# Patient Record
Sex: FEMALE | Race: BLACK OR AFRICAN AMERICAN | Employment: UNEMPLOYED | URBAN - METROPOLITAN AREA
[De-identification: names, ages, dates, MRNs, and addresses within clinical notes are randomized per-mention and may not be internally consistent; named-entity substitution may affect disease eponyms.]

---

## 2017-04-05 ENCOUNTER — HOSPITAL ENCOUNTER (OUTPATIENT)
Dept: PREADMISSION TESTING | Age: 77
Discharge: HOME | End: 2017-04-05
Payer: COMMERCIAL

## 2017-04-05 VITALS
HEIGHT: 67 IN | OXYGEN SATURATION: 88 % | BODY MASS INDEX: 27.7 KG/M2 | SYSTOLIC BLOOD PRESSURE: 114 MMHG | DIASTOLIC BLOOD PRESSURE: 53 MMHG | HEART RATE: 81 BPM | RESPIRATION RATE: 16 BRPM | WEIGHT: 176.5 LBS

## 2017-04-05 LAB
ABSOLUTE EOS #: 0.2 K/UL (ref 0–0.4)
ABSOLUTE LYMPH #: 2.1 K/UL (ref 1–4.8)
ABSOLUTE MONO #: 0.5 K/UL (ref 0.2–0.8)
ANION GAP SERPL CALCULATED.3IONS-SCNC: 11 MMOL/L (ref 9–17)
BASOPHILS # BLD: 0 % (ref 0–2)
BASOPHILS ABSOLUTE: 0 K/UL (ref 0–0.2)
BUN BLDV-MCNC: 23 MG/DL (ref 8–23)
BUN/CREAT BLD: 28 (ref 9–20)
CALCIUM SERPL-MCNC: 9.3 MG/DL (ref 8.6–10.4)
CHLORIDE BLD-SCNC: 101 MMOL/L (ref 98–107)
CO2: 27 MMOL/L (ref 20–31)
CREAT SERPL-MCNC: 0.82 MG/DL (ref 0.5–0.9)
DIFFERENTIAL TYPE: ABNORMAL
EOSINOPHILS RELATIVE PERCENT: 3 % (ref 1–4)
GFR AFRICAN AMERICAN: >60 ML/MIN
GFR NON-AFRICAN AMERICAN: >60 ML/MIN
GFR SERPL CREATININE-BSD FRML MDRD: ABNORMAL ML/MIN/{1.73_M2}
GFR SERPL CREATININE-BSD FRML MDRD: ABNORMAL ML/MIN/{1.73_M2}
GLUCOSE BLD-MCNC: 77 MG/DL (ref 70–99)
HCT VFR BLD CALC: 39.9 % (ref 36–46)
HEMOGLOBIN: 13.3 G/DL (ref 12–16)
LYMPHOCYTES # BLD: 40 % (ref 24–44)
MCH RBC QN AUTO: 30.2 PG (ref 26–34)
MCHC RBC AUTO-ENTMCNC: 33.4 G/DL (ref 31–37)
MCV RBC AUTO: 90.6 FL (ref 80–100)
MONOCYTES # BLD: 9 % (ref 1–7)
PDW BLD-RTO: 14.3 % (ref 11.5–14.5)
PLATELET # BLD: 156 K/UL (ref 130–400)
PLATELET ESTIMATE: ABNORMAL
PMV BLD AUTO: 8.2 FL (ref 6–12)
POTASSIUM SERPL-SCNC: 4.7 MMOL/L (ref 3.7–5.3)
RBC # BLD: 4.41 M/UL (ref 4–5.2)
RBC # BLD: ABNORMAL 10*6/UL
SEG NEUTROPHILS: 48 % (ref 36–66)
SEGMENTED NEUTROPHILS ABSOLUTE COUNT: 2.6 K/UL (ref 1.8–7.7)
SODIUM BLD-SCNC: 139 MMOL/L (ref 135–144)
WBC # BLD: 5.3 K/UL (ref 3.5–11)
WBC # BLD: ABNORMAL 10*3/UL

## 2017-04-05 PROCEDURE — 85025 COMPLETE CBC W/AUTO DIFF WBC: CPT

## 2017-04-05 PROCEDURE — 80048 BASIC METABOLIC PNL TOTAL CA: CPT

## 2017-04-05 PROCEDURE — 36415 COLL VENOUS BLD VENIPUNCTURE: CPT

## 2017-04-05 PROCEDURE — 93005 ELECTROCARDIOGRAM TRACING: CPT

## 2017-04-06 LAB
EKG ATRIAL RATE: 71 BPM
EKG P AXIS: 26 DEGREES
EKG P-R INTERVAL: 192 MS
EKG Q-T INTERVAL: 410 MS
EKG QRS DURATION: 98 MS
EKG QTC CALCULATION (BAZETT): 445 MS
EKG R AXIS: 82 DEGREES
EKG T AXIS: 26 DEGREES
EKG VENTRICULAR RATE: 71 BPM

## 2017-04-17 ENCOUNTER — ANESTHESIA EVENT (OUTPATIENT)
Dept: OPERATING ROOM | Age: 77
End: 2017-04-17
Payer: COMMERCIAL

## 2017-04-18 ENCOUNTER — HOSPITAL ENCOUNTER (OUTPATIENT)
Age: 77
Setting detail: OUTPATIENT SURGERY
Discharge: HOME | End: 2017-04-18
Attending: PODIATRIST | Admitting: PODIATRIST
Payer: COMMERCIAL

## 2017-04-18 ENCOUNTER — ANESTHESIA (OUTPATIENT)
Dept: OPERATING ROOM | Age: 77
End: 2017-04-18
Payer: COMMERCIAL

## 2017-04-18 VITALS
TEMPERATURE: 97.7 F | HEART RATE: 70 BPM | RESPIRATION RATE: 17 BRPM | DIASTOLIC BLOOD PRESSURE: 53 MMHG | SYSTOLIC BLOOD PRESSURE: 134 MMHG | WEIGHT: 176.37 LBS | HEIGHT: 67 IN | BODY MASS INDEX: 27.68 KG/M2 | OXYGEN SATURATION: 96 %

## 2017-04-18 VITALS — OXYGEN SATURATION: 92 % | SYSTOLIC BLOOD PRESSURE: 103 MMHG | DIASTOLIC BLOOD PRESSURE: 57 MMHG

## 2017-04-18 PROCEDURE — 6360000002 HC RX W HCPCS: Performed by: NURSE ANESTHETIST, CERTIFIED REGISTERED

## 2017-04-18 PROCEDURE — 7100000001 HC PACU RECOVERY - ADDTL 15 MIN: Performed by: PODIATRIST

## 2017-04-18 PROCEDURE — 2580000003 HC RX 258: Performed by: ANESTHESIOLOGY

## 2017-04-18 PROCEDURE — 2500000003 HC RX 250 WO HCPCS: Performed by: NURSE ANESTHETIST, CERTIFIED REGISTERED

## 2017-04-18 PROCEDURE — 7100000000 HC PACU RECOVERY - FIRST 15 MIN: Performed by: PODIATRIST

## 2017-04-18 PROCEDURE — 2500000003 HC RX 250 WO HCPCS: Performed by: PODIATRIST

## 2017-04-18 PROCEDURE — 3600000012 HC SURGERY LEVEL 2 ADDTL 15MIN: Performed by: PODIATRIST

## 2017-04-18 PROCEDURE — 2580000003 HC RX 258: Performed by: NURSE ANESTHETIST, CERTIFIED REGISTERED

## 2017-04-18 PROCEDURE — 2720000010 HC SURG SUPPLY STERILE: Performed by: PODIATRIST

## 2017-04-18 PROCEDURE — 3700000000 HC ANESTHESIA ATTENDED CARE: Performed by: PODIATRIST

## 2017-04-18 PROCEDURE — 3600000002 HC SURGERY LEVEL 2 BASE: Performed by: PODIATRIST

## 2017-04-18 PROCEDURE — 3700000001 HC ADD 15 MINUTES (ANESTHESIA): Performed by: PODIATRIST

## 2017-04-18 RX ORDER — SODIUM CHLORIDE 0.9 % (FLUSH) 0.9 %
10 SYRINGE (ML) INJECTION EVERY 12 HOURS SCHEDULED
Status: DISCONTINUED | OUTPATIENT
Start: 2017-04-18 | End: 2017-04-18 | Stop reason: HOSPADM

## 2017-04-18 RX ORDER — PROPOFOL 10 MG/ML
INJECTION, EMULSION INTRAVENOUS CONTINUOUS PRN
Status: DISCONTINUED | OUTPATIENT
Start: 2017-04-18 | End: 2017-04-18 | Stop reason: SDUPTHER

## 2017-04-18 RX ORDER — FENTANYL CITRATE 50 UG/ML
INJECTION, SOLUTION INTRAMUSCULAR; INTRAVENOUS PRN
Status: DISCONTINUED | OUTPATIENT
Start: 2017-04-18 | End: 2017-04-18 | Stop reason: SDUPTHER

## 2017-04-18 RX ORDER — ONDANSETRON 2 MG/ML
4 INJECTION INTRAMUSCULAR; INTRAVENOUS
Status: DISCONTINUED | OUTPATIENT
Start: 2017-04-18 | End: 2017-04-18 | Stop reason: HOSPADM

## 2017-04-18 RX ORDER — LIDOCAINE HYDROCHLORIDE 10 MG/ML
INJECTION, SOLUTION EPIDURAL; INFILTRATION; INTRACAUDAL; PERINEURAL PRN
Status: DISCONTINUED | OUTPATIENT
Start: 2017-04-18 | End: 2017-04-18 | Stop reason: HOSPADM

## 2017-04-18 RX ORDER — CEFAZOLIN SODIUM 1 G/3ML
INJECTION, POWDER, FOR SOLUTION INTRAMUSCULAR; INTRAVENOUS PRN
Status: DISCONTINUED | OUTPATIENT
Start: 2017-04-18 | End: 2017-04-18 | Stop reason: SDUPTHER

## 2017-04-18 RX ORDER — SODIUM CHLORIDE, SODIUM LACTATE, POTASSIUM CHLORIDE, CALCIUM CHLORIDE 600; 310; 30; 20 MG/100ML; MG/100ML; MG/100ML; MG/100ML
INJECTION, SOLUTION INTRAVENOUS CONTINUOUS
Status: DISCONTINUED | OUTPATIENT
Start: 2017-04-19 | End: 2017-04-18 | Stop reason: HOSPADM

## 2017-04-18 RX ORDER — SODIUM CHLORIDE 9 MG/ML
INJECTION, SOLUTION INTRAVENOUS CONTINUOUS
Status: DISCONTINUED | OUTPATIENT
Start: 2017-04-19 | End: 2017-04-18 | Stop reason: ALTCHOICE

## 2017-04-18 RX ORDER — FENTANYL CITRATE 50 UG/ML
25 INJECTION, SOLUTION INTRAMUSCULAR; INTRAVENOUS EVERY 5 MIN PRN
Status: DISCONTINUED | OUTPATIENT
Start: 2017-04-18 | End: 2017-04-18 | Stop reason: HOSPADM

## 2017-04-18 RX ORDER — LABETALOL HYDROCHLORIDE 5 MG/ML
5 INJECTION, SOLUTION INTRAVENOUS EVERY 10 MIN PRN
Status: DISCONTINUED | OUTPATIENT
Start: 2017-04-18 | End: 2017-04-18 | Stop reason: HOSPADM

## 2017-04-18 RX ORDER — SODIUM CHLORIDE, SODIUM LACTATE, POTASSIUM CHLORIDE, CALCIUM CHLORIDE 600; 310; 30; 20 MG/100ML; MG/100ML; MG/100ML; MG/100ML
INJECTION, SOLUTION INTRAVENOUS CONTINUOUS PRN
Status: DISCONTINUED | OUTPATIENT
Start: 2017-04-18 | End: 2017-04-18 | Stop reason: SDUPTHER

## 2017-04-18 RX ORDER — HYDROMORPHONE HCL 110MG/55ML
0.5 PATIENT CONTROLLED ANALGESIA SYRINGE INTRAVENOUS EVERY 5 MIN PRN
Status: DISCONTINUED | OUTPATIENT
Start: 2017-04-18 | End: 2017-04-18 | Stop reason: HOSPADM

## 2017-04-18 RX ORDER — PROMETHAZINE HYDROCHLORIDE 25 MG/ML
6.25 INJECTION, SOLUTION INTRAMUSCULAR; INTRAVENOUS
Status: DISCONTINUED | OUTPATIENT
Start: 2017-04-18 | End: 2017-04-18 | Stop reason: HOSPADM

## 2017-04-18 RX ORDER — HYDRALAZINE HYDROCHLORIDE 20 MG/ML
5 INJECTION INTRAMUSCULAR; INTRAVENOUS EVERY 10 MIN PRN
Status: DISCONTINUED | OUTPATIENT
Start: 2017-04-18 | End: 2017-04-18 | Stop reason: HOSPADM

## 2017-04-18 RX ORDER — LIDOCAINE HYDROCHLORIDE 20 MG/ML
INJECTION, SOLUTION INFILTRATION; PERINEURAL PRN
Status: DISCONTINUED | OUTPATIENT
Start: 2017-04-18 | End: 2017-04-18 | Stop reason: SDUPTHER

## 2017-04-18 RX ORDER — SODIUM CHLORIDE 0.9 % (FLUSH) 0.9 %
10 SYRINGE (ML) INJECTION PRN
Status: DISCONTINUED | OUTPATIENT
Start: 2017-04-18 | End: 2017-04-18 | Stop reason: HOSPADM

## 2017-04-18 RX ORDER — LIDOCAINE HYDROCHLORIDE 10 MG/ML
1 INJECTION, SOLUTION EPIDURAL; INFILTRATION; INTRACAUDAL; PERINEURAL
Status: DISCONTINUED | OUTPATIENT
Start: 2017-04-19 | End: 2017-04-18 | Stop reason: HOSPADM

## 2017-04-18 RX ADMIN — LIDOCAINE HYDROCHLORIDE 40 MG: 20 INJECTION, SOLUTION INFILTRATION; PERINEURAL at 07:32

## 2017-04-18 RX ADMIN — FENTANYL CITRATE 25 MCG: 50 INJECTION, SOLUTION INTRAMUSCULAR; INTRAVENOUS at 07:32

## 2017-04-18 RX ADMIN — FENTANYL CITRATE 25 MCG: 50 INJECTION, SOLUTION INTRAMUSCULAR; INTRAVENOUS at 07:38

## 2017-04-18 RX ADMIN — FENTANYL CITRATE 25 MCG: 50 INJECTION, SOLUTION INTRAMUSCULAR; INTRAVENOUS at 07:51

## 2017-04-18 RX ADMIN — CEFAZOLIN SODIUM 2000 MG: 1 INJECTION, POWDER, FOR SOLUTION INTRAMUSCULAR; INTRAVENOUS at 07:34

## 2017-04-18 RX ADMIN — SODIUM CHLORIDE, POTASSIUM CHLORIDE, SODIUM LACTATE AND CALCIUM CHLORIDE: 600; 310; 30; 20 INJECTION, SOLUTION INTRAVENOUS at 07:27

## 2017-04-18 RX ADMIN — PROPOFOL 65 MCG/KG/MIN: 10 INJECTION, EMULSION INTRAVENOUS at 07:32

## 2017-04-18 RX ADMIN — FENTANYL CITRATE 25 MCG: 50 INJECTION, SOLUTION INTRAMUSCULAR; INTRAVENOUS at 07:34

## 2017-04-18 RX ADMIN — SODIUM CHLORIDE, POTASSIUM CHLORIDE, SODIUM LACTATE AND CALCIUM CHLORIDE: 600; 310; 30; 20 INJECTION, SOLUTION INTRAVENOUS at 07:25

## 2017-04-18 ASSESSMENT — PAIN - FUNCTIONAL ASSESSMENT: PAIN_FUNCTIONAL_ASSESSMENT: 0-10

## 2017-04-18 ASSESSMENT — PAIN DESCRIPTION - DESCRIPTORS: DESCRIPTORS: OTHER (COMMENT)

## 2017-04-18 ASSESSMENT — PAIN SCALES - GENERAL
PAINLEVEL_OUTOF10: 0
PAINLEVEL_OUTOF10: 0

## 2017-07-06 ENCOUNTER — HOSPITAL ENCOUNTER (INPATIENT)
Age: 77
LOS: 5 days | Discharge: HOME | DRG: 552 | End: 2017-07-11
Attending: EMERGENCY MEDICINE | Admitting: FAMILY MEDICINE
Payer: COMMERCIAL

## 2017-07-06 ENCOUNTER — APPOINTMENT (OUTPATIENT)
Dept: GENERAL RADIOLOGY | Age: 77
DRG: 552 | End: 2017-07-06
Payer: COMMERCIAL

## 2017-07-06 DIAGNOSIS — I50.9 ACUTE CONGESTIVE HEART FAILURE, UNSPECIFIED CONGESTIVE HEART FAILURE TYPE: Primary | ICD-10-CM

## 2017-07-06 DIAGNOSIS — R29.898 WEAKNESS OF RIGHT LOWER EXTREMITY: ICD-10-CM

## 2017-07-06 LAB
ABSOLUTE EOS #: 0.1 K/UL (ref 0–0.4)
ABSOLUTE LYMPH #: 2.5 K/UL (ref 1–4.8)
ABSOLUTE MONO #: 0.8 K/UL (ref 0.2–0.8)
ACETAMINOPHEN LEVEL: <10 UG/ML (ref 10–30)
ANION GAP SERPL CALCULATED.3IONS-SCNC: 12 MMOL/L (ref 9–17)
BASOPHILS # BLD: 0 %
BASOPHILS ABSOLUTE: 0 K/UL (ref 0–0.2)
BNP INTERPRETATION: ABNORMAL
BUN BLDV-MCNC: 16 MG/DL (ref 8–23)
BUN/CREAT BLD: 19 (ref 9–20)
CALCIUM SERPL-MCNC: 8.9 MG/DL (ref 8.6–10.4)
CHLORIDE BLD-SCNC: 102 MMOL/L (ref 98–107)
CO2: 25 MMOL/L (ref 20–31)
CREAT SERPL-MCNC: 0.83 MG/DL (ref 0.5–0.9)
DIFFERENTIAL TYPE: ABNORMAL
EOSINOPHILS RELATIVE PERCENT: 1 %
ETHANOL PERCENT: <0.01 %
ETHANOL: <10 MG/DL
GFR AFRICAN AMERICAN: >60 ML/MIN
GFR NON-AFRICAN AMERICAN: >60 ML/MIN
GFR SERPL CREATININE-BSD FRML MDRD: ABNORMAL ML/MIN/{1.73_M2}
GFR SERPL CREATININE-BSD FRML MDRD: ABNORMAL ML/MIN/{1.73_M2}
GLUCOSE BLD-MCNC: 100 MG/DL (ref 70–99)
HCT VFR BLD CALC: 38.3 % (ref 36–46)
HEMOGLOBIN: 12.9 G/DL (ref 12–16)
LYMPHOCYTES # BLD: 32 %
MCH RBC QN AUTO: 30.5 PG (ref 26–34)
MCHC RBC AUTO-ENTMCNC: 33.7 G/DL (ref 31–37)
MCV RBC AUTO: 90.3 FL (ref 80–100)
MONOCYTES # BLD: 10 %
PDW BLD-RTO: 14.1 % (ref 11.5–14.5)
PLATELET # BLD: 129 K/UL (ref 130–400)
PLATELET ESTIMATE: ABNORMAL
PMV BLD AUTO: 7.3 FL (ref 6–12)
POTASSIUM SERPL-SCNC: 4 MMOL/L (ref 3.7–5.3)
PRO-BNP: 1027 PG/ML
RBC # BLD: 4.24 M/UL (ref 4–5.2)
RBC # BLD: ABNORMAL 10*6/UL
SALICYLATE LEVEL: <1 MG/DL (ref 3–10)
SEG NEUTROPHILS: 57 %
SEGMENTED NEUTROPHILS ABSOLUTE COUNT: 4.4 K/UL (ref 1.8–7.7)
SODIUM BLD-SCNC: 139 MMOL/L (ref 135–144)
TOXIC TRICYCLIC SC,BLOOD: NEGATIVE
TROPONIN INTERP: NORMAL
TROPONIN T: <0.03 NG/ML
WBC # BLD: 7.9 K/UL (ref 3.5–11)
WBC # BLD: ABNORMAL 10*3/UL

## 2017-07-06 PROCEDURE — 71020 XR CHEST STANDARD TWO VW: CPT

## 2017-07-06 PROCEDURE — 87086 URINE CULTURE/COLONY COUNT: CPT

## 2017-07-06 PROCEDURE — 6370000000 HC RX 637 (ALT 250 FOR IP): Performed by: EMERGENCY MEDICINE

## 2017-07-06 PROCEDURE — G0480 DRUG TEST DEF 1-7 CLASSES: HCPCS

## 2017-07-06 PROCEDURE — 1200000000 HC SEMI PRIVATE

## 2017-07-06 PROCEDURE — G0378 HOSPITAL OBSERVATION PER HR: HCPCS

## 2017-07-06 PROCEDURE — 6360000002 HC RX W HCPCS: Performed by: EMERGENCY MEDICINE

## 2017-07-06 PROCEDURE — 85025 COMPLETE CBC W/AUTO DIFF WBC: CPT

## 2017-07-06 PROCEDURE — 80307 DRUG TEST PRSMV CHEM ANLYZR: CPT

## 2017-07-06 PROCEDURE — 93005 ELECTROCARDIOGRAM TRACING: CPT

## 2017-07-06 PROCEDURE — 83880 ASSAY OF NATRIURETIC PEPTIDE: CPT

## 2017-07-06 PROCEDURE — 80048 BASIC METABOLIC PNL TOTAL CA: CPT

## 2017-07-06 PROCEDURE — 99284 EMERGENCY DEPT VISIT MOD MDM: CPT

## 2017-07-06 PROCEDURE — 84484 ASSAY OF TROPONIN QUANT: CPT

## 2017-07-06 RX ORDER — OXYCODONE HYDROCHLORIDE 15 MG/1
15 TABLET ORAL ONCE
Status: COMPLETED | OUTPATIENT
Start: 2017-07-06 | End: 2017-07-06

## 2017-07-06 RX ORDER — GABAPENTIN 300 MG/1
300 CAPSULE ORAL ONCE
Status: COMPLETED | OUTPATIENT
Start: 2017-07-06 | End: 2017-07-06

## 2017-07-06 RX ORDER — CLOPIDOGREL BISULFATE 75 MG/1
75 TABLET ORAL ONCE
Status: COMPLETED | OUTPATIENT
Start: 2017-07-06 | End: 2017-07-06

## 2017-07-06 RX ORDER — FUROSEMIDE 10 MG/ML
40 INJECTION INTRAMUSCULAR; INTRAVENOUS ONCE
Status: COMPLETED | OUTPATIENT
Start: 2017-07-06 | End: 2017-07-06

## 2017-07-06 RX ORDER — DOCUSATE SODIUM 100 MG/1
100 CAPSULE, LIQUID FILLED ORAL ONCE
Status: COMPLETED | OUTPATIENT
Start: 2017-07-06 | End: 2017-07-06

## 2017-07-06 RX ORDER — ASPIRIN 81 MG/1
81 TABLET, CHEWABLE ORAL ONCE
Status: COMPLETED | OUTPATIENT
Start: 2017-07-06 | End: 2017-07-06

## 2017-07-06 RX ADMIN — OXYCODONE HYDROCHLORIDE 15 MG: 15 TABLET ORAL at 22:07

## 2017-07-06 RX ADMIN — GABAPENTIN 300 MG: 300 CAPSULE ORAL at 23:13

## 2017-07-06 RX ADMIN — CLOPIDOGREL BISULFATE 75 MG: 75 TABLET ORAL at 23:13

## 2017-07-06 RX ADMIN — FUROSEMIDE 40 MG: 10 INJECTION, SOLUTION INTRAMUSCULAR; INTRAVENOUS at 22:07

## 2017-07-06 RX ADMIN — ASPIRIN 81 MG 81 MG: 81 TABLET ORAL at 23:14

## 2017-07-06 RX ADMIN — DOCUSATE SODIUM 100 MG: 100 CAPSULE, LIQUID FILLED ORAL at 23:13

## 2017-07-06 ASSESSMENT — ENCOUNTER SYMPTOMS
SORE THROAT: 0
COUGH: 1
TROUBLE SWALLOWING: 0
VOMITING: 0
SHORTNESS OF BREATH: 1
CONSTIPATION: 0
BLOOD IN STOOL: 0
BACK PAIN: 0
PHOTOPHOBIA: 0
ABDOMINAL PAIN: 0
RHINORRHEA: 0
NAUSEA: 0
DIARRHEA: 0
SINUS PRESSURE: 0

## 2017-07-06 ASSESSMENT — PAIN SCALES - GENERAL: PAINLEVEL_OUTOF10: 8

## 2017-07-06 ASSESSMENT — PAIN DESCRIPTION - LOCATION: LOCATION: BACK

## 2017-07-06 ASSESSMENT — PAIN DESCRIPTION - ORIENTATION: ORIENTATION: LOWER

## 2017-07-06 ASSESSMENT — PAIN DESCRIPTION - PAIN TYPE: TYPE: ACUTE PAIN

## 2017-07-07 ENCOUNTER — APPOINTMENT (OUTPATIENT)
Dept: CT IMAGING | Age: 77
DRG: 552 | End: 2017-07-07
Payer: COMMERCIAL

## 2017-07-07 PROBLEM — R26.9 ABNORMAL GAIT: Status: ACTIVE | Noted: 2017-03-20

## 2017-07-07 PROBLEM — M51.36 DDD (DEGENERATIVE DISC DISEASE), LUMBAR: Status: ACTIVE | Noted: 2017-03-20

## 2017-07-07 PROBLEM — H04.129 DRY EYE SYNDROME: Status: ACTIVE | Noted: 2017-03-02

## 2017-07-07 LAB
ABSOLUTE EOS #: 0.1 K/UL (ref 0–0.4)
ABSOLUTE LYMPH #: 2.5 K/UL (ref 1–4.8)
ABSOLUTE MONO #: 0.7 K/UL (ref 0.2–0.8)
ANION GAP SERPL CALCULATED.3IONS-SCNC: 14 MMOL/L (ref 9–17)
BASOPHILS # BLD: 0 %
BASOPHILS ABSOLUTE: 0 K/UL (ref 0–0.2)
BILIRUBIN URINE: NEGATIVE
BUN BLDV-MCNC: 15 MG/DL (ref 8–23)
BUN/CREAT BLD: 18 (ref 9–20)
CALCIUM SERPL-MCNC: 9.1 MG/DL (ref 8.6–10.4)
CHLORIDE BLD-SCNC: 100 MMOL/L (ref 98–107)
CO2: 27 MMOL/L (ref 20–31)
COLOR: YELLOW
COMMENT UA: NORMAL
CREAT SERPL-MCNC: 0.85 MG/DL (ref 0.5–0.9)
DIFFERENTIAL TYPE: NORMAL
EKG ATRIAL RATE: 71 BPM
EKG P AXIS: 11 DEGREES
EKG P-R INTERVAL: 192 MS
EKG Q-T INTERVAL: 418 MS
EKG QRS DURATION: 106 MS
EKG QTC CALCULATION (BAZETT): 454 MS
EKG R AXIS: -44 DEGREES
EKG T AXIS: 4 DEGREES
EKG VENTRICULAR RATE: 71 BPM
EOSINOPHILS RELATIVE PERCENT: 2 %
GFR AFRICAN AMERICAN: >60 ML/MIN
GFR NON-AFRICAN AMERICAN: >60 ML/MIN
GFR SERPL CREATININE-BSD FRML MDRD: ABNORMAL ML/MIN/{1.73_M2}
GFR SERPL CREATININE-BSD FRML MDRD: ABNORMAL ML/MIN/{1.73_M2}
GLUCOSE BLD-MCNC: 122 MG/DL (ref 70–99)
GLUCOSE URINE: NEGATIVE
HCT VFR BLD CALC: 39.4 % (ref 36–46)
HEMOGLOBIN: 13.2 G/DL (ref 12–16)
KETONES, URINE: NEGATIVE
LEUKOCYTE ESTERASE, URINE: NEGATIVE
LYMPHOCYTES # BLD: 35 %
MCH RBC QN AUTO: 30.2 PG (ref 26–34)
MCHC RBC AUTO-ENTMCNC: 33.4 G/DL (ref 31–37)
MCV RBC AUTO: 90.4 FL (ref 80–100)
MONOCYTES # BLD: 9 %
NITRITE, URINE: NEGATIVE
PDW BLD-RTO: 14 % (ref 11.5–14.5)
PH UA: 5.5 (ref 5–8)
PLATELET # BLD: 131 K/UL (ref 130–400)
PLATELET ESTIMATE: NORMAL
PMV BLD AUTO: 7.2 FL (ref 6–12)
POTASSIUM SERPL-SCNC: 3.7 MMOL/L (ref 3.7–5.3)
PROTEIN UA: NEGATIVE
RBC # BLD: 4.36 M/UL (ref 4–5.2)
RBC # BLD: NORMAL 10*6/UL
SEG NEUTROPHILS: 54 %
SEGMENTED NEUTROPHILS ABSOLUTE COUNT: 3.9 K/UL (ref 1.8–7.7)
SODIUM BLD-SCNC: 141 MMOL/L (ref 135–144)
SPECIFIC GRAVITY UA: 1.01 (ref 1–1.03)
TROPONIN INTERP: NORMAL
TROPONIN T: <0.03 NG/ML
TURBIDITY: CLEAR
URINE HGB: NEGATIVE
UROBILINOGEN, URINE: NORMAL
WBC # BLD: 7.3 K/UL (ref 3.5–11)
WBC # BLD: NORMAL 10*3/UL

## 2017-07-07 PROCEDURE — G0378 HOSPITAL OBSERVATION PER HR: HCPCS

## 2017-07-07 PROCEDURE — 97161 PT EVAL LOW COMPLEX 20 MIN: CPT

## 2017-07-07 PROCEDURE — 97530 THERAPEUTIC ACTIVITIES: CPT

## 2017-07-07 PROCEDURE — 80048 BASIC METABOLIC PNL TOTAL CA: CPT

## 2017-07-07 PROCEDURE — 94760 N-INVAS EAR/PLS OXIMETRY 1: CPT

## 2017-07-07 PROCEDURE — 6360000002 HC RX W HCPCS: Performed by: FAMILY MEDICINE

## 2017-07-07 PROCEDURE — 2700000000 HC OXYGEN THERAPY PER DAY

## 2017-07-07 PROCEDURE — G8979 MOBILITY GOAL STATUS: HCPCS

## 2017-07-07 PROCEDURE — 72131 CT LUMBAR SPINE W/O DYE: CPT

## 2017-07-07 PROCEDURE — 97116 GAIT TRAINING THERAPY: CPT

## 2017-07-07 PROCEDURE — 6370000000 HC RX 637 (ALT 250 FOR IP): Performed by: FAMILY MEDICINE

## 2017-07-07 PROCEDURE — 81003 URINALYSIS AUTO W/O SCOPE: CPT

## 2017-07-07 PROCEDURE — 36415 COLL VENOUS BLD VENIPUNCTURE: CPT

## 2017-07-07 PROCEDURE — G8978 MOBILITY CURRENT STATUS: HCPCS

## 2017-07-07 PROCEDURE — 94640 AIRWAY INHALATION TREATMENT: CPT

## 2017-07-07 PROCEDURE — 87186 SC STD MICRODIL/AGAR DIL: CPT

## 2017-07-07 PROCEDURE — 87077 CULTURE AEROBIC IDENTIFY: CPT

## 2017-07-07 PROCEDURE — 2580000003 HC RX 258: Performed by: FAMILY MEDICINE

## 2017-07-07 PROCEDURE — 1200000000 HC SEMI PRIVATE

## 2017-07-07 PROCEDURE — 85025 COMPLETE CBC W/AUTO DIFF WBC: CPT

## 2017-07-07 PROCEDURE — 70450 CT HEAD/BRAIN W/O DYE: CPT

## 2017-07-07 PROCEDURE — 84484 ASSAY OF TROPONIN QUANT: CPT

## 2017-07-07 RX ORDER — ACETAMINOPHEN 325 MG/1
650 TABLET ORAL EVERY 4 HOURS PRN
Status: DISCONTINUED | OUTPATIENT
Start: 2017-07-07 | End: 2017-07-07 | Stop reason: SDUPTHER

## 2017-07-07 RX ORDER — FLUTICASONE PROPIONATE 50 MCG
2 SPRAY, SUSPENSION (ML) NASAL DAILY
Status: DISCONTINUED | OUTPATIENT
Start: 2017-07-07 | End: 2017-07-11 | Stop reason: HOSPADM

## 2017-07-07 RX ORDER — ONDANSETRON 2 MG/ML
4 INJECTION INTRAMUSCULAR; INTRAVENOUS EVERY 6 HOURS PRN
Status: DISCONTINUED | OUTPATIENT
Start: 2017-07-07 | End: 2017-07-11 | Stop reason: HOSPADM

## 2017-07-07 RX ORDER — CETIRIZINE HYDROCHLORIDE 10 MG/1
10 TABLET ORAL DAILY PRN
Status: DISCONTINUED | OUTPATIENT
Start: 2017-07-07 | End: 2017-07-11 | Stop reason: HOSPADM

## 2017-07-07 RX ORDER — SODIUM CHLORIDE 0.9 % (FLUSH) 0.9 %
10 SYRINGE (ML) INJECTION EVERY 12 HOURS SCHEDULED
Status: DISCONTINUED | OUTPATIENT
Start: 2017-07-07 | End: 2017-07-07 | Stop reason: SDUPTHER

## 2017-07-07 RX ORDER — IPRATROPIUM BROMIDE AND ALBUTEROL SULFATE 2.5; .5 MG/3ML; MG/3ML
1 SOLUTION RESPIRATORY (INHALATION) 4 TIMES DAILY PRN
Status: DISCONTINUED | OUTPATIENT
Start: 2017-07-07 | End: 2017-07-07

## 2017-07-07 RX ORDER — ALBUTEROL SULFATE 2.5 MG/3ML
2.5 SOLUTION RESPIRATORY (INHALATION)
Status: DISCONTINUED | OUTPATIENT
Start: 2017-07-07 | End: 2017-07-07

## 2017-07-07 RX ORDER — DOCUSATE SODIUM 100 MG/1
100 CAPSULE, LIQUID FILLED ORAL 2 TIMES DAILY
Status: DISCONTINUED | OUTPATIENT
Start: 2017-07-07 | End: 2017-07-11 | Stop reason: HOSPADM

## 2017-07-07 RX ORDER — TEMAZEPAM 15 MG/1
15 CAPSULE ORAL NIGHTLY PRN
Status: DISCONTINUED | OUTPATIENT
Start: 2017-07-07 | End: 2017-07-07 | Stop reason: CLARIF

## 2017-07-07 RX ORDER — OXYCODONE HYDROCHLORIDE 15 MG/1
15 TABLET ORAL EVERY 6 HOURS PRN
Status: DISCONTINUED | OUTPATIENT
Start: 2017-07-07 | End: 2017-07-07

## 2017-07-07 RX ORDER — SODIUM CHLORIDE 0.9 % (FLUSH) 0.9 %
10 SYRINGE (ML) INJECTION PRN
Status: DISCONTINUED | OUTPATIENT
Start: 2017-07-07 | End: 2017-07-11 | Stop reason: HOSPADM

## 2017-07-07 RX ORDER — LORAZEPAM 0.5 MG/1
0.5 TABLET ORAL NIGHTLY PRN
Status: DISCONTINUED | OUTPATIENT
Start: 2017-07-07 | End: 2017-07-11 | Stop reason: HOSPADM

## 2017-07-07 RX ORDER — ASPIRIN 81 MG/1
81 TABLET, CHEWABLE ORAL DAILY
Status: DISCONTINUED | OUTPATIENT
Start: 2017-07-07 | End: 2017-07-11 | Stop reason: HOSPADM

## 2017-07-07 RX ORDER — POLYETHYLENE GLYCOL 3350 17 G/17G
17 POWDER, FOR SOLUTION ORAL DAILY
Status: DISCONTINUED | OUTPATIENT
Start: 2017-07-07 | End: 2017-07-07 | Stop reason: CLARIF

## 2017-07-07 RX ORDER — SODIUM CHLORIDE 0.9 % (FLUSH) 0.9 %
10 SYRINGE (ML) INJECTION EVERY 12 HOURS SCHEDULED
Status: DISCONTINUED | OUTPATIENT
Start: 2017-07-07 | End: 2017-07-11 | Stop reason: HOSPADM

## 2017-07-07 RX ORDER — ACETAMINOPHEN 325 MG/1
650 TABLET ORAL EVERY 4 HOURS PRN
Status: DISCONTINUED | OUTPATIENT
Start: 2017-07-07 | End: 2017-07-11 | Stop reason: HOSPADM

## 2017-07-07 RX ORDER — OXYCODONE HYDROCHLORIDE 15 MG/1
15 TABLET ORAL EVERY 4 HOURS PRN
Status: DISCONTINUED | OUTPATIENT
Start: 2017-07-07 | End: 2017-07-11 | Stop reason: HOSPADM

## 2017-07-07 RX ORDER — HYDROCHLOROTHIAZIDE 12.5 MG/1
12.5 CAPSULE, GELATIN COATED ORAL DAILY
Status: DISCONTINUED | OUTPATIENT
Start: 2017-07-07 | End: 2017-07-11 | Stop reason: HOSPADM

## 2017-07-07 RX ORDER — CLOPIDOGREL BISULFATE 75 MG/1
75 TABLET ORAL DAILY
Status: DISCONTINUED | OUTPATIENT
Start: 2017-07-07 | End: 2017-07-11 | Stop reason: HOSPADM

## 2017-07-07 RX ORDER — ALBUTEROL SULFATE 2.5 MG/3ML
2.5 SOLUTION RESPIRATORY (INHALATION) EVERY 6 HOURS PRN
Status: DISCONTINUED | OUTPATIENT
Start: 2017-07-07 | End: 2017-07-11 | Stop reason: HOSPADM

## 2017-07-07 RX ORDER — SODIUM CHLORIDE 0.9 % (FLUSH) 0.9 %
10 SYRINGE (ML) INJECTION PRN
Status: DISCONTINUED | OUTPATIENT
Start: 2017-07-07 | End: 2017-07-07 | Stop reason: SDUPTHER

## 2017-07-07 RX ORDER — GABAPENTIN 300 MG/1
600 CAPSULE ORAL 3 TIMES DAILY
Status: DISCONTINUED | OUTPATIENT
Start: 2017-07-07 | End: 2017-07-11 | Stop reason: HOSPADM

## 2017-07-07 RX ORDER — GABAPENTIN 400 MG/1
800 CAPSULE ORAL 3 TIMES DAILY
Status: ON HOLD | COMMUNITY
End: 2017-07-10 | Stop reason: HOSPADM

## 2017-07-07 RX ORDER — ALBUTEROL SULFATE 90 UG/1
2 AEROSOL, METERED RESPIRATORY (INHALATION) EVERY 6 HOURS PRN
Status: DISCONTINUED | OUTPATIENT
Start: 2017-07-07 | End: 2017-07-11 | Stop reason: HOSPADM

## 2017-07-07 RX ORDER — IBANDRONATE SODIUM 150 MG/1
150 TABLET, FILM COATED ORAL
Status: DISCONTINUED | OUTPATIENT
Start: 2017-07-07 | End: 2017-07-07 | Stop reason: RX

## 2017-07-07 RX ORDER — POLYETHYLENE GLYCOL 3350 17 G/17G
17 POWDER, FOR SOLUTION ORAL DAILY
Status: DISCONTINUED | OUTPATIENT
Start: 2017-07-07 | End: 2017-07-11 | Stop reason: HOSPADM

## 2017-07-07 RX ADMIN — DOCUSATE SODIUM 100 MG: 100 CAPSULE, LIQUID FILLED ORAL at 22:12

## 2017-07-07 RX ADMIN — ASPIRIN 81 MG 81 MG: 81 TABLET ORAL at 08:21

## 2017-07-07 RX ADMIN — POLYETHYLENE GLYCOL 3350 17 G: 17 POWDER, FOR SOLUTION ORAL at 08:23

## 2017-07-07 RX ADMIN — CLOPIDOGREL BISULFATE 75 MG: 75 TABLET ORAL at 08:22

## 2017-07-07 RX ADMIN — OXYCODONE HYDROCHLORIDE 15 MG: 15 TABLET ORAL at 22:12

## 2017-07-07 RX ADMIN — MOMETASONE FUROATE AND FORMOTEROL FUMARATE DIHYDRATE 2 PUFF: 200; 5 AEROSOL RESPIRATORY (INHALATION) at 20:59

## 2017-07-07 RX ADMIN — GABAPENTIN 600 MG: 300 CAPSULE ORAL at 13:37

## 2017-07-07 RX ADMIN — ENOXAPARIN SODIUM 40 MG: 40 INJECTION SUBCUTANEOUS at 08:22

## 2017-07-07 RX ADMIN — TIOTROPIUM BROMIDE INHALATION SPRAY 2 PUFF: 3.12 SPRAY, METERED RESPIRATORY (INHALATION) at 13:53

## 2017-07-07 RX ADMIN — DOCUSATE SODIUM 100 MG: 100 CAPSULE, LIQUID FILLED ORAL at 08:22

## 2017-07-07 RX ADMIN — Medication 10 ML: at 08:23

## 2017-07-07 RX ADMIN — OXYCODONE HYDROCHLORIDE 15 MG: 15 TABLET ORAL at 08:22

## 2017-07-07 RX ADMIN — MAGNESIUM HYDROXIDE 30 ML: 400 SUSPENSION ORAL at 18:46

## 2017-07-07 RX ADMIN — HYDROCHLOROTHIAZIDE 12.5 MG: 12.5 CAPSULE ORAL at 08:21

## 2017-07-07 RX ADMIN — Medication 10 ML: at 22:13

## 2017-07-07 RX ADMIN — GABAPENTIN 600 MG: 300 CAPSULE ORAL at 08:22

## 2017-07-07 RX ADMIN — OXYCODONE HYDROCHLORIDE 15 MG: 15 TABLET ORAL at 17:53

## 2017-07-07 RX ADMIN — ALBUTEROL SULFATE 2.5 MG: 2.5 SOLUTION RESPIRATORY (INHALATION) at 04:46

## 2017-07-07 RX ADMIN — OXYCODONE HYDROCHLORIDE 15 MG: 15 TABLET ORAL at 13:33

## 2017-07-07 RX ADMIN — GABAPENTIN 600 MG: 300 CAPSULE ORAL at 22:11

## 2017-07-07 ASSESSMENT — PAIN DESCRIPTION - FREQUENCY
FREQUENCY: INTERMITTENT

## 2017-07-07 ASSESSMENT — PAIN DESCRIPTION - PAIN TYPE
TYPE: ACUTE PAIN;CHRONIC PAIN
TYPE: CHRONIC PAIN
TYPE: ACUTE PAIN
TYPE: ACUTE PAIN;CHRONIC PAIN

## 2017-07-07 ASSESSMENT — PAIN SCALES - GENERAL
PAINLEVEL_OUTOF10: 8
PAINLEVEL_OUTOF10: 7
PAINLEVEL_OUTOF10: 8
PAINLEVEL_OUTOF10: 10
PAINLEVEL_OUTOF10: 0
PAINLEVEL_OUTOF10: 8
PAINLEVEL_OUTOF10: 3
PAINLEVEL_OUTOF10: 0
PAINLEVEL_OUTOF10: 10
PAINLEVEL_OUTOF10: 7

## 2017-07-07 ASSESSMENT — PAIN DESCRIPTION - DESCRIPTORS
DESCRIPTORS: ACHING
DESCRIPTORS: ACHING
DESCRIPTORS: ACHING;DISCOMFORT;SORE

## 2017-07-07 ASSESSMENT — PAIN DESCRIPTION - ORIENTATION
ORIENTATION: RIGHT
ORIENTATION: LOWER

## 2017-07-07 ASSESSMENT — PAIN DESCRIPTION - ONSET: ONSET: GRADUAL

## 2017-07-07 ASSESSMENT — PAIN DESCRIPTION - LOCATION
LOCATION: BACK
LOCATION: GROIN
LOCATION: BACK
LOCATION: BACK

## 2017-07-07 ASSESSMENT — PAIN DESCRIPTION - PROGRESSION: CLINICAL_PROGRESSION: NOT CHANGED

## 2017-07-08 LAB
CULTURE: ABNORMAL
CULTURE: ABNORMAL
Lab: ABNORMAL
ORGANISM: ABNORMAL
SPECIMEN DESCRIPTION: ABNORMAL
SPECIMEN DESCRIPTION: ABNORMAL
STATUS: ABNORMAL

## 2017-07-08 PROCEDURE — 90670 PCV13 VACCINE IM: CPT | Performed by: FAMILY MEDICINE

## 2017-07-08 PROCEDURE — 97116 GAIT TRAINING THERAPY: CPT

## 2017-07-08 PROCEDURE — G0009 ADMIN PNEUMOCOCCAL VACCINE: HCPCS | Performed by: FAMILY MEDICINE

## 2017-07-08 PROCEDURE — 6370000000 HC RX 637 (ALT 250 FOR IP): Performed by: FAMILY MEDICINE

## 2017-07-08 PROCEDURE — G0378 HOSPITAL OBSERVATION PER HR: HCPCS

## 2017-07-08 PROCEDURE — 1200000000 HC SEMI PRIVATE

## 2017-07-08 PROCEDURE — 2700000000 HC OXYGEN THERAPY PER DAY

## 2017-07-08 PROCEDURE — 2580000003 HC RX 258: Performed by: FAMILY MEDICINE

## 2017-07-08 PROCEDURE — 97530 THERAPEUTIC ACTIVITIES: CPT

## 2017-07-08 PROCEDURE — 94640 AIRWAY INHALATION TREATMENT: CPT

## 2017-07-08 PROCEDURE — 93005 ELECTROCARDIOGRAM TRACING: CPT

## 2017-07-08 PROCEDURE — 6360000002 HC RX W HCPCS: Performed by: FAMILY MEDICINE

## 2017-07-08 RX ADMIN — GABAPENTIN 600 MG: 300 CAPSULE ORAL at 22:09

## 2017-07-08 RX ADMIN — GABAPENTIN 600 MG: 300 CAPSULE ORAL at 08:32

## 2017-07-08 RX ADMIN — MOMETASONE FUROATE AND FORMOTEROL FUMARATE DIHYDRATE 2 PUFF: 200; 5 AEROSOL RESPIRATORY (INHALATION) at 10:03

## 2017-07-08 RX ADMIN — GABAPENTIN 600 MG: 300 CAPSULE ORAL at 13:16

## 2017-07-08 RX ADMIN — HYDROCHLOROTHIAZIDE 12.5 MG: 12.5 CAPSULE ORAL at 08:32

## 2017-07-08 RX ADMIN — OXYCODONE HYDROCHLORIDE 15 MG: 15 TABLET ORAL at 17:57

## 2017-07-08 RX ADMIN — ASPIRIN 81 MG 81 MG: 81 TABLET ORAL at 08:32

## 2017-07-08 RX ADMIN — CLOPIDOGREL BISULFATE 75 MG: 75 TABLET ORAL at 08:32

## 2017-07-08 RX ADMIN — PNEUMOCOCCAL 13-VALENT CONJUGATE VACCINE 0.5 ML: 2.2; 2.2; 2.2; 2.2; 2.2; 4.4; 2.2; 2.2; 2.2; 2.2; 2.2; 2.2; 2.2 INJECTION, SUSPENSION INTRAMUSCULAR at 15:23

## 2017-07-08 RX ADMIN — DOCUSATE SODIUM 100 MG: 100 CAPSULE, LIQUID FILLED ORAL at 22:09

## 2017-07-08 RX ADMIN — POLYETHYLENE GLYCOL 3350 17 G: 17 POWDER, FOR SOLUTION ORAL at 08:32

## 2017-07-08 RX ADMIN — TIOTROPIUM BROMIDE INHALATION SPRAY 2 PUFF: 3.12 SPRAY, METERED RESPIRATORY (INHALATION) at 10:03

## 2017-07-08 RX ADMIN — MOMETASONE FUROATE AND FORMOTEROL FUMARATE DIHYDRATE 2 PUFF: 200; 5 AEROSOL RESPIRATORY (INHALATION) at 21:01

## 2017-07-08 RX ADMIN — DOCUSATE SODIUM 100 MG: 100 CAPSULE, LIQUID FILLED ORAL at 08:32

## 2017-07-08 RX ADMIN — MAGNESIUM HYDROXIDE 30 ML: 400 SUSPENSION ORAL at 08:31

## 2017-07-08 RX ADMIN — OXYCODONE HYDROCHLORIDE 15 MG: 15 TABLET ORAL at 08:31

## 2017-07-08 RX ADMIN — OXYCODONE HYDROCHLORIDE 15 MG: 15 TABLET ORAL at 13:15

## 2017-07-08 RX ADMIN — OXYCODONE HYDROCHLORIDE 15 MG: 15 TABLET ORAL at 22:09

## 2017-07-08 RX ADMIN — Medication 10 ML: at 13:16

## 2017-07-08 RX ADMIN — ENOXAPARIN SODIUM 40 MG: 40 INJECTION SUBCUTANEOUS at 08:31

## 2017-07-08 ASSESSMENT — PAIN SCALES - GENERAL
PAINLEVEL_OUTOF10: 4
PAINLEVEL_OUTOF10: 3
PAINLEVEL_OUTOF10: 2
PAINLEVEL_OUTOF10: 8
PAINLEVEL_OUTOF10: 8
PAINLEVEL_OUTOF10: 9
PAINLEVEL_OUTOF10: 3
PAINLEVEL_OUTOF10: 0
PAINLEVEL_OUTOF10: 5
PAINLEVEL_OUTOF10: 8
PAINLEVEL_OUTOF10: 8

## 2017-07-08 ASSESSMENT — PAIN DESCRIPTION - LOCATION
LOCATION: BACK
LOCATION: HIP
LOCATION: BACK
LOCATION: HIP
LOCATION: BACK

## 2017-07-08 ASSESSMENT — PAIN DESCRIPTION - ORIENTATION
ORIENTATION: LOWER
ORIENTATION: RIGHT
ORIENTATION: RIGHT
ORIENTATION: LOWER
ORIENTATION: LOWER

## 2017-07-08 ASSESSMENT — PAIN DESCRIPTION - PAIN TYPE
TYPE: ACUTE PAIN;CHRONIC PAIN
TYPE: ACUTE PAIN
TYPE: ACUTE PAIN;CHRONIC PAIN

## 2017-07-08 ASSESSMENT — PAIN DESCRIPTION - ONSET: ONSET: GRADUAL

## 2017-07-08 ASSESSMENT — PAIN DESCRIPTION - DESCRIPTORS
DESCRIPTORS: ACHING
DESCRIPTORS: ACHING

## 2017-07-08 ASSESSMENT — PAIN DESCRIPTION - FREQUENCY
FREQUENCY: INTERMITTENT

## 2017-07-09 PROCEDURE — G8988 SELF CARE GOAL STATUS: HCPCS

## 2017-07-09 PROCEDURE — G8987 SELF CARE CURRENT STATUS: HCPCS

## 2017-07-09 PROCEDURE — 97530 THERAPEUTIC ACTIVITIES: CPT

## 2017-07-09 PROCEDURE — 6370000000 HC RX 637 (ALT 250 FOR IP): Performed by: FAMILY MEDICINE

## 2017-07-09 PROCEDURE — 1200000000 HC SEMI PRIVATE

## 2017-07-09 PROCEDURE — 97535 SELF CARE MNGMENT TRAINING: CPT

## 2017-07-09 PROCEDURE — 6360000002 HC RX W HCPCS: Performed by: FAMILY MEDICINE

## 2017-07-09 PROCEDURE — 2580000003 HC RX 258: Performed by: FAMILY MEDICINE

## 2017-07-09 PROCEDURE — G0378 HOSPITAL OBSERVATION PER HR: HCPCS

## 2017-07-09 PROCEDURE — 94760 N-INVAS EAR/PLS OXIMETRY 1: CPT

## 2017-07-09 PROCEDURE — 97167 OT EVAL HIGH COMPLEX 60 MIN: CPT

## 2017-07-09 PROCEDURE — 2700000000 HC OXYGEN THERAPY PER DAY

## 2017-07-09 PROCEDURE — 94640 AIRWAY INHALATION TREATMENT: CPT

## 2017-07-09 RX ADMIN — OXYCODONE HYDROCHLORIDE 15 MG: 15 TABLET ORAL at 07:49

## 2017-07-09 RX ADMIN — OXYCODONE HYDROCHLORIDE 15 MG: 15 TABLET ORAL at 11:57

## 2017-07-09 RX ADMIN — ENOXAPARIN SODIUM 40 MG: 40 INJECTION SUBCUTANEOUS at 07:48

## 2017-07-09 RX ADMIN — GABAPENTIN 600 MG: 300 CAPSULE ORAL at 15:35

## 2017-07-09 RX ADMIN — DOCUSATE SODIUM 100 MG: 100 CAPSULE, LIQUID FILLED ORAL at 07:49

## 2017-07-09 RX ADMIN — ASPIRIN 81 MG 81 MG: 81 TABLET ORAL at 07:49

## 2017-07-09 RX ADMIN — HYDROCHLOROTHIAZIDE 12.5 MG: 12.5 CAPSULE ORAL at 07:49

## 2017-07-09 RX ADMIN — MAGNESIUM HYDROXIDE 30 ML: 400 SUSPENSION ORAL at 11:26

## 2017-07-09 RX ADMIN — DOCUSATE SODIUM 100 MG: 100 CAPSULE, LIQUID FILLED ORAL at 20:48

## 2017-07-09 RX ADMIN — Medication 10 ML: at 23:51

## 2017-07-09 RX ADMIN — TIOTROPIUM BROMIDE INHALATION SPRAY 2 PUFF: 3.12 SPRAY, METERED RESPIRATORY (INHALATION) at 09:49

## 2017-07-09 RX ADMIN — Medication 10 ML: at 07:50

## 2017-07-09 RX ADMIN — POLYETHYLENE GLYCOL 3350 17 G: 17 POWDER, FOR SOLUTION ORAL at 07:49

## 2017-07-09 RX ADMIN — CLOPIDOGREL BISULFATE 75 MG: 75 TABLET ORAL at 07:49

## 2017-07-09 RX ADMIN — GABAPENTIN 600 MG: 300 CAPSULE ORAL at 07:49

## 2017-07-09 RX ADMIN — OXYCODONE HYDROCHLORIDE 15 MG: 15 TABLET ORAL at 22:14

## 2017-07-09 RX ADMIN — OXYCODONE HYDROCHLORIDE 15 MG: 15 TABLET ORAL at 17:49

## 2017-07-09 RX ADMIN — MOMETASONE FUROATE AND FORMOTEROL FUMARATE DIHYDRATE 2 PUFF: 200; 5 AEROSOL RESPIRATORY (INHALATION) at 09:49

## 2017-07-09 RX ADMIN — HYDROMORPHONE HYDROCHLORIDE 0.5 MG: 1 INJECTION, SOLUTION INTRAMUSCULAR; INTRAVENOUS; SUBCUTANEOUS at 23:50

## 2017-07-09 RX ADMIN — MOMETASONE FUROATE AND FORMOTEROL FUMARATE DIHYDRATE 2 PUFF: 200; 5 AEROSOL RESPIRATORY (INHALATION) at 20:29

## 2017-07-09 ASSESSMENT — PAIN DESCRIPTION - LOCATION
LOCATION: GROIN;HIP
LOCATION: HIP;BACK
LOCATION: BACK;HIP
LOCATION: HIP;BACK

## 2017-07-09 ASSESSMENT — PAIN DESCRIPTION - PAIN TYPE
TYPE: ACUTE PAIN;CHRONIC PAIN
TYPE: CHRONIC PAIN

## 2017-07-09 ASSESSMENT — PAIN DESCRIPTION - ONSET: ONSET: GRADUAL

## 2017-07-09 ASSESSMENT — PAIN SCALES - GENERAL
PAINLEVEL_OUTOF10: 10
PAINLEVEL_OUTOF10: 6
PAINLEVEL_OUTOF10: 2
PAINLEVEL_OUTOF10: 8
PAINLEVEL_OUTOF10: 3
PAINLEVEL_OUTOF10: 0
PAINLEVEL_OUTOF10: 8
PAINLEVEL_OUTOF10: 10
PAINLEVEL_OUTOF10: 8

## 2017-07-09 ASSESSMENT — PAIN DESCRIPTION - PROGRESSION: CLINICAL_PROGRESSION: NOT CHANGED

## 2017-07-09 ASSESSMENT — PAIN DESCRIPTION - DESCRIPTORS
DESCRIPTORS: ACHING;SORE
DESCRIPTORS: ACHING;DISCOMFORT;RADIATING

## 2017-07-09 ASSESSMENT — PAIN DESCRIPTION - ORIENTATION
ORIENTATION: RIGHT
ORIENTATION: RIGHT;LOWER

## 2017-07-09 ASSESSMENT — PAIN DESCRIPTION - DIRECTION: RADIATING_TOWARDS: LEGS

## 2017-07-09 ASSESSMENT — PAIN DESCRIPTION - FREQUENCY: FREQUENCY: INTERMITTENT

## 2017-07-10 LAB
EKG ATRIAL RATE: 60 BPM
EKG P AXIS: 60 DEGREES
EKG P-R INTERVAL: 202 MS
EKG Q-T INTERVAL: 472 MS
EKG QRS DURATION: 86 MS
EKG QTC CALCULATION (BAZETT): 472 MS
EKG R AXIS: -36 DEGREES
EKG T AXIS: 15 DEGREES
EKG VENTRICULAR RATE: 60 BPM

## 2017-07-10 PROCEDURE — 2580000003 HC RX 258: Performed by: FAMILY MEDICINE

## 2017-07-10 PROCEDURE — 1200000000 HC SEMI PRIVATE

## 2017-07-10 PROCEDURE — G0378 HOSPITAL OBSERVATION PER HR: HCPCS

## 2017-07-10 PROCEDURE — 97110 THERAPEUTIC EXERCISES: CPT

## 2017-07-10 PROCEDURE — 97535 SELF CARE MNGMENT TRAINING: CPT | Performed by: NURSE PRACTITIONER

## 2017-07-10 PROCEDURE — 97530 THERAPEUTIC ACTIVITIES: CPT

## 2017-07-10 PROCEDURE — 6360000002 HC RX W HCPCS: Performed by: FAMILY MEDICINE

## 2017-07-10 PROCEDURE — 97530 THERAPEUTIC ACTIVITIES: CPT | Performed by: NURSE PRACTITIONER

## 2017-07-10 PROCEDURE — 6370000000 HC RX 637 (ALT 250 FOR IP): Performed by: FAMILY MEDICINE

## 2017-07-10 PROCEDURE — 94640 AIRWAY INHALATION TREATMENT: CPT

## 2017-07-10 PROCEDURE — 97116 GAIT TRAINING THERAPY: CPT

## 2017-07-10 RX ADMIN — OXYCODONE HYDROCHLORIDE 15 MG: 15 TABLET ORAL at 04:49

## 2017-07-10 RX ADMIN — DOCUSATE SODIUM 100 MG: 100 CAPSULE, LIQUID FILLED ORAL at 20:49

## 2017-07-10 RX ADMIN — GABAPENTIN 600 MG: 300 CAPSULE ORAL at 20:48

## 2017-07-10 RX ADMIN — GABAPENTIN 600 MG: 300 CAPSULE ORAL at 09:50

## 2017-07-10 RX ADMIN — TIOTROPIUM BROMIDE INHALATION SPRAY 2 PUFF: 3.12 SPRAY, METERED RESPIRATORY (INHALATION) at 20:51

## 2017-07-10 RX ADMIN — OXYCODONE HYDROCHLORIDE 15 MG: 15 TABLET ORAL at 20:49

## 2017-07-10 RX ADMIN — CLOPIDOGREL BISULFATE 75 MG: 75 TABLET ORAL at 09:50

## 2017-07-10 RX ADMIN — ENOXAPARIN SODIUM 40 MG: 40 INJECTION SUBCUTANEOUS at 09:50

## 2017-07-10 RX ADMIN — MOMETASONE FUROATE AND FORMOTEROL FUMARATE DIHYDRATE 2 PUFF: 200; 5 AEROSOL RESPIRATORY (INHALATION) at 20:42

## 2017-07-10 RX ADMIN — OXYCODONE HYDROCHLORIDE 15 MG: 15 TABLET ORAL at 09:50

## 2017-07-10 RX ADMIN — ASPIRIN 81 MG 81 MG: 81 TABLET ORAL at 09:50

## 2017-07-10 RX ADMIN — DOCUSATE SODIUM 100 MG: 100 CAPSULE, LIQUID FILLED ORAL at 09:50

## 2017-07-10 RX ADMIN — GABAPENTIN 600 MG: 300 CAPSULE ORAL at 14:43

## 2017-07-10 RX ADMIN — HYDROCHLOROTHIAZIDE 12.5 MG: 12.5 CAPSULE ORAL at 09:50

## 2017-07-10 RX ADMIN — Medication 10 ML: at 10:04

## 2017-07-10 RX ADMIN — POLYETHYLENE GLYCOL 3350 17 G: 17 POWDER, FOR SOLUTION ORAL at 17:54

## 2017-07-10 RX ADMIN — Medication 10 ML: at 20:49

## 2017-07-10 RX ADMIN — OXYCODONE HYDROCHLORIDE 15 MG: 15 TABLET ORAL at 14:45

## 2017-07-10 ASSESSMENT — PAIN DESCRIPTION - ORIENTATION
ORIENTATION: RIGHT;LOWER
ORIENTATION: RIGHT;POSTERIOR
ORIENTATION: RIGHT;POSTERIOR
ORIENTATION: RIGHT

## 2017-07-10 ASSESSMENT — PAIN DESCRIPTION - PAIN TYPE
TYPE: CHRONIC PAIN
TYPE: ACUTE PAIN;CHRONIC PAIN

## 2017-07-10 ASSESSMENT — PAIN DESCRIPTION - DIRECTION: RADIATING_TOWARDS: LEGS

## 2017-07-10 ASSESSMENT — PAIN SCALES - GENERAL
PAINLEVEL_OUTOF10: 5
PAINLEVEL_OUTOF10: 7
PAINLEVEL_OUTOF10: 7
PAINLEVEL_OUTOF10: 10
PAINLEVEL_OUTOF10: 3
PAINLEVEL_OUTOF10: 3
PAINLEVEL_OUTOF10: 4
PAINLEVEL_OUTOF10: 10
PAINLEVEL_OUTOF10: 9

## 2017-07-10 ASSESSMENT — PAIN DESCRIPTION - DESCRIPTORS
DESCRIPTORS: SHARP
DESCRIPTORS: SHARP
DESCRIPTORS: SHARP;ACHING

## 2017-07-10 ASSESSMENT — PAIN DESCRIPTION - LOCATION
LOCATION: HIP
LOCATION: HIP
LOCATION: BACK;HIP
LOCATION: HIP
LOCATION: BACK;HIP;GROIN;LEG
LOCATION: HIP

## 2017-07-10 ASSESSMENT — PAIN DESCRIPTION - PROGRESSION: CLINICAL_PROGRESSION: NOT CHANGED

## 2017-07-10 ASSESSMENT — PAIN DESCRIPTION - FREQUENCY
FREQUENCY: CONTINUOUS
FREQUENCY: CONTINUOUS

## 2017-07-10 ASSESSMENT — PAIN DESCRIPTION - ONSET: ONSET: ON-GOING

## 2017-07-11 VITALS
RESPIRATION RATE: 16 BRPM | DIASTOLIC BLOOD PRESSURE: 63 MMHG | HEART RATE: 77 BPM | WEIGHT: 180.4 LBS | TEMPERATURE: 97.9 F | BODY MASS INDEX: 28.31 KG/M2 | OXYGEN SATURATION: 95 % | SYSTOLIC BLOOD PRESSURE: 119 MMHG | HEIGHT: 67 IN

## 2017-07-11 PROCEDURE — 6370000000 HC RX 637 (ALT 250 FOR IP): Performed by: FAMILY MEDICINE

## 2017-07-11 PROCEDURE — 6360000002 HC RX W HCPCS: Performed by: FAMILY MEDICINE

## 2017-07-11 PROCEDURE — G0378 HOSPITAL OBSERVATION PER HR: HCPCS

## 2017-07-11 RX ADMIN — GABAPENTIN 600 MG: 300 CAPSULE ORAL at 09:46

## 2017-07-11 RX ADMIN — DOCUSATE SODIUM 100 MG: 100 CAPSULE, LIQUID FILLED ORAL at 09:46

## 2017-07-11 RX ADMIN — CLOPIDOGREL BISULFATE 75 MG: 75 TABLET ORAL at 09:46

## 2017-07-11 RX ADMIN — GABAPENTIN 600 MG: 300 CAPSULE ORAL at 13:48

## 2017-07-11 RX ADMIN — HYDROCHLOROTHIAZIDE 12.5 MG: 12.5 CAPSULE ORAL at 09:46

## 2017-07-11 RX ADMIN — ENOXAPARIN SODIUM 40 MG: 40 INJECTION SUBCUTANEOUS at 09:47

## 2017-07-11 RX ADMIN — POLYETHYLENE GLYCOL 3350 17 G: 17 POWDER, FOR SOLUTION ORAL at 09:47

## 2017-07-11 RX ADMIN — OXYCODONE HYDROCHLORIDE 15 MG: 15 TABLET ORAL at 09:46

## 2017-07-11 RX ADMIN — OXYCODONE HYDROCHLORIDE 15 MG: 15 TABLET ORAL at 05:34

## 2017-07-11 RX ADMIN — ASPIRIN 81 MG 81 MG: 81 TABLET ORAL at 09:46

## 2017-07-11 RX ADMIN — OXYCODONE HYDROCHLORIDE 15 MG: 15 TABLET ORAL at 13:50

## 2017-07-11 ASSESSMENT — PAIN SCALES - GENERAL
PAINLEVEL_OUTOF10: 10
PAINLEVEL_OUTOF10: 10
PAINLEVEL_OUTOF10: 5
PAINLEVEL_OUTOF10: 7

## 2017-07-11 ASSESSMENT — PAIN DESCRIPTION - ORIENTATION
ORIENTATION: RIGHT;LOWER
ORIENTATION: RIGHT;LOWER

## 2017-07-11 ASSESSMENT — PAIN DESCRIPTION - FREQUENCY
FREQUENCY: CONTINUOUS
FREQUENCY: CONTINUOUS

## 2017-07-11 ASSESSMENT — PAIN DESCRIPTION - LOCATION
LOCATION: BACK;HIP;GROIN;LEG
LOCATION: BACK;HIP;GROIN;LEG

## 2017-07-11 ASSESSMENT — PAIN DESCRIPTION - PAIN TYPE
TYPE: CHRONIC PAIN
TYPE: CHRONIC PAIN

## 2017-07-11 ASSESSMENT — PAIN DESCRIPTION - DESCRIPTORS
DESCRIPTORS: SHARP;ACHING
DESCRIPTORS: SHARP;ACHING

## 2017-08-01 PROBLEM — M54.17 LUMBOSACRAL RADICULOPATHY: Status: ACTIVE | Noted: 2017-08-01

## 2018-01-17 ENCOUNTER — OFFICE VISIT (OUTPATIENT)
Dept: PODIATRY | Age: 78
End: 2018-01-17
Payer: COMMERCIAL

## 2018-01-17 DIAGNOSIS — M79.604 BILATERAL LOWER EXTREMITY PAIN: Primary | ICD-10-CM

## 2018-01-17 DIAGNOSIS — B35.1 DERMATOPHYTOSIS OF NAIL: ICD-10-CM

## 2018-01-17 DIAGNOSIS — D23.71 BENIGN NEOPLASM OF SKIN OF LOWER LIMB, INCLUDING HIP, RIGHT: ICD-10-CM

## 2018-01-17 DIAGNOSIS — I73.9 PVD (PERIPHERAL VASCULAR DISEASE) (HCC): ICD-10-CM

## 2018-01-17 DIAGNOSIS — M79.605 BILATERAL LOWER EXTREMITY PAIN: Primary | ICD-10-CM

## 2018-01-17 PROCEDURE — 11721 DEBRIDE NAIL 6 OR MORE: CPT | Performed by: PODIATRIST

## 2018-01-17 PROCEDURE — 17110 DESTRUCTION B9 LES UP TO 14: CPT | Performed by: PODIATRIST

## 2018-01-17 NOTE — PROGRESS NOTES
Tramadol Nausea And Vomiting and Nausea Only     Current Outpatient Prescriptions on File Prior to Visit   Medication Sig Dispense Refill    Tiotropium Bromide Monohydrate 2.5 MCG/ACT AERS Inhale 2 puffs into the lungs daily 1 Inhaler 0    oxyCODONE (OXY-IR) 15 MG immediate release tablet Take 15 mg by mouth every 4 hours as needed for Pain      albuterol (PROVENTIL) (2.5 MG/3ML) 0.083% nebulizer solution Take 3 mLs by nebulization every 6 hours as needed for Wheezing. 120 each 3    aspirin 81 MG chewable tablet Take 1 tablet by mouth daily. 30 tablet     tiotropium (SPIRIVA) 18 MCG inhalation capsule Inhale 1 capsule into the lungs daily. 30 capsule 0    hydrochlorothiazide (MICROZIDE) 12.5 MG capsule Take 1 capsule by mouth daily. 30 capsule 0    budesonide-formoterol (SYMBICORT) 160-4.5 MCG/ACT AERO Inhale 2 puffs into the lungs 2 times daily. 1 Inhaler 3    ipratropium-albuterol (DUONEB) 0.5-2.5 (3) MG/3ML SOLN nebulizer solution Inhale 3 mLs into the lungs 4 times daily as needed for Shortness of Breath. 360 mL 0    albuterol (PROVENTIL HFA;VENTOLIN HFA) 108 (90 BASE) MCG/ACT inhaler Inhale 2 puffs into the lungs every 6 hours as needed for Wheezing.  clopidogrel (PLAVIX) 75 MG tablet Take 75 mg by mouth daily.  fluticasone (FLONASE) 50 MCG/ACT nasal spray 2 sprays by Nasal route daily.  cetirizine (ZYRTEC) 10 MG tablet Take 10 mg by mouth daily as needed for Allergies.  polyethylene glycol (GLYCOLAX) powder Take 17 g by mouth daily.  ibandronate (BONIVA) 150 MG tablet Take 150 mg by mouth every 30 days.  docusate sodium (COLACE) 100 MG capsule Take 100 mg by mouth 2 times daily.        Current Facility-Administered Medications on File Prior to Visit   Medication Dose Route Frequency Provider Last Rate Last Dose    0.9 % sodium chloride infusion   Intravenous Continuous Nicol Kaur MD   Stopped at 12/23/16 8832     Review of Systems  Objective:  General: AAO x 3

## 2018-02-12 RX ORDER — AMMONIUM LACTATE 12 G/100G
CREAM TOPICAL
Qty: 385 G | Refills: 3 | Status: SHIPPED | OUTPATIENT
Start: 2018-02-12 | End: 2018-06-04 | Stop reason: SDUPTHER

## 2018-03-12 ENCOUNTER — OFFICE VISIT (OUTPATIENT)
Dept: PODIATRY | Age: 78
End: 2018-03-12
Payer: COMMERCIAL

## 2018-03-12 VITALS — HEIGHT: 67 IN | BODY MASS INDEX: 27.62 KG/M2 | WEIGHT: 176 LBS

## 2018-03-12 DIAGNOSIS — M20.42 HAMMER TOES OF BOTH FEET: ICD-10-CM

## 2018-03-12 DIAGNOSIS — R26.2 DIFFICULTY WALKING: ICD-10-CM

## 2018-03-12 DIAGNOSIS — D23.72 BENIGN NEOPLASM OF SKIN OF FOOT, LEFT: ICD-10-CM

## 2018-03-12 DIAGNOSIS — M20.41 HAMMER TOES OF BOTH FEET: ICD-10-CM

## 2018-03-12 DIAGNOSIS — M79.604 BILATERAL LOWER EXTREMITY PAIN: Primary | ICD-10-CM

## 2018-03-12 DIAGNOSIS — I73.9 PERIPHERAL VASCULAR DISEASE (HCC): ICD-10-CM

## 2018-03-12 DIAGNOSIS — B35.1 DERMATOPHYTOSIS OF NAIL: ICD-10-CM

## 2018-03-12 DIAGNOSIS — M79.605 BILATERAL LOWER EXTREMITY PAIN: Primary | ICD-10-CM

## 2018-03-12 DIAGNOSIS — D23.71 BENIGN NEOPLASM OF SKIN OF LOWER LIMB, INCLUDING HIP, RIGHT: ICD-10-CM

## 2018-03-12 PROBLEM — N31.9 NEUROGENIC BLADDER: Status: ACTIVE | Noted: 2017-07-24

## 2018-03-12 PROBLEM — M62.81 GENERALIZED MUSCLE WEAKNESS: Status: ACTIVE | Noted: 2017-09-14

## 2018-03-12 PROBLEM — R55 SYNCOPE AND COLLAPSE: Status: ACTIVE | Noted: 2017-11-16

## 2018-03-12 PROCEDURE — 17110 DESTRUCTION B9 LES UP TO 14: CPT | Performed by: PODIATRIST

## 2018-03-12 PROCEDURE — 11721 DEBRIDE NAIL 6 OR MORE: CPT | Performed by: PODIATRIST

## 2018-03-12 NOTE — PROGRESS NOTES
absent, Bilateral    Neurological: Sensation diminshed to light touch to level of digits, Bilateral.  Protective sensation intact 6/10 sites via 5.07/10g [place]ev Monofilament, Bilateral.  negative Tinel's, Bilateral.  negative Valleix sign, Bilateral.      Integument: Warm, dry, supple, Bilateral.  Benign skin neoplasm, left 5th digit, left 2nd digit, right medial 1st MPJ. Open lesion absent, Bilateral.  Interdigital maceration absent to web spaces 4, Bilateral.  Nails 1-5 left and 1-5 right thickened > 3.0 mm, dystrophic and crumbly, discolored with subungual debris. Fissures absent, Bilateral.     Assessment:  65 y.o. female with:   1. Bilateral lower extremity pain  80189 - [place] DEBRIDEMENT OF NAILS, 6 OR MORE   2. Dermatophytosis of nail  51708 - [place] DEBRIDEMENT OF NAILS, 6 OR MORE   3. Peripheral vascular disease (HCC)  20368 - [place] DEBRIDEMENT OF NAILS, 6 OR MORE   4. Benign neoplasm of skin of lower limb, including hip, right  48640 - [place] DESTRUCTION BENIGN LESIONS UP TO 14         Plan:   Pt was evaluated and examined. Patient was given personalized discharge instructions. Nails 1-10 were debrided in length and thickness sharply with a nail nipper and  without incident. Pt will follow up in 2 months or sooner if any problems arise. Diagnosis was discussed with the pt and all of their questions were answered in detail. Proper foot hygiene and care was discussed with the pt. Patient to check feet daily and contact the office with any questions/problems/concerns. Other comorbidity noted and will be managed by PCP. Pain waiver discussed with patient and confirmed. 3/12/2018    The lesions were partially debrided and silver nitrate was applied with an apperature pad under occlusion. The patient will leave in place for 24-48 hours and than remove. The patient tolerated the procedure well and without complication.    Pt will follow up in 2 months         Electronically signed by Dina Nielsen DPM on 3/12/2018 at 1:44 PM  3/12/2018

## 2018-05-08 ENCOUNTER — OFFICE VISIT (OUTPATIENT)
Dept: PODIATRY | Age: 78
End: 2018-05-08
Payer: COMMERCIAL

## 2018-05-08 VITALS — HEIGHT: 66 IN | RESPIRATION RATE: 16 BRPM | BODY MASS INDEX: 28.28 KG/M2 | WEIGHT: 176 LBS | HEART RATE: 68 BPM

## 2018-05-08 DIAGNOSIS — M20.42 HAMMER TOES OF BOTH FEET: ICD-10-CM

## 2018-05-08 DIAGNOSIS — M20.41 HAMMER TOES OF BOTH FEET: ICD-10-CM

## 2018-05-08 DIAGNOSIS — D23.72 BENIGN NEOPLASM OF SKIN OF LOWER LIMB, INCLUDING HIP, LEFT: ICD-10-CM

## 2018-05-08 DIAGNOSIS — B35.1 DERMATOPHYTOSIS OF NAIL: ICD-10-CM

## 2018-05-08 DIAGNOSIS — R26.2 DIFFICULTY WALKING: ICD-10-CM

## 2018-05-08 DIAGNOSIS — D23.71 BENIGN NEOPLASM OF SKIN OF LOWER LIMB, INCLUDING HIP, RIGHT: ICD-10-CM

## 2018-05-08 DIAGNOSIS — M79.672 PAIN IN BOTH FEET: Primary | ICD-10-CM

## 2018-05-08 DIAGNOSIS — M79.671 PAIN IN BOTH FEET: Primary | ICD-10-CM

## 2018-05-08 DIAGNOSIS — I73.9 PERIPHERAL VASCULAR DISEASE (HCC): ICD-10-CM

## 2018-05-08 PROBLEM — Z99.81 SUPPLEMENTAL OXYGEN DEPENDENT: Status: ACTIVE | Noted: 2018-05-01

## 2018-05-08 PROBLEM — R04.0 EPISTAXIS: Status: ACTIVE | Noted: 2018-02-05

## 2018-05-08 PROBLEM — J20.8 ACUTE BRONCHITIS DUE TO OTHER SPECIFIED ORGANISMS: Status: ACTIVE | Noted: 2018-05-01

## 2018-05-08 PROBLEM — G56.03 BILATERAL CARPAL TUNNEL SYNDROME: Status: ACTIVE | Noted: 2018-02-12

## 2018-05-08 PROCEDURE — 17110 DESTRUCTION B9 LES UP TO 14: CPT | Performed by: PODIATRIST

## 2018-05-08 PROCEDURE — 11721 DEBRIDE NAIL 6 OR MORE: CPT | Performed by: PODIATRIST

## 2018-06-04 RX ORDER — AMMONIUM LACTATE 12 G/100G
CREAM TOPICAL
Qty: 385 G | Refills: 0 | Status: SHIPPED | OUTPATIENT
Start: 2018-06-04 | End: 2018-07-02 | Stop reason: SDUPTHER

## 2018-07-03 RX ORDER — AMMONIUM LACTATE 12 %
CREAM (GRAM) TOPICAL
Qty: 420 G | Refills: 0 | Status: SHIPPED | OUTPATIENT
Start: 2018-07-03 | End: 2018-07-30 | Stop reason: SDUPTHER

## 2018-07-12 ENCOUNTER — OFFICE VISIT (OUTPATIENT)
Dept: PODIATRY | Age: 78
End: 2018-07-12
Payer: COMMERCIAL

## 2018-07-12 VITALS — HEART RATE: 68 BPM | BODY MASS INDEX: 28.28 KG/M2 | WEIGHT: 176 LBS | RESPIRATION RATE: 16 BRPM | HEIGHT: 66 IN

## 2018-07-12 DIAGNOSIS — D23.72 BENIGN NEOPLASM OF SKIN OF LOWER LIMB, INCLUDING HIP, LEFT: ICD-10-CM

## 2018-07-12 DIAGNOSIS — B35.1 DERMATOPHYTOSIS OF NAIL: Primary | ICD-10-CM

## 2018-07-12 DIAGNOSIS — D23.71 BENIGN NEOPLASM OF SKIN OF LOWER LIMB, INCLUDING HIP, RIGHT: ICD-10-CM

## 2018-07-12 DIAGNOSIS — I73.9 PVD (PERIPHERAL VASCULAR DISEASE) (HCC): ICD-10-CM

## 2018-07-12 DIAGNOSIS — M79.672 PAIN IN BOTH FEET: ICD-10-CM

## 2018-07-12 DIAGNOSIS — M79.671 PAIN IN BOTH FEET: ICD-10-CM

## 2018-07-12 PROCEDURE — 17110 DESTRUCTION B9 LES UP TO 14: CPT | Performed by: PODIATRIST

## 2018-07-12 PROCEDURE — 11721 DEBRIDE NAIL 6 OR MORE: CPT | Performed by: PODIATRIST

## 2018-07-12 NOTE — PROGRESS NOTES
[facility] PHYSICIANS  [facility] [address]  [address] [facility]   Dept: [phone]  Dept Fax: [phone]    NAIL PAIN PROGRESS NOTE  Date of patient's visit: 7/12/2018  Patient's Name:  Benjamin Tubbs YOB: 1940            Patient Care Team:  Amina Rivera MD as PCP - Kirsite Espinal MD as Consulting Physician (Cardiology)  Renae Schofield DPM as Physician (Podiatry)      Chief Complaint   Patient presents with    Nail Problem    Foot Pain       Subjective: This Benjamin Tubbs comes to clinic for foot and nail care. Pt currently has complaint of thickened, painful, elongated nails that he/she cannot manage by themselves. Pt. Relates pain to nails with shoe gear. Pt's primary care physician is Amina Rivera MD.  Pt also relates to painful spots to the bottom of both of her feet that hurt when she is putting weight on it.    Past Medical History:   Diagnosis Date    Aortic valve disease     Pt. denies    Arthritis     COPD (chronic obstructive pulmonary disease) ([address]     Disease of blood and blood forming organ     Diverticulitis     GERD (gastroesophageal reflux disease)     History of blood transfusion     History of gastritis     Lumbar disc disease     Movement disorder     Nerve disease, peripheral ([address] 11/17/2015    Osteoporosis     Pneumonia 11/27/2015    Unspecified cerebral artery occlusion with cerebral infarction     TIA's       Allergies   Allergen Reactions    Anti-Inflammatory Enzyme [Nutritional Supplements]      Patient states allergy to anti inflammatories    Ceclor [Cefaclor]      Doesn't remember    Morphine Other (See Comments)     Urinary retention    Nsaids      Patient states allergy to anti-inflammatories    Penicillins     Propoxyphene     Skelaxin [Metaxalone]     Sulfa Antibiotics Hives    Tadalafil     Tolmetin      Patient states allergy to anti-inflammatories  Patient states allergy to anti-inflammatories  Patient states allergy to anti-inflammatories    Alendronate Nausea And Vomiting and Nausea Only    Asa [Aspirin] Other (See Comments)     Hx diverticulitis. Can't T  Take regular aspirin 325mg but can take baby aspirin 81mg    Erythromycin Nausea And Vomiting and Nausea Only    Temazepam Nausea And Vomiting and Nausea Only    Tetracyclines & Related Nausea And Vomiting    Tramadol Nausea And Vomiting and Nausea Only     Current Outpatient Prescriptions on File Prior to Visit   Medication Sig Dispense Refill    FRANCISCA-HYDROLAC 12 12 % cream APPLY TO AFFECTED AREA DAILY 420 g 0    Tiotropium Bromide Monohydrate 2.5 MCG/ACT AERS Inhale 2 puffs into the lungs daily 1 Inhaler 0    oxyCODONE (OXY-IR) 15 MG immediate release tablet Take 15 mg by mouth every 4 hours as needed for Pain      albuterol (PROVENTIL) (2.5 MG/3ML) 0.083% nebulizer solution Take 3 mLs by nebulization every 6 hours as needed for Wheezing. 120 each 3    aspirin 81 MG chewable tablet Take 1 tablet by mouth daily. 30 tablet     tiotropium (SPIRIVA) 18 MCG inhalation capsule Inhale 1 capsule into the lungs daily. 30 capsule 0    hydrochlorothiazide (MICROZIDE) 12.5 MG capsule Take 1 capsule by mouth daily. 30 capsule 0    budesonide-formoterol (SYMBICORT) 160-4.5 MCG/ACT AERO Inhale 2 puffs into the lungs 2 times daily. 1 Inhaler 3    ipratropium-albuterol (DUONEB) 0.5-2.5 (3) MG/3ML SOLN nebulizer solution Inhale 3 mLs into the lungs 4 times daily as needed for Shortness of Breath. 360 mL 0    albuterol (PROVENTIL HFA;VENTOLIN HFA) 108 (90 BASE) MCG/ACT inhaler Inhale 2 puffs into the lungs every 6 hours as needed for Wheezing.  clopidogrel (PLAVIX) 75 MG tablet Take 75 mg by mouth daily.  fluticasone (FLONASE) 50 MCG/ACT nasal spray 2 sprays by Nasal route daily.  cetirizine (ZYRTEC) 10 MG tablet Take 10 mg by mouth daily as needed for Allergies.       polyethylene glycol (GLYCOLAX) powder Take 17 g by mouth daily.  ibandronate (BONIVA) 150 MG tablet Take 150 mg by mouth every 30 days.  docusate sodium (COLACE) 100 MG capsule Take 100 mg by mouth 2 times daily. Current Facility-Administered Medications on File Prior to Visit   Medication Dose Route Frequency Provider Last Rate Last Dose    0.9 % sodium chloride infusion   Intravenous Continuous Martha Aranda MD   Stopped at 12/23/16 7848     Review of Systems. Review of Systems - History obtained from chart review and the patient  General ROS: negative for - chills, fatigue, fever, night sweats or weight gain  Constitutional: Negative for chills, diaphoresis, fatigue, fever and unexpected weight change. Musculoskeletal: Positive for arthralgias, gait problem and joint swelling. Neurological ROS: negative for - behavioral changes, confusion, headaches or seizures. Negative for weakness and numbness. Dermatological ROS: negative for - mole changes, rash  Cardiovascular: Negative for leg swelling. Gastrointestinal: Negative for constipation, diarrhea, nausea and vomiting. Objective:  General: AAO x 3 in NAD.     Derm  Toenail Description  Sites of Onychomycosis Involvement (Check affected area)  [x] [x] [x] [x] [x] [x] [x] [x] [x] [x]  5 4 3 2 1 1 2 3 4 5                          Right                                        Left    Thickness  [x] [x] [x] [x] [x] [x] [x] [x] [x] [x]  5 4 3 2 1 1 2 3 4 5                         Right                                        Left    Dystrophic Changes   [x] [x] [x] [x] [x] [x] [x] [x] [x] [x]  5 4 3 2 1 1 2 3 4 5                         Right                                        Left    Color  [x] [x] [x] [x] [x] [x] [x] [x] [x] [x]  5 4 3 2 1 1 2 3 4 5                          Right                                        Left    Incurvation/Ingrowin   [] [] [] [] [] [] [] [] [] []  5 4 3 2 1 1 2 3 4 5                         Right Left    Inflammation/Pain   [x] [x] [x] [x] [x] [x] [x] [x] [x] [x]  5 4 3  2 1 1 2 3 4 5                         Right                                        Left       Nails are painful 1-10 with direct palpation. Dermatologic Exam:  Skin lesion present to the right and left plantar foot with a central core and petchaie noted to the lesions periphery. Pain on palpation of the lesion      Musculoskeletal:     1st MPJ ROM decreased, Bilateral.  Muscle strength 5/5, Bilateral.  Pain present upon palpation of toenails 1-5, Bilateral. decreased medial longitudinal arch, Bilateral.  Ankle ROM decreased,Bilateral.    Dorsally contracted digits present digits 2, Bilateral.     Vascular: DP and PT pulses palpable 1/4, Bilateral.  CFT <5 seconds, Bilateral.  Hair growth absent to the level of the digits, Bilateral.  Edema present, Bilateral.  Varicosities absent, Bilateral. Erythema absent, Bilateral    Neurological: Sensation diminshed to light touch to level of digits, Bilateral.  Protective sensation intact 6/10 sites via 5.07/10g [place]-Kev Monofilament, Bilateral.  negative Tinel's, Bilateral.  negative Valleix sign, Bilateral.      Integument: Warm, dry, supple, Bilateral.  Open lesion absent, Bilateral.  Interdigital maceration absent to web spaces 4, Bilateral.  Nails 1-5 left and 1-5 right thickened > 3.0 mm, dystrophic and crumbly, discolored with subungual debris. Fissures absent, Bilateral.     Assessment:  65 y.o. female with:    Diagnosis Orders   1. Dermatophytosis of nail  71388 - [place] DEBRIDEMENT OF NAILS, 6 OR MORE   2. PVD (peripheral vascular disease) ([facility]  92163 - [place] DEBRIDEMENT OF NAILS, 6 OR MORE    36375 - [place] DESTRUCTION BENIGN LESIONS UP TO 14   3. Pain in both feet  63671 - [place] DEBRIDEMENT OF NAILS, 6 OR MORE    87325 - [place] DESTRUCTION BENIGN LESIONS UP TO 14   4. Benign neoplasm of skin of lower limb, including hip, right  01675 - [place] DESTRUCTION BENIGN LESIONS UP TO 14   5.  Benign

## 2018-07-30 RX ORDER — AMMONIUM LACTATE 12 %
CREAM (GRAM) TOPICAL
Qty: 420 G | Refills: 0 | Status: SHIPPED | OUTPATIENT
Start: 2018-07-30

## 2018-09-13 ENCOUNTER — OFFICE VISIT (OUTPATIENT)
Dept: PODIATRY | Age: 78
End: 2018-09-13
Payer: COMMERCIAL

## 2018-09-13 VITALS — HEIGHT: 66 IN | RESPIRATION RATE: 16 BRPM | BODY MASS INDEX: 28.77 KG/M2 | WEIGHT: 179 LBS

## 2018-09-13 DIAGNOSIS — I73.9 PVD (PERIPHERAL VASCULAR DISEASE) (HCC): ICD-10-CM

## 2018-09-13 DIAGNOSIS — B35.1 DERMATOPHYTOSIS OF NAIL: Primary | ICD-10-CM

## 2018-09-13 DIAGNOSIS — M79.672 PAIN IN BOTH FEET: ICD-10-CM

## 2018-09-13 DIAGNOSIS — D23.71 BENIGN NEOPLASM OF SKIN OF LOWER LIMB, INCLUDING HIP, RIGHT: ICD-10-CM

## 2018-09-13 DIAGNOSIS — D23.72 BENIGN NEOPLASM OF SKIN OF LOWER LIMB, INCLUDING HIP, LEFT: ICD-10-CM

## 2018-09-13 DIAGNOSIS — M79.671 PAIN IN BOTH FEET: ICD-10-CM

## 2018-09-13 PROCEDURE — 11721 DEBRIDE NAIL 6 OR MORE: CPT | Performed by: PODIATRIST

## 2018-09-13 PROCEDURE — 17110 DESTRUCTION B9 LES UP TO 14: CPT | Performed by: PODIATRIST

## 2018-09-13 NOTE — PROGRESS NOTES
[facility] PHYSICIANS  [facility] [address]  [address] [facility]   Dept: [phone]  Dept Fax: [phone]    NAIL PAIN PROGRESS NOTE  Date of patient's visit: 9/13/2018  Patient's Name:  Rhoda Batista YOB: 1940            Patient Care Team:  Sonido Dior MD as PCP - Kelsie Florence MD as Consulting Physician (Cardiology)  Álvaro Giles DPM as Physician (Podiatry)      No chief complaint on file. Subjective: This Rhoda Batista comes to clinic for foot and nail care. Pt currently has complaint of thickened, painful, elongated nails that he/she cannot manage by themselves. Pt. Relates pain to nails with shoe gear.   Pt's primary care physician is Sonido Dior MD.  Past Medical History:   Diagnosis Date    Aortic valve disease     Pt. denies    Arthritis     COPD (chronic obstructive pulmonary disease) ([address]     Disease of blood and blood forming organ     Diverticulitis     GERD (gastroesophageal reflux disease)     History of blood transfusion     History of gastritis     Lumbar disc disease     Movement disorder     Nerve disease, peripheral 11/17/2015    Osteoporosis     Pneumonia 11/27/2015    Unspecified cerebral artery occlusion with cerebral infarction     TIA's       Allergies   Allergen Reactions    Anti-Inflammatory Enzyme [Nutritional Supplements]      Patient states allergy to anti inflammatories    Ceclor [Cefaclor]      Doesn't remember    Morphine Other (See Comments)     Urinary retention    Nsaids      Patient states allergy to anti-inflammatories    Penicillins     Propoxyphene     Skelaxin [Metaxalone]     Sulfa Antibiotics Hives    Tadalafil     Tolmetin      Patient states allergy to anti-inflammatories  Patient states allergy to anti-inflammatories  Patient states allergy to anti-inflammatories    Alendronate Nausea And Vomiting and Nausea Only    Asa [Aspirin] Other (See Comments) times daily. Current Facility-Administered Medications on File Prior to Visit   Medication Dose Route Frequency Provider Last Rate Last Dose    0.9 % sodium chloride infusion   Intravenous Continuous Amor Patterson MD   Stopped at 12/23/16 9425     Review of Systems. Review of Systems - History obtained from chart review and the patient  General ROS: negative for - chills, fatigue, fever, night sweats or weight gain  Constitutional: Negative for chills, diaphoresis, fatigue, fever and unexpected weight change. Musculoskeletal: Positive for arthralgias, gait problem and joint swelling. Neurological ROS: negative for - behavioral changes, confusion, headaches or seizures. Negative for weakness and numbness. Dermatological ROS: negative for - mole changes, rash  Cardiovascular: Negative for leg swelling. Gastrointestinal: Negative for constipation, diarrhea, nausea and vomiting. Objective:  General: AAO x 3 in NAD.     Derm  Toenail Description  Sites of Onychomycosis Involvement (Check affected area)  [x] [x] [x] [x] [x] [x] [x] [x] [x] [x]  5 4 3 2 1 1 2 3 4 5                          Right                                        Left    Thickness  [x] [x] [x] [x] [x] [x] [x] [x] [x] [x]  5 4 3 2 1 1 2 3 4 5                         Right                                        Left    Dystrophic Changes   [x] [x] [x] [x] [x] [x] [x] [x] [x] [x]  5 4 3 2 1 1 2 3 4 5                         Right                                        Left    Color  [x] [x] [x] [x] [x] [x] [x] [x] [x] [x]  5 4 3 2 1 1 2 3 4 5                          Right                                        Left    Incurvation/Ingrowin   [] [] [] [] [] [] [] [] [] []  5 4 3 2 1 1 2 3 4 5                         Right                                        Left    Inflammation/Pain   [x] [x] [x] [x] [x] [x] [x] [x] [x] [x]  5 4 3  2 1 1 2 3 4 5                         Right                                        Left and than remove. The patient tolerated the procedure well and without complication. Nails 1-10 were debrided in length and thickness sharply with a nail nipper and  without incident. Pt will follow up in 9 weeks or sooner if any problems arise. Diagnosis was discussed with the pt and all of their questions were answered in detail. Proper foot hygiene and care was discussed with the pt. Patient to check feet daily and contact the office with any questions/problems/concerns. Other comorbidity noted and will be managed by PCP. Pain waiver discussed with patient and confirmed.    9/13/2018      Electronically signed by Gisell Lei DPM on 9/13/2018 at 2:12 PM  9/13/2018

## 2018-11-16 ENCOUNTER — HOSPITAL ENCOUNTER (INPATIENT)
Age: 78
LOS: 8 days | Discharge: NURSING, REHAB OR LONG-TERM CARE | DRG: 189 | End: 2018-11-24
Attending: EMERGENCY MEDICINE | Admitting: FAMILY MEDICINE
Payer: COMMERCIAL

## 2018-11-16 ENCOUNTER — APPOINTMENT (OUTPATIENT)
Dept: GENERAL RADIOLOGY | Age: 78
DRG: 189 | End: 2018-11-16
Payer: COMMERCIAL

## 2018-11-16 DIAGNOSIS — J81.0 ACUTE PULMONARY EDEMA (HCC): Primary | ICD-10-CM

## 2018-11-16 DIAGNOSIS — M51.36 DDD (DEGENERATIVE DISC DISEASE), LUMBAR: ICD-10-CM

## 2018-11-16 LAB
ABSOLUTE EOS #: 0.1 K/UL (ref 0–0.4)
ABSOLUTE IMMATURE GRANULOCYTE: ABNORMAL K/UL (ref 0–0.3)
ABSOLUTE LYMPH #: 1.9 K/UL (ref 1–4.8)
ABSOLUTE MONO #: 0.5 K/UL (ref 0.2–0.8)
ANION GAP SERPL CALCULATED.3IONS-SCNC: 12 MMOL/L (ref 9–17)
BASOPHILS # BLD: 1 % (ref 0–2)
BASOPHILS ABSOLUTE: 0 K/UL (ref 0–0.2)
BNP INTERPRETATION: NORMAL
BUN BLDV-MCNC: 20 MG/DL (ref 8–23)
BUN/CREAT BLD: 25 (ref 9–20)
CALCIUM SERPL-MCNC: 9.5 MG/DL (ref 8.6–10.4)
CHLORIDE BLD-SCNC: 103 MMOL/L (ref 98–107)
CO2: 24 MMOL/L (ref 20–31)
CREAT SERPL-MCNC: 0.79 MG/DL (ref 0.5–0.9)
DIFFERENTIAL TYPE: ABNORMAL
EKG ATRIAL RATE: 91 BPM
EKG P AXIS: 3 DEGREES
EKG P-R INTERVAL: 172 MS
EKG Q-T INTERVAL: 394 MS
EKG QRS DURATION: 98 MS
EKG QTC CALCULATION (BAZETT): 484 MS
EKG R AXIS: -42 DEGREES
EKG T AXIS: 24 DEGREES
EKG VENTRICULAR RATE: 91 BPM
EOSINOPHILS RELATIVE PERCENT: 2 % (ref 1–4)
GFR AFRICAN AMERICAN: >60 ML/MIN
GFR NON-AFRICAN AMERICAN: >60 ML/MIN
GFR SERPL CREATININE-BSD FRML MDRD: ABNORMAL ML/MIN/{1.73_M2}
GFR SERPL CREATININE-BSD FRML MDRD: ABNORMAL ML/MIN/{1.73_M2}
GLUCOSE BLD-MCNC: 97 MG/DL (ref 70–99)
HCT VFR BLD CALC: 38.2 % (ref 36–46)
HEMOGLOBIN: 12.6 G/DL (ref 12–16)
IMMATURE GRANULOCYTES: ABNORMAL %
LYMPHOCYTES # BLD: 31 % (ref 24–44)
MCH RBC QN AUTO: 30.7 PG (ref 26–34)
MCHC RBC AUTO-ENTMCNC: 33.1 G/DL (ref 31–37)
MCV RBC AUTO: 92.8 FL (ref 80–100)
MONOCYTES # BLD: 8 % (ref 1–7)
MYOGLOBIN: 63 NG/ML (ref 25–58)
NRBC AUTOMATED: ABNORMAL PER 100 WBC
PDW BLD-RTO: 13.5 % (ref 11.5–14.5)
PLATELET # BLD: 192 K/UL (ref 130–400)
PLATELET ESTIMATE: ABNORMAL
PMV BLD AUTO: ABNORMAL FL (ref 6–12)
POTASSIUM SERPL-SCNC: 3.9 MMOL/L (ref 3.7–5.3)
PRO-BNP: 217 PG/ML
RBC # BLD: 4.11 M/UL (ref 4–5.2)
RBC # BLD: ABNORMAL 10*6/UL
SEG NEUTROPHILS: 58 % (ref 36–66)
SEGMENTED NEUTROPHILS ABSOLUTE COUNT: 3.8 K/UL (ref 1.8–7.7)
SODIUM BLD-SCNC: 139 MMOL/L (ref 135–144)
TROPONIN INTERP: ABNORMAL
TROPONIN T: <0.03 NG/ML
WBC # BLD: 6.3 K/UL (ref 3.5–11)
WBC # BLD: ABNORMAL 10*3/UL

## 2018-11-16 PROCEDURE — 96375 TX/PRO/DX INJ NEW DRUG ADDON: CPT

## 2018-11-16 PROCEDURE — 96374 THER/PROPH/DIAG INJ IV PUSH: CPT

## 2018-11-16 PROCEDURE — 93005 ELECTROCARDIOGRAM TRACING: CPT

## 2018-11-16 PROCEDURE — 83880 ASSAY OF NATRIURETIC PEPTIDE: CPT

## 2018-11-16 PROCEDURE — 85025 COMPLETE CBC W/AUTO DIFF WBC: CPT

## 2018-11-16 PROCEDURE — 2060000000 HC ICU INTERMEDIATE R&B

## 2018-11-16 PROCEDURE — 6360000002 HC RX W HCPCS: Performed by: EMERGENCY MEDICINE

## 2018-11-16 PROCEDURE — 80048 BASIC METABOLIC PNL TOTAL CA: CPT

## 2018-11-16 PROCEDURE — 87086 URINE CULTURE/COLONY COUNT: CPT

## 2018-11-16 PROCEDURE — 94761 N-INVAS EAR/PLS OXIMETRY MLT: CPT

## 2018-11-16 PROCEDURE — 71045 X-RAY EXAM CHEST 1 VIEW: CPT

## 2018-11-16 PROCEDURE — 94640 AIRWAY INHALATION TREATMENT: CPT

## 2018-11-16 PROCEDURE — 83874 ASSAY OF MYOGLOBIN: CPT

## 2018-11-16 PROCEDURE — 81003 URINALYSIS AUTO W/O SCOPE: CPT

## 2018-11-16 PROCEDURE — 94664 DEMO&/EVAL PT USE INHALER: CPT

## 2018-11-16 PROCEDURE — 99285 EMERGENCY DEPT VISIT HI MDM: CPT

## 2018-11-16 PROCEDURE — 84484 ASSAY OF TROPONIN QUANT: CPT

## 2018-11-16 RX ORDER — ACETAMINOPHEN 325 MG/1
650 TABLET ORAL EVERY 4 HOURS PRN
Status: DISCONTINUED | OUTPATIENT
Start: 2018-11-16 | End: 2018-11-24 | Stop reason: HOSPADM

## 2018-11-16 RX ORDER — FUROSEMIDE 10 MG/ML
40 INJECTION INTRAMUSCULAR; INTRAVENOUS ONCE
Status: COMPLETED | OUTPATIENT
Start: 2018-11-16 | End: 2018-11-16

## 2018-11-16 RX ORDER — PREDNISONE 10 MG/1
10 TABLET ORAL DAILY
Status: DISCONTINUED | OUTPATIENT
Start: 2018-11-17 | End: 2018-11-17

## 2018-11-16 RX ORDER — OXYCODONE HYDROCHLORIDE AND ACETAMINOPHEN 5; 325 MG/1; MG/1
1 TABLET ORAL EVERY 4 HOURS PRN
Status: ON HOLD | COMMUNITY
End: 2018-11-24

## 2018-11-16 RX ORDER — DIPHENHYDRAMINE HCL 25 MG
12.5 TABLET ORAL NIGHTLY PRN
Status: ON HOLD | COMMUNITY
End: 2018-11-24 | Stop reason: HOSPADM

## 2018-11-16 RX ORDER — PYRIDOXINE HCL (VITAMIN B6) 100 MG
3 TABLET ORAL DAILY
COMMUNITY

## 2018-11-16 RX ORDER — IPRATROPIUM BROMIDE AND ALBUTEROL SULFATE 2.5; .5 MG/3ML; MG/3ML
1 SOLUTION RESPIRATORY (INHALATION)
Status: DISCONTINUED | OUTPATIENT
Start: 2018-11-16 | End: 2018-11-17

## 2018-11-16 RX ORDER — FLUTICASONE PROPIONATE 50 MCG
2 SPRAY, SUSPENSION (ML) NASAL DAILY
Status: DISCONTINUED | OUTPATIENT
Start: 2018-11-17 | End: 2018-11-24 | Stop reason: HOSPADM

## 2018-11-16 RX ORDER — ONDANSETRON 2 MG/ML
4 INJECTION INTRAMUSCULAR; INTRAVENOUS EVERY 6 HOURS PRN
Status: DISCONTINUED | OUTPATIENT
Start: 2018-11-16 | End: 2018-11-17 | Stop reason: SDUPTHER

## 2018-11-16 RX ORDER — BISACODYL 10 MG
10 SUPPOSITORY, RECTAL RECTAL DAILY PRN
Status: DISCONTINUED | OUTPATIENT
Start: 2018-11-16 | End: 2018-11-24 | Stop reason: HOSPADM

## 2018-11-16 RX ORDER — SODIUM CHLORIDE 0.9 % (FLUSH) 0.9 %
10 SYRINGE (ML) INJECTION EVERY 12 HOURS SCHEDULED
Status: DISCONTINUED | OUTPATIENT
Start: 2018-11-17 | End: 2018-11-24 | Stop reason: HOSPADM

## 2018-11-16 RX ORDER — SODIUM CHLORIDE 0.9 % (FLUSH) 0.9 %
10 SYRINGE (ML) INJECTION PRN
Status: DISCONTINUED | OUTPATIENT
Start: 2018-11-16 | End: 2018-11-24 | Stop reason: HOSPADM

## 2018-11-16 RX ORDER — DOCUSATE SODIUM 100 MG/1
100 CAPSULE, LIQUID FILLED ORAL 2 TIMES DAILY
Status: DISCONTINUED | OUTPATIENT
Start: 2018-11-17 | End: 2018-11-17 | Stop reason: SDUPTHER

## 2018-11-16 RX ORDER — CARVEDILOL 3.12 MG/1
3.12 TABLET ORAL 2 TIMES DAILY WITH MEALS
Status: DISCONTINUED | OUTPATIENT
Start: 2018-11-17 | End: 2018-11-17

## 2018-11-16 RX ORDER — GABAPENTIN 600 MG/1
600 TABLET ORAL 3 TIMES DAILY
Status: ON HOLD | COMMUNITY
End: 2019-10-15 | Stop reason: HOSPADM

## 2018-11-16 RX ORDER — METHYLPREDNISOLONE SODIUM SUCCINATE 125 MG/2ML
125 INJECTION, POWDER, LYOPHILIZED, FOR SOLUTION INTRAMUSCULAR; INTRAVENOUS ONCE
Status: COMPLETED | OUTPATIENT
Start: 2018-11-16 | End: 2018-11-16

## 2018-11-16 RX ORDER — FUROSEMIDE 10 MG/ML
40 INJECTION INTRAMUSCULAR; INTRAVENOUS DAILY
Status: DISCONTINUED | OUTPATIENT
Start: 2018-11-17 | End: 2018-11-17

## 2018-11-16 RX ORDER — LEVALBUTEROL INHALATION SOLUTION 1.25 MG/3ML
1 SOLUTION RESPIRATORY (INHALATION) EVERY 8 HOURS PRN
COMMUNITY

## 2018-11-16 RX ORDER — DOCUSATE SODIUM 100 MG/1
100 CAPSULE, LIQUID FILLED ORAL 2 TIMES DAILY
Status: DISCONTINUED | OUTPATIENT
Start: 2018-11-17 | End: 2018-11-24 | Stop reason: HOSPADM

## 2018-11-16 RX ORDER — NICOTINE 21 MG/24HR
1 PATCH, TRANSDERMAL 24 HOURS TRANSDERMAL DAILY PRN
Status: DISCONTINUED | OUTPATIENT
Start: 2018-11-16 | End: 2018-11-24 | Stop reason: HOSPADM

## 2018-11-16 RX ORDER — POLYETHYLENE GLYCOL 3350 17 G/17G
17 POWDER, FOR SOLUTION ORAL DAILY
Status: DISCONTINUED | OUTPATIENT
Start: 2018-11-17 | End: 2018-11-17

## 2018-11-16 RX ORDER — ALBUTEROL SULFATE 2.5 MG/3ML
5 SOLUTION RESPIRATORY (INHALATION)
Status: DISCONTINUED | OUTPATIENT
Start: 2018-11-16 | End: 2018-11-16

## 2018-11-16 RX ORDER — GABAPENTIN 300 MG/1
600 CAPSULE ORAL 2 TIMES DAILY
Status: DISCONTINUED | OUTPATIENT
Start: 2018-11-17 | End: 2018-11-17

## 2018-11-16 RX ORDER — PREDNISONE 10 MG/1
10 TABLET ORAL DAILY
Status: ON HOLD | COMMUNITY
End: 2018-11-17

## 2018-11-16 RX ORDER — ALBUTEROL SULFATE 2.5 MG/3ML
2.5 SOLUTION RESPIRATORY (INHALATION) EVERY 6 HOURS PRN
Status: DISCONTINUED | OUTPATIENT
Start: 2018-11-16 | End: 2018-11-24 | Stop reason: HOSPADM

## 2018-11-16 RX ORDER — LISINOPRIL 5 MG/1
5 TABLET ORAL DAILY
Status: DISCONTINUED | OUTPATIENT
Start: 2018-11-17 | End: 2018-11-17

## 2018-11-16 RX ORDER — IPRATROPIUM BROMIDE AND ALBUTEROL SULFATE 2.5; .5 MG/3ML; MG/3ML
1 SOLUTION RESPIRATORY (INHALATION) 4 TIMES DAILY PRN
Status: DISCONTINUED | OUTPATIENT
Start: 2018-11-16 | End: 2018-11-17

## 2018-11-16 RX ORDER — ALBUTEROL SULFATE 90 UG/1
2 AEROSOL, METERED RESPIRATORY (INHALATION)
Status: DISCONTINUED | OUTPATIENT
Start: 2018-11-16 | End: 2018-11-16

## 2018-11-16 RX ORDER — CLOPIDOGREL BISULFATE 75 MG/1
75 TABLET ORAL DAILY
Status: DISCONTINUED | OUTPATIENT
Start: 2018-11-17 | End: 2018-11-24 | Stop reason: HOSPADM

## 2018-11-16 RX ORDER — OXYCODONE AND ACETAMINOPHEN 10; 325 MG/1; MG/1
1 TABLET ORAL EVERY 4 HOURS PRN
Status: DISCONTINUED | OUTPATIENT
Start: 2018-11-16 | End: 2018-11-17

## 2018-11-16 RX ADMIN — ALBUTEROL SULFATE 5 MG: 5 SOLUTION RESPIRATORY (INHALATION) at 20:05

## 2018-11-16 RX ADMIN — FUROSEMIDE 40 MG: 10 INJECTION, SOLUTION INTRAMUSCULAR; INTRAVENOUS at 21:06

## 2018-11-16 RX ADMIN — METHYLPREDNISOLONE SODIUM SUCCINATE 125 MG: 125 INJECTION, POWDER, FOR SOLUTION INTRAMUSCULAR; INTRAVENOUS at 20:19

## 2018-11-16 ASSESSMENT — ENCOUNTER SYMPTOMS
SHORTNESS OF BREATH: 1
ABDOMINAL PAIN: 0
CONSTIPATION: 0
EYE DISCHARGE: 0
VOMITING: 0
EYE REDNESS: 0
FACIAL SWELLING: 0
DIARRHEA: 0
WHEEZING: 1
COLOR CHANGE: 0
COUGH: 1

## 2018-11-17 ENCOUNTER — APPOINTMENT (OUTPATIENT)
Dept: GENERAL RADIOLOGY | Age: 78
DRG: 189 | End: 2018-11-17
Payer: COMMERCIAL

## 2018-11-17 LAB
ANION GAP SERPL CALCULATED.3IONS-SCNC: 12 MMOL/L (ref 9–17)
BNP INTERPRETATION: NORMAL
BUN BLDV-MCNC: 20 MG/DL (ref 8–23)
BUN/CREAT BLD: 25 (ref 9–20)
CALCIUM SERPL-MCNC: 9.3 MG/DL (ref 8.6–10.4)
CHLORIDE BLD-SCNC: 100 MMOL/L (ref 98–107)
CHOLESTEROL/HDL RATIO: 2.4
CHOLESTEROL: 165 MG/DL
CO2: 26 MMOL/L (ref 20–31)
CREAT SERPL-MCNC: 0.81 MG/DL (ref 0.5–0.9)
CULTURE: NO GROWTH
GFR AFRICAN AMERICAN: >60 ML/MIN
GFR NON-AFRICAN AMERICAN: >60 ML/MIN
GFR SERPL CREATININE-BSD FRML MDRD: ABNORMAL ML/MIN/{1.73_M2}
GFR SERPL CREATININE-BSD FRML MDRD: ABNORMAL ML/MIN/{1.73_M2}
GLUCOSE BLD-MCNC: 150 MG/DL (ref 70–99)
HCT VFR BLD CALC: 36.3 % (ref 36–46)
HDLC SERPL-MCNC: 70 MG/DL
HEMOGLOBIN: 12.3 G/DL (ref 12–16)
LDL CHOLESTEROL: 89 MG/DL (ref 0–130)
LV EF: 68 %
LVEF MODALITY: NORMAL
Lab: NORMAL
MAGNESIUM: 1.9 MG/DL (ref 1.6–2.6)
MCH RBC QN AUTO: 32.1 PG (ref 26–34)
MCHC RBC AUTO-ENTMCNC: 34 G/DL (ref 31–37)
MCV RBC AUTO: 94.4 FL (ref 80–100)
MYOGLOBIN: 35 NG/ML (ref 25–58)
MYOGLOBIN: 37 NG/ML (ref 25–58)
MYOGLOBIN: 41 NG/ML (ref 25–58)
NRBC AUTOMATED: ABNORMAL PER 100 WBC
PDW BLD-RTO: 13.9 % (ref 11.5–14.5)
PLATELET # BLD: 167 K/UL (ref 130–400)
PMV BLD AUTO: ABNORMAL FL (ref 6–12)
POTASSIUM SERPL-SCNC: 4 MMOL/L (ref 3.7–5.3)
PRO-BNP: 254 PG/ML
RBC # BLD: 3.85 M/UL (ref 4–5.2)
SODIUM BLD-SCNC: 138 MMOL/L (ref 135–144)
SPECIMEN DESCRIPTION: NORMAL
STATUS: NORMAL
TRIGL SERPL-MCNC: 28 MG/DL
TROPONIN INTERP: NORMAL
TROPONIN T: <0.03 NG/ML
TSH SERPL DL<=0.05 MIU/L-ACNC: 0.46 MIU/L (ref 0.3–5)
VLDLC SERPL CALC-MCNC: NORMAL MG/DL (ref 1–30)
WBC # BLD: 4.4 K/UL (ref 3.5–11)

## 2018-11-17 PROCEDURE — 94664 DEMO&/EVAL PT USE INHALER: CPT

## 2018-11-17 PROCEDURE — 36415 COLL VENOUS BLD VENIPUNCTURE: CPT

## 2018-11-17 PROCEDURE — 97530 THERAPEUTIC ACTIVITIES: CPT

## 2018-11-17 PROCEDURE — G8988 SELF CARE GOAL STATUS: HCPCS

## 2018-11-17 PROCEDURE — 83874 ASSAY OF MYOGLOBIN: CPT

## 2018-11-17 PROCEDURE — 96365 THER/PROPH/DIAG IV INF INIT: CPT

## 2018-11-17 PROCEDURE — 80061 LIPID PANEL: CPT

## 2018-11-17 PROCEDURE — 72100 X-RAY EXAM L-S SPINE 2/3 VWS: CPT

## 2018-11-17 PROCEDURE — 80048 BASIC METABOLIC PNL TOTAL CA: CPT

## 2018-11-17 PROCEDURE — 71045 X-RAY EXAM CHEST 1 VIEW: CPT

## 2018-11-17 PROCEDURE — 97161 PT EVAL LOW COMPLEX 20 MIN: CPT

## 2018-11-17 PROCEDURE — 83735 ASSAY OF MAGNESIUM: CPT

## 2018-11-17 PROCEDURE — 96372 THER/PROPH/DIAG INJ SC/IM: CPT

## 2018-11-17 PROCEDURE — 96376 TX/PRO/DX INJ SAME DRUG ADON: CPT

## 2018-11-17 PROCEDURE — 6370000000 HC RX 637 (ALT 250 FOR IP): Performed by: FAMILY MEDICINE

## 2018-11-17 PROCEDURE — 2700000000 HC OXYGEN THERAPY PER DAY

## 2018-11-17 PROCEDURE — 97166 OT EVAL MOD COMPLEX 45 MIN: CPT

## 2018-11-17 PROCEDURE — 97535 SELF CARE MNGMENT TRAINING: CPT

## 2018-11-17 PROCEDURE — 73502 X-RAY EXAM HIP UNI 2-3 VIEWS: CPT

## 2018-11-17 PROCEDURE — 2060000000 HC ICU INTERMEDIATE R&B

## 2018-11-17 PROCEDURE — G8987 SELF CARE CURRENT STATUS: HCPCS

## 2018-11-17 PROCEDURE — 2580000003 HC RX 258: Performed by: FAMILY MEDICINE

## 2018-11-17 PROCEDURE — 94640 AIRWAY INHALATION TREATMENT: CPT

## 2018-11-17 PROCEDURE — 6360000002 HC RX W HCPCS: Performed by: FAMILY MEDICINE

## 2018-11-17 PROCEDURE — 83880 ASSAY OF NATRIURETIC PEPTIDE: CPT

## 2018-11-17 PROCEDURE — 85027 COMPLETE CBC AUTOMATED: CPT

## 2018-11-17 PROCEDURE — 84443 ASSAY THYROID STIM HORMONE: CPT

## 2018-11-17 PROCEDURE — 94760 N-INVAS EAR/PLS OXIMETRY 1: CPT

## 2018-11-17 PROCEDURE — 84484 ASSAY OF TROPONIN QUANT: CPT

## 2018-11-17 PROCEDURE — 93306 TTE W/DOPPLER COMPLETE: CPT

## 2018-11-17 RX ORDER — METHYLPREDNISOLONE SODIUM SUCCINATE 40 MG/ML
40 INJECTION, POWDER, LYOPHILIZED, FOR SOLUTION INTRAMUSCULAR; INTRAVENOUS EVERY 8 HOURS
Status: DISCONTINUED | OUTPATIENT
Start: 2018-11-17 | End: 2018-11-17

## 2018-11-17 RX ORDER — ASPIRIN 81 MG/1
81 TABLET ORAL DAILY
Status: ON HOLD | COMMUNITY
End: 2019-10-15 | Stop reason: HOSPADM

## 2018-11-17 RX ORDER — LEVOFLOXACIN 5 MG/ML
750 INJECTION, SOLUTION INTRAVENOUS EVERY 24 HOURS
Status: DISCONTINUED | OUTPATIENT
Start: 2018-11-17 | End: 2018-11-20

## 2018-11-17 RX ORDER — ASPIRIN 81 MG/1
81 TABLET, CHEWABLE ORAL DAILY
Status: DISCONTINUED | OUTPATIENT
Start: 2018-11-17 | End: 2018-11-24 | Stop reason: HOSPADM

## 2018-11-17 RX ORDER — ASCORBIC ACID 500 MG
500 TABLET ORAL DAILY
COMMUNITY

## 2018-11-17 RX ORDER — POLYETHYLENE GLYCOL 3350 17 G/17G
17 POWDER, FOR SOLUTION ORAL 2 TIMES DAILY
Status: DISCONTINUED | OUTPATIENT
Start: 2018-11-17 | End: 2018-11-24 | Stop reason: HOSPADM

## 2018-11-17 RX ORDER — ALBUTEROL SULFATE 2.5 MG/3ML
2.5 SOLUTION RESPIRATORY (INHALATION)
Status: DISCONTINUED | OUTPATIENT
Start: 2018-11-18 | End: 2018-11-24 | Stop reason: HOSPADM

## 2018-11-17 RX ORDER — OXYCODONE HCL 10 MG/1
20 TABLET, FILM COATED, EXTENDED RELEASE ORAL EVERY 12 HOURS SCHEDULED
Status: DISCONTINUED | OUTPATIENT
Start: 2018-11-17 | End: 2018-11-17

## 2018-11-17 RX ORDER — TROSPIUM CHLORIDE 20 MG/1
20 TABLET, FILM COATED ORAL 2 TIMES DAILY
Status: ON HOLD | COMMUNITY
End: 2018-11-24 | Stop reason: HOSPADM

## 2018-11-17 RX ORDER — FERROUS SULFATE 325(65) MG
325 TABLET ORAL 2 TIMES DAILY
Status: ON HOLD | COMMUNITY
End: 2019-10-15 | Stop reason: HOSPADM

## 2018-11-17 RX ORDER — METHYLPREDNISOLONE SODIUM SUCCINATE 125 MG/2ML
80 INJECTION, POWDER, LYOPHILIZED, FOR SOLUTION INTRAMUSCULAR; INTRAVENOUS EVERY 8 HOURS
Status: DISCONTINUED | OUTPATIENT
Start: 2018-11-17 | End: 2018-11-19

## 2018-11-17 RX ORDER — OXYCODONE HCL 10 MG/1
20 TABLET, FILM COATED, EXTENDED RELEASE ORAL 2 TIMES DAILY
Status: DISCONTINUED | OUTPATIENT
Start: 2018-11-17 | End: 2018-11-24 | Stop reason: HOSPADM

## 2018-11-17 RX ORDER — M-VIT,TX,IRON,MINS/CALC/FOLIC 27MG-0.4MG
1 TABLET ORAL DAILY
COMMUNITY

## 2018-11-17 RX ORDER — DIPHENHYDRAMINE HCL 25 MG
25 TABLET ORAL NIGHTLY PRN
Status: DISCONTINUED | OUTPATIENT
Start: 2018-11-17 | End: 2018-11-24 | Stop reason: HOSPADM

## 2018-11-17 RX ORDER — OXYCODONE AND ACETAMINOPHEN 10; 325 MG/1; MG/1
1 TABLET ORAL EVERY 6 HOURS PRN
Status: DISCONTINUED | OUTPATIENT
Start: 2018-11-17 | End: 2018-11-24 | Stop reason: HOSPADM

## 2018-11-17 RX ORDER — IPRATROPIUM BROMIDE AND ALBUTEROL SULFATE 2.5; .5 MG/3ML; MG/3ML
1 SOLUTION RESPIRATORY (INHALATION)
Status: DISCONTINUED | OUTPATIENT
Start: 2018-11-17 | End: 2018-11-17

## 2018-11-17 RX ORDER — OXYCODONE AND ACETAMINOPHEN 10; 325 MG/1; MG/1
1 TABLET ORAL EVERY 8 HOURS PRN
Status: DISCONTINUED | OUTPATIENT
Start: 2018-11-17 | End: 2018-11-17

## 2018-11-17 RX ORDER — ONDANSETRON 2 MG/ML
4 INJECTION INTRAMUSCULAR; INTRAVENOUS EVERY 6 HOURS PRN
Status: DISCONTINUED | OUTPATIENT
Start: 2018-11-17 | End: 2018-11-24 | Stop reason: HOSPADM

## 2018-11-17 RX ORDER — GABAPENTIN 300 MG/1
600 CAPSULE ORAL 3 TIMES DAILY
Status: DISCONTINUED | OUTPATIENT
Start: 2018-11-17 | End: 2018-11-17

## 2018-11-17 RX ORDER — MULTIVITAMIN WITH IRON
100 TABLET ORAL DAILY
COMMUNITY

## 2018-11-17 RX ORDER — LIDOCAINE 50 MG/G
1 PATCH TOPICAL DAILY
Status: DISCONTINUED | OUTPATIENT
Start: 2018-11-17 | End: 2018-11-24 | Stop reason: HOSPADM

## 2018-11-17 RX ORDER — ONDANSETRON 4 MG/1
4 TABLET, ORALLY DISINTEGRATING ORAL EVERY 6 HOURS PRN
Status: DISCONTINUED | OUTPATIENT
Start: 2018-11-17 | End: 2018-11-24 | Stop reason: HOSPADM

## 2018-11-17 RX ORDER — GABAPENTIN 300 MG/1
600 CAPSULE ORAL 3 TIMES DAILY
Status: DISCONTINUED | OUTPATIENT
Start: 2018-11-17 | End: 2018-11-24 | Stop reason: HOSPADM

## 2018-11-17 RX ADMIN — POLYETHYLENE GLYCOL (3350) 17 G: 17 POWDER, FOR SOLUTION ORAL at 09:14

## 2018-11-17 RX ADMIN — GABAPENTIN 600 MG: 300 CAPSULE ORAL at 01:21

## 2018-11-17 RX ADMIN — Medication 10 ML: at 09:17

## 2018-11-17 RX ADMIN — OXYCODONE HYDROCHLORIDE 20 MG: 10 TABLET, FILM COATED, EXTENDED RELEASE ORAL at 22:14

## 2018-11-17 RX ADMIN — OXYCODONE HYDROCHLORIDE 20 MG: 10 TABLET, FILM COATED, EXTENDED RELEASE ORAL at 01:21

## 2018-11-17 RX ADMIN — DOCUSATE SODIUM 100 MG: 100 CAPSULE, LIQUID FILLED ORAL at 01:22

## 2018-11-17 RX ADMIN — POLYETHYLENE GLYCOL (3350) 17 G: 17 POWDER, FOR SOLUTION ORAL at 01:22

## 2018-11-17 RX ADMIN — POLYETHYLENE GLYCOL (3350) 17 G: 17 POWDER, FOR SOLUTION ORAL at 20:31

## 2018-11-17 RX ADMIN — LEVOFLOXACIN 750 MG: 5 INJECTION, SOLUTION INTRAVENOUS at 14:01

## 2018-11-17 RX ADMIN — GABAPENTIN 600 MG: 300 CAPSULE ORAL at 20:31

## 2018-11-17 RX ADMIN — MOMETASONE FUROATE AND FORMOTEROL FUMARATE DIHYDRATE 2 PUFF: 200; 5 AEROSOL RESPIRATORY (INHALATION) at 09:14

## 2018-11-17 RX ADMIN — IPRATROPIUM BROMIDE AND ALBUTEROL SULFATE 1 AMPULE: .5; 3 SOLUTION RESPIRATORY (INHALATION) at 15:50

## 2018-11-17 RX ADMIN — TIOTROPIUM BROMIDE 18 MCG: 18 CAPSULE ORAL; RESPIRATORY (INHALATION) at 09:13

## 2018-11-17 RX ADMIN — MOMETASONE FUROATE AND FORMOTEROL FUMARATE DIHYDRATE 2 PUFF: 200; 5 AEROSOL RESPIRATORY (INHALATION) at 20:11

## 2018-11-17 RX ADMIN — METHYLPREDNISOLONE SODIUM SUCCINATE 80 MG: 125 INJECTION, POWDER, FOR SOLUTION INTRAMUSCULAR; INTRAVENOUS at 23:19

## 2018-11-17 RX ADMIN — FUROSEMIDE 40 MG: 10 INJECTION, SOLUTION INTRAMUSCULAR; INTRAVENOUS at 09:10

## 2018-11-17 RX ADMIN — OXYCODONE HYDROCHLORIDE AND ACETAMINOPHEN 1 TABLET: 10; 325 TABLET ORAL at 23:19

## 2018-11-17 RX ADMIN — OXYCODONE HYDROCHLORIDE AND ACETAMINOPHEN 1 TABLET: 10; 325 TABLET ORAL at 06:48

## 2018-11-17 RX ADMIN — IPRATROPIUM BROMIDE AND ALBUTEROL SULFATE 1 AMPULE: .5; 3 SOLUTION RESPIRATORY (INHALATION) at 20:11

## 2018-11-17 RX ADMIN — GABAPENTIN 600 MG: 300 CAPSULE ORAL at 16:59

## 2018-11-17 RX ADMIN — Medication 10 ML: at 20:30

## 2018-11-17 RX ADMIN — PREDNISONE 10 MG: 10 TABLET ORAL at 09:08

## 2018-11-17 RX ADMIN — DOCUSATE SODIUM 100 MG: 100 CAPSULE, LIQUID FILLED ORAL at 09:06

## 2018-11-17 RX ADMIN — DOCUSATE SODIUM 100 MG: 100 CAPSULE, LIQUID FILLED ORAL at 20:31

## 2018-11-17 RX ADMIN — ALBUTEROL SULFATE 2.5 MG: 2.5 SOLUTION RESPIRATORY (INHALATION) at 00:50

## 2018-11-17 RX ADMIN — OXYCODONE HYDROCHLORIDE 20 MG: 10 TABLET, FILM COATED, EXTENDED RELEASE ORAL at 14:00

## 2018-11-17 RX ADMIN — METHYLPREDNISOLONE SODIUM SUCCINATE 80 MG: 125 INJECTION, POWDER, FOR SOLUTION INTRAMUSCULAR; INTRAVENOUS at 14:01

## 2018-11-17 RX ADMIN — ALBUTEROL SULFATE 2.5 MG: 2.5 SOLUTION RESPIRATORY (INHALATION) at 09:13

## 2018-11-17 RX ADMIN — ASPIRIN 81 MG 81 MG: 81 TABLET ORAL at 01:21

## 2018-11-17 RX ADMIN — GABAPENTIN 600 MG: 300 CAPSULE ORAL at 09:09

## 2018-11-17 RX ADMIN — ENOXAPARIN SODIUM 40 MG: 40 INJECTION SUBCUTANEOUS at 09:10

## 2018-11-17 RX ADMIN — CLOPIDOGREL BISULFATE 75 MG: 75 TABLET ORAL at 20:31

## 2018-11-17 RX ADMIN — OXYCODONE HYDROCHLORIDE AND ACETAMINOPHEN 1 TABLET: 10; 325 TABLET ORAL at 12:54

## 2018-11-17 ASSESSMENT — PAIN DESCRIPTION - DESCRIPTORS
DESCRIPTORS: SHARP;SHOOTING;DISCOMFORT
DESCRIPTORS: SHARP;SHOOTING;DISCOMFORT

## 2018-11-17 ASSESSMENT — PAIN DESCRIPTION - PAIN TYPE
TYPE: CHRONIC PAIN
TYPE: CHRONIC PAIN;ACUTE PAIN
TYPE: CHRONIC PAIN

## 2018-11-17 ASSESSMENT — PAIN DESCRIPTION - FREQUENCY
FREQUENCY: CONTINUOUS
FREQUENCY: CONTINUOUS

## 2018-11-17 ASSESSMENT — PAIN SCALES - GENERAL
PAINLEVEL_OUTOF10: 10
PAINLEVEL_OUTOF10: 6
PAINLEVEL_OUTOF10: 9
PAINLEVEL_OUTOF10: 10
PAINLEVEL_OUTOF10: 9
PAINLEVEL_OUTOF10: 10
PAINLEVEL_OUTOF10: 10

## 2018-11-17 ASSESSMENT — PAIN DESCRIPTION - LOCATION
LOCATION: HIP;LEG
LOCATION: HIP;LEG
LOCATION: LEG
LOCATION: HIP;LEG
LOCATION: HIP;LEG

## 2018-11-17 ASSESSMENT — PAIN DESCRIPTION - PROGRESSION
CLINICAL_PROGRESSION: GRADUALLY IMPROVING
CLINICAL_PROGRESSION: NOT CHANGED
CLINICAL_PROGRESSION: GRADUALLY IMPROVING
CLINICAL_PROGRESSION: GRADUALLY IMPROVING

## 2018-11-17 ASSESSMENT — PAIN DESCRIPTION - ORIENTATION
ORIENTATION: RIGHT

## 2018-11-17 ASSESSMENT — PAIN DESCRIPTION - ONSET
ONSET: ON-GOING
ONSET: ON-GOING

## 2018-11-17 NOTE — PROGRESS NOTES
Occupational Therapy  Initial Assessment    DATE: 2018    NAME: Mika Weinstein  MRN: [id number]   : 1940    Chief Complaint   Patient presents with    Shortness of Breath     Past Medical History:   Diagnosis Date    Aortic valve disease     Pt. denies    Arthritis     COPD (chronic obstructive [facility] disease) [facility]     Disease of blood and blood forming organ     Diverticulitis     GERD (gastroesophageal reflux disease)     History of blood transfusion     History of gastritis     Lumbar disc disease     Movement disorder     Nerve disease, peripheral 2015    Osteoporosis     Pneumonia 2015    Unspecified cerebral artery occlusion with cerebral infarction     TIA's     Past Surgical History:   Procedure Laterality Date    ARTHROPLASTY Left 2017    LEFT FOOT ARTHROPLASTY  4TH DIGIT performed by Alicia Champion DPM at [place] [address] Left 2017    L 4th digit arthroplasty     HYSTERECTOMY      Partial    LUMBAR SPINE SURGERY  x 2    SPINAL FUSION      UPPER GASTROINTESTINAL ENDOSCOPY      VARICOSE VEIN SURGERY        18 1205   Restrictions/Precautions   Restrictions/Precautions Fall Risk   Required Braces or Orthoses? No   Vision   Vision [facility]   Hearing   Hearing [facility]   General   Patient assessed for rehabilitation services?  Yes   Family / Caregiver Present No   Referring Practitioner Adil   Diagnosis Acute pulmonary edema   Pain Assessment   Patient Currently in Pain Yes   Pain Assessment 0-10   Pain Level 10   Pain Type Chronic pain   Pain Location Leg  (sciatica)   Social/Functional History   Lives With Alone  (home health M-F X 4 hours, family A weekends inconsistent)   Type of [address] One level   Home Access Stairs to enter without rails   Entrance Stairs - Number of Steps 1   Bathroom Shower/Tub Tub/Shower unit   Bathroom Toilet Standard   Bathroom Accessibility Accessible   Home Equipment

## 2018-11-17 NOTE — ED PROVIDER NOTES
[address] [place] [address] ED  eMERGENCY dEPARTMENT eNCOUnter      Pt Name: Olena Joseph  MRN: [id number]  [place] 1940  Date of evaluation: 11/16/2018  Provider: Avery Mota MD    CHIEF COMPLAINT       Chief Complaint   Patient presents with    Shortness of Breath         HISTORY OF PRESENT ILLNESS  (Location/Symptom, Timing/Onset, Context/Setting, Quality, Duration, Modifying Factors, Severity.)   Olena Joseph is a 77 y.o. female who presents to the emergency department For difficulty breathing. She states she's been having trouble breathing for the last week and she's been coughing up yellow to brown colored phlegm. No hemoptysis or fever. She has a history of COPD. Symptoms are worse with exertion. Nursing Notes were reviewed. ALLERGIES     Anti-inflammatory enzyme [nutritional supplements]; Ceclor [cefaclor]; Morphine; Nsaids; Penicillins; Propoxyphene; Skelaxin [metaxalone]; Sulfa antibiotics; Tadalafil; Tolmetin; Alendronate; Asa [aspirin]; Erythromycin; Temazepam; Tetracyclines & related; and Tramadol    CURRENT MEDICATIONS       Previous Medications    ALBUTEROL (PROVENTIL HFA;VENTOLIN HFA) 108 (90 BASE) MCG/ACT INHALER    Inhale 2 puffs into the lungs every 6 hours as needed for Wheezing. ALBUTEROL (PROVENTIL) (2.5 MG/3ML) 0.083% NEBULIZER SOLUTION    Take 3 mLs by nebulization every 6 hours as needed for Wheezing. BUDESONIDE-FORMOTEROL (SYMBICORT) 160-4.5 MCG/ACT AERO    Inhale 2 puffs into the lungs 2 times daily. CALCIUM CARB-CHOLECALCIFEROL (CALCIUM 600 + D) 600-200 MG-UNIT TABS    Take by mouth    CETIRIZINE (ZYRTEC) 10 MG TABLET    Take 10 mg by mouth daily as needed for Allergies. CLOPIDOGREL (PLAVIX) 75 MG TABLET    Take 75 mg by mouth daily. DIPHENHYDRAMINE (BENADRYL) 25 MG TABLET    Take 25 mg by mouth every 6 hours as needed for Itching    DOCUSATE SODIUM (COLACE) 100 MG CAPSULE    Take 100 mg by mouth 2 times daily.     FLUTICASONE (FLONASE) 50 MCG/ACT during which I was engaged in work directly related to her care and did not include time spent treating other patients simultaneously. CONSULTS:  None    PROCEDURES:  None    FINAL IMPRESSION      1. Acute pulmonary edema [facility])          DISPOSITION/PLAN   DISPOSITION Decision To Admit 11/16/2018 08:47:53 PM      PATIENT REFERRED TO:   No follow-up provider specified.     DISCHARGE MEDICATIONS:     New Prescriptions    No medications on file         (Please note that portions of this note were completed with a voice recognition program.  Efforts were made to edit the dictations but occasionally words are mis-transcribed.)    Murphy Dempsey MD  Attending Emergency Physician             Murphy Dempsey MD  11/16/18 6935

## 2018-11-17 NOTE — H&P
0.083% nebulizer solution Take 3 mLs by nebulization every 6 hours as needed for Wheezing. 11/15/14  Yes Martinez Monroe MD   tiotropium (SPIRIVA) 18 MCG inhalation capsule Inhale 1 capsule into the lungs daily. 6/27/14  Yes Albert Orta MD   hydrochlorothiazide (MICROZIDE) 12.5 MG capsule Take 1 capsule by mouth daily. 6/28/14  Yes Albert Orta MD   budesonide-formoterol (SYMBICORT) 160-4.5 MCG/ACT AERO Inhale 2 puffs into the lungs 2 times daily. 6/27/14  Yes Marcello Griffin MD   ipratropium-albuterol (DUONEB) 0.5-2.5 (3) MG/3ML SOLN nebulizer solution Inhale 3 mLs into the lungs 4 times daily as needed for Shortness of Breath. 6/27/14  Yes Marcello Griffin MD   albuterol (PROVENTIL HFA;VENTOLIN HFA) 108 (90 BASE) MCG/ACT inhaler Inhale 2 puffs into the lungs every 6 hours as needed for Wheezing. Yes Historical Provider, MD   clopidogrel (PLAVIX) 75 MG tablet Take 75 mg by mouth daily. Yes Historical Provider, MD   fluticasone (FLONASE) 50 MCG/ACT nasal spray 2 sprays by Nasal route daily. Yes Historical Provider, MD   cetirizine (ZYRTEC) 10 MG tablet Take 10 mg by mouth daily as needed for Allergies. Yes Historical Provider, MD   polyethylene glycol (GLYCOLAX) powder Take 17 g by mouth daily. Yes Historical Provider, MD   ibandronate (BONIVA) 150 MG tablet Take 150 mg by mouth every 30 days. Yes Historical Provider, MD   docusate sodium (COLACE) 100 MG capsule Take 100 mg by mouth 2 times daily. Yes Historical Provider, MD   oxyCODONE (OXY-IR) 15 MG immediate release tablet Take 15 mg by mouth every 4 hours as needed for Pain    Historical Provider, MD       Allergies:  Anti-inflammatory enzyme [nutritional supplements]; Ceclor [cefaclor]; Morphine; Nsaids; Penicillins; Propoxyphene; Skelaxin [metaxalone]; Sulfa antibiotics; Tadalafil; Tolmetin; Alendronate; Asa [aspirin];  Erythromycin; Temazepam; Tetracyclines & related; and Tramadol    Social History:   TOBACCO:   reports Measurements & Calculations:      LVIDd:2.84 cm(3.7 - 5.6 cm)      Diastolic ITOSUM:40.8 ml   UEJDQ:5.46 cm(2.2 - 4.0 cm)      Systolic GHGLKQ:20.4 ml   QYQM:2.3 cm(0.6 - 1.1 cm)        Aortic Root:2.9 cm(2.0 - 3.7 cm)   LVPWd:1.14 cm(0.6 - 1.1 cm)      LA Dimension: 4.1 cm(1.9 - 4.0 cm)   Fractional Shortenin.94 %   Calculated LVEF (%): 55.9 %      LVOT:2 cm      Mitral:                                  Aortic      Valve Area (P1/2-Time): 3.73 cm^2        Peak Velocity: 1.59 m/s   Peak E-Wave: 1.04 m/s                    Peak Gradient: 10.11 mmHg   Peak A-Wave: 1.24 m/s   E/A Ratio: 0.84   Peak Gradient: 4.33 mmHg   Deceleration Time: 204 msec   P1/2t: 59 msec      Tricuspid:                               Pulmonic:      Estimated RVSP: 42 mmHg   Peak TR Velocity: 2.67 m/s   Peak TR Gradient: 28.5156 mmHg   Estimated RA Pressure: 5 mmHg                                            Estimated PASP: 33.52 mmHg     Septal Wall E' velocity:0.07 m/s  Lateral Wall E' velocity:0.09 m/s   XR CHEST PORTABLE [[id number]] Collected: 18 1106   Updated: 18 1115    Narrative:     EXAMINATION:  SINGLE XRAY VIEW OF THE CHEST    2018 11:04 am    COMPARISON:  Chest radiograph performed 2018. HISTORY:  ORDERING SYSTEM PROVIDED HISTORY: re-eval  TECHNOLOGIST PROVIDED HISTORY:  re-eval  Acuity: Acute  Type of Exam: Unknown    FINDINGS:  There is chronic pulmonary change.  Mild similar potential edema.  There is  bibasilar atelectasis. Azra Lipschutz is no pneumothorax.  The mediastinal structures  are stable.  The upper abdomen is unremarkable.  The extrathoracic soft  tissues are unremarkable. Impression:     Mild chronic pulmonary change with mild similar potential edema. Brain Natriuretic Peptide [[id number]] Collected: 18 0904   Updated: 18 0940     Pro- pg/mL    Comment:  Pro-BNP results cannot be compared to BNP results.        BNP Interpretation         Comment: Pro-BNP Reference Range:     addition  8. Will start Solumedrol 80 mg q8h now  9. Start Levaquin  10. Cont DUoNeb prn  11. COnt Lasix  12. Degroot  13. Daily Wts  14. Serial cardiac enz  15. Serial EKGs  16. Echo shows normal systolic function  17. HTN  18. CHF Stage B  19. AR  20. ACE/ BB for now  21. Chronic Pain  22. Severe lumbar pain  23. Rt hip pain  24. Check XRays  25. Increased Percocet 10 to q6h prn  26. Cont Oxycontin bid  27. Started Lidoderm ovre area of worse pain  28. PPI  29. IS  30. Lovenox  31. Cont clsoe f up  32. Answered all qts  33.  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    Constipation K59.00    Displacement of intervertebral disc without myelopathy [id number]    Dry eye syndrome H04.129    Eczema L30.9    Insomnia G47.00    DDD (degenerative disc disease), lumbar M51.36    Neuropathic pain M79.2    Osteoporosis M81.0    Nerve disease, peripheral G62.9    Posterior vitreous detachment H43.819    Failed back syndrome of lumbar spine M96.1    Pseudophakia Z96.1    Temporary cerebral vascular dysfunction G93.9    Bursitis, trochanteric M70.60    Vitamin D deficiency E55.9    Lumbosacral radiculopathy M54.17    Generalized muscle weakness M62.81    Neurogenic bladder N31.9    Syncope and collapse R55    Acute bronchitis due to other specified organisms J20.8    Bilateral carpal tunnel syndrome G56.03    Epistaxis R04.0    Supplemental oxygen dependent Z99.81    Acute pulmonary edema (HCC) J81.0

## 2018-11-17 NOTE — PROGRESS NOTES
I will see the patient tomorrow. But reviewing her medical records and her current admission information, this is not congestive heart failure. She has normal LV systolic function and only mild valvular heart disease. Her pro-BNP is normal.  This excludes heart failure. Based on the notes this is likely an exacerbation of her underlying chronic obstructive pulmonary disease.     Edmundo Vázquez MD, Mode Epperson

## 2018-11-17 NOTE — FLOWSHEET NOTE
11/17/18 1603   Provider Notification   Reason for Communication Review case   Provider Name Dr. Bushra Beltran   Provider Notification Physician   Method of Communication Call   Response See orders   Notification Time 46   MD called per patient request to see if she can resume her gabapentin TID which she takes at home, it is only ordered BID. MD states he will review her labs and determine if it can be increased to TID. See orders.

## 2018-11-17 NOTE — PROGRESS NOTES
Equipment: Rolling walker (transport chair)  Receives Help From: Home health (wekdays X 4 hrs, family on w/e inconsistent)  ADL Assistance: Needs assistance  Bath: Minimal assistance  Dressing: Minimal assistance  Homemaking Assistance: Needs assistance (family and HHA, meals on wheels)  Homemaking Responsibilities: No  Ambulation Assistance: Independent (rw, transport chair outside home)  Transfer Assistance: Independent  Active : No  Occupation: Retired  Cognition        Objective     Observation/Palpation  Posture: Poor (Trunk flexed due to LBP/sciatica)    AROM RLE (degrees)  RLE AROM: WNL  AROM LLE (degrees)  LLE AROM : WNL  AROM RUE (degrees)  RUE General AROM: See OT eval for B UE ROM  Strength RLE  Strength RLE: WFL  Comment: B ankles 5/5,hips 4/5,knees 4+/5  Strength LLE  Strength LLE: WFL  Strength RUE  Comment: See OT eval for B UE MMT  Tone RLE  RLE Tone: Normotonic  Tone LLE  LLE Tone: Normotonic  Motor Control  Gross Motor?: WNL  Sensation  Overall Sensation Status: WNL  Bed mobility  Rolling to Left: Stand by assistance  Supine to Sit: Stand by assistance (Very slow)  Scooting: Stand by assistance  Comment: Very slow/deliberate all movements  Transfers  Sit to Stand: Minimal Assistance  Stand to sit: Minimal Assistance  Stand Pivot Transfers: Minimal Assistance  Ambulation  Ambulation?: Yes  Ambulation 1  Surface: level tile  Device: Rolling Walker  Assistance: Moderate assistance  Distance: 5' x 1  Comments: To chair  Stairs/Curb  Stairs?: No     Balance  Posture: Poor  Sitting - Static: Good  Sitting - Dynamic: Good  Standing - Static: Fair;+  Standing - Dynamic: Fair        Assessment   Body structures, Functions, Activity limitations: Decreased endurance;Decreased balance  Prognosis: Good  Decision Making: Low Complexity  REQUIRES PT FOLLOW UP: Yes  Activity Tolerance  Activity Tolerance: Patient Tolerated treatment well;Patient limited by pain; Patient limited by endurance         Plan

## 2018-11-18 LAB
ANION GAP SERPL CALCULATED.3IONS-SCNC: 14 MMOL/L (ref 9–17)
BUN BLDV-MCNC: 18 MG/DL (ref 8–23)
BUN/CREAT BLD: 24 (ref 9–20)
CALCIUM SERPL-MCNC: 9 MG/DL (ref 8.6–10.4)
CHLORIDE BLD-SCNC: 102 MMOL/L (ref 98–107)
CO2: 25 MMOL/L (ref 20–31)
CREAT SERPL-MCNC: 0.75 MG/DL (ref 0.5–0.9)
GFR AFRICAN AMERICAN: >60 ML/MIN
GFR NON-AFRICAN AMERICAN: >60 ML/MIN
GFR SERPL CREATININE-BSD FRML MDRD: ABNORMAL ML/MIN/{1.73_M2}
GFR SERPL CREATININE-BSD FRML MDRD: ABNORMAL ML/MIN/{1.73_M2}
GLUCOSE BLD-MCNC: 196 MG/DL (ref 70–99)
HCT VFR BLD CALC: 35.2 % (ref 36–46)
HEMOGLOBIN: 12.2 G/DL (ref 12–16)
MCH RBC QN AUTO: 31.6 PG (ref 26–34)
MCHC RBC AUTO-ENTMCNC: 34.6 G/DL (ref 31–37)
MCV RBC AUTO: 91.4 FL (ref 80–100)
NRBC AUTOMATED: ABNORMAL PER 100 WBC
PDW BLD-RTO: 14.2 % (ref 11.5–14.5)
PLATELET # BLD: 168 K/UL (ref 130–400)
PMV BLD AUTO: 8.3 FL (ref 6–12)
POTASSIUM SERPL-SCNC: 4.1 MMOL/L (ref 3.7–5.3)
RBC # BLD: 3.85 M/UL (ref 4–5.2)
SODIUM BLD-SCNC: 141 MMOL/L (ref 135–144)
WBC # BLD: 7.2 K/UL (ref 3.5–11)

## 2018-11-18 PROCEDURE — 96372 THER/PROPH/DIAG INJ SC/IM: CPT

## 2018-11-18 PROCEDURE — 96366 THER/PROPH/DIAG IV INF ADDON: CPT

## 2018-11-18 PROCEDURE — 2700000000 HC OXYGEN THERAPY PER DAY

## 2018-11-18 PROCEDURE — 6360000002 HC RX W HCPCS: Performed by: FAMILY MEDICINE

## 2018-11-18 PROCEDURE — 36415 COLL VENOUS BLD VENIPUNCTURE: CPT

## 2018-11-18 PROCEDURE — 6370000000 HC RX 637 (ALT 250 FOR IP): Performed by: FAMILY MEDICINE

## 2018-11-18 PROCEDURE — 96376 TX/PRO/DX INJ SAME DRUG ADON: CPT

## 2018-11-18 PROCEDURE — 94640 AIRWAY INHALATION TREATMENT: CPT

## 2018-11-18 PROCEDURE — 2060000000 HC ICU INTERMEDIATE R&B

## 2018-11-18 PROCEDURE — 80048 BASIC METABOLIC PNL TOTAL CA: CPT

## 2018-11-18 PROCEDURE — 2580000003 HC RX 258: Performed by: FAMILY MEDICINE

## 2018-11-18 PROCEDURE — 85027 COMPLETE CBC AUTOMATED: CPT

## 2018-11-18 RX ADMIN — OXYCODONE HYDROCHLORIDE AND ACETAMINOPHEN 1 TABLET: 10; 325 TABLET ORAL at 05:37

## 2018-11-18 RX ADMIN — GABAPENTIN 600 MG: 300 CAPSULE ORAL at 14:06

## 2018-11-18 RX ADMIN — METHYLPREDNISOLONE SODIUM SUCCINATE 80 MG: 125 INJECTION, POWDER, FOR SOLUTION INTRAMUSCULAR; INTRAVENOUS at 21:35

## 2018-11-18 RX ADMIN — METHYLPREDNISOLONE SODIUM SUCCINATE 80 MG: 125 INJECTION, POWDER, FOR SOLUTION INTRAMUSCULAR; INTRAVENOUS at 14:06

## 2018-11-18 RX ADMIN — GABAPENTIN 600 MG: 300 CAPSULE ORAL at 08:40

## 2018-11-18 RX ADMIN — LEVOFLOXACIN 750 MG: 5 INJECTION, SOLUTION INTRAVENOUS at 14:06

## 2018-11-18 RX ADMIN — METHYLPREDNISOLONE SODIUM SUCCINATE 80 MG: 125 INJECTION, POWDER, FOR SOLUTION INTRAMUSCULAR; INTRAVENOUS at 05:36

## 2018-11-18 RX ADMIN — OXYCODONE HYDROCHLORIDE 20 MG: 10 TABLET, FILM COATED, EXTENDED RELEASE ORAL at 21:34

## 2018-11-18 RX ADMIN — ENOXAPARIN SODIUM 40 MG: 40 INJECTION SUBCUTANEOUS at 08:41

## 2018-11-18 RX ADMIN — POLYETHYLENE GLYCOL (3350) 17 G: 17 POWDER, FOR SOLUTION ORAL at 08:40

## 2018-11-18 RX ADMIN — DOCUSATE SODIUM 100 MG: 100 CAPSULE, LIQUID FILLED ORAL at 21:35

## 2018-11-18 RX ADMIN — OXYCODONE HYDROCHLORIDE AND ACETAMINOPHEN 1 TABLET: 10; 325 TABLET ORAL at 23:50

## 2018-11-18 RX ADMIN — OXYCODONE HYDROCHLORIDE AND ACETAMINOPHEN 1 TABLET: 10; 325 TABLET ORAL at 20:18

## 2018-11-18 RX ADMIN — Medication 10 ML: at 21:36

## 2018-11-18 RX ADMIN — GABAPENTIN 600 MG: 300 CAPSULE ORAL at 21:34

## 2018-11-18 RX ADMIN — ASPIRIN 81 MG 81 MG: 81 TABLET ORAL at 08:40

## 2018-11-18 RX ADMIN — MOMETASONE FUROATE AND FORMOTEROL FUMARATE DIHYDRATE 2 PUFF: 200; 5 AEROSOL RESPIRATORY (INHALATION) at 20:05

## 2018-11-18 RX ADMIN — OXYCODONE HYDROCHLORIDE AND ACETAMINOPHEN 1 TABLET: 10; 325 TABLET ORAL at 14:10

## 2018-11-18 RX ADMIN — CLOPIDOGREL BISULFATE 75 MG: 75 TABLET ORAL at 21:35

## 2018-11-18 RX ADMIN — Medication 10 ML: at 08:41

## 2018-11-18 RX ADMIN — TIOTROPIUM BROMIDE 18 MCG: 18 CAPSULE ORAL; RESPIRATORY (INHALATION) at 09:49

## 2018-11-18 RX ADMIN — OXYCODONE HYDROCHLORIDE 20 MG: 10 TABLET, FILM COATED, EXTENDED RELEASE ORAL at 10:00

## 2018-11-18 RX ADMIN — MOMETASONE FUROATE AND FORMOTEROL FUMARATE DIHYDRATE 2 PUFF: 200; 5 AEROSOL RESPIRATORY (INHALATION) at 09:55

## 2018-11-18 RX ADMIN — ALBUTEROL SULFATE 2.5 MG: 2.5 SOLUTION RESPIRATORY (INHALATION) at 20:05

## 2018-11-18 RX ADMIN — ALBUTEROL SULFATE 2.5 MG: 2.5 SOLUTION RESPIRATORY (INHALATION) at 14:44

## 2018-11-18 RX ADMIN — ALBUTEROL SULFATE 2.5 MG: 2.5 SOLUTION RESPIRATORY (INHALATION) at 09:48

## 2018-11-18 RX ADMIN — DOCUSATE SODIUM 100 MG: 100 CAPSULE, LIQUID FILLED ORAL at 08:40

## 2018-11-18 ASSESSMENT — PAIN DESCRIPTION - PROGRESSION

## 2018-11-18 ASSESSMENT — PAIN SCALES - GENERAL
PAINLEVEL_OUTOF10: 4
PAINLEVEL_OUTOF10: 10
PAINLEVEL_OUTOF10: 4
PAINLEVEL_OUTOF10: 8
PAINLEVEL_OUTOF10: 8
PAINLEVEL_OUTOF10: 7
PAINLEVEL_OUTOF10: 7
PAINLEVEL_OUTOF10: 8
PAINLEVEL_OUTOF10: 9
PAINLEVEL_OUTOF10: 8
PAINLEVEL_OUTOF10: 8

## 2018-11-18 ASSESSMENT — PAIN DESCRIPTION - LOCATION
LOCATION: HIP;LEG
LOCATION: HIP
LOCATION: HIP;LEG
LOCATION: HIP
LOCATION: HIP;LEG
LOCATION: HIP

## 2018-11-18 ASSESSMENT — PAIN DESCRIPTION - DESCRIPTORS
DESCRIPTORS: DISCOMFORT;TINGLING;NUMBNESS
DESCRIPTORS: DISCOMFORT;TINGLING;NUMBNESS
DESCRIPTORS: DISCOMFORT;NUMBNESS;TINGLING

## 2018-11-18 ASSESSMENT — PAIN DESCRIPTION - DIRECTION: RADIATING_TOWARDS: DOWN RIGHT LEG

## 2018-11-18 ASSESSMENT — PAIN DESCRIPTION - ORIENTATION
ORIENTATION: RIGHT

## 2018-11-18 ASSESSMENT — PAIN DESCRIPTION - PAIN TYPE
TYPE: CHRONIC PAIN

## 2018-11-18 ASSESSMENT — PAIN DESCRIPTION - FREQUENCY
FREQUENCY: CONTINUOUS

## 2018-11-18 ASSESSMENT — PAIN DESCRIPTION - ONSET
ONSET: ON-GOING

## 2018-11-18 NOTE — FLOWSHEET NOTE
Per Dr. Fitz Alfred request RN contacted radiology at [phone] and notified them of the errors noted in the results of the lumbar and R hip x-rays performed on 11/17/18. Radiology stated they will apply an addendum and get those errors corrected.

## 2018-11-18 NOTE — CONSULTS
Pulmonary Medicine and Critical Care Consult    Patient - Sandra Cisneros   MRN -  [id number]   Acct # - [de-identified]   - 1940      Date of Admission -  2018  7:56 PM  Date of evaluation -  2018  Room - -   Rashaun Orozco MD Primary Care Physician - Evone Duverney, MD     Reason for Consult      COPD exacerbation    Assessment   · Acute on chronic respiratory failure  · Acute Exacerbation of COPD  · Tracheo-bronchitis  · secondary pulmonary HTN    Recommendations   · O2 NC  · incentive spirometer every hour WA  · Albuterol nebs  · Solumedrol 80 IV Q 8 hrs  · Dulera 200 BID  · Levaquin  · Spiriva  · DVT PX    Problem List      Patient Active Problem List   Diagnosis    Hypoxia    Chronic obstructive pulmonary disease with acute exacerbation (HCC)    Hypokalemia    Chronic bilateral low back pain with bilateral sciatica    Altered mental status, unspecified    Pneumonia    Abnormal gait    Adiposity    Aortic valve insufficiency    Allergic rhinitis    Benign essential HTN    Centriacinar emphysema ([address]    Constipation    Displacement of intervertebral disc without myelopathy    Dry eye syndrome    Eczema    Insomnia    DDD (degenerative disc disease), lumbar    Neuropathic pain    Osteoporosis    Nerve disease, peripheral    Posterior vitreous detachment    Failed back syndrome of lumbar spine    Pseudophakia    Temporary cerebral vascular dysfunction    Bursitis, trochanteric    Vitamin D deficiency    Lumbosacral radiculopathy    Generalized muscle weakness    Neurogenic bladder    Syncope and collapse    Acute bronchitis due to other specified organisms    Bilateral carpal tunnel syndrome    Epistaxis    Supplemental oxygen dependent    Acute pulmonary edema ([address]       HPI     Sandra Cisneros is 77 y.o.,  female, PMH of chronic respiratory failure due to COPD  who presents to the emergency diphenhydrAMINE, oxyCODONE-acetaminophen, albuterol, sodium chloride flush, magnesium hydroxide, bisacodyl, nicotine, acetaminophen  IV Drips/Infusions    Home Medications  Prescriptions Prior to Admission: vitamin B-6 (PYRIDOXINE) 100 MG tablet, Take 100 mg by mouth daily  vitamin C (ASCORBIC ACID) 500 MG tablet, Take 500 mg by mouth daily  Coenzyme Q10 (COQ-10) 10 MG CAPS, Take 1 tablet by mouth daily  ferrous sulfate 325 (65 Fe) MG tablet, Take 325 mg by mouth 2 times daily  trospium (SANCTURA) 20 MG tablet, Take 20 mg by mouth 2 times daily  aspirin 81 MG EC tablet, Take 81 mg by mouth daily  Multiple Vitamins-Minerals (THERAPEUTIC MULTIVITAMIN-MINERALS) tablet, Take 1 tablet by mouth daily  sodium chloride (OCEAN, BABY AYR) 0.65 % nasal spray, 1 spray by Nasal route as needed for Congestion  gabapentin (NEURONTIN) 600 MG tablet, Take 600 mg by mouth 3 times daily. Martha Siebarber oxyCODONE-acetaminophen (PERCOCET) 5-325 MG per tablet, Take 1 tablet by mouth every 4 hours as needed for Pain. .  levalbuterol (XOPENEX) 1.25 MG/3ML nebulizer solution, Take 1 ampule by nebulization every 4 hours as needed for Wheezing  diphenhydrAMINE (BENADRYL) 25 MG tablet, Take 12.5 mg by mouth nightly as needed for Itching   Calcium Carb-Cholecalciferol (CALCIUM 600 + D) 600-200 MG-UNIT TABS, Take 3 tablets by mouth daily   OxyCODONE ER (XTAMPZA ER) 27 MG C12A, Take 27 mg by mouth every 12 hours. .  Mirabegron ER (MYRBETRIQ) 50 MG TB24, Take 50 mg by mouth daily  FRANCISCA-HYDROLAC 12 12 % cream, APPLY TO AFFECTED AREA DAILY  tiotropium (SPIRIVA) 18 MCG inhalation capsule, Inhale 1 capsule into the lungs daily. hydrochlorothiazide (MICROZIDE) 12.5 MG capsule, Take 1 capsule by mouth daily. budesonide-formoterol (SYMBICORT) 160-4.5 MCG/ACT AERO, Inhale 2 puffs into the lungs 2 times daily. albuterol (PROVENTIL HFA;VENTOLIN HFA) 108 (90 BASE) MCG/ACT inhaler, Inhale 2 puffs into the lungs every 6 hours as needed for Wheezing.   clopidogrel (PLAVIX) 75 MG tablet, Take 75 mg by mouth daily. fluticasone (FLONASE) 50 MCG/ACT nasal spray, 2 sprays by Nasal route daily as needed   cetirizine (ZYRTEC) 10 MG tablet, Take 10 mg by mouth daily as needed for Allergies. polyethylene glycol (GLYCOLAX) powder, Take 17 g by mouth daily. ibandronate (BONIVA) 150 MG tablet, Take 150 mg by mouth every 30 days. docusate sodium (COLACE) 100 MG capsule, Take 100 mg by mouth 2 times daily. [DISCONTINUED] Umeclidinium Bromide (INCRUSE ELLIPTA) 62.5 MCG/INH AEPB, Inhale into the lungs  [DISCONTINUED] predniSONE (DELTASONE) 10 MG tablet, Take 10 mg by mouth daily  [DISCONTINUED] Tiotropium Bromide Monohydrate 2.5 MCG/ACT AERS, Inhale 2 puffs into the lungs daily  [DISCONTINUED] oxyCODONE (OXY-IR) 15 MG immediate release tablet, Take 15 mg by mouth every 4 hours as needed for Pain  [DISCONTINUED] albuterol (PROVENTIL) (2.5 MG/3ML) 0.083% nebulizer solution, Take 3 mLs by nebulization every 6 hours as needed for Wheezing. [DISCONTINUED] ipratropium-albuterol (DUONEB) 0.5-2.5 (3) MG/3ML SOLN nebulizer solution, Inhale 3 mLs into the lungs 4 times daily as needed for Shortness of Breath. Allergies    Anti-inflammatory enzyme [nutritional supplements]; Ceclor [cefaclor]; Morphine; Nsaids; Penicillins; Propoxyphene; Skelaxin [metaxalone]; Sulfa antibiotics; Tadalafil; Tolmetin; Alendronate; Asa [aspirin]; Erythromycin; Temazepam; Tetracyclines & related; and Tramadol  Social History     Social History     Social History    Marital status:      Spouse name: N/A    Number of children: N/A    Years of education: N/A     Occupational History    Not on file. Social History Main Topics    Smoking status: Former Smoker     Quit date: 7/7/1994    Smokeless tobacco: Never Used    Alcohol use No    Drug use: No    Sexual activity: No     Other Topics Concern    Not on file     Social History Narrative    No narrative on file     Family History    History reviewed.  No pertinent family history. ROS - 11 systems   General Denies any fever= reported chills  HEENT Denies any diplopia, tinnitus or vertigo  Resp  As above  Cardiac Denies any chest pain, palpitations, claudication   GI Denies any melena, hematochezia, hematemesis or pyrosis   Denies any frequency, urgency, hesitancy or incontinence  Heme Denies bruising or bleeding easily  Endocrine Denies any history of  thyroid disease  Neuro Denies any focal motor or sensory deficits  Psychiatric Denies anxiety, depression, suicidal ideation  Skin Denies rashes, itching, open sores    Vitals     height is 5' 5\" (1.651 m) and weight is 156 lb 6.4 oz (70.9 kg). Her temporal temperature is 98.1 °F (36.7 °C). Her blood pressure is 108/60 and her pulse is 93. Her respiration is 16 and oxygen saturation is 92%. Body mass index is 26.03 kg/m². I/O      Intake/Output Summary (Last 24 hours) at 11/18/18 1356  Last data filed at 11/18/18 1007   Gross per 24 hour   Intake              430 ml   Output             2425 ml   Net            -1995 ml     I/O last 3 completed shifts: In: 1140 [P.O.:1140]  Out: 2425 [Urine:2425]   Patient Vitals for the past 96 hrs (Last 3 readings):   Weight   11/18/18 0536 156 lb 6.4 oz (70.9 kg)   11/16/18 2330 154 lb 9.6 oz (70.1 kg)   11/16/18 1958 161 lb (73 kg)     Exam   General Appearance  Awake, alert, oriented, in no acute distress  HEENT - Head is normocephalic, atraumatic. Pupil reactive to light  Neck - Supple, symmetrical, trachea midline and Soft, trachea midline and straight  Lungs - no wheezes, rales or rhonchi, aeration good  Cardiovascular - Heart sounds are normal.  Regular rhythm normal rate without murmur, gallop or rub. Abdomen - Soft, nontender, nondistended, no masses or organomegaly  Neurologic - CN II-XII are grossly intact.  There are no focal motor  deficits  Skin - No bruising or bleeding  Extremities - No cyanosis, clubbing 1+ edema    Labs  - Old records and notes have been reviewed in [facility]   CBC   Lab Results   Component Value Date    WBC 7.2 11/18/2018    RBC 3.85 11/18/2018    HGB 12.2 11/18/2018    HCT 35.2 11/18/2018     11/18/2018    MCV 91.4 11/18/2018    MCH 31.6 11/18/2018    MCHC 34.6 11/18/2018    RDW 14.2 11/18/2018    LYMPHOPCT 31 11/16/2018    MONOPCT 8 11/16/2018    BASOPCT 1 11/16/2018    MONOSABS 0.50 11/16/2018    LYMPHSABS 1.90 11/16/2018    EOSABS 0.10 11/16/2018    BASOSABS 0.00 11/16/2018    DIFFTYPE NOT REPORTED 11/16/2018     BMP Lab Results   Component Value Date     11/18/2018    K 4.1 11/18/2018     11/18/2018    CO2 25 11/18/2018    BUN 18 11/18/2018    CREATININE 0.75 11/18/2018    GLUCOSE 196 11/18/2018    CALCIUM 9.0 11/18/2018    MG 1.9 11/17/2018     LFTS  Lab Results   Component Value Date    ALKPHOS 85 08/20/2015    ALT 18 08/20/2015    AST 32 08/20/2015    PROT 8.3 08/20/2015    BILITOT 0.34 08/20/2015    BILIDIR 0.09 08/20/2015    IBILI 0.25 08/20/2015    LABALBU 3.9 08/20/2015       Lab Results   Component Value Date    APTT 26.6 11/26/2015     INR   Lab Results   Component Value Date    INR 1.0 11/26/2015    INR 1.0 11/15/2014    PROTIME 10.7 11/26/2015    PROTIME 10.6 11/15/2014     Results for [employer] (MRN [id number]) as of 11/18/2018 14:01   Ref. Range 11/17/2018 09:04   Pro-BNP Latest Ref Range: <300 pg/mL 254   Troponin T Latest Ref Range: <0.03 ng/mL <0.03   Chol/HDL Ratio Latest Ref Range: <5  2.4   Cholesterol Latest Ref Range: <200 mg/dL 165   HDL Cholesterol Latest Ref Range: >40 mg/dL 70   LDL Cholesterol Latest Ref Range: 0 - 130 mg/dL 89   Triglycerides Latest Ref Range: <150 mg/dL 28     Radiology    CXR  There is chronic pulmonary change.  Mild similar potential edema.  There is   bibasilar atelectasis.  There is no pneumothorax.  The mediastinal structures   are stable.  The upper abdomen is unremarkable.  The extrathoracic soft   tissues are unremarkable.            \"Thank you for asking us to see

## 2018-11-19 PROBLEM — H61.21 EXCESSIVE CERUMEN IN EAR CANAL, RIGHT: Status: ACTIVE | Noted: 2018-11-19

## 2018-11-19 LAB
ANION GAP SERPL CALCULATED.3IONS-SCNC: 10 MMOL/L (ref 9–17)
BNP INTERPRETATION: ABNORMAL
BUN BLDV-MCNC: 17 MG/DL (ref 8–23)
BUN/CREAT BLD: 24 (ref 9–20)
CALCIUM SERPL-MCNC: 8.9 MG/DL (ref 8.6–10.4)
CHLORIDE BLD-SCNC: 103 MMOL/L (ref 98–107)
CO2: 28 MMOL/L (ref 20–31)
CREAT SERPL-MCNC: 0.72 MG/DL (ref 0.5–0.9)
GFR AFRICAN AMERICAN: >60 ML/MIN
GFR NON-AFRICAN AMERICAN: >60 ML/MIN
GFR SERPL CREATININE-BSD FRML MDRD: ABNORMAL ML/MIN/{1.73_M2}
GFR SERPL CREATININE-BSD FRML MDRD: ABNORMAL ML/MIN/{1.73_M2}
GLUCOSE BLD-MCNC: 150 MG/DL (ref 70–99)
HCT VFR BLD CALC: 35.3 % (ref 36–46)
HEMOGLOBIN: 12.1 G/DL (ref 12–16)
MCH RBC QN AUTO: 31.5 PG (ref 26–34)
MCHC RBC AUTO-ENTMCNC: 34.4 G/DL (ref 31–37)
MCV RBC AUTO: 91.5 FL (ref 80–100)
NRBC AUTOMATED: ABNORMAL PER 100 WBC
PDW BLD-RTO: 14.4 % (ref 11.5–14.5)
PLATELET # BLD: 177 K/UL (ref 130–400)
PMV BLD AUTO: 8.2 FL (ref 6–12)
POTASSIUM SERPL-SCNC: 4.1 MMOL/L (ref 3.7–5.3)
PRO-BNP: 380 PG/ML
RBC # BLD: 3.86 M/UL (ref 4–5.2)
SODIUM BLD-SCNC: 141 MMOL/L (ref 135–144)
WBC # BLD: 8.4 K/UL (ref 3.5–11)

## 2018-11-19 PROCEDURE — 2580000003 HC RX 258: Performed by: FAMILY MEDICINE

## 2018-11-19 PROCEDURE — 80048 BASIC METABOLIC PNL TOTAL CA: CPT

## 2018-11-19 PROCEDURE — 97116 GAIT TRAINING THERAPY: CPT

## 2018-11-19 PROCEDURE — 96366 THER/PROPH/DIAG IV INF ADDON: CPT

## 2018-11-19 PROCEDURE — 36415 COLL VENOUS BLD VENIPUNCTURE: CPT

## 2018-11-19 PROCEDURE — 2700000000 HC OXYGEN THERAPY PER DAY

## 2018-11-19 PROCEDURE — 6370000000 HC RX 637 (ALT 250 FOR IP): Performed by: FAMILY MEDICINE

## 2018-11-19 PROCEDURE — 2060000000 HC ICU INTERMEDIATE R&B

## 2018-11-19 PROCEDURE — 97535 SELF CARE MNGMENT TRAINING: CPT

## 2018-11-19 PROCEDURE — 6360000002 HC RX W HCPCS: Performed by: FAMILY MEDICINE

## 2018-11-19 PROCEDURE — 83880 ASSAY OF NATRIURETIC PEPTIDE: CPT

## 2018-11-19 PROCEDURE — 96376 TX/PRO/DX INJ SAME DRUG ADON: CPT

## 2018-11-19 PROCEDURE — 94640 AIRWAY INHALATION TREATMENT: CPT

## 2018-11-19 PROCEDURE — 97530 THERAPEUTIC ACTIVITIES: CPT

## 2018-11-19 PROCEDURE — 6360000002 HC RX W HCPCS: Performed by: INTERNAL MEDICINE

## 2018-11-19 PROCEDURE — 97110 THERAPEUTIC EXERCISES: CPT

## 2018-11-19 PROCEDURE — 96372 THER/PROPH/DIAG INJ SC/IM: CPT

## 2018-11-19 PROCEDURE — 94760 N-INVAS EAR/PLS OXIMETRY 1: CPT

## 2018-11-19 PROCEDURE — 85027 COMPLETE CBC AUTOMATED: CPT

## 2018-11-19 RX ORDER — METHYLPREDNISOLONE SODIUM SUCCINATE 40 MG/ML
30 INJECTION, POWDER, LYOPHILIZED, FOR SOLUTION INTRAMUSCULAR; INTRAVENOUS EVERY 8 HOURS
Status: DISCONTINUED | OUTPATIENT
Start: 2018-11-19 | End: 2018-11-20

## 2018-11-19 RX ADMIN — DOCUSATE SODIUM 100 MG: 100 CAPSULE, LIQUID FILLED ORAL at 09:05

## 2018-11-19 RX ADMIN — Medication 5 DROP: at 20:24

## 2018-11-19 RX ADMIN — OXYCODONE HYDROCHLORIDE AND ACETAMINOPHEN 1 TABLET: 10; 325 TABLET ORAL at 18:16

## 2018-11-19 RX ADMIN — DOCUSATE SODIUM 100 MG: 100 CAPSULE, LIQUID FILLED ORAL at 20:27

## 2018-11-19 RX ADMIN — OXYCODONE HYDROCHLORIDE AND ACETAMINOPHEN 1 TABLET: 10; 325 TABLET ORAL at 05:54

## 2018-11-19 RX ADMIN — MOMETASONE FUROATE AND FORMOTEROL FUMARATE DIHYDRATE 2 PUFF: 200; 5 AEROSOL RESPIRATORY (INHALATION) at 20:16

## 2018-11-19 RX ADMIN — METHYLPREDNISOLONE SODIUM SUCCINATE 30 MG: 40 INJECTION, POWDER, FOR SOLUTION INTRAMUSCULAR; INTRAVENOUS at 21:44

## 2018-11-19 RX ADMIN — Medication 10 ML: at 09:06

## 2018-11-19 RX ADMIN — GABAPENTIN 600 MG: 300 CAPSULE ORAL at 14:25

## 2018-11-19 RX ADMIN — METHYLPREDNISOLONE SODIUM SUCCINATE 80 MG: 125 INJECTION, POWDER, FOR SOLUTION INTRAMUSCULAR; INTRAVENOUS at 05:54

## 2018-11-19 RX ADMIN — ENOXAPARIN SODIUM 40 MG: 40 INJECTION SUBCUTANEOUS at 09:06

## 2018-11-19 RX ADMIN — POLYETHYLENE GLYCOL (3350) 17 G: 17 POWDER, FOR SOLUTION ORAL at 09:06

## 2018-11-19 RX ADMIN — GABAPENTIN 600 MG: 300 CAPSULE ORAL at 09:05

## 2018-11-19 RX ADMIN — ALBUTEROL SULFATE 2.5 MG: 2.5 SOLUTION RESPIRATORY (INHALATION) at 07:09

## 2018-11-19 RX ADMIN — ALBUTEROL SULFATE 2.5 MG: 2.5 SOLUTION RESPIRATORY (INHALATION) at 15:09

## 2018-11-19 RX ADMIN — METHYLPREDNISOLONE SODIUM SUCCINATE 30 MG: 40 INJECTION, POWDER, FOR SOLUTION INTRAMUSCULAR; INTRAVENOUS at 14:25

## 2018-11-19 RX ADMIN — LEVOFLOXACIN 750 MG: 5 INJECTION, SOLUTION INTRAVENOUS at 14:25

## 2018-11-19 RX ADMIN — ALBUTEROL SULFATE 2.5 MG: 2.5 SOLUTION RESPIRATORY (INHALATION) at 20:16

## 2018-11-19 RX ADMIN — CLOPIDOGREL BISULFATE 75 MG: 75 TABLET ORAL at 20:27

## 2018-11-19 RX ADMIN — OXYCODONE HYDROCHLORIDE 20 MG: 10 TABLET, FILM COATED, EXTENDED RELEASE ORAL at 21:44

## 2018-11-19 RX ADMIN — Medication 10 ML: at 21:44

## 2018-11-19 RX ADMIN — OXYCODONE HYDROCHLORIDE 20 MG: 10 TABLET, FILM COATED, EXTENDED RELEASE ORAL at 10:02

## 2018-11-19 RX ADMIN — OXYCODONE HYDROCHLORIDE AND ACETAMINOPHEN 1 TABLET: 10; 325 TABLET ORAL at 12:01

## 2018-11-19 RX ADMIN — ALBUTEROL SULFATE 2.5 MG: 2.5 SOLUTION RESPIRATORY (INHALATION) at 11:53

## 2018-11-19 RX ADMIN — GABAPENTIN 600 MG: 300 CAPSULE ORAL at 21:45

## 2018-11-19 RX ADMIN — POLYETHYLENE GLYCOL (3350) 17 G: 17 POWDER, FOR SOLUTION ORAL at 20:33

## 2018-11-19 RX ADMIN — TIOTROPIUM BROMIDE 18 MCG: 18 CAPSULE ORAL; RESPIRATORY (INHALATION) at 07:09

## 2018-11-19 RX ADMIN — MOMETASONE FUROATE AND FORMOTEROL FUMARATE DIHYDRATE 2 PUFF: 200; 5 AEROSOL RESPIRATORY (INHALATION) at 07:11

## 2018-11-19 RX ADMIN — ASPIRIN 81 MG 81 MG: 81 TABLET ORAL at 20:39

## 2018-11-19 ASSESSMENT — PAIN DESCRIPTION - PROGRESSION

## 2018-11-19 ASSESSMENT — PAIN DESCRIPTION - FREQUENCY
FREQUENCY: CONTINUOUS
FREQUENCY: CONTINUOUS

## 2018-11-19 ASSESSMENT — PAIN DESCRIPTION - ORIENTATION: ORIENTATION: RIGHT

## 2018-11-19 ASSESSMENT — PAIN SCALES - GENERAL
PAINLEVEL_OUTOF10: 7
PAINLEVEL_OUTOF10: 8
PAINLEVEL_OUTOF10: 10
PAINLEVEL_OUTOF10: 0
PAINLEVEL_OUTOF10: 10
PAINLEVEL_OUTOF10: 7
PAINLEVEL_OUTOF10: 9
PAINLEVEL_OUTOF10: 5
PAINLEVEL_OUTOF10: 0
PAINLEVEL_OUTOF10: 4

## 2018-11-19 ASSESSMENT — ENCOUNTER SYMPTOMS
DIARRHEA: 0
SHORTNESS OF BREATH: 1
SHORTNESS OF BREATH: 0
COUGH: 1

## 2018-11-19 ASSESSMENT — PAIN DESCRIPTION - ONSET: ONSET: ON-GOING

## 2018-11-19 ASSESSMENT — PAIN DESCRIPTION - PAIN TYPE
TYPE: CHRONIC PAIN

## 2018-11-19 ASSESSMENT — PAIN DESCRIPTION - DESCRIPTORS
DESCRIPTORS: SHARP;STABBING
DESCRIPTORS: SHARP;STABBING

## 2018-11-19 ASSESSMENT — PAIN DESCRIPTION - LOCATION
LOCATION: BACK
LOCATION: BACK
LOCATION: HIP
LOCATION: GENERALIZED

## 2018-11-19 NOTE — CONSULTS
18  Cardiology Consultation Note   Reason for Consult: We have been asked to evaluate this patient for possible CHF. Name:   Darlene Mitchell :  1940   MRN:   [id number] Gender:   female   PCP:  Christie Garner MD Age: 77 y.o. PCP Fax:  [phone] Attending Physician: Jose C Marquez [facility]: [facility] Room Number: [id number]      History Obtained From:  patient, electronic medical record    Recommendations:   · Management per pulmonary service  · Continue current medications     Impression:   1. Chronic obstructive pulmonary disease exacerbation  2. Secondary pulmonary hypertension  3. Valvular heart disease: Mild aortic and  mitral regurgitation. I will continue current treatment. Clinical Summary:    77 y.o. [place] female with COPD who who presented with cough and dyspnea which has become progressively worse over the last several weeks. She also complains of mild orthopnea. She has phlegm which was white-yellow. She denies chest pain, paroxysmal nocturnal dyspnea, palpitations, dizziness or syncope. She is feeling better after steroids and current management. This patient does not have congestive heart failure. This was either part of the initial differential diagnosis and CXR findings. I did discuss this with her son, Lori Clemente.       History:   Code Status: Full Code     Allergies as of 2018 - Review Complete 2018   Allergen Reaction Noted    Anti-inflammatory enzyme [nutritional supplements]  2015    Ceclor [cefaclor]  2015    Morphine Other (See Comments) 2017    Nsaids  2015    Penicillins  2015    Propoxyphene  2015    Skelaxin [metaxalone]  2015    Sulfa antibiotics Hives 2017    Tadalafil  2017    Tolmetin  2015    Alendronate Nausea And Vomiting and Nausea Only 2014    Asa [aspirin] Other (See Comments) 2014    Erythromycin Nausea And Vomiting and TB24 Take 50 mg by mouth daily   Yes Historical Provider, MD   FRANCISCA-HYDROLAC 12 12 % cream APPLY TO AFFECTED AREA DAILY 7/30/18  Yes Antonietta Dow DPM   tiotropium (SPIRIVA) 18 MCG inhalation capsule Inhale 1 capsule into the lungs daily. 6/27/14  Yes Carmen Aldana MD   hydrochlorothiazide (MICROZIDE) 12.5 MG capsule Take 1 capsule by mouth daily. 6/28/14  Yes Carmen Aldana MD   budesonide-formoterol (SYMBICORT) 160-4.5 MCG/ACT AERO Inhale 2 puffs into the lungs 2 times daily. 6/27/14  Yes Ngoc Lopez MD   albuterol (PROVENTIL HFA;VENTOLIN HFA) 108 (90 BASE) MCG/ACT inhaler Inhale 2 puffs into the lungs every 6 hours as needed for Wheezing. Yes Historical Provider, MD   clopidogrel (PLAVIX) 75 MG tablet Take 75 mg by mouth daily. Yes Historical Provider, MD   fluticasone (FLONASE) 50 MCG/ACT nasal spray 2 sprays by Nasal route daily as needed    Yes Historical Provider, MD   cetirizine (ZYRTEC) 10 MG tablet Take 10 mg by mouth daily as needed for Allergies. Yes Historical Provider, MD   polyethylene glycol (GLYCOLAX) powder Take 17 g by mouth daily. Yes Historical Provider, MD   ibandronate (BONIVA) 150 MG tablet Take 150 mg by mouth every 30 days. Yes Historical Provider, MD   docusate sodium (COLACE) 100 MG capsule Take 100 mg by mouth 2 times daily. Yes Historical Provider, MD       She  has a past medical history of Aortic valve disease; Arthritis; COPD (chronic obstructive pulmonary disease) ([facility] [address] Disease of blood and blood forming organ; Diverticulitis; GERD (gastroesophageal reflux disease); History of blood transfusion; History of gastritis; Lumbar disc disease; Movement disorder; Nerve disease, peripheral; Osteoporosis; Pneumonia; and Unspecified cerebral artery occlusion with cerebral infarction. She  has a past surgical history that includes Spinal fusion (2012); Varicose vein surgery; Lumbar spine surgery (x 2); Upper gastrointestinal endoscopy;  Colonoscopy;

## 2018-11-19 NOTE — PROGRESS NOTES
Jerson Michel is a 77 y.o. female patient.     Current Facility-Administered Medications   Medication Dose Route Frequency Provider Last Rate Last Dose    methylPREDNISolone sodium (SOLU-MEDROL) injection 30 mg  30 mg Intravenous Q8H Raine Mccall MD   30 mg at 11/19/18 1425    ondansetron (ZOFRAN-ODT) disintegrating tablet 4 mg  4 mg Oral Q6H PRN Oleg Sosa MD        Or    ondansetron (ZOFRAN) injection 4 mg  4 mg Intravenous Q6H PRN Oleg Sosa MD        diphenhydrAMINE (BENADRYL) tablet 25 mg  25 mg Oral Nightly PRN Oleg Sosa MD        polyethylene glycol (GLYCOLAX) packet 17 g  17 g Oral BID Berta Last MD   17 g at 11/19/18 0906    aspirin chewable tablet 81 mg  81 mg Oral Daily Oleg Sosa MD   81 mg at 11/18/18 0840    oxyCODONE-acetaminophen (PERCOCET)  MG per tablet 1 tablet  1 tablet Oral Q6H PRN Oleg Sosa MD   1 tablet at 11/19/18 1201    lidocaine (LIDODERM) 5 % 1 patch  1 patch Transdermal Daily Berta Last MD   1 patch at 11/19/18 0906    oxyCODONE (OXYCONTIN) extended release tablet 20 mg  20 mg Oral BID Oleg Sosa MD   20 mg at 11/19/18 1002    levofloxacin (LEVAQUIN) 750 MG/150ML infusion 750 mg  750 mg Intravenous Q24H Berta Last MD   Stopped at 11/19/18 1600    gabapentin (NEURONTIN) capsule 600 mg  600 mg Oral TID Oleg Sosa MD   600 mg at 11/19/18 1425    albuterol (PROVENTIL) nebulizer solution 2.5 mg  2.5 mg Nebulization Q4H WA Oleg Sosa MD   2.5 mg at 11/19/18 1509    clopidogrel (PLAVIX) tablet 75 mg  75 mg Oral Daily Oleg Sosa MD   75 mg at 11/18/18 2135    fluticasone (FLONASE) 50 MCG/ACT nasal spray 2 spray  2 spray Nasal Daily Oleg Sosa MD        tiotropium (SPIRIVA) inhalation capsule 18 mcg  18 mcg Inhalation Daily Oleg Sosa MD   18 mcg at 11/19/18 0709    mometasone-formoterol (DULERA) 200-5 MCG/ACT inhaler 2 puff  2 puff Inhalation BID Berta Last MD   2 puff at 11/19/18 0711    albuterol rhythm  Left axis deviation  Moderate voltage criteria for LVH, may be normal variant  Abnormal ECG  When compared with ECG of 08-JUL-2017 07:50,  Vent. rate has increased BY  31 BPM       Assessment:    Condition: In serious condition. Improving. (Patient Active Problem List:     Hypoxia     Chronic obstructive pulmonary disease with acute exacerbation (HCC)     Hypokalemia     Chronic bilateral low back pain with bilateral sciatica     Altered mental status, unspecified     Pneumonia     Abnormal gait     Adiposity     Aortic valve insufficiency     Allergic rhinitis     Benign essential HTN     Centriacinar emphysema (HCC)     Constipation     Displacement of intervertebral disc without myelopathy     Dry eye syndrome     Eczema     Insomnia     DDD (degenerative disc disease), lumbar     Neuropathic pain     Osteoporosis     Nerve disease, peripheral     Posterior vitreous detachment     Failed back syndrome of lumbar spine     Pseudophakia     Temporary cerebral vascular dysfunction     Bursitis, trochanteric     Vitamin D deficiency     Lumbosacral radiculopathy     Generalized muscle weakness     Neurogenic bladder     Syncope and collapse     Acute bronchitis due to other specified organisms     Bilateral carpal tunnel syndrome     Epistaxis     Supplemental oxygen dependent     Excessive cerumen in ear canal, right    ). Plan:   (Pt looks much better  Breathing better  Energy improved  Ate better  C/o R ear fullness  Show Wax  Debrox bid  CHF well comp  AECOPD  Abx, steroids, aerosols prn  Plan DC soon  DC once OK w all).        Fariba Singh MD  11/19/2018

## 2018-11-19 NOTE — PROGRESS NOTES
Levaquin  · Decrease IV solu-medrol  · Albuterol Q 4 hours and prn  · Spiriva  · Dulera 200  · X-ray chest in am  · Labs: CBC and BMP in am  · BiPAP, when necessary  · 2 liters/min via nasal cannula  · PT OT  · DVT prophylaxis with low molecular weight heparin  · Will follow with you    Hank Steve MD, [facility]  Pulmonary Critical Care and Sleep Medicine,  [facility]  Cell: [phone]  Office: [phone]

## 2018-11-20 ENCOUNTER — APPOINTMENT (OUTPATIENT)
Dept: GENERAL RADIOLOGY | Age: 78
DRG: 189 | End: 2018-11-20
Payer: COMMERCIAL

## 2018-11-20 LAB
ABSOLUTE EOS #: 0 K/UL (ref 0–0.4)
ABSOLUTE IMMATURE GRANULOCYTE: ABNORMAL K/UL (ref 0–0.3)
ABSOLUTE LYMPH #: 1 K/UL (ref 1–4.8)
ABSOLUTE MONO #: 0.4 K/UL (ref 0.2–0.8)
ANION GAP SERPL CALCULATED.3IONS-SCNC: 9 MMOL/L (ref 9–17)
BASOPHILS # BLD: 0 % (ref 0–2)
BASOPHILS ABSOLUTE: 0 K/UL (ref 0–0.2)
BUN BLDV-MCNC: 21 MG/DL (ref 8–23)
BUN/CREAT BLD: 27 (ref 9–20)
CALCIUM SERPL-MCNC: 8.7 MG/DL (ref 8.6–10.4)
CHLORIDE BLD-SCNC: 103 MMOL/L (ref 98–107)
CO2: 27 MMOL/L (ref 20–31)
CREAT SERPL-MCNC: 0.78 MG/DL (ref 0.5–0.9)
DIFFERENTIAL TYPE: ABNORMAL
EOSINOPHILS RELATIVE PERCENT: 0 % (ref 1–4)
GFR AFRICAN AMERICAN: >60 ML/MIN
GFR NON-AFRICAN AMERICAN: >60 ML/MIN
GFR SERPL CREATININE-BSD FRML MDRD: ABNORMAL ML/MIN/{1.73_M2}
GFR SERPL CREATININE-BSD FRML MDRD: ABNORMAL ML/MIN/{1.73_M2}
GLUCOSE BLD-MCNC: 129 MG/DL (ref 70–99)
HCT VFR BLD CALC: 36.7 % (ref 36–46)
HEMOGLOBIN: 12.4 G/DL (ref 12–16)
IMMATURE GRANULOCYTES: ABNORMAL %
LYMPHOCYTES # BLD: 13 % (ref 24–44)
MCH RBC QN AUTO: 31.7 PG (ref 26–34)
MCHC RBC AUTO-ENTMCNC: 33.8 G/DL (ref 31–37)
MCV RBC AUTO: 93.6 FL (ref 80–100)
MONOCYTES # BLD: 6 % (ref 1–7)
NRBC AUTOMATED: ABNORMAL PER 100 WBC
PDW BLD-RTO: 14.2 % (ref 11.5–14.5)
PLATELET # BLD: 186 K/UL (ref 130–400)
PLATELET ESTIMATE: ABNORMAL
PMV BLD AUTO: ABNORMAL FL (ref 6–12)
POTASSIUM SERPL-SCNC: 4.5 MMOL/L (ref 3.7–5.3)
RBC # BLD: 3.92 M/UL (ref 4–5.2)
RBC # BLD: ABNORMAL 10*6/UL
SEG NEUTROPHILS: 81 % (ref 36–66)
SEGMENTED NEUTROPHILS ABSOLUTE COUNT: 6.3 K/UL (ref 1.8–7.7)
SODIUM BLD-SCNC: 139 MMOL/L (ref 135–144)
WBC # BLD: 7.7 K/UL (ref 3.5–11)
WBC # BLD: ABNORMAL 10*3/UL

## 2018-11-20 PROCEDURE — 36415 COLL VENOUS BLD VENIPUNCTURE: CPT

## 2018-11-20 PROCEDURE — 6360000002 HC RX W HCPCS: Performed by: FAMILY MEDICINE

## 2018-11-20 PROCEDURE — 6370000000 HC RX 637 (ALT 250 FOR IP): Performed by: INTERNAL MEDICINE

## 2018-11-20 PROCEDURE — 2580000003 HC RX 258: Performed by: FAMILY MEDICINE

## 2018-11-20 PROCEDURE — 2060000000 HC ICU INTERMEDIATE R&B

## 2018-11-20 PROCEDURE — 94760 N-INVAS EAR/PLS OXIMETRY 1: CPT

## 2018-11-20 PROCEDURE — 71045 X-RAY EXAM CHEST 1 VIEW: CPT

## 2018-11-20 PROCEDURE — 97116 GAIT TRAINING THERAPY: CPT

## 2018-11-20 PROCEDURE — 96376 TX/PRO/DX INJ SAME DRUG ADON: CPT

## 2018-11-20 PROCEDURE — 6370000000 HC RX 637 (ALT 250 FOR IP): Performed by: FAMILY MEDICINE

## 2018-11-20 PROCEDURE — 85025 COMPLETE CBC W/AUTO DIFF WBC: CPT

## 2018-11-20 PROCEDURE — 96372 THER/PROPH/DIAG INJ SC/IM: CPT

## 2018-11-20 PROCEDURE — 94640 AIRWAY INHALATION TREATMENT: CPT

## 2018-11-20 PROCEDURE — 2700000000 HC OXYGEN THERAPY PER DAY

## 2018-11-20 PROCEDURE — 97530 THERAPEUTIC ACTIVITIES: CPT

## 2018-11-20 PROCEDURE — 80048 BASIC METABOLIC PNL TOTAL CA: CPT

## 2018-11-20 PROCEDURE — 6360000002 HC RX W HCPCS: Performed by: INTERNAL MEDICINE

## 2018-11-20 RX ORDER — LEVOFLOXACIN 500 MG/1
750 TABLET, FILM COATED ORAL DAILY
Status: DISCONTINUED | OUTPATIENT
Start: 2018-11-20 | End: 2018-11-21 | Stop reason: CLARIF

## 2018-11-20 RX ORDER — PREDNISONE 20 MG/1
20 TABLET ORAL 2 TIMES DAILY
Status: DISCONTINUED | OUTPATIENT
Start: 2018-11-20 | End: 2018-11-24 | Stop reason: HOSPADM

## 2018-11-20 RX ADMIN — Medication 5 DROP: at 09:27

## 2018-11-20 RX ADMIN — ALBUTEROL SULFATE 2.5 MG: 2.5 SOLUTION RESPIRATORY (INHALATION) at 14:00

## 2018-11-20 RX ADMIN — GABAPENTIN 600 MG: 300 CAPSULE ORAL at 21:48

## 2018-11-20 RX ADMIN — PREDNISONE 20 MG: 20 TABLET ORAL at 21:48

## 2018-11-20 RX ADMIN — OXYCODONE HYDROCHLORIDE AND ACETAMINOPHEN 1 TABLET: 10; 325 TABLET ORAL at 16:17

## 2018-11-20 RX ADMIN — PREDNISONE 20 MG: 20 TABLET ORAL at 12:57

## 2018-11-20 RX ADMIN — LEVOFLOXACIN 750 MG: 500 TABLET, FILM COATED ORAL at 12:57

## 2018-11-20 RX ADMIN — ENOXAPARIN SODIUM 40 MG: 40 INJECTION SUBCUTANEOUS at 09:26

## 2018-11-20 RX ADMIN — GABAPENTIN 600 MG: 300 CAPSULE ORAL at 16:16

## 2018-11-20 RX ADMIN — MOMETASONE FUROATE AND FORMOTEROL FUMARATE DIHYDRATE 2 PUFF: 200; 5 AEROSOL RESPIRATORY (INHALATION) at 19:36

## 2018-11-20 RX ADMIN — OXYCODONE HYDROCHLORIDE 20 MG: 10 TABLET, FILM COATED, EXTENDED RELEASE ORAL at 09:36

## 2018-11-20 RX ADMIN — OXYCODONE HYDROCHLORIDE 20 MG: 10 TABLET, FILM COATED, EXTENDED RELEASE ORAL at 21:48

## 2018-11-20 RX ADMIN — ASPIRIN 81 MG 81 MG: 81 TABLET ORAL at 21:51

## 2018-11-20 RX ADMIN — ALBUTEROL SULFATE 2.5 MG: 2.5 SOLUTION RESPIRATORY (INHALATION) at 10:16

## 2018-11-20 RX ADMIN — Medication 10 ML: at 21:53

## 2018-11-20 RX ADMIN — ALBUTEROL SULFATE 2.5 MG: 2.5 SOLUTION RESPIRATORY (INHALATION) at 06:16

## 2018-11-20 RX ADMIN — DOCUSATE SODIUM 100 MG: 100 CAPSULE, LIQUID FILLED ORAL at 09:26

## 2018-11-20 RX ADMIN — ALBUTEROL SULFATE 2.5 MG: 2.5 SOLUTION RESPIRATORY (INHALATION) at 19:36

## 2018-11-20 RX ADMIN — METHYLPREDNISOLONE SODIUM SUCCINATE 30 MG: 40 INJECTION, POWDER, FOR SOLUTION INTRAMUSCULAR; INTRAVENOUS at 05:46

## 2018-11-20 RX ADMIN — OXYCODONE HYDROCHLORIDE AND ACETAMINOPHEN 1 TABLET: 10; 325 TABLET ORAL at 00:26

## 2018-11-20 RX ADMIN — GABAPENTIN 600 MG: 300 CAPSULE ORAL at 09:26

## 2018-11-20 RX ADMIN — MOMETASONE FUROATE AND FORMOTEROL FUMARATE DIHYDRATE 2 PUFF: 200; 5 AEROSOL RESPIRATORY (INHALATION) at 06:16

## 2018-11-20 RX ADMIN — POLYETHYLENE GLYCOL (3350) 17 G: 17 POWDER, FOR SOLUTION ORAL at 09:36

## 2018-11-20 RX ADMIN — CLOPIDOGREL BISULFATE 75 MG: 75 TABLET ORAL at 21:47

## 2018-11-20 RX ADMIN — DIPHENHYDRAMINE HCL 25 MG: 25 TABLET ORAL at 21:50

## 2018-11-20 RX ADMIN — Medication 10 ML: at 09:04

## 2018-11-20 RX ADMIN — TIOTROPIUM BROMIDE 18 MCG: 18 CAPSULE ORAL; RESPIRATORY (INHALATION) at 06:16

## 2018-11-20 ASSESSMENT — PAIN SCALES - GENERAL
PAINLEVEL_OUTOF10: 9
PAINLEVEL_OUTOF10: 7
PAINLEVEL_OUTOF10: 6
PAINLEVEL_OUTOF10: 7

## 2018-11-20 ASSESSMENT — PAIN DESCRIPTION - PAIN TYPE
TYPE: CHRONIC PAIN

## 2018-11-20 ASSESSMENT — PAIN DESCRIPTION - PROGRESSION
CLINICAL_PROGRESSION: GRADUALLY WORSENING
CLINICAL_PROGRESSION: GRADUALLY WORSENING
CLINICAL_PROGRESSION: GRADUALLY IMPROVING
CLINICAL_PROGRESSION: NOT CHANGED
CLINICAL_PROGRESSION: GRADUALLY IMPROVING

## 2018-11-20 ASSESSMENT — PAIN DESCRIPTION - DESCRIPTORS
DESCRIPTORS: SHARP;STABBING
DESCRIPTORS: DISCOMFORT
DESCRIPTORS: DISCOMFORT

## 2018-11-20 ASSESSMENT — ENCOUNTER SYMPTOMS
DIARRHEA: 0
COUGH: 1
SHORTNESS OF BREATH: 0

## 2018-11-20 ASSESSMENT — PAIN DESCRIPTION - FREQUENCY
FREQUENCY: CONTINUOUS

## 2018-11-20 ASSESSMENT — PAIN DESCRIPTION - ONSET
ONSET: ON-GOING
ONSET: PROGRESSIVE
ONSET: PROGRESSIVE

## 2018-11-20 ASSESSMENT — PAIN DESCRIPTION - LOCATION
LOCATION: HIP
LOCATION: BACK
LOCATION: BACK;HIP

## 2018-11-20 ASSESSMENT — PAIN DESCRIPTION - ORIENTATION
ORIENTATION: RIGHT
ORIENTATION: RIGHT;OTHER (COMMENT)
ORIENTATION: RIGHT

## 2018-11-20 NOTE — PLAN OF CARE
Problem: Falls - Risk of:  Goal: Will remain free from falls  Will remain free from falls   Outcome: Ongoing      Problem: Pain:  Goal: Pain level will decrease  Pain level will decrease   Outcome: Ongoing      Problem: Skin Integrity:  Goal: Absence of new skin breakdown  Absence of new skin breakdown   Outcome: Ongoing

## 2018-11-20 NOTE — PROGRESS NOTES
Physical Therapy  [facility]  Daily Treatment Note  NAME: Carolyn Angelo  : 1940  MRN: [id number]    Date of Service: 2018    Discharge Recommendations:  [address]        Patient Diagnosis(es): The encounter diagnosis was Acute pulmonary edema ([facility] [address] has a past medical history of Aortic valve disease; Arthritis; COPD (chronic obstructive pulmonary disease) ([facility] [address] Disease of blood and blood forming organ; Diverticulitis; GERD (gastroesophageal reflux disease); History of blood transfusion; History of gastritis; Lumbar disc disease; Movement disorder; Nerve disease, peripheral; Osteoporosis; Pneumonia; and Unspecified cerebral artery occlusion with cerebral infarction. has a past surgical history that includes Spinal fusion (); Varicose vein surgery; Lumbar spine surgery (x 2); Upper gastrointestinal endoscopy; Colonoscopy; Hysterectomy; Foot surgery (Left, 2017); and arthroplasty (Left, 2017).     Restrictions  Restrictions/Precautions  Restrictions/Precautions: Fall Risk, Up as Tolerated  Required Braces or Orthoses?: No  Position Activity Restriction  Other position/activity restrictions: HR monitor, 1500 ml fluid restriction, pedraza   Subjective   General  Chart Reviewed: Yes  Pain Screening  Patient Currently in Pain: Denies  Pain Assessment  Pain Assessment: 0-10  Pain Level: 6  Vital Signs  Patient Currently in Pain: Denies       Orientation     Cognition      Objective   Bed mobility  Scooting: Minimal assistance  Transfers  Sit to Stand: Minimal Assistance  Stand to sit: Minimal Assistance  Stand Pivot Transfers: Minimal Assistance  Ambulation  Ambulation?: Yes  Ambulation 1  Surface: level tile  Device: Rolling Walker  Assistance: Minimal assistance  Quality of Gait: extremely slow and requiring rest breaks, very poor posture, leaning onto walker to rest, pt very weak   Distance: 15 ft x 2  Comments: pt with very poor kyphotic posture, leaning heavily on walker requiring several rest breaks, pt doing worse than yesterday      Balance  Posture: Poor  Sitting - Static: Fair  Sitting - Dynamic: Fair  Standing - Static: Poor  Standing - Dynamic: Poor            Comment: assisted pt to bathroom for toiletin              Assessment   Body structures, Functions, Activity limitations: Decreased endurance;Decreased balance  Assessment: pt tolerated treatment fairly this date. Requires multiple rest breaks and cues to stay on task and for proper technique. Continued need for skilled therapy to improve strength and balance and to educate patient on proper posture and HEP  Activity Tolerance  Activity Tolerance: Patient limited by fatigue;Patient limited by endurance; Patient limited by cognitive status     G-Code     OutComes Score                                                    AM-PAC Score  AM-PAC Inpatient Mobility Raw Score : 14  AM-PAC Inpatient T-Scale Score : 38.1  Mobility Inpatient CMS 0-100% Score: 61.29  Mobility Inpatient CMS G-Code Modifier : CL          Goals  Short term goals  Time Frame for Short term goals: 12 treatments  Short term goal 1: Independent bed mobility/transfers  Short term goal 2: Independent ambulation w/ ' x 1  Short term goal 3: Good standing balance and postyre  Short term goal 4: Tolerate 30 min ther act  Patient Goals   Patient goals : Back to my apartment    Plan    Plan  Times per week: 1-2x/day,6-7 days/week  Current Treatment Recommendations: Functional Mobility Training, Balance Training, Strengthening, Transfer Training, Endurance Training, Gait Training, Stair training  Safety Devices  Type of devices:  All fall risk precautions in place, Left in chair, Nurse notified, Call light within reach  Restraints  Initially in place: No     Therapy Time   Individual Concurrent Group Co-treatment   Time In  1502         Time Out  1613         Minutes  70                 [address], [place]

## 2018-11-20 NOTE — PROGRESS NOTES
[facility]  [address] Lissa Aguilar, [address] [place] [address], [address] [place] [address]  Tel:  [phone]      Fax:  [phone]  [email or web address]    Cardiology Progress Note:    Patient seen at this time. Sitting up in bed, sleeping, awakens easily. NAD. Denies any dizziness. Denies CP or palpitations. States SOB is improving. Still having cough. Intermittent blood tinged sputum with increased coughing. Per nursing, VSS. No acute events overnight. Review of Systems: As above, otherwise, negative. ECG/Telemetry reviewed- Appears sinus. Recent Labs      11/18/18   0744  11/19/18   0520  11/20/18   0536   WBC  7.2  8.4  7.7   HGB  12.2  12.1  12.4   HCT  35.2*  35.3*  36.7   MCV  91.4  91.5  93.6   PLT  168  177  186      Recent Labs      11/18/18   0744  11/19/18   0520  11/20/18   0536   NA  141  141  139   K  4.1  4.1  4.5   CL  102  103  103   CO2  25  28  27   BUN  18  17  21   CREATININE  0.75  0.72  0.78     Recent Labs      11/17/18   0904   MG  1.9       Physical Exam:    /72   Pulse 93   Temp 97 °F (36.1 °C) (Temporal)   Resp 16   Ht 5' 5\" (1.651 m)   Wt 153 lb 1 oz (69.4 kg)   SpO2 96%   BMI 25.47 kg/m²      Alert, oriented . Appropriate. Head normocephalic without any acute identifiable abnormalities. PERRL. No scleral icterus. Neck supple. Atraumatic. Lung sounds clear, but diminished throughout. Regular rate. No acute distress with respirations. On nasal cannula. S1S2. Regular rate and rhythm. Distant sound. No murmur noted. Trace B ankle edema. PPP. Bowel sounds active. Abdomen soft, non-tender, non-distended. Skin intact. Skin warm and dry. Assessment/Plan     1. COPD: Per pulmonary service  2. Pulmonary Hypertension: As above  3.  Mild Mitral Regurgitation    Continue current medications  Okay from CV standpoint for patient to transfer to progressive care/step down unit  Okay to discharge from CV standpoint  Little else to add from CV standpoint  At

## 2018-11-20 NOTE — PROGRESS NOTES
hypertension    Recommendations:  · Continue Levaquin  · Discontinue IV solu-medrol  · Start prednisone taper  · Albuterol Q 4 hours and prn  · Spiriva  · Dulera 200  · BiPAP, when necessary  · 2 liters/min via nasal cannula  · PT OT  · DVT prophylaxis with low molecular weight heparin  · OK for discharge planning from pulmonary stand point with follow up with pulmonary in 2-3 weeks. Plan was discussed with patient and she understands it.     Sabina Cuevas MD, [facility]  Pulmonary Critical Care and Sleep Medicine,  [facility]  Cell: [phone]  Office: [phone]

## 2018-11-21 LAB
ANION GAP SERPL CALCULATED.3IONS-SCNC: 8 MMOL/L (ref 9–17)
BNP INTERPRETATION: NORMAL
BUN BLDV-MCNC: 20 MG/DL (ref 8–23)
BUN/CREAT BLD: 29 (ref 9–20)
CALCIUM SERPL-MCNC: 8.7 MG/DL (ref 8.6–10.4)
CHLORIDE BLD-SCNC: 104 MMOL/L (ref 98–107)
CO2: 28 MMOL/L (ref 20–31)
CREAT SERPL-MCNC: 0.69 MG/DL (ref 0.5–0.9)
GFR AFRICAN AMERICAN: >60 ML/MIN
GFR NON-AFRICAN AMERICAN: >60 ML/MIN
GFR SERPL CREATININE-BSD FRML MDRD: ABNORMAL ML/MIN/{1.73_M2}
GFR SERPL CREATININE-BSD FRML MDRD: ABNORMAL ML/MIN/{1.73_M2}
GLUCOSE BLD-MCNC: 138 MG/DL (ref 70–99)
HCT VFR BLD CALC: 37.9 % (ref 36–46)
HEMOGLOBIN: 12.5 G/DL (ref 12–16)
MCH RBC QN AUTO: 31 PG (ref 26–34)
MCHC RBC AUTO-ENTMCNC: 32.9 G/DL (ref 31–37)
MCV RBC AUTO: 94.2 FL (ref 80–100)
NRBC AUTOMATED: NORMAL PER 100 WBC
PDW BLD-RTO: 14.2 % (ref 11.5–14.5)
PLATELET # BLD: 206 K/UL (ref 130–400)
PMV BLD AUTO: NORMAL FL (ref 6–12)
POTASSIUM SERPL-SCNC: 4.6 MMOL/L (ref 3.7–5.3)
PRO-BNP: 269 PG/ML
RBC # BLD: 4.02 M/UL (ref 4–5.2)
SODIUM BLD-SCNC: 140 MMOL/L (ref 135–144)
WBC # BLD: 5.9 K/UL (ref 3.5–11)

## 2018-11-21 PROCEDURE — 6370000000 HC RX 637 (ALT 250 FOR IP): Performed by: FAMILY MEDICINE

## 2018-11-21 PROCEDURE — 2060000000 HC ICU INTERMEDIATE R&B

## 2018-11-21 PROCEDURE — 2580000003 HC RX 258: Performed by: FAMILY MEDICINE

## 2018-11-21 PROCEDURE — 36415 COLL VENOUS BLD VENIPUNCTURE: CPT

## 2018-11-21 PROCEDURE — 83880 ASSAY OF NATRIURETIC PEPTIDE: CPT

## 2018-11-21 PROCEDURE — 6360000002 HC RX W HCPCS: Performed by: FAMILY MEDICINE

## 2018-11-21 PROCEDURE — 97535 SELF CARE MNGMENT TRAINING: CPT

## 2018-11-21 PROCEDURE — 85027 COMPLETE CBC AUTOMATED: CPT

## 2018-11-21 PROCEDURE — 2700000000 HC OXYGEN THERAPY PER DAY

## 2018-11-21 PROCEDURE — 97116 GAIT TRAINING THERAPY: CPT

## 2018-11-21 PROCEDURE — 80048 BASIC METABOLIC PNL TOTAL CA: CPT

## 2018-11-21 PROCEDURE — 6370000000 HC RX 637 (ALT 250 FOR IP): Performed by: INTERNAL MEDICINE

## 2018-11-21 PROCEDURE — 96372 THER/PROPH/DIAG INJ SC/IM: CPT

## 2018-11-21 PROCEDURE — 94640 AIRWAY INHALATION TREATMENT: CPT

## 2018-11-21 PROCEDURE — 97530 THERAPEUTIC ACTIVITIES: CPT

## 2018-11-21 PROCEDURE — 94760 N-INVAS EAR/PLS OXIMETRY 1: CPT

## 2018-11-21 RX ORDER — LEVOFLOXACIN 750 MG/1
750 TABLET ORAL DAILY
Status: DISCONTINUED | OUTPATIENT
Start: 2018-11-21 | End: 2018-11-24 | Stop reason: HOSPADM

## 2018-11-21 RX ADMIN — Medication 10 ML: at 21:00

## 2018-11-21 RX ADMIN — ALBUTEROL SULFATE 2.5 MG: 2.5 SOLUTION RESPIRATORY (INHALATION) at 21:04

## 2018-11-21 RX ADMIN — LEVOFLOXACIN 750 MG: 750 TABLET, FILM COATED ORAL at 09:50

## 2018-11-21 RX ADMIN — ALBUTEROL SULFATE 2.5 MG: 2.5 SOLUTION RESPIRATORY (INHALATION) at 07:38

## 2018-11-21 RX ADMIN — PREDNISONE 20 MG: 20 TABLET ORAL at 09:26

## 2018-11-21 RX ADMIN — Medication 5 DROP: at 21:09

## 2018-11-21 RX ADMIN — GABAPENTIN 600 MG: 300 CAPSULE ORAL at 21:08

## 2018-11-21 RX ADMIN — POLYETHYLENE GLYCOL (3350) 17 G: 17 POWDER, FOR SOLUTION ORAL at 09:26

## 2018-11-21 RX ADMIN — ENOXAPARIN SODIUM 40 MG: 40 INJECTION SUBCUTANEOUS at 09:26

## 2018-11-21 RX ADMIN — ASPIRIN 81 MG 81 MG: 81 TABLET ORAL at 21:09

## 2018-11-21 RX ADMIN — Medication 10 ML: at 09:26

## 2018-11-21 RX ADMIN — GABAPENTIN 600 MG: 300 CAPSULE ORAL at 09:26

## 2018-11-21 RX ADMIN — OXYCODONE HYDROCHLORIDE AND ACETAMINOPHEN 1 TABLET: 10; 325 TABLET ORAL at 15:22

## 2018-11-21 RX ADMIN — CLOPIDOGREL BISULFATE 75 MG: 75 TABLET ORAL at 21:09

## 2018-11-21 RX ADMIN — Medication 5 DROP: at 09:26

## 2018-11-21 RX ADMIN — OXYCODONE HYDROCHLORIDE 20 MG: 10 TABLET, FILM COATED, EXTENDED RELEASE ORAL at 21:08

## 2018-11-21 RX ADMIN — GABAPENTIN 600 MG: 300 CAPSULE ORAL at 15:21

## 2018-11-21 RX ADMIN — DOCUSATE SODIUM 100 MG: 100 CAPSULE, LIQUID FILLED ORAL at 09:26

## 2018-11-21 RX ADMIN — OXYCODONE HYDROCHLORIDE AND ACETAMINOPHEN 1 TABLET: 10; 325 TABLET ORAL at 06:12

## 2018-11-21 RX ADMIN — PREDNISONE 20 MG: 20 TABLET ORAL at 21:09

## 2018-11-21 RX ADMIN — ALBUTEROL SULFATE 2.5 MG: 2.5 SOLUTION RESPIRATORY (INHALATION) at 11:30

## 2018-11-21 RX ADMIN — OXYCODONE HYDROCHLORIDE 20 MG: 10 TABLET, FILM COATED, EXTENDED RELEASE ORAL at 09:31

## 2018-11-21 RX ADMIN — MOMETASONE FUROATE AND FORMOTEROL FUMARATE DIHYDRATE 2 PUFF: 200; 5 AEROSOL RESPIRATORY (INHALATION) at 07:42

## 2018-11-21 RX ADMIN — MOMETASONE FUROATE AND FORMOTEROL FUMARATE DIHYDRATE 2 PUFF: 200; 5 AEROSOL RESPIRATORY (INHALATION) at 21:04

## 2018-11-21 RX ADMIN — ALBUTEROL SULFATE 2.5 MG: 2.5 SOLUTION RESPIRATORY (INHALATION) at 16:00

## 2018-11-21 ASSESSMENT — PAIN SCALES - GENERAL
PAINLEVEL_OUTOF10: 8
PAINLEVEL_OUTOF10: 9
PAINLEVEL_OUTOF10: 9
PAINLEVEL_OUTOF10: 0
PAINLEVEL_OUTOF10: 0
PAINLEVEL_OUTOF10: 4
PAINLEVEL_OUTOF10: 7
PAINLEVEL_OUTOF10: 6
PAINLEVEL_OUTOF10: 4

## 2018-11-21 ASSESSMENT — PAIN DESCRIPTION - FREQUENCY
FREQUENCY: CONTINUOUS

## 2018-11-21 ASSESSMENT — PAIN DESCRIPTION - PROGRESSION
CLINICAL_PROGRESSION: GRADUALLY WORSENING
CLINICAL_PROGRESSION: GRADUALLY IMPROVING
CLINICAL_PROGRESSION: GRADUALLY WORSENING
CLINICAL_PROGRESSION: GRADUALLY WORSENING
CLINICAL_PROGRESSION: GRADUALLY IMPROVING
CLINICAL_PROGRESSION: GRADUALLY IMPROVING

## 2018-11-21 ASSESSMENT — PAIN DESCRIPTION - DESCRIPTORS
DESCRIPTORS: DISCOMFORT

## 2018-11-21 ASSESSMENT — PAIN DESCRIPTION - LOCATION
LOCATION: BACK;HIP
LOCATION: BACK;HIP
LOCATION: BACK
LOCATION: BACK
LOCATION: BACK;HIP

## 2018-11-21 ASSESSMENT — PAIN DESCRIPTION - ONSET
ONSET: ON-GOING
ONSET: ON-GOING
ONSET: PROGRESSIVE
ONSET: PROGRESSIVE

## 2018-11-21 ASSESSMENT — PAIN DESCRIPTION - PAIN TYPE
TYPE: CHRONIC PAIN

## 2018-11-21 NOTE — PROGRESS NOTES
Normal Limits  Objective    ADL  UE Bathing: Minimal assistance  Additional Comments: with gown on backside. Balance  Sitting Balance: Supervision  Standing Balance: Contact guard assistance  Standing Balance  Sit to stand: Contact guard assistance  Stand to sit: Contact guard assistance  Comment: poor safety awareness with leaning on rw handles and kyphotic posture. Quick to move at times then fatigued. Functional Mobility  Functional - Mobility Device: Rolling Walker  Activity: Other  Assist Level: Minimal assistance  Functional Mobility Comments: moderate vcing for posture and safety awareness. Transfers  Stand Step Transfers: Contact guard assistance  Stand Pivot Transfers: Contact guard assistance  Sit to stand: Contact guard assistance  Stand to sit: Contact guard assistance   Cognition  Overall Cognitive Status: Exceptions  Arousal/Alertness: Appropriate responses to stimuli  Following Commands: Follows one step commands with increased time; Follows one step commands with repetition  Attention Span: Attends with cues to redirect; Difficulty attending to directions  Memory: Decreased short term memory  Safety Judgement: Decreased awareness of need for safety  Problem Solving: Decreased awareness of errors;Assistance required to identify errors made;Assistance required to correct errors made  Insights: Decreased awareness of deficits  Initiation: Requires cues for some  Sequencing: Requires cues for some     Perception  Overall Perceptual Status: [facility]     Positioning  Bed Positioning Type: Lower extremity  Bed Postion Comment: elevated onto chair with pillows. Assessment   Performance deficits / Impairments: Decreased functional mobility ; Decreased ADL status; Decreased endurance;Decreased balance  Assessment: Skilled OT is needed to increase I and safety with ADL and functional performance for pt to return home at [facility]   Prognosis: Good  Patient Education: posture and safety awareness  REQUIRES OT FOLLOW UP: Yes  Activity Tolerance  Activity Tolerance: Patient limited by fatigue;Patient limited by pain  Safety Devices  Type of devices: Call light within reach; Left in chair;Patient at risk for falls;Gait belt;Nurse notified          Plan   Plan  Times per week: 4-5x/week 1x/day as chon   Current Treatment Recommendations: Strengthening, Balance Training, Functional Mobility Training, Safety Education & Training, Home Management Training, Endurance Training, Neuromuscular Re-education, Patient/Caregiver Education & Training, Equipment Evaluation, Education, & procurement, Self-Care / ADL, Pain Management  AM-PAC Score        AM-PAC Inpatient Daily Activity Raw Score: 18  AM-PAC Inpatient ADL T-Scale Score : 38.66  ADL Inpatient CMS 0-100% Score: 46.65  ADL Inpatient CMS G-Code Modifier : CK    Goals  Short term goals  Time Frame for Short term goals: 10 sessions  Short term goal 1: Pt will demo mod I and safety with bed mobility, transfers, and functional mobility  Short term goal 2: Pt will demo mod I with UB dressing, toileting, and grooming  Short term goal 3: Pt will demo no more than min A with bathing and LB dressing  Short term goal 4: Pt will tolerate at least 15-20 mins of dynamic seated/standing activity with rest breaks PRN  Short term goal 5: Pt will demo G understanding of safety awareness, EC, AE, DME, and WS to increase safety  Patient Goals   Patient goals : Go back to my home! Therapy Time   Individual Concurrent Group Co-treatment   Time In 0736         Time Out 1435         Minutes 30             Upon arrival patient seated chairside. Patient completed sit>stand, flexed posture but straightens after vcing. Patient then continues to demonstrate flexed posture throughout treatment with moderate vcing to stand straight. Patient impulsive with quick movement and poor safety awareness. Patient then fatigues quickly and is resting forearms an rw.  Educated patient on not leaning on walker

## 2018-11-21 NOTE — PROGRESS NOTES
inhaler 2 puff  2 puff Inhalation BID Oleg Sosa MD   2 puff at 11/21/18 0742    albuterol (PROVENTIL) nebulizer solution 2.5 mg  2.5 mg Nebulization Q6H PRN Oleg Sosa MD   2.5 mg at 11/17/18 0913    sodium chloride flush 0.9 % injection 10 mL  10 mL Intravenous 2 times per day Oleg Sosa MD   10 mL at 11/21/18 0926    sodium chloride flush 0.9 % injection 10 mL  10 mL Intravenous PRN Oleg oSsa MD        magnesium hydroxide (MILK OF MAGNESIA) 400 MG/5ML suspension 30 mL  30 mL Oral Daily PRN Oleg Sosa MD        docusate sodium (COLACE) capsule 100 mg  100 mg Oral BID Oleg Sosa MD   100 mg at 11/21/18 0926    bisacodyl (DULCOLAX) suppository 10 mg  10 mg Rectal Daily PRN Oleg Sosa MD        nicotine (NICODERM CQ) 21 MG/24HR 1 patch  1 patch Transdermal Daily PRN Oleg Sosa MD        enoxaparin (LOVENOX) injection 40 mg  40 mg Subcutaneous Daily Oleg Sosa MD   40 mg at 11/21/18 1198    acetaminophen (TYLENOL) tablet 650 mg  650 mg Oral Q4H PRN David Pino MD         Facility-Administered Medications Ordered in Other Encounters   Medication Dose Route Frequency Provider Last Rate Last Dose    0.9 % sodium chloride infusion   Intravenous Continuous Asael Cadet MD   Stopped at 12/23/16 6498     Allergies   Allergen Reactions    Anti-Inflammatory Enzyme [Nutritional Supplements]      Patient states allergy to anti inflammatories    Ceclor [Cefaclor]      Doesn't remember    Morphine Other (See Comments)     Urinary retention    Nsaids      Patient states allergy to anti-inflammatories    Penicillins     Propoxyphene     Skelaxin [Metaxalone]     Sulfa Antibiotics Hives    Tadalafil     Tolmetin      Patient states allergy to anti-inflammatories  Patient states allergy to anti-inflammatories  Patient states allergy to anti-inflammatories    Alendronate Nausea And Vomiting and Nausea Only    Asa [Aspirin] Other (See Comments)     Hx diverticulitis.

## 2018-11-21 NOTE — PLAN OF CARE
Problem: Falls - Risk of:  Goal: Absence of physical injury  Absence of physical injury   Outcome: Ongoing  Fall risk precautions ongoing     Problem: Pain:  Goal: Control of acute pain  Control of acute pain   Outcome: Ongoing  Comfort care Q1, pain medication PRN     Problem: Skin Integrity:  Goal: Absence of new skin breakdown  Absence of new skin breakdown   Outcome: Ongoing  Assess skin Q4, turn pt Q2

## 2018-11-22 LAB
ANION GAP SERPL CALCULATED.3IONS-SCNC: 7 MMOL/L (ref 9–17)
BUN BLDV-MCNC: 23 MG/DL (ref 8–23)
BUN/CREAT BLD: 33 (ref 9–20)
CALCIUM SERPL-MCNC: 8.7 MG/DL (ref 8.6–10.4)
CHLORIDE BLD-SCNC: 105 MMOL/L (ref 98–107)
CO2: 28 MMOL/L (ref 20–31)
CREAT SERPL-MCNC: 0.7 MG/DL (ref 0.5–0.9)
GFR AFRICAN AMERICAN: >60 ML/MIN
GFR NON-AFRICAN AMERICAN: >60 ML/MIN
GFR SERPL CREATININE-BSD FRML MDRD: ABNORMAL ML/MIN/{1.73_M2}
GFR SERPL CREATININE-BSD FRML MDRD: ABNORMAL ML/MIN/{1.73_M2}
GLUCOSE BLD-MCNC: 107 MG/DL (ref 70–99)
HCT VFR BLD CALC: 36 % (ref 36–46)
HEMOGLOBIN: 12.4 G/DL (ref 12–16)
MCH RBC QN AUTO: 31.5 PG (ref 26–34)
MCHC RBC AUTO-ENTMCNC: 34.2 G/DL (ref 31–37)
MCV RBC AUTO: 92.1 FL (ref 80–100)
NRBC AUTOMATED: ABNORMAL PER 100 WBC
PDW BLD-RTO: 14.8 % (ref 11.5–14.5)
PLATELET # BLD: 194 K/UL (ref 130–400)
PMV BLD AUTO: 8.2 FL (ref 6–12)
POTASSIUM SERPL-SCNC: 4.7 MMOL/L (ref 3.7–5.3)
RBC # BLD: 3.92 M/UL (ref 4–5.2)
SODIUM BLD-SCNC: 140 MMOL/L (ref 135–144)
WBC # BLD: 5.3 K/UL (ref 3.5–11)

## 2018-11-22 PROCEDURE — 2580000003 HC RX 258: Performed by: FAMILY MEDICINE

## 2018-11-22 PROCEDURE — 96372 THER/PROPH/DIAG INJ SC/IM: CPT

## 2018-11-22 PROCEDURE — 6370000000 HC RX 637 (ALT 250 FOR IP): Performed by: FAMILY MEDICINE

## 2018-11-22 PROCEDURE — 6360000002 HC RX W HCPCS: Performed by: FAMILY MEDICINE

## 2018-11-22 PROCEDURE — 94760 N-INVAS EAR/PLS OXIMETRY 1: CPT

## 2018-11-22 PROCEDURE — 6370000000 HC RX 637 (ALT 250 FOR IP): Performed by: INTERNAL MEDICINE

## 2018-11-22 PROCEDURE — 2700000000 HC OXYGEN THERAPY PER DAY

## 2018-11-22 PROCEDURE — 80048 BASIC METABOLIC PNL TOTAL CA: CPT

## 2018-11-22 PROCEDURE — 85027 COMPLETE CBC AUTOMATED: CPT

## 2018-11-22 PROCEDURE — 2060000000 HC ICU INTERMEDIATE R&B

## 2018-11-22 PROCEDURE — 94640 AIRWAY INHALATION TREATMENT: CPT

## 2018-11-22 PROCEDURE — 36415 COLL VENOUS BLD VENIPUNCTURE: CPT

## 2018-11-22 RX ORDER — PANTOPRAZOLE SODIUM 40 MG/1
40 TABLET, DELAYED RELEASE ORAL
Status: DISCONTINUED | OUTPATIENT
Start: 2018-11-22 | End: 2018-11-24 | Stop reason: HOSPADM

## 2018-11-22 RX ADMIN — LEVOFLOXACIN 750 MG: 750 TABLET, FILM COATED ORAL at 09:03

## 2018-11-22 RX ADMIN — MOMETASONE FUROATE AND FORMOTEROL FUMARATE DIHYDRATE 2 PUFF: 200; 5 AEROSOL RESPIRATORY (INHALATION) at 19:26

## 2018-11-22 RX ADMIN — ALBUTEROL SULFATE 2.5 MG: 2.5 SOLUTION RESPIRATORY (INHALATION) at 07:42

## 2018-11-22 RX ADMIN — PREDNISONE 20 MG: 20 TABLET ORAL at 21:31

## 2018-11-22 RX ADMIN — DOCUSATE SODIUM 100 MG: 100 CAPSULE, LIQUID FILLED ORAL at 09:03

## 2018-11-22 RX ADMIN — Medication 10 ML: at 21:32

## 2018-11-22 RX ADMIN — PREDNISONE 20 MG: 20 TABLET ORAL at 09:03

## 2018-11-22 RX ADMIN — OXYCODONE HYDROCHLORIDE 20 MG: 10 TABLET, FILM COATED, EXTENDED RELEASE ORAL at 21:31

## 2018-11-22 RX ADMIN — ALBUTEROL SULFATE 2.5 MG: 2.5 SOLUTION RESPIRATORY (INHALATION) at 11:31

## 2018-11-22 RX ADMIN — GABAPENTIN 600 MG: 300 CAPSULE ORAL at 21:31

## 2018-11-22 RX ADMIN — Medication 10 ML: at 09:16

## 2018-11-22 RX ADMIN — GABAPENTIN 600 MG: 300 CAPSULE ORAL at 09:03

## 2018-11-22 RX ADMIN — CLOPIDOGREL BISULFATE 75 MG: 75 TABLET ORAL at 21:31

## 2018-11-22 RX ADMIN — ENOXAPARIN SODIUM 40 MG: 40 INJECTION SUBCUTANEOUS at 09:03

## 2018-11-22 RX ADMIN — PANTOPRAZOLE SODIUM 40 MG: 40 TABLET, DELAYED RELEASE ORAL at 14:32

## 2018-11-22 RX ADMIN — MOMETASONE FUROATE AND FORMOTEROL FUMARATE DIHYDRATE 2 PUFF: 200; 5 AEROSOL RESPIRATORY (INHALATION) at 07:42

## 2018-11-22 RX ADMIN — ALBUTEROL SULFATE 2.5 MG: 2.5 SOLUTION RESPIRATORY (INHALATION) at 19:23

## 2018-11-22 RX ADMIN — OXYCODONE HYDROCHLORIDE 20 MG: 10 TABLET, FILM COATED, EXTENDED RELEASE ORAL at 09:03

## 2018-11-22 RX ADMIN — Medication 5 DROP: at 09:04

## 2018-11-22 RX ADMIN — GABAPENTIN 600 MG: 300 CAPSULE ORAL at 14:30

## 2018-11-22 RX ADMIN — ASPIRIN 81 MG 81 MG: 81 TABLET ORAL at 21:31

## 2018-11-22 RX ADMIN — OXYCODONE HYDROCHLORIDE AND ACETAMINOPHEN 1 TABLET: 10; 325 TABLET ORAL at 14:31

## 2018-11-22 RX ADMIN — Medication 5 DROP: at 21:31

## 2018-11-22 RX ADMIN — ALBUTEROL SULFATE 2.5 MG: 2.5 SOLUTION RESPIRATORY (INHALATION) at 15:17

## 2018-11-22 RX ADMIN — OXYCODONE HYDROCHLORIDE AND ACETAMINOPHEN 1 TABLET: 10; 325 TABLET ORAL at 00:30

## 2018-11-22 ASSESSMENT — PAIN DESCRIPTION - PAIN TYPE
TYPE: CHRONIC PAIN

## 2018-11-22 ASSESSMENT — PAIN DESCRIPTION - PROGRESSION
CLINICAL_PROGRESSION: GRADUALLY WORSENING
CLINICAL_PROGRESSION: GRADUALLY IMPROVING
CLINICAL_PROGRESSION: GRADUALLY WORSENING
CLINICAL_PROGRESSION: GRADUALLY IMPROVING

## 2018-11-22 ASSESSMENT — PAIN SCALES - GENERAL
PAINLEVEL_OUTOF10: 9
PAINLEVEL_OUTOF10: 5
PAINLEVEL_OUTOF10: 4
PAINLEVEL_OUTOF10: 8
PAINLEVEL_OUTOF10: 9

## 2018-11-22 ASSESSMENT — ENCOUNTER SYMPTOMS
COUGH: 1
DIARRHEA: 0
SHORTNESS OF BREATH: 0

## 2018-11-22 ASSESSMENT — PAIN DESCRIPTION - ONSET
ONSET: PROGRESSIVE
ONSET: PROGRESSIVE

## 2018-11-22 ASSESSMENT — PAIN DESCRIPTION - FREQUENCY
FREQUENCY: CONTINUOUS

## 2018-11-22 ASSESSMENT — PAIN DESCRIPTION - DESCRIPTORS
DESCRIPTORS: DISCOMFORT

## 2018-11-22 ASSESSMENT — PAIN DESCRIPTION - LOCATION
LOCATION: GENERALIZED
LOCATION: BACK;HIP
LOCATION: GENERALIZED
LOCATION: BACK;HIP
LOCATION: BACK;HIP

## 2018-11-22 ASSESSMENT — PAIN DESCRIPTION - ORIENTATION: ORIENTATION: OTHER (COMMENT)

## 2018-11-22 NOTE — PROGRESS NOTES
Type and Reason for Visit: Initial, RD Nutrition Re-Screen (LOS > 5 Days)    Dx:  1. Acute on chronic respiratory failure. 2. AE COPD. 3. Tracheo-bronchitis. 4. Mild secondary pulmonary HTN. Diet Order:  Cardiac, 2 g sodium, 1,500 ml fluid restriction    Dietitian Assessment of Nutrition Re-Screen: Pt n/a @ time of visit. PO intake=%. Appears to be tolerating current diet. Recommendations:  1. Continue on cardiac, 2 g sodium, 1,500 ml fluid restricted diet. 2. Does not appear to need an ONS. Pt is @ low nutrition risk. F/U 11/26 or 11/27 (or earlier as requested).     Electronically signed by Serge Morocho, MANSI, QUINTIN on 11/22/18 at 4:58 PM    Contact Number: [phone]

## 2018-11-22 NOTE — PROGRESS NOTES
Sarika Ward is a 77 y.o. female patient.     Current Facility-Administered Medications   Medication Dose Route Frequency Provider Last Rate Last Dose    levofloxacin (LEVAQUIN) tablet 750 mg  750 mg Oral Daily Victorino Esquivel MD   750 mg at 11/22/18 0903    predniSONE (DELTASONE) tablet 20 mg  20 mg Oral BID Victorino Esquivel MD   20 mg at 11/22/18 0903    carbamide peroxide (DEBROX) 6.5 % otic solution 5 drop  5 drop Right Ear BID Oleg Sosa MD   5 drop at 11/22/18 0904    ondansetron (ZOFRAN-ODT) disintegrating tablet 4 mg  4 mg Oral Q6H PRN Oleg Sosa MD        Or    ondansetron (ZOFRAN) injection 4 mg  4 mg Intravenous Q6H PRN Oleg Sosa MD        diphenhydrAMINE (BENADRYL) tablet 25 mg  25 mg Oral Nightly PRN Oleg Sosa MD   25 mg at 11/20/18 2150    polyethylene glycol (GLYCOLAX) packet 17 g  17 g Oral BID Cameron Jacobson MD   17 g at 11/21/18 0926    aspirin chewable tablet 81 mg  81 mg Oral Daily Oleg Sosa MD   81 mg at 11/21/18 2109    oxyCODONE-acetaminophen (PERCOCET)  MG per tablet 1 tablet  1 tablet Oral Q6H PRN Oleg Sosa MD   1 tablet at 11/22/18 0030    lidocaine (LIDODERM) 5 % 1 patch  1 patch Transdermal Daily Cameron Jacobson MD   1 patch at 11/22/18 0903    oxyCODONE (OXYCONTIN) extended release tablet 20 mg  20 mg Oral BID Oleg Sosa MD   20 mg at 11/22/18 0903    gabapentin (NEURONTIN) capsule 600 mg  600 mg Oral TID Oleg Sosa MD   600 mg at 11/22/18 0903    albuterol (PROVENTIL) nebulizer solution 2.5 mg  2.5 mg Nebulization Q4H WA Oleg Sosa MD   2.5 mg at 11/22/18 1131    clopidogrel (PLAVIX) tablet 75 mg  75 mg Oral Daily Oleg Sosa MD   75 mg at 11/21/18 2109    fluticasone (FLONASE) 50 MCG/ACT nasal spray 2 spray  2 spray Nasal Daily Oleg Sosa MD        tiotropium (SPIRIVA) inhalation capsule 18 mcg  18 mcg Inhalation Daily Oleg Sosa MD   18 mcg at 11/20/18 0616    mometasone-formoterol (DULERA) 200-5 MCG/ACT inhaler 2 puff  2 puff Inhalation BID Oleg Sosa MD   2 puff at 11/22/18 0742    albuterol (PROVENTIL) nebulizer solution 2.5 mg  2.5 mg Nebulization Q6H PRN Oleg Sosa MD   2.5 mg at 11/17/18 0913    sodium chloride flush 0.9 % injection 10 mL  10 mL Intravenous 2 times per day Oleg Sosa MD   10 mL at 11/22/18 0916    sodium chloride flush 0.9 % injection 10 mL  10 mL Intravenous PRN Oleg Sosa MD        magnesium hydroxide (MILK OF MAGNESIA) 400 MG/5ML suspension 30 mL  30 mL Oral Daily PRN Oleg Sosa MD        docusate sodium (COLACE) capsule 100 mg  100 mg Oral BID Oleg Sosa MD   100 mg at 11/22/18 6156    bisacodyl (DULCOLAX) suppository 10 mg  10 mg Rectal Daily PRN Oleg Sosa MD        nicotine (NICODERM CQ) 21 MG/24HR 1 patch  1 patch Transdermal Daily PRN Oleg Sosa MD        enoxaparin (LOVENOX) injection 40 mg  40 mg Subcutaneous Daily Oleg Sosa MD   40 mg at 11/22/18 0903    acetaminophen (TYLENOL) tablet 650 mg  650 mg Oral Q4H PRN Tammy Pierre MD         Facility-Administered Medications Ordered in Other Encounters   Medication Dose Route Frequency Provider Last Rate Last Dose    0.9 % sodium chloride infusion   Intravenous Continuous Raymond Chambers MD   Stopped at 12/23/16 1558     Allergies   Allergen Reactions    Anti-Inflammatory Enzyme [Nutritional Supplements]      Patient states allergy to anti inflammatories    Ceclor [Cefaclor]      Doesn't remember    Morphine Other (See Comments)     Urinary retention    Nsaids      Patient states allergy to anti-inflammatories    Penicillins     Propoxyphene     Skelaxin [Metaxalone]     Sulfa Antibiotics Hives    Tadalafil     Tolmetin      Patient states allergy to anti-inflammatories  Patient states allergy to anti-inflammatories  Patient states allergy to anti-inflammatories    Alendronate Nausea And Vomiting and Nausea Only    Asa [Aspirin] Other (See Comments)     Hx diverticulitis. Extremities: There is no local swelling. Neurological: Patient is alert and oriented to person, place and time. Skin:  Warm and dry. Basic Metabolic Panel w/ Reflex to MG [[id number]] (Abnormal) Collected: 11/22/18 0438   Updated: 11/22/18 0520     Glucose 107 (H) mg/dL    BUN 23 mg/dL    CREATININE 0.70 mg/dL    Bun/Cre Ratio 33 (H)    Calcium 8.7 mg/dL    Sodium 140 mmol/L    Potassium 4.7 mmol/L    Chloride 105 mmol/L    CO2 28 mmol/L    Anion Gap 7 (L) mmol/L    GFR Non-African American >60 mL/min    GFR African American >60 mL/min    GFR Comment         Comment: Average GFR for 79or more years old:    65 mL/min/1.73sq m   Chronic Kidney Disease:    <60 mL/min/1.73sq m   Kidney failure:    <15 mL/min/1.73sq m               eGFR calculated using average adult body mass. Additional eGFR calculator    available at:         [email or web address]              GFR Staging NOT REPORTED   CBC [[id number]] (Abnormal) Collected: 11/22/18 0438   Updated: 11/22/18 0504     WBC 5.3 k/uL    RBC 3.92 (L) m/uL    Hemoglobin 12.4 g/dL    Hematocrit 36.0 %    MCV 92.1 fL    MCH 31.5 pg    MCHC 34.2 g/dL    RDW 14.8 (H) %    Platelets 277 k/uL    MPV 8.2 fL       Assessment:    Condition: In serious condition. Improving.    (Patient Active Problem List:     Hypoxia     Chronic obstructive pulmonary disease with acute exacerbation (HCC)     Hypokalemia     Chronic bilateral low back pain with bilateral sciatica     Altered mental status, unspecified     Pneumonia     Abnormal gait     Adiposity     Aortic valve insufficiency     Allergic rhinitis     Benign essential HTN     Centriacinar emphysema (HCC)     Constipation     Displacement of intervertebral disc without myelopathy     Dry eye syndrome     Eczema     Insomnia     DDD (degenerative disc disease), lumbar     Neuropathic pain     Osteoporosis     Nerve disease, peripheral     Posterior vitreous detachment     Failed back syndrome of

## 2018-11-22 NOTE — PROGRESS NOTES
[place]  Pulmonary Critical Care and Sleep Medicine,    [facility] [place]: [phone]

## 2018-11-23 LAB
ANION GAP SERPL CALCULATED.3IONS-SCNC: 17 MMOL/L (ref 9–17)
BUN BLDV-MCNC: 23 MG/DL (ref 8–23)
BUN/CREAT BLD: 34 (ref 9–20)
CALCIUM SERPL-MCNC: 8.8 MG/DL (ref 8.6–10.4)
CHLORIDE BLD-SCNC: 108 MMOL/L (ref 98–107)
CO2: 17 MMOL/L (ref 20–31)
CREAT SERPL-MCNC: 0.68 MG/DL (ref 0.5–0.9)
GFR AFRICAN AMERICAN: >60 ML/MIN
GFR NON-AFRICAN AMERICAN: >60 ML/MIN
GFR SERPL CREATININE-BSD FRML MDRD: ABNORMAL ML/MIN/{1.73_M2}
GFR SERPL CREATININE-BSD FRML MDRD: ABNORMAL ML/MIN/{1.73_M2}
GLUCOSE BLD-MCNC: 114 MG/DL (ref 70–99)
HCT VFR BLD CALC: 36.4 % (ref 36–46)
HEMOGLOBIN: 12.1 G/DL (ref 12–16)
MCH RBC QN AUTO: 31.4 PG (ref 26–34)
MCHC RBC AUTO-ENTMCNC: 33.3 G/DL (ref 31–37)
MCV RBC AUTO: 94.2 FL (ref 80–100)
NRBC AUTOMATED: ABNORMAL PER 100 WBC
PDW BLD-RTO: 13.2 % (ref 11.5–14.5)
PLATELET # BLD: 200 K/UL (ref 130–400)
PMV BLD AUTO: ABNORMAL FL (ref 6–12)
POTASSIUM SERPL-SCNC: 4.6 MMOL/L (ref 3.7–5.3)
RBC # BLD: 3.86 M/UL (ref 4–5.2)
SODIUM BLD-SCNC: 142 MMOL/L (ref 135–144)
WBC # BLD: 6.4 K/UL (ref 3.5–11)

## 2018-11-23 PROCEDURE — 36415 COLL VENOUS BLD VENIPUNCTURE: CPT

## 2018-11-23 PROCEDURE — 2700000000 HC OXYGEN THERAPY PER DAY

## 2018-11-23 PROCEDURE — 85027 COMPLETE CBC AUTOMATED: CPT

## 2018-11-23 PROCEDURE — 94640 AIRWAY INHALATION TREATMENT: CPT

## 2018-11-23 PROCEDURE — 96372 THER/PROPH/DIAG INJ SC/IM: CPT

## 2018-11-23 PROCEDURE — 6360000002 HC RX W HCPCS: Performed by: FAMILY MEDICINE

## 2018-11-23 PROCEDURE — 80048 BASIC METABOLIC PNL TOTAL CA: CPT

## 2018-11-23 PROCEDURE — 97116 GAIT TRAINING THERAPY: CPT

## 2018-11-23 PROCEDURE — 97530 THERAPEUTIC ACTIVITIES: CPT

## 2018-11-23 PROCEDURE — 6370000000 HC RX 637 (ALT 250 FOR IP): Performed by: FAMILY MEDICINE

## 2018-11-23 PROCEDURE — 1200000000 HC SEMI PRIVATE

## 2018-11-23 PROCEDURE — 97110 THERAPEUTIC EXERCISES: CPT

## 2018-11-23 PROCEDURE — 2060000000 HC ICU INTERMEDIATE R&B

## 2018-11-23 PROCEDURE — 6370000000 HC RX 637 (ALT 250 FOR IP): Performed by: INTERNAL MEDICINE

## 2018-11-23 PROCEDURE — 2580000003 HC RX 258: Performed by: FAMILY MEDICINE

## 2018-11-23 RX ORDER — FERROUS SULFATE 325(65) MG
325 TABLET ORAL 2 TIMES DAILY
Status: CANCELLED | OUTPATIENT
Start: 2018-11-24

## 2018-11-23 RX ORDER — ASPIRIN 81 MG/1
81 TABLET ORAL DAILY
Status: CANCELLED | OUTPATIENT
Start: 2018-11-24

## 2018-11-23 RX ADMIN — GABAPENTIN 600 MG: 300 CAPSULE ORAL at 21:26

## 2018-11-23 RX ADMIN — Medication 10 ML: at 21:26

## 2018-11-23 RX ADMIN — Medication 5 DROP: at 21:38

## 2018-11-23 RX ADMIN — OXYCODONE HYDROCHLORIDE AND ACETAMINOPHEN 1 TABLET: 10; 325 TABLET ORAL at 03:10

## 2018-11-23 RX ADMIN — OXYCODONE HYDROCHLORIDE AND ACETAMINOPHEN 1 TABLET: 10; 325 TABLET ORAL at 15:10

## 2018-11-23 RX ADMIN — OXYCODONE HYDROCHLORIDE 20 MG: 10 TABLET, FILM COATED, EXTENDED RELEASE ORAL at 10:00

## 2018-11-23 RX ADMIN — ALBUTEROL SULFATE 2.5 MG: 2.5 SOLUTION RESPIRATORY (INHALATION) at 12:02

## 2018-11-23 RX ADMIN — LEVOFLOXACIN 750 MG: 750 TABLET, FILM COATED ORAL at 10:00

## 2018-11-23 RX ADMIN — OXYCODONE HYDROCHLORIDE AND ACETAMINOPHEN 1 TABLET: 10; 325 TABLET ORAL at 21:26

## 2018-11-23 RX ADMIN — GABAPENTIN 600 MG: 300 CAPSULE ORAL at 10:00

## 2018-11-23 RX ADMIN — PREDNISONE 20 MG: 20 TABLET ORAL at 21:26

## 2018-11-23 RX ADMIN — Medication 10 ML: at 10:03

## 2018-11-23 RX ADMIN — OXYCODONE HYDROCHLORIDE 20 MG: 10 TABLET, FILM COATED, EXTENDED RELEASE ORAL at 22:55

## 2018-11-23 RX ADMIN — ENOXAPARIN SODIUM 40 MG: 40 INJECTION SUBCUTANEOUS at 10:01

## 2018-11-23 RX ADMIN — PREDNISONE 20 MG: 20 TABLET ORAL at 10:00

## 2018-11-23 RX ADMIN — GABAPENTIN 600 MG: 300 CAPSULE ORAL at 15:05

## 2018-11-23 RX ADMIN — ALBUTEROL SULFATE 2.5 MG: 2.5 SOLUTION RESPIRATORY (INHALATION) at 15:27

## 2018-11-23 RX ADMIN — ASPIRIN 81 MG 81 MG: 81 TABLET ORAL at 21:26

## 2018-11-23 RX ADMIN — CLOPIDOGREL BISULFATE 75 MG: 75 TABLET ORAL at 21:26

## 2018-11-23 ASSESSMENT — PAIN DESCRIPTION - PAIN TYPE
TYPE: CHRONIC PAIN
TYPE: CHRONIC PAIN

## 2018-11-23 ASSESSMENT — PAIN DESCRIPTION - LOCATION
LOCATION: BACK;HIP
LOCATION: BACK;HIP

## 2018-11-23 ASSESSMENT — PAIN SCALES - GENERAL
PAINLEVEL_OUTOF10: 0
PAINLEVEL_OUTOF10: 0
PAINLEVEL_OUTOF10: 9
PAINLEVEL_OUTOF10: 0
PAINLEVEL_OUTOF10: 9
PAINLEVEL_OUTOF10: 0
PAINLEVEL_OUTOF10: 7
PAINLEVEL_OUTOF10: 0
PAINLEVEL_OUTOF10: 9
PAINLEVEL_OUTOF10: 10
PAINLEVEL_OUTOF10: 2
PAINLEVEL_OUTOF10: 3
PAINLEVEL_OUTOF10: 7
PAINLEVEL_OUTOF10: 0
PAINLEVEL_OUTOF10: 9

## 2018-11-23 ASSESSMENT — ENCOUNTER SYMPTOMS
DIARRHEA: 0
COUGH: 1
SHORTNESS OF BREATH: 0

## 2018-11-23 NOTE — CARE COORDINATION
Social Claudene Aquas will also have a private room with a private bath. Discussed with dtr. She did not make decision. Will meet with pt to finalize dc plans.  Adria Flores

## 2018-11-23 NOTE — PROGRESS NOTES
1-2x/day,6-7 days/week  Current Treatment Recommendations: Functional Mobility Training, Balance Training, Strengthening, Transfer Training, Endurance Training, Gait Training, Stair training  Safety Devices  Type of devices:  All fall risk precautions in place, Left in chair, Nurse notified, Call light within reach  Restraints  Initially in place: No     Therapy Time   Individual Concurrent Group Co-treatment   Time In 1005         Time Out 1159         Minutes [facility] [address], PTA

## 2018-11-23 NOTE — CARE COORDINATION
Social Work-Met with pt. She states that she would like to discuss preference of SNF with dtr. Her dtr is working and she will not be able to discuss preference tonight.  Ivan Siemens

## 2018-11-24 VITALS
DIASTOLIC BLOOD PRESSURE: 73 MMHG | SYSTOLIC BLOOD PRESSURE: 135 MMHG | OXYGEN SATURATION: 95 % | RESPIRATION RATE: 16 BRPM | HEIGHT: 65 IN | TEMPERATURE: 97.9 F | BODY MASS INDEX: 26.08 KG/M2 | HEART RATE: 85 BPM | WEIGHT: 156.5 LBS

## 2018-11-24 LAB
ANION GAP SERPL CALCULATED.3IONS-SCNC: 10 MMOL/L (ref 9–17)
BUN BLDV-MCNC: 24 MG/DL (ref 8–23)
BUN/CREAT BLD: 32 (ref 9–20)
CALCIUM SERPL-MCNC: 8.4 MG/DL (ref 8.6–10.4)
CHLORIDE BLD-SCNC: 109 MMOL/L (ref 98–107)
CO2: 25 MMOL/L (ref 20–31)
CREAT SERPL-MCNC: 0.76 MG/DL (ref 0.5–0.9)
GFR AFRICAN AMERICAN: >60 ML/MIN
GFR NON-AFRICAN AMERICAN: >60 ML/MIN
GFR SERPL CREATININE-BSD FRML MDRD: ABNORMAL ML/MIN/{1.73_M2}
GFR SERPL CREATININE-BSD FRML MDRD: ABNORMAL ML/MIN/{1.73_M2}
GLUCOSE BLD-MCNC: 104 MG/DL (ref 70–99)
HCT VFR BLD CALC: 34.7 % (ref 36–46)
HEMOGLOBIN: 11.7 G/DL (ref 12–16)
MCH RBC QN AUTO: 31.5 PG (ref 26–34)
MCHC RBC AUTO-ENTMCNC: 33.8 G/DL (ref 31–37)
MCV RBC AUTO: 93 FL (ref 80–100)
NRBC AUTOMATED: ABNORMAL PER 100 WBC
PDW BLD-RTO: 14.2 % (ref 11.5–14.5)
PLATELET # BLD: 211 K/UL (ref 130–400)
PMV BLD AUTO: 8.1 FL (ref 6–12)
POTASSIUM SERPL-SCNC: 4.1 MMOL/L (ref 3.7–5.3)
RBC # BLD: 3.73 M/UL (ref 4–5.2)
SODIUM BLD-SCNC: 144 MMOL/L (ref 135–144)
WBC # BLD: 7.1 K/UL (ref 3.5–11)

## 2018-11-24 PROCEDURE — 85027 COMPLETE CBC AUTOMATED: CPT

## 2018-11-24 PROCEDURE — 96372 THER/PROPH/DIAG INJ SC/IM: CPT

## 2018-11-24 PROCEDURE — 80048 BASIC METABOLIC PNL TOTAL CA: CPT

## 2018-11-24 PROCEDURE — 6370000000 HC RX 637 (ALT 250 FOR IP): Performed by: FAMILY MEDICINE

## 2018-11-24 PROCEDURE — 6370000000 HC RX 637 (ALT 250 FOR IP): Performed by: INTERNAL MEDICINE

## 2018-11-24 PROCEDURE — 94640 AIRWAY INHALATION TREATMENT: CPT

## 2018-11-24 PROCEDURE — 2580000003 HC RX 258: Performed by: FAMILY MEDICINE

## 2018-11-24 PROCEDURE — 2700000000 HC OXYGEN THERAPY PER DAY

## 2018-11-24 PROCEDURE — 6360000002 HC RX W HCPCS: Performed by: FAMILY MEDICINE

## 2018-11-24 PROCEDURE — 36415 COLL VENOUS BLD VENIPUNCTURE: CPT

## 2018-11-24 RX ORDER — OXYCODONE HYDROCHLORIDE AND ACETAMINOPHEN 5; 325 MG/1; MG/1
1 TABLET ORAL EVERY 4 HOURS PRN
Qty: 20 TABLET | Refills: 0 | Status: SHIPPED | OUTPATIENT
Start: 2018-11-24 | End: 2018-11-29

## 2018-11-24 RX ADMIN — PANTOPRAZOLE SODIUM 40 MG: 40 TABLET, DELAYED RELEASE ORAL at 07:00

## 2018-11-24 RX ADMIN — ALBUTEROL SULFATE 2.5 MG: 2.5 SOLUTION RESPIRATORY (INHALATION) at 07:54

## 2018-11-24 RX ADMIN — GABAPENTIN 600 MG: 300 CAPSULE ORAL at 09:38

## 2018-11-24 RX ADMIN — OXYCODONE HYDROCHLORIDE 20 MG: 10 TABLET, FILM COATED, EXTENDED RELEASE ORAL at 09:37

## 2018-11-24 RX ADMIN — OXYCODONE HYDROCHLORIDE AND ACETAMINOPHEN 1 TABLET: 10; 325 TABLET ORAL at 04:01

## 2018-11-24 RX ADMIN — GABAPENTIN 600 MG: 300 CAPSULE ORAL at 14:39

## 2018-11-24 RX ADMIN — Medication 10 ML: at 09:38

## 2018-11-24 RX ADMIN — TIOTROPIUM BROMIDE 18 MCG: 18 CAPSULE ORAL; RESPIRATORY (INHALATION) at 07:54

## 2018-11-24 RX ADMIN — Medication 5 DROP: at 09:45

## 2018-11-24 RX ADMIN — ENOXAPARIN SODIUM 40 MG: 40 INJECTION SUBCUTANEOUS at 09:38

## 2018-11-24 RX ADMIN — ALBUTEROL SULFATE 2.5 MG: 2.5 SOLUTION RESPIRATORY (INHALATION) at 15:04

## 2018-11-24 RX ADMIN — PREDNISONE 20 MG: 20 TABLET ORAL at 09:38

## 2018-11-24 RX ADMIN — ALBUTEROL SULFATE 2.5 MG: 2.5 SOLUTION RESPIRATORY (INHALATION) at 11:25

## 2018-11-24 RX ADMIN — OXYCODONE HYDROCHLORIDE AND ACETAMINOPHEN 1 TABLET: 10; 325 TABLET ORAL at 14:39

## 2018-11-24 RX ADMIN — LEVOFLOXACIN 750 MG: 750 TABLET, FILM COATED ORAL at 09:37

## 2018-11-24 RX ADMIN — MOMETASONE FUROATE AND FORMOTEROL FUMARATE DIHYDRATE 2 PUFF: 200; 5 AEROSOL RESPIRATORY (INHALATION) at 07:55

## 2018-11-24 ASSESSMENT — PAIN SCALES - GENERAL
PAINLEVEL_OUTOF10: 9
PAINLEVEL_OUTOF10: 10

## 2018-11-24 ASSESSMENT — PAIN DESCRIPTION - LOCATION: LOCATION: BACK;HIP

## 2018-11-24 ASSESSMENT — PAIN DESCRIPTION - PAIN TYPE: TYPE: CHRONIC PAIN

## 2018-11-24 NOTE — PROGRESS NOTES
Pulmonary Critical Care Progress Note  Barak Bailey CNP / Dr. Sohail Fulton M.D. Patient seen for the follow up of Acute on chronic respiratory failure, acute exacerbation of COPD, tracheobronchitis, secondary pulmonary hypertension    Subjective:  She is feeling quite well, ambulating in her room and tolerating activity fairly well. Her shortness of breath and cough have improved significantly. She denies chest pain. Examination:  Vitals: /63   Pulse 99   Temp 98.2 °F (36.8 °C) (Oral)   Resp 16   Ht 5' 5\" (1.651 m)   Wt 156 lb 8 oz (71 kg)   SpO2 95%   BMI 26.04 kg/m²     General appearance: alert and cooperative with exam, sitting up in chair in no distress eating on  Neck: No JVD  Lungs: Air exchange, no rales, wheeze, rhonchi  Heart: regular rate and rhythm, S1, S2 normal, no gallop  Abdomen: Soft, non tender, + BS  Extremities: no cyanosis or clubbing. No significant edema    LABs:  CBC:   Recent Labs      11/23/18   0520  11/24/18   0627   WBC  6.4  7.1   HGB  12.1  11.7*   HCT  36.4  34.7*   PLT  200  211     BMP:   Recent Labs      11/23/18 0520  11/24/18   0627   NA  142  144   K  4.6  4.1   CO2  17*  25   BUN  23  24*   CREATININE  0.68  0.76   LABGLOM  >60  >60   GLUCOSE  114*  104*     Impression:  · Acute on chronic respiratory failure  · Acute exacerbation of COPD  · Tracheo-bronchitis  · Mild secondary pulmonary hypertension     Recommendations:  · 2 liters/min via nasal cannula  · BiPAP, when necessary  · Continue Levaquin  · Prednisone taper  · Albuterol Q 4 hours and prn  · Spiriva  · Dulera 200  · PT/OT  · DVT prophylaxis with low molecular weight heparin  · OK for discharge planning from pulmonary stand point with follow up with pulmonary in 2-3 weeks. Plan was discussed with patient and she understands it.  Transport set up for 6:30 PM tonight    Electronically signed by     JUNIOR Corrigan CNP on 11/24/2018 at 1:43 PM  Patient was seen under the supervision of

## 2018-12-16 PROBLEM — I27.21 SECONDARY PULMONARY ARTERIAL HYPERTENSION (HCC): Status: ACTIVE | Noted: 2018-12-16

## 2018-12-16 NOTE — DISCHARGE SUMMARY
100 mg by mouth daily             vitamin C (ASCORBIC ACID) 500 MG tablet  Take 500 mg by mouth daily                  Activity: activity as tolerated    Diet: cardiac diet    Time Spent on discharge is more than 30 minutes in the examination, evaluation, counseling and review of medications and discharge plan. Electronically signed by Fabien Condon MD on 12/16/2018 at 6:11 PM     Thank you Dr. Lilo Bartholomew MD for the opportunity to be involved in this patient's care.

## 2018-12-19 PROBLEM — I27.20 PULMONARY HTN (HCC): Status: ACTIVE | Noted: 2018-12-19

## 2018-12-19 PROBLEM — I38 VALVULAR HEART DISEASE: Status: ACTIVE | Noted: 2018-12-19

## 2018-12-19 PROBLEM — I51.7 LVH (LEFT VENTRICULAR HYPERTROPHY): Status: ACTIVE | Noted: 2018-12-19

## 2019-02-05 ENCOUNTER — OFFICE VISIT (OUTPATIENT)
Dept: PODIATRY | Age: 79
End: 2019-02-05
Payer: COMMERCIAL

## 2019-02-05 VITALS — WEIGHT: 171 LBS | HEIGHT: 66 IN | BODY MASS INDEX: 27.48 KG/M2

## 2019-02-05 DIAGNOSIS — M79.671 FOOT PAIN, BILATERAL: Primary | ICD-10-CM

## 2019-02-05 DIAGNOSIS — R26.2 TROUBLE WALKING: ICD-10-CM

## 2019-02-05 DIAGNOSIS — M79.672 FOOT PAIN, BILATERAL: Primary | ICD-10-CM

## 2019-02-05 DIAGNOSIS — I73.9 PERIPHERAL VASCULAR DISORDER (HCC): ICD-10-CM

## 2019-02-05 DIAGNOSIS — B35.1 DERMATOPHYTOSIS OF NAIL: ICD-10-CM

## 2019-02-05 PROCEDURE — 99213 OFFICE O/P EST LOW 20 MIN: CPT | Performed by: PODIATRIST

## 2019-02-05 PROCEDURE — 11721 DEBRIDE NAIL 6 OR MORE: CPT | Performed by: PODIATRIST

## 2019-04-16 ENCOUNTER — HOSPITAL ENCOUNTER (INPATIENT)
Age: 79
LOS: 3 days | Discharge: HOME HEALTH | DRG: 191 | End: 2019-04-19
Attending: EMERGENCY MEDICINE | Admitting: INTERNAL MEDICINE
Payer: COMMERCIAL

## 2019-04-16 ENCOUNTER — APPOINTMENT (OUTPATIENT)
Dept: GENERAL RADIOLOGY | Age: 79
DRG: 191 | End: 2019-04-16
Payer: COMMERCIAL

## 2019-04-16 DIAGNOSIS — R06.00 DYSPNEA AND RESPIRATORY ABNORMALITIES: Primary | ICD-10-CM

## 2019-04-16 DIAGNOSIS — J44.1 COPD EXACERBATION (HCC): ICD-10-CM

## 2019-04-16 DIAGNOSIS — J84.89 INTERSTITIAL PNEUMONITIS (HCC): ICD-10-CM

## 2019-04-16 DIAGNOSIS — R06.89 DYSPNEA AND RESPIRATORY ABNORMALITIES: Primary | ICD-10-CM

## 2019-04-16 LAB
% CKMB: 2.4 % (ref 0–3)
ABSOLUTE EOS #: 0.1 K/UL (ref 0–0.4)
ABSOLUTE IMMATURE GRANULOCYTE: ABNORMAL K/UL (ref 0–0.3)
ABSOLUTE LYMPH #: 1.9 K/UL (ref 1–4.8)
ABSOLUTE MONO #: 0.5 K/UL (ref 0.2–0.8)
ADENOVIRUS PCR: NOT DETECTED
ANION GAP SERPL CALCULATED.3IONS-SCNC: 11 MMOL/L (ref 9–17)
BASOPHILS # BLD: 0 % (ref 0–2)
BASOPHILS ABSOLUTE: 0 K/UL (ref 0–0.2)
BNP INTERPRETATION: NORMAL
BORDETELLA PERTUSSIS PCR: NOT DETECTED
BUN BLDV-MCNC: 15 MG/DL (ref 8–23)
BUN/CREAT BLD: 20 (ref 9–20)
CALCIUM SERPL-MCNC: 9.1 MG/DL (ref 8.6–10.4)
CHLAMYDIA PNEUMONIAE BY PCR: NOT DETECTED
CHLORIDE BLD-SCNC: 103 MMOL/L (ref 98–107)
CK MB: 4.3 NG/ML
CKMB INTERPRETATION: NORMAL
CO2: 25 MMOL/L (ref 20–31)
CORONAVIRUS 229E PCR: NOT DETECTED
CORONAVIRUS HKU1 PCR: NOT DETECTED
CORONAVIRUS NL63 PCR: NOT DETECTED
CORONAVIRUS OC43 PCR: NOT DETECTED
CREAT SERPL-MCNC: 0.75 MG/DL (ref 0.5–0.9)
DIFFERENTIAL TYPE: ABNORMAL
EOSINOPHILS RELATIVE PERCENT: 2 % (ref 1–4)
GFR AFRICAN AMERICAN: >60 ML/MIN
GFR NON-AFRICAN AMERICAN: >60 ML/MIN
GFR SERPL CREATININE-BSD FRML MDRD: ABNORMAL ML/MIN/{1.73_M2}
GFR SERPL CREATININE-BSD FRML MDRD: ABNORMAL ML/MIN/{1.73_M2}
GLUCOSE BLD-MCNC: 114 MG/DL (ref 70–99)
HCT VFR BLD CALC: 38.9 % (ref 36–46)
HEMOGLOBIN: 13.5 G/DL (ref 12–16)
HUMAN METAPNEUMOVIRUS PCR: NOT DETECTED
IMMATURE GRANULOCYTES: ABNORMAL %
INFLUENZA A BY PCR: NOT DETECTED
INFLUENZA A H1 (2009) PCR: NORMAL
INFLUENZA A H1 PCR: NORMAL
INFLUENZA A H3 PCR: NORMAL
INFLUENZA B BY PCR: NOT DETECTED
LYMPHOCYTES # BLD: 40 % (ref 24–44)
MAGNESIUM: 1.9 MG/DL (ref 1.6–2.6)
MCH RBC QN AUTO: 30.9 PG (ref 26–34)
MCHC RBC AUTO-ENTMCNC: 34.6 G/DL (ref 31–37)
MCV RBC AUTO: 89.4 FL (ref 80–100)
MONOCYTES # BLD: 10 % (ref 1–7)
MYCOPLASMA PNEUMONIAE PCR: NOT DETECTED
MYOGLOBIN: 45 NG/ML (ref 25–58)
MYOGLOBIN: 58 NG/ML (ref 25–58)
NRBC AUTOMATED: ABNORMAL PER 100 WBC
PARAINFLUENZA 1 PCR: NOT DETECTED
PARAINFLUENZA 2 PCR: NOT DETECTED
PARAINFLUENZA 3 PCR: NOT DETECTED
PARAINFLUENZA 4 PCR: NOT DETECTED
PDW BLD-RTO: 14.9 % (ref 11.5–14.5)
PLATELET # BLD: 221 K/UL (ref 130–400)
PLATELET ESTIMATE: ABNORMAL
PMV BLD AUTO: 8.4 FL (ref 6–12)
POTASSIUM SERPL-SCNC: 3.8 MMOL/L (ref 3.7–5.3)
PRO-BNP: 256 PG/ML
RBC # BLD: 4.35 M/UL (ref 4–5.2)
RBC # BLD: ABNORMAL 10*6/UL
RESP SYNCYTIAL VIRUS PCR: NOT DETECTED
RHINO/ENTEROVIRUS PCR: NOT DETECTED
SEG NEUTROPHILS: 48 % (ref 36–66)
SEGMENTED NEUTROPHILS ABSOLUTE COUNT: 2.3 K/UL (ref 1.8–7.7)
SODIUM BLD-SCNC: 139 MMOL/L (ref 135–144)
SPECIMEN DESCRIPTION: NORMAL
TOTAL CK: 182 U/L (ref 26–192)
TROPONIN INTERP: ABNORMAL
TROPONIN INTERP: ABNORMAL
TROPONIN T: ABNORMAL NG/ML
TROPONIN T: ABNORMAL NG/ML
TROPONIN, HIGH SENSITIVITY: 22 NG/L (ref 0–14)
TROPONIN, HIGH SENSITIVITY: 27 NG/L (ref 0–14)
WBC # BLD: 4.8 K/UL (ref 3.5–11)
WBC # BLD: ABNORMAL 10*3/UL

## 2019-04-16 PROCEDURE — 93005 ELECTROCARDIOGRAM TRACING: CPT

## 2019-04-16 PROCEDURE — 6360000002 HC RX W HCPCS: Performed by: NURSE PRACTITIONER

## 2019-04-16 PROCEDURE — 99285 EMERGENCY DEPT VISIT HI MDM: CPT

## 2019-04-16 PROCEDURE — 2580000003 HC RX 258: Performed by: NURSE PRACTITIONER

## 2019-04-16 PROCEDURE — 83874 ASSAY OF MYOGLOBIN: CPT

## 2019-04-16 PROCEDURE — 87798 DETECT AGENT NOS DNA AMP: CPT

## 2019-04-16 PROCEDURE — 94640 AIRWAY INHALATION TREATMENT: CPT

## 2019-04-16 PROCEDURE — 87486 CHLMYD PNEUM DNA AMP PROBE: CPT

## 2019-04-16 PROCEDURE — 6370000000 HC RX 637 (ALT 250 FOR IP): Performed by: NURSE PRACTITIONER

## 2019-04-16 PROCEDURE — 85025 COMPLETE CBC W/AUTO DIFF WBC: CPT

## 2019-04-16 PROCEDURE — 87581 M.PNEUMON DNA AMP PROBE: CPT

## 2019-04-16 PROCEDURE — 82553 CREATINE MB FRACTION: CPT

## 2019-04-16 PROCEDURE — 6360000002 HC RX W HCPCS: Performed by: INTERNAL MEDICINE

## 2019-04-16 PROCEDURE — 83880 ASSAY OF NATRIURETIC PEPTIDE: CPT

## 2019-04-16 PROCEDURE — 94761 N-INVAS EAR/PLS OXIMETRY MLT: CPT

## 2019-04-16 PROCEDURE — 97166 OT EVAL MOD COMPLEX 45 MIN: CPT

## 2019-04-16 PROCEDURE — 94664 DEMO&/EVAL PT USE INHALER: CPT

## 2019-04-16 PROCEDURE — 6370000000 HC RX 637 (ALT 250 FOR IP): Performed by: EMERGENCY MEDICINE

## 2019-04-16 PROCEDURE — 83735 ASSAY OF MAGNESIUM: CPT

## 2019-04-16 PROCEDURE — 1200000000 HC SEMI PRIVATE

## 2019-04-16 PROCEDURE — 82550 ASSAY OF CK (CPK): CPT

## 2019-04-16 PROCEDURE — 87070 CULTURE OTHR SPECIMN AEROBIC: CPT

## 2019-04-16 PROCEDURE — 6360000002 HC RX W HCPCS: Performed by: EMERGENCY MEDICINE

## 2019-04-16 PROCEDURE — 99223 1ST HOSP IP/OBS HIGH 75: CPT | Performed by: NURSE PRACTITIONER

## 2019-04-16 PROCEDURE — 87633 RESP VIRUS 12-25 TARGETS: CPT

## 2019-04-16 PROCEDURE — 2700000000 HC OXYGEN THERAPY PER DAY

## 2019-04-16 PROCEDURE — 2580000003 HC RX 258: Performed by: INTERNAL MEDICINE

## 2019-04-16 PROCEDURE — 84484 ASSAY OF TROPONIN QUANT: CPT

## 2019-04-16 PROCEDURE — 87205 SMEAR GRAM STAIN: CPT

## 2019-04-16 PROCEDURE — 71045 X-RAY EXAM CHEST 1 VIEW: CPT

## 2019-04-16 PROCEDURE — 96374 THER/PROPH/DIAG INJ IV PUSH: CPT

## 2019-04-16 PROCEDURE — 97530 THERAPEUTIC ACTIVITIES: CPT

## 2019-04-16 PROCEDURE — 97535 SELF CARE MNGMENT TRAINING: CPT

## 2019-04-16 PROCEDURE — 36415 COLL VENOUS BLD VENIPUNCTURE: CPT

## 2019-04-16 PROCEDURE — 80048 BASIC METABOLIC PNL TOTAL CA: CPT

## 2019-04-16 RX ORDER — ACETAMINOPHEN 325 MG/1
650 TABLET ORAL EVERY 4 HOURS PRN
Status: DISCONTINUED | OUTPATIENT
Start: 2019-04-16 | End: 2019-04-19 | Stop reason: HOSPADM

## 2019-04-16 RX ORDER — PREDNISONE 20 MG/1
40 TABLET ORAL DAILY
Status: DISCONTINUED | OUTPATIENT
Start: 2019-04-18 | End: 2019-04-19 | Stop reason: HOSPADM

## 2019-04-16 RX ORDER — LANOLIN ALCOHOL/MO/W.PET/CERES
100 CREAM (GRAM) TOPICAL DAILY
Status: DISCONTINUED | OUTPATIENT
Start: 2019-04-16 | End: 2019-04-19 | Stop reason: HOSPADM

## 2019-04-16 RX ORDER — ALBUTEROL SULFATE 2.5 MG/3ML
2.5 SOLUTION RESPIRATORY (INHALATION)
Status: DISCONTINUED | OUTPATIENT
Start: 2019-04-16 | End: 2019-04-19 | Stop reason: HOSPADM

## 2019-04-16 RX ORDER — ONDANSETRON 4 MG/1
4 TABLET, ORALLY DISINTEGRATING ORAL EVERY 6 HOURS PRN
Status: DISCONTINUED | OUTPATIENT
Start: 2019-04-16 | End: 2019-04-19 | Stop reason: HOSPADM

## 2019-04-16 RX ORDER — POLYETHYLENE GLYCOL 3350 17 G/17G
17 POWDER, FOR SOLUTION ORAL DAILY
Status: DISCONTINUED | OUTPATIENT
Start: 2019-04-16 | End: 2019-04-19 | Stop reason: HOSPADM

## 2019-04-16 RX ORDER — CLOPIDOGREL BISULFATE 75 MG/1
75 TABLET ORAL DAILY
Status: DISCONTINUED | OUTPATIENT
Start: 2019-04-16 | End: 2019-04-19 | Stop reason: HOSPADM

## 2019-04-16 RX ORDER — SODIUM CHLORIDE 9 MG/ML
INJECTION, SOLUTION INTRAVENOUS CONTINUOUS
Status: DISCONTINUED | OUTPATIENT
Start: 2019-04-16 | End: 2019-04-19 | Stop reason: HOSPADM

## 2019-04-16 RX ORDER — DOCUSATE SODIUM 100 MG/1
100 CAPSULE, LIQUID FILLED ORAL 2 TIMES DAILY
Status: DISCONTINUED | OUTPATIENT
Start: 2019-04-16 | End: 2019-04-19 | Stop reason: HOSPADM

## 2019-04-16 RX ORDER — GUAIFENESIN 600 MG/1
600 TABLET, EXTENDED RELEASE ORAL 2 TIMES DAILY
Status: DISCONTINUED | OUTPATIENT
Start: 2019-04-16 | End: 2019-04-19 | Stop reason: HOSPADM

## 2019-04-16 RX ORDER — GABAPENTIN 300 MG/1
600 CAPSULE ORAL 3 TIMES DAILY
Status: DISCONTINUED | OUTPATIENT
Start: 2019-04-16 | End: 2019-04-19 | Stop reason: HOSPADM

## 2019-04-16 RX ORDER — SODIUM CHLORIDE 0.9 % (FLUSH) 0.9 %
10 SYRINGE (ML) INJECTION PRN
Status: DISCONTINUED | OUTPATIENT
Start: 2019-04-16 | End: 2019-04-19 | Stop reason: HOSPADM

## 2019-04-16 RX ORDER — ONDANSETRON 2 MG/ML
4 INJECTION INTRAMUSCULAR; INTRAVENOUS EVERY 6 HOURS PRN
Status: DISCONTINUED | OUTPATIENT
Start: 2019-04-16 | End: 2019-04-16 | Stop reason: SDUPTHER

## 2019-04-16 RX ORDER — IBANDRONATE SODIUM 150 MG/1
150 TABLET, FILM COATED ORAL
Status: DISCONTINUED | OUTPATIENT
Start: 2019-04-16 | End: 2019-04-16 | Stop reason: RX

## 2019-04-16 RX ORDER — SODIUM CHLORIDE 0.9 % (FLUSH) 0.9 %
10 SYRINGE (ML) INJECTION EVERY 12 HOURS SCHEDULED
Status: DISCONTINUED | OUTPATIENT
Start: 2019-04-16 | End: 2019-04-19 | Stop reason: HOSPADM

## 2019-04-16 RX ORDER — METHYLPREDNISOLONE SODIUM SUCCINATE 40 MG/ML
40 INJECTION, POWDER, LYOPHILIZED, FOR SOLUTION INTRAMUSCULAR; INTRAVENOUS EVERY 6 HOURS
Status: COMPLETED | OUTPATIENT
Start: 2019-04-16 | End: 2019-04-18

## 2019-04-16 RX ORDER — FAMOTIDINE 20 MG/1
20 TABLET, FILM COATED ORAL 2 TIMES DAILY
Status: DISCONTINUED | OUTPATIENT
Start: 2019-04-16 | End: 2019-04-19 | Stop reason: HOSPADM

## 2019-04-16 RX ORDER — ONDANSETRON 2 MG/ML
4 INJECTION INTRAMUSCULAR; INTRAVENOUS EVERY 6 HOURS PRN
Status: DISCONTINUED | OUTPATIENT
Start: 2019-04-16 | End: 2019-04-19 | Stop reason: HOSPADM

## 2019-04-16 RX ORDER — IPRATROPIUM BROMIDE AND ALBUTEROL SULFATE 2.5; .5 MG/3ML; MG/3ML
1 SOLUTION RESPIRATORY (INHALATION)
Status: DISCONTINUED | OUTPATIENT
Start: 2019-04-16 | End: 2019-04-16

## 2019-04-16 RX ORDER — ASPIRIN 81 MG/1
81 TABLET ORAL DAILY
Status: DISCONTINUED | OUTPATIENT
Start: 2019-04-16 | End: 2019-04-19 | Stop reason: HOSPADM

## 2019-04-16 RX ORDER — CARVEDILOL 3.12 MG/1
3.12 TABLET ORAL 2 TIMES DAILY WITH MEALS
Status: DISCONTINUED | OUTPATIENT
Start: 2019-04-16 | End: 2019-04-19 | Stop reason: HOSPADM

## 2019-04-16 RX ORDER — NICOTINE 21 MG/24HR
1 PATCH, TRANSDERMAL 24 HOURS TRANSDERMAL DAILY PRN
Status: DISCONTINUED | OUTPATIENT
Start: 2019-04-16 | End: 2019-04-19 | Stop reason: HOSPADM

## 2019-04-16 RX ORDER — METHYLPREDNISOLONE SODIUM SUCCINATE 125 MG/2ML
125 INJECTION, POWDER, LYOPHILIZED, FOR SOLUTION INTRAMUSCULAR; INTRAVENOUS ONCE
Status: COMPLETED | OUTPATIENT
Start: 2019-04-16 | End: 2019-04-16

## 2019-04-16 RX ORDER — ASCORBIC ACID 500 MG
500 TABLET ORAL DAILY
Status: DISCONTINUED | OUTPATIENT
Start: 2019-04-16 | End: 2019-04-19 | Stop reason: HOSPADM

## 2019-04-16 RX ORDER — CALCIUM CARBONATE/VITAMIN D3 600 MG-10
3 TABLET ORAL DAILY
Status: DISCONTINUED | OUTPATIENT
Start: 2019-04-16 | End: 2019-04-19 | Stop reason: HOSPADM

## 2019-04-16 RX ORDER — LANOLIN ALCOHOL/MO/W.PET/CERES
325 CREAM (GRAM) TOPICAL 2 TIMES DAILY
Status: DISCONTINUED | OUTPATIENT
Start: 2019-04-16 | End: 2019-04-19 | Stop reason: HOSPADM

## 2019-04-16 RX ORDER — IPRATROPIUM BROMIDE AND ALBUTEROL SULFATE 2.5; .5 MG/3ML; MG/3ML
1 SOLUTION RESPIRATORY (INHALATION) ONCE
Status: COMPLETED | OUTPATIENT
Start: 2019-04-16 | End: 2019-04-16

## 2019-04-16 RX ADMIN — SODIUM CHLORIDE: 9 INJECTION, SOLUTION INTRAVENOUS at 03:47

## 2019-04-16 RX ADMIN — ALBUTEROL SULFATE 2.5 MG: 2.5 SOLUTION RESPIRATORY (INHALATION) at 07:08

## 2019-04-16 RX ADMIN — DOCUSATE SODIUM 100 MG: 100 CAPSULE, LIQUID FILLED ORAL at 20:25

## 2019-04-16 RX ADMIN — SODIUM CHLORIDE: 9 INJECTION, SOLUTION INTRAVENOUS at 15:17

## 2019-04-16 RX ADMIN — GUAIFENESIN 600 MG: 600 TABLET, EXTENDED RELEASE ORAL at 09:55

## 2019-04-16 RX ADMIN — CLOPIDOGREL 75 MG: 75 TABLET, FILM COATED ORAL at 09:54

## 2019-04-16 RX ADMIN — GABAPENTIN 600 MG: 300 CAPSULE ORAL at 09:54

## 2019-04-16 RX ADMIN — METHYLPREDNISOLONE SODIUM SUCCINATE 125 MG: 125 INJECTION, POWDER, FOR SOLUTION INTRAMUSCULAR; INTRAVENOUS at 02:40

## 2019-04-16 RX ADMIN — FAMOTIDINE 20 MG: 20 TABLET ORAL at 20:25

## 2019-04-16 RX ADMIN — DOCUSATE SODIUM 100 MG: 100 CAPSULE, LIQUID FILLED ORAL at 09:54

## 2019-04-16 RX ADMIN — GUAIFENESIN 600 MG: 600 TABLET, EXTENDED RELEASE ORAL at 20:26

## 2019-04-16 RX ADMIN — GABAPENTIN 600 MG: 300 CAPSULE ORAL at 20:25

## 2019-04-16 RX ADMIN — Medication 500 MG: at 09:54

## 2019-04-16 RX ADMIN — AZITHROMYCIN MONOHYDRATE 500 MG: 500 INJECTION, POWDER, LYOPHILIZED, FOR SOLUTION INTRAVENOUS at 20:25

## 2019-04-16 RX ADMIN — ALBUTEROL SULFATE 2.5 MG: 2.5 SOLUTION RESPIRATORY (INHALATION) at 15:25

## 2019-04-16 RX ADMIN — CARVEDILOL 3.12 MG: 3.12 TABLET, FILM COATED ORAL at 09:54

## 2019-04-16 RX ADMIN — FERROUS SULFATE TAB EC 325 MG (65 MG FE EQUIVALENT) 325 MG: 325 (65 FE) TABLET DELAYED RESPONSE at 09:55

## 2019-04-16 RX ADMIN — METHYLPREDNISOLONE SODIUM SUCCINATE 40 MG: 40 INJECTION, POWDER, FOR SOLUTION INTRAMUSCULAR; INTRAVENOUS at 15:01

## 2019-04-16 RX ADMIN — METHYLPREDNISOLONE SODIUM SUCCINATE 40 MG: 40 INJECTION, POWDER, FOR SOLUTION INTRAMUSCULAR; INTRAVENOUS at 20:26

## 2019-04-16 RX ADMIN — METHYLPREDNISOLONE SODIUM SUCCINATE 40 MG: 40 INJECTION, POWDER, FOR SOLUTION INTRAMUSCULAR; INTRAVENOUS at 09:55

## 2019-04-16 RX ADMIN — TIOTROPIUM BROMIDE 18 MCG: 18 CAPSULE ORAL; RESPIRATORY (INHALATION) at 11:49

## 2019-04-16 RX ADMIN — CARVEDILOL 3.12 MG: 3.12 TABLET, FILM COATED ORAL at 17:26

## 2019-04-16 RX ADMIN — Medication 10 ML: at 20:26

## 2019-04-16 RX ADMIN — ALBUTEROL SULFATE 2.5 MG: 2.5 SOLUTION RESPIRATORY (INHALATION) at 11:39

## 2019-04-16 RX ADMIN — GABAPENTIN 600 MG: 300 CAPSULE ORAL at 15:01

## 2019-04-16 RX ADMIN — MOMETASONE FUROATE AND FORMOTEROL FUMARATE DIHYDRATE 2 PUFF: 200; 5 AEROSOL RESPIRATORY (INHALATION) at 07:09

## 2019-04-16 RX ADMIN — ASPIRIN 81 MG: 81 TABLET, COATED ORAL at 09:55

## 2019-04-16 RX ADMIN — ENOXAPARIN SODIUM 40 MG: 40 INJECTION SUBCUTANEOUS at 09:55

## 2019-04-16 RX ADMIN — FAMOTIDINE 20 MG: 20 TABLET ORAL at 09:55

## 2019-04-16 RX ADMIN — Medication 3 TABLET: at 09:54

## 2019-04-16 RX ADMIN — IPRATROPIUM BROMIDE AND ALBUTEROL SULFATE 1 AMPULE: .5; 3 SOLUTION RESPIRATORY (INHALATION) at 02:09

## 2019-04-16 RX ADMIN — FERROUS SULFATE TAB EC 325 MG (65 MG FE EQUIVALENT) 325 MG: 325 (65 FE) TABLET DELAYED RESPONSE at 20:25

## 2019-04-16 RX ADMIN — POLYETHYLENE GLYCOL (3350) 17 G: 17 POWDER, FOR SOLUTION ORAL at 09:55

## 2019-04-16 ASSESSMENT — PAIN SCALES - GENERAL
PAINLEVEL_OUTOF10: 0
PAINLEVEL_OUTOF10: 7
PAINLEVEL_OUTOF10: 8
PAINLEVEL_OUTOF10: 8

## 2019-04-16 ASSESSMENT — PAIN DESCRIPTION - ORIENTATION
ORIENTATION: RIGHT
ORIENTATION: RIGHT

## 2019-04-16 ASSESSMENT — PAIN DESCRIPTION - PAIN TYPE
TYPE: CHRONIC PAIN

## 2019-04-16 ASSESSMENT — PAIN DESCRIPTION - DESCRIPTORS: DESCRIPTORS: ACHING

## 2019-04-16 ASSESSMENT — PAIN DESCRIPTION - LOCATION
LOCATION: BACK
LOCATION: BACK;HIP
LOCATION: BACK;HIP

## 2019-04-16 ASSESSMENT — PAIN DESCRIPTION - ONSET: ONSET: ON-GOING

## 2019-04-16 ASSESSMENT — PAIN DESCRIPTION - FREQUENCY: FREQUENCY: CONTINUOUS

## 2019-04-16 NOTE — PROGRESS NOTES
STAY WITH ME protocol in place  Bed alarm on  Signs in place  Fall risk sticker to wrist band  Non-skid socks on/at bedside  Adequate lighting provided  Room/floor free of clutter  Bed low and locked  Call light in reach  [address] [facility] in place for patient/family to view & ask questions.

## 2019-04-16 NOTE — ED PROVIDER NOTES
FE) MG TABLET    Take 325 mg by mouth 2 times daily    FLUTICASONE (FLONASE) 50 MCG/ACT NASAL SPRAY    2 sprays by Nasal route daily as needed     GABAPENTIN (NEURONTIN) 600 MG TABLET    Take 600 mg by mouth 3 times daily. Kayli Skinner FRANCISCA-HYDROLAC 12 12 % CREAM    APPLY TO AFFECTED AREA DAILY    IBANDRONATE (BONIVA) 150 MG TABLET    Take 150 mg by mouth every 30 days. LEVALBUTEROL (XOPENEX) 1.25 MG/3ML NEBULIZER SOLUTION    Take 1 ampule by nebulization every 4 hours as needed for Wheezing    MULTIPLE VITAMINS-MINERALS (THERAPEUTIC MULTIVITAMIN-MINERALS) TABLET    Take 1 tablet by mouth daily    OXYCODONE ER (XTAMPZA ER) 27 MG C12A    Take 27 mg by mouth every 12 hours. Kayli Skinner POLYETHYLENE GLYCOL (GLYCOLAX) POWDER    Take 17 g by mouth daily. SODIUM CHLORIDE (OCEAN, BABY AYR) 0.65 % NASAL SPRAY    1 spray by Nasal route as needed for Congestion    TIOTROPIUM (SPIRIVA) 18 MCG INHALATION CAPSULE    Inhale 1 capsule into the lungs daily.     VITAMIN B-6 (PYRIDOXINE) 100 MG TABLET    Take 100 mg by mouth daily    VITAMIN C (ASCORBIC ACID) 500 MG TABLET    Take 500 mg by mouth daily       PAST MEDICAL HISTORY         Diagnosis Date    Aortic valve disease     Pt. denies    Arthritis     COPD (chronic obstructive pulmonary disease) ([facility] [address]     Disease of blood and blood forming organ     Diverticulitis     GERD (gastroesophageal reflux disease)     History of blood transfusion     History of gastritis     Lumbar disc disease     Movement disorder     Nerve disease, peripheral 11/17/2015    Osteoporosis     Pneumonia 11/27/2015    Unspecified cerebral artery occlusion with cerebral infarction     TIA's       SURGICAL HISTORY           Procedure Laterality Date    ARTHROPLASTY Left 4/18/2017    LEFT FOOT ARTHROPLASTY  4TH DIGIT performed by Lili Whitfield DPM at [facility] 3 Left 04/18/2017    L 4th digit arthroplasty     HYSTERECTOMY      Partial    LUMBAR SPINE SURGERY  x 2    SPINAL FUSION  2012    UPPER GASTROINTESTINAL ENDOSCOPY      VARICOSE VEIN SURGERY           FAMILY HISTORY     History reviewed. No pertinent family history. No family status information on file. SOCIAL HISTORY      reports that she quit smoking about 24 years ago. She has never used smokeless tobacco. She reports that she does not drink alcohol or use drugs. REVIEW OF SYSTEMS    (2-9 systems for level 4, 10 or more for level 5)     Review of Systems   All other systems reviewed and are negative. Except as noted above the remainder of the review of systems was reviewed and negative. PHYSICAL EXAM    (up to 7 for level 4, 8 or more for level 5)     Vitals:    04/16/19 0226 04/16/19 0241 04/16/19 0257 04/16/19 0326   BP: (!) 150/85 (!) 152/82  (!) 145/66   Pulse: 93 91 91 85   Resp: 15 14 21 20   Temp:       TempSrc:       SpO2: 94% 94% 95% 97%   Weight:       Height:           Physical exam reflects a pleasant elderly female. She is afebrile. Vital signs stable to include pulse ox of 95% on 2 L via nasal cannula. She's not hypoxic. She is alert and conversive integument warm and dry. Oropharyngeal exam is without lesion. Mild JVD noted. Heart regular rate and rhythm normal S1 and S2. Systolic murmur noted. Lungs are diminished throughout scattered rales and scattered wheezes. Abdomen is soft throughout no focal pain. Extremities show 1+ edema. No calf swelling erythema or asymmetry. Integument without rash or lesion. No neurovascular deficits are noted      DIAGNOSTIC RESULTS     EKG: All EKG's are interpreted by the Emergency Department Physician who either signs or Co-signs this chart in the absence of a cardiologist.    EKG is limited by motion artifact demonstrates sinus rhythm rate in the 90s. Fascicular block noted. criteria for LVH noted. No gross ectopy.   No gross ischemic change    RADIOLOGY:   Non-plain film images such as CT, Ultrasound and MRI are read by the radiologist. Rajani Nolasco radiographic images are visualized and preliminarily interpreted by the emergency physician with the below findings:    XR CHEST PORTABLE   Status: Preliminary result   Order Providers     Maria De Jesus Tubbs MD          [place] Radiologists     Read Date Phone Pager   Diego Arzate Apr 16, 2019 [phone]    Radiation Dose Estimates     No radiation information found for this patient   Impression   1. Stable chronic bibasilar reticular opacities which may relate to   interstitial pneumonitis. 2. No acute pneumonia or pulmonary edema.             Interpretation per the Radiologist below, if available at the time of this note:    XR CHEST PORTABLE   Final Result   1. Stable chronic bibasilar reticular opacities which may relate to   interstitial pneumonitis. 2. No acute pneumonia or pulmonary edema. XR CHEST PORTABLE    (Results Pending)           LABS:  Labs Reviewed   CBC WITH AUTO DIFFERENTIAL - Abnormal; Notable for the following components:       Result Value    RDW 14.9 (*)     Monocytes 10 (*)     All other components within normal limits   BASIC METABOLIC PANEL - Abnormal; Notable for the following components:    Glucose 114 (*)     All other components within normal limits   GRAM STAIN   RESPIRATORY CULTURE   CK ISOENZYMES   TROP/MYOGLOBIN   MAGNESIUM   BRAIN NATRIURETIC PEPTIDE   Results for Michelle Salazar (MRN [id number]) as of 4/16/2019 02:36   Ref.  Range 4/16/2019 02:25   WBC Latest Ref Range: 3.5 - 11.0 k/uL 4.8   RBC Latest Ref Range: 4.0 - 5.2 m/uL 4.35   Hemoglobin Quant Latest Ref Range: 12.0 - 16.0 g/dL 13.5   Hematocrit Latest Ref Range: 36 - 46 % 38.9   MCV Latest Ref Range: 80 - 100 fL 89.4   MCH Latest Ref Range: 26 - 34 pg 30.9   MCHC Latest Ref Range: 31 - 37 g/dL 34.6   MPV Latest Ref Range: 6.0 - 12.0 fL 8.4   RDW Latest Ref Range: 11.5 - 14.5 % 14.9 (H)   Platelet Count Latest Ref Range: 130 - 400 k/uL 221   Platelet Estimate Unknown NOT REPORTED   Absolute Mono # Latest Ref Range: 0.2 - 0.8 k/uL 0.50   Eosinophils % Latest Ref Range: 1 - 4 % 2   Basophils # Latest Ref Range: 0.0 - 0.2 k/uL 0.00   Differential Type Unknown NOT REPORTED   Seg Neutrophils Latest Ref Range: 36 - 66 % 48   Segs Absolute Latest Ref Range: 1.8 - 7.7 k/uL 2.30   Lymphocytes Latest Ref Range: 24 - 44 % 40   Absolute Lymph # Latest Ref Range: 1.0 - 4.8 k/uL 1.90   Monocytes Latest Ref Range: 1 - 7 % 10 (H)   Absolute Eos # Latest Ref Range: 0.0 - 0.4 k/uL 0.10   Basophils Latest Ref Range: 0 - 2 % 0   Immature Granulocytes Latest Ref Range: 0 % NOT REPORTED   WBC Morphology Unknown NOT REPORTED   RBC Morphology Unknown NOT REPORTED     Results for Katalina Ball (MRN [id number]) as of 4/16/2019 04:02   Ref. Range 4/16/2019 02:54   Sodium Latest Ref Range: 135 - 144 mmol/L 139   Potassium Latest Ref Range: 3.7 - 5.3 mmol/L 3.8   Chloride Latest Ref Range: 98 - 107 mmol/L 103   CO2 Latest Ref Range: 20 - 31 mmol/L 25   BUN Latest Ref Range: 8 - 23 mg/dL 15   Creatinine Latest Ref Range: 0.50 - 0.90 mg/dL 0.75   Bun/Cre Ratio Latest Ref Range: 9 - 20  20   Anion Gap Latest Ref Range: 9 - 17 mmol/L 11   GFR Non- Latest Ref Range: >60 mL/min >60   GFR African American Latest Ref Range: >60 mL/min >60   Magnesium Latest Ref Range: 1.6 - 2.6 mg/dL 1.9   Glucose Latest Ref Range: 70 - 99 mg/dL 114 (H)   Calcium Latest Ref Range: 8.6 - 10.4 mg/dL 9.1   GFR Comment Unknown Pend   GFR Staging Unknown NOT REPORTED   Total CK Latest Ref Range: 26 - 192 U/L 182   Results for Katalina Ball (MRN [id number]) as of 4/16/2019 04:40   Ref. Range 4/16/2019 02:54   Total CK Latest Ref Range: 26 - 192 U/L 182   % CKMB Latest Ref Range: 0.0 - 3.0 % 2.4   CK-MB Latest Ref Range: <5.4 ng/mL 4.3   CKMB Interpretation Unknown NORMAL ISOENZYME . Lord Jomar Lord Jomar    Myoglobin Latest Ref Range: 25 - 58 ng/mL 58   Pro-BNP Latest Ref Range: <300 pg/mL 256   Troponin T Latest Ref Range: <0.03 ng/mL NOT REPORTED   Troponin Interp Unknown NOT REPORTED   Troponin, High Sensitivity Latest Ref Range: 0 - 14 ng/L 27 (H)   BNP Interpretation Unknown Pro-BNP Reference. .. Glucose Latest Ref Range: 70 - 99 mg/dL 114 (H)     All other labs were within normal range or not returned as of this dictation. EMERGENCY DEPARTMENT COURSE and DIFFERENTIAL DIAGNOSIS/MDM:   Vitals:    Vitals:    04/16/19 0226 04/16/19 0241 04/16/19 0257 04/16/19 0326   BP: (!) 150/85 (!) 152/82  (!) 145/66   Pulse: 93 91 91 85   Resp: 15 14 21 20   Temp:       TempSrc:       SpO2: 94% 94% 95% 97%   Weight:       Height:         Patient is evaluated on arrival.  She is placed on oxygen. She states she feels better on oxygen. She did receive nebulizer treatment . patient has a long history of multiple etiologies with regard to shortness of breath. Chest x-ray does not demonstrate pulmonary edema today. She does have evidence of acute on chronic interstitial pneumonitis. She does have flare of her underlying COPD. She did receive initial dosing of IV Solu-Medrol as well as nebulizer treatment per protocol. She felt better resting on oxygen. patient states she was not comfortable going home due to her worsening shortness of breath in the home setting. She cannot tolerate anti-inflammatories or aspirin products. EKG is without abnormality. With regard to her cardiac panel troponin is mildly elevated remainder of panel is normal.  Serial enzymes will be followed. Given her age and comorbidities care is discussed with internal medicine to facilitate admission for further symptomatic management and consultation with her pulmonologist    CONSULTS:  IP CONSULT TO INTERNAL MEDICINE  IP CONSULT TO PULMONOLOGY    PROCEDURES:  None    FINAL IMPRESSION      1. Dyspnea and respiratory abnormalities    2. Interstitial pneumonitis ([facility] [address]    3.  COPD exacerbation ([facility] [address]          DISPOSITION/PLAN   DISPOSITION Admitted 04/16/2019 03:21:40 AM      PATIENT REFERRED TO:   MD Andrew [id number]  [facility] [phone]            DISCHARGE MEDICATIONS:     New Prescriptions    No medications on file     Controlled Substances Monitoring:     RX Monitoring 4/16/2019   Attestation The Prescription Monitoring Report for this patient was reviewed today. Chronic Pain Routine Monitoring Obtaining appropriate analgesic effect of treatment.        (Please note that portions of this note were completed with a voice recognition program.  Efforts were made to edit the dictations but occasionally words are mis-transcribed.)    Beltran Laguerre MD  Attending Emergency Physician          Beltran Lagurere MD  04/16/19 4615

## 2019-04-16 NOTE — ED NOTES
Spoke to Dr Saman Beaulieu he stated Dr Princeton Mcburney would be making rounds this am and would see the patient.       Yuliya Michael, CHARO  04/16/19 [id number]

## 2019-04-16 NOTE — PLAN OF CARE
Problem: Falls - Risk of:  Goal: Will remain free from falls  Description  Will remain free from falls  Outcome: Ongoing  Goal: Absence of physical injury  Description  Absence of physical injury  Outcome: Ongoing     Problem: Risk for Impaired Skin Integrity  Goal: Tissue integrity - skin and mucous membranes  Description  Structural intactness and normal physiological function of skin and  mucous membranes. Outcome: Ongoing     Problem: ABCDS Injury Assessment  Goal: Absence of physical injury  Outcome: Ongoing     Problem: Pain:  Goal: Pain level will decrease  Description  Pain level will decrease  Outcome: Ongoing  Goal: Control of acute pain  Description  Control of acute pain  Outcome: Ongoing  Goal: Control of chronic pain  Description  Control of chronic pain  Outcome: Ongoing     Problem: Musculor/Skeletal Functional Status  Goal: Highest potential functional level  Outcome: Ongoing     Problem: Discharge Planning:  Goal: Discharged to appropriate level of care  Description  Discharged to appropriate level of care  Outcome: Ongoing     Problem:  Activity Intolerance:  Goal: Ability to tolerate increased activity will improve  Description  Ability to tolerate increased activity will improve  Outcome: Ongoing     Problem: Airway Clearance - Ineffective:  Goal: Ability to maintain a clear airway will improve  Description  Ability to maintain a clear airway will improve  Outcome: Ongoing     Problem: Breathing Pattern - Ineffective:  Goal: Ability to achieve and maintain a regular respiratory rate will improve  Description  Ability to achieve and maintain a regular respiratory rate will improve  Outcome: Ongoing     Problem: Gas Exchange - Impaired:  Goal: Levels of oxygenation will improve  Description  Levels of oxygenation will improve  Outcome: Ongoing

## 2019-04-16 NOTE — PROGRESS NOTES
Occupational Therapy   Occupational Therapy Initial Assessment  Date: 2019   Patient Name: Shelia Gallardo  MRN: [id number]     : 1940    Date of Service: 2019    Discharge Recommendations:  Home with Home health OT, Home with assist PRN, Continue to assess pending progress(continue to monitor progress-anticipate home with assist prn and HHOT pending progress)  OT Equipment Recommendations  Equipment Needed: Yes  Mobility Devices: ADL Assistive Devices  ADL Assistive Devices: Toilet Safety Frame;Transfer Tub Bench    Assessment   Performance deficits / Impairments: Decreased functional mobility ; Decreased ADL status; Decreased strength;Decreased endurance;Decreased high-level IADLs;Decreased balance  Decision Making: Medium Complexity  Patient Education: Pt. instructed in OT goals/poc, pacing, bed mobility, transfer tech, safe func mobility, use of RW, fall prevention, safety, and dc planning. REQUIRES OT FOLLOW UP: Yes  Activity Tolerance  Activity Tolerance: Patient Tolerated treatment well  Safety Devices  Safety Devices in place: Yes  Type of devices: All fall risk precautions in place;Nurse notified;Gait belt;Bed alarm in place;Call light within reach; Patient at risk for falls; Left in bed           Patient Diagnosis(es): The primary encounter diagnosis was Dyspnea and respiratory abnormalities. Diagnoses of Interstitial pneumonitis ([address] and COPD exacerbation ([address] were also pertinent to this visit. has a past medical history of Aortic valve disease, Arthritis, COPD (chronic obstructive pulmonary disease) ([address] Disease of blood and blood forming organ, Diverticulitis, GERD (gastroesophageal reflux disease), History of blood transfusion, History of gastritis, Lumbar disc disease, Movement disorder, Nerve disease, peripheral, Osteoporosis, Pneumonia, and Unspecified cerebral artery occlusion with cerebral infarction.    has a past surgical history that includes Spinal fusion (); Varicose vein surgery; Lumbar spine surgery (x 2); Upper gastrointestinal endoscopy; Colonoscopy; Hysterectomy; Foot surgery (Left, 04/18/2017); and arthroplasty (Left, 4/18/2017). Restrictions  Restrictions/Precautions  Restrictions/Precautions: Fall Risk  Position Activity Restriction  Other position/activity restrictions: up as tolerated, telemetry, IV, O2 per nc, bed alarm    Subjective   General  Chart Reviewed: Yes  Patient assessed for rehabilitation services?: Yes  Family / Caregiver Present: No  Diagnosis: COPD Exacerbation  Subjective  Subjective: \"I prefer Zoraida. \"  General Comment  Comments: Writer consulted CHARO Iniguez whom indicated pt. was medically stable for OT this date.      Social/Functional History  Social/Functional History  Lives With: Alone  Type of Home: Apartment  Home Layout: One level  Home Access: Level entry  Bathroom Shower/Tub: Tub/Shower unit  Bathroom Toilet: Standard(holds onto sink)  Bathroom Equipment: Shower chair, Grab bars in [address] [place]: Rolling walker, Wheelchair-manual, Oxygen, Sock aid(3L of O2 at home 24/7)  Arvilla Honey Help From: Other (comment)(aide from [address] 3 days/week for ~4hrs daily; aide assists with IADLs and showers )  ADL Assistance: Independent(takes own showers on days aide not present)  Homemaking Assistance: Independent(basic only)  Homemaking Responsibilities: Yes(basic only)  Ambulation Assistance: Independent(with RW)  Transfer Assistance: Independent  Active : No       Objective   Vision: Impaired  Vision Exceptions: Wears glasses for reading  Hearing: Within functional limits    Orientation  Overall Orientation Status: Within Functional Limits  Observation/Palpation  Posture: Poor  Observation: significant forward trunk flexion with standing posture(pt. reports present standing posture is normal stance for years)  Balance  Sitting Balance: Stand by assistance  Standing Balance: Minimal assistance  Standing Balance  Time: ~4 min   Activity: during functional mobility and ADLs  Sit to stand: Minimal assistance  Stand to sit: Minimal assistance  Comment: pt. ambulated ~20'  x 2 with min assist; verbal cues required re: looking up and re: safe tech/use of RW during mobility  Functional Mobility  Functional - Mobility Device: Rolling Walker  Activity: To/from bathroom  Assist Level: Minimal assistance  Toilet Transfers  Toilet - Technique: Ambulating  Equipment Used: Grab bars  Toilet Transfer: Minimal assistance  ADL  Grooming: Minimal assistance(standing sink side)  UE Bathing: Minimal assistance  LE Bathing: Moderate assistance  UE Dressing: Minimal assistance  LE Dressing: Maximum assistance(with footies and pajama pants)  Toileting: Minimal assistance  Additional Comments: AM-PAC 6 Clicks functional assessment (self care) completed, pt. scored 17.   Tone RUE  RUE Tone: Normotonic  Tone LUE  LUE Tone: Normotonic  Coordination  Movements Are Fluid And Coordinated: Yes(of BUE)     Bed mobility  Rolling to Left: Stand by assistance  Supine to Sit: Minimal assistance  Sit to Supine: Minimal assistance  Scooting: Minimal assistance  Comment: verbal cues re: proper tech and use of UE during bed mobility skills; pt. returned to bed following session per pt. request  Transfers  Stand Step Transfers: Minimal assistance  Sit to stand: Minimal assistance  Stand to sit: Minimal assistance  Transfer Comments: with RW; required vcs re: proper hand placement during transfer tech     Cognition  Overall Cognitive Status: WFL                 LUE AROM (degrees)  LUE AROM : WFL  RUE AROM (degrees)  RUE AROM : WFL  LUE Strength  Gross LUE Strength: Exceptions to WFL(grossly 3+/5)  RUE Strength  Gross RUE Strength: Exceptions to WFL(grossly 3+/5)                   Plan   Plan  Times per week: 4-6x/week  Times per day: Daily  Current Treatment Recommendations: Strengthening, Functional Mobility Training, Patient/Caregiver Education & Training, Equipment Evaluation, Education, & procurement, Endurance Training, Balance Training, Safety Education & Training, Self-Care / ADL    G-Code     OutComes Score                                                  AM-PAC Score             Goals  Short term goals  Time Frame for Short term goals: 8-10 sessions  Short term goal 1: Pt. will demo Mod-I with all aspects of basic self care using A.E. Short term goal 2: Pt. will demo Mod-I with functional transfers using RW and appropriate DME with good safety. Short term goal 3: Pt. will demo Mod-I with functional mobility for ADL completion/setup with good safety and pacing. Short term goal 4: Pt. will improve standing tolerance to 8-10 min for increased INDEP with functional mobility and ADL setup. Short term goal 5: Pt. will independently demo and verb good understanding of fall prevention techs, EC/WS techs and possible equip needs for home. Patient Goals   Patient goals :  \"To go home\"       Therapy Time   Individual Concurrent Group Co-treatment   Time In 1025         Time Out 1133         Minutes [address] [facility] Bright [place], OT

## 2019-04-16 NOTE — ED NOTES
Call out to Dr Gustavo Sawyer for consult, no answer per answering service will try again and have him call.      Joy Parmar RN  04/16/19 0776

## 2019-04-16 NOTE — H&P
[facility]    HISTORY AND PHYSICAL EXAMINATION            Date:   4/16/2019  Patient name:  Lance Cordova  Date of admission:  4/16/2019  1:45 AM  MRN:   [id number]  Account:  [de-identified]  YOB: 1940  PCP:    Alexander Cabello MD  Room:   2002/2002-02  Code Status:    Full Code    Chief Complaint:     Chief Complaint   Patient presents with    Shortness of Breath       History Obtained From:     patient    History of Present Illness: The patient is a 77 y.o. Non-/non  female who presents with Shortness of Breath   and she is admitted to the hospital for the management of COPD exacerbation. She was brought in by her son to the ED for worsening shortness of breath. She states that she is always a little short of breath and wears home O2 continuously at 3 L. She reports that she was going to see her PCP but that her symptoms got worse overnight so she came here. She denies any chest pain, N/V/D/ or dizziness. She states she had a more productive cough at home.  She denies fevers or chills    Past Medical History:     Past Medical History:   Diagnosis Date    Aortic valve disease     Pt. denies    Arthritis     COPD (chronic obstructive pulmonary disease) ([address]     Disease of blood and blood forming organ     Diverticulitis     GERD (gastroesophageal reflux disease)     History of blood transfusion     History of gastritis     Lumbar disc disease     Movement disorder     Nerve disease, peripheral 11/17/2015    Osteoporosis     Pneumonia 11/27/2015    Unspecified cerebral artery occlusion with cerebral infarction     TIA's        Past Surgical History:     Past Surgical History:   Procedure Laterality Date    ARTHROPLASTY Left 4/18/2017    LEFT FOOT ARTHROPLASTY  4TH DIGIT performed by Ana Maria Uriarte DPM at [place] [address] Left 04/18/2017    L 4th digit arthroplasty     HYSTERECTOMY      Partial    LUMBAR SPINE SURGERY  x 2    SPINAL FUSION  2012    UPPER GASTROINTESTINAL ENDOSCOPY      VARICOSE VEIN SURGERY          Medications Prior to Admission:     Prior to Admission medications    Medication Sig Start Date End Date Taking? Authorizing Provider   carvedilol (COREG) 3.125 MG tablet Take 3.125 mg by mouth 2 times daily (with meals)    Historical Provider, MD   vitamin B-6 (PYRIDOXINE) 100 MG tablet Take 100 mg by mouth daily    Historical Provider, MD   vitamin C (ASCORBIC ACID) 500 MG tablet Take 500 mg by mouth daily    Historical Provider, MD   Coenzyme Q10 (COQ-10) 10 MG CAPS Take 1 tablet by mouth daily    Historical Provider, MD   ferrous sulfate 325 (65 Fe) MG tablet Take 325 mg by mouth 2 times daily    Historical Provider, MD   aspirin 81 MG EC tablet Take 81 mg by mouth daily    Historical Provider, MD   Multiple Vitamins-Minerals (THERAPEUTIC MULTIVITAMIN-MINERALS) tablet Take 1 tablet by mouth daily    Historical Provider, MD   sodium chloride (OCEAN, BABY AYR) 0.65 % nasal spray 1 spray by Nasal route as needed for Congestion    Historical Provider, MD   gabapentin (NEURONTIN) 600 MG tablet Take 600 mg by mouth 3 times daily. Alfred Augustin Historical Provider, MD   levalbuterol (XOPENEX) 1.25 MG/3ML nebulizer solution Take 1 ampule by nebulization every 4 hours as needed for Wheezing    Historical Provider, MD   Calcium Carb-Cholecalciferol (CALCIUM 600 + D) 600-200 MG-UNIT TABS Take 3 tablets by mouth daily     Historical Provider, MD   OxyCODONE ER (XTAMPZA ER) 27 MG C12A Take 27 mg by mouth every 12 hours. .    Historical Provider, MD   FRANCISCA-HYDROLAC 12 12 % cream APPLY TO AFFECTED AREA DAILY 7/30/18   Juan Dow DPM   tiotropium (SPIRIVA) 18 MCG inhalation capsule Inhale 1 capsule into the lungs daily. 6/27/14   Brenton Ron MD   budesonide-formoterol (SYMBICORT) 160-4.5 MCG/ACT AERO Inhale 2 puffs into the lungs 2 times daily.  6/27/14   Brown Running MD Yunior   albuterol (PROVENTIL HFA;VENTOLIN HFA) 108 (90 BASE) MCG/ACT inhaler Inhale 2 puffs into the lungs every 6 hours as needed for Wheezing. Historical Provider, MD   clopidogrel (PLAVIX) 75 MG tablet Take 75 mg by mouth daily. Historical Provider, MD   fluticasone (FLONASE) 50 MCG/ACT nasal spray 2 sprays by Nasal route daily as needed     Historical Provider, MD   polyethylene glycol (GLYCOLAX) powder Take 17 g by mouth daily. Historical Provider, MD   ibandronate (BONIVA) 150 MG tablet Take 150 mg by mouth every 30 days. Historical Provider, MD   docusate sodium (COLACE) 100 MG capsule Take 100 mg by mouth 2 times daily. Historical Provider, MD        Allergies:     Anti-inflammatory enzyme [nutritional supplements]; Ceclor [cefaclor]; Morphine; Nsaids; Penicillins; Propoxyphene; Skelaxin [metaxalone]; Sulfa antibiotics; Tadalafil; Tolmetin; Alendronate; Asa [aspirin]; Erythromycin; Temazepam; Tetracyclines & related; and Tramadol    Social History:     Tobacco:    reports that she quit smoking about 24 years ago. She has never used smokeless tobacco.  Alcohol:      reports that she does not drink alcohol. Drug Use:  reports that she does not use drugs. Family History:     History reviewed. No pertinent family history. Review of Systems:     Positive and Negative as described in HPI. CONSTITUTIONAL:  negative for fevers, chills, sweats, fatigue, weight loss  HEENT:  negative for vision, hearing changes, runny nose, throat pain  RESPIRATORY:  Positive for shortness of breath, cough, congestion, wheezing. CARDIOVASCULAR:  negative for chest pain, palpitations.   GASTROINTESTINAL:  negative for nausea, vomiting, diarrhea, constipation, change in bowel habits, abdominal pain   GENITOURINARY:  negative for difficulty of urination, burning with urination, frequency   INTEGUMENT:  negative for rash, skin lesions, easy bruising   HEMATOLOGIC/LYMPHATIC:  negative for swelling/edema ALLERGIC/IMMUNOLOGIC:  negative for urticaria , itching  ENDOCRINE:  negative increase in drinking, increase in urination, hot or cold intolerance  MUSCULOSKELETAL:  negative joint pains, muscle aches, swelling of joints  NEUROLOGICAL:  negative for headaches, dizziness, lightheadedness, numbness, pain, tingling extremities  BEHAVIOR/PSYCH:  negative for depression, anxiety    Physical Exam:   BP (!) 151/84   Pulse 79   Temp 98.1 °F (36.7 °C) (Oral)   Resp 18   Ht 5' 4\" (1.626 m)   Wt 160 lb (72.6 kg)   SpO2 96%   BMI 27.46 kg/m²   Temp (24hrs), Av.9 °F (36.6 °C), Min:97.5 °F (36.4 °C), Max:98.2 °F (36.8 °C)    No results for input(s): POCGLU in the last 72 hours. No intake or output data in the 24 hours ending 19 1552    General Appearance:  alert, well appearing, and in no acute distress  Mental status: oriented to person, place, and time with normal affect  Head:  normocephalic, atraumatic. Eye: no icterus, redness, pupils equal and reactive, extraocular eye movements intact, conjunctiva clear  Ear: normal external ear, no discharge, hearing intact  Nose:  no drainage noted  Mouth: mucous membranes moist  Neck: supple, no carotid bruits, thyroid not palpable  Lungs: Bilateral equal air entry, diminished to auscultation, no wheezing, rales or rhonchi, dyspnea on exertion noted  Cardiovascular: normal rate, regular rhythm, no murmur, gallop, rub.   Abdomen: Soft, nontender, nondistended, normal bowel sounds, no hepatomegaly or splenomegaly  Neurologic: There are no new focal motor or sensory deficits, normal muscle tone and bulk, no abnormal sensation, normal speech, cranial nerves II through XII grossly intact  Skin: No gross lesions, rashes, bruising or bleeding on exposed skin area  Extremities:  peripheral pulses palpable, no pedal edema or calf pain with palpation  Psych: normal affect     Investigations:      Laboratory Testing:  Recent Results (from the past 24 hour(s))   EKG 12 Lead Collection Time: 04/16/19  1:54 AM   Result Value Ref Range    Ventricular Rate 95 BPM    Atrial Rate 95 BPM    P-R Interval 176 ms    QRS Duration 98 ms    Q-T Interval 368 ms    QTc Calculation (Bazett) 462 ms    P Axis 25 degrees    R Axis -53 degrees    T Axis 42 degrees   CBC Auto Differential    Collection Time: 04/16/19  2:25 AM   Result Value Ref Range    WBC 4.8 3.5 - 11.0 k/uL    RBC 4.35 4.0 - 5.2 m/uL    Hemoglobin 13.5 12.0 - 16.0 g/dL    Hematocrit 38.9 36 - 46 %    MCV 89.4 80 - 100 fL    MCH 30.9 26 - 34 pg    MCHC 34.6 31 - 37 g/dL    RDW 14.9 (H) 11.5 - 14.5 %    Platelets 969 703 - 115 k/uL    MPV 8.4 6.0 - 12.0 fL    NRBC Automated NOT REPORTED per 100 WBC    Differential Type NOT REPORTED     Seg Neutrophils 48 36 - 66 %    Lymphocytes 40 24 - 44 %    Monocytes 10 (H) 1 - 7 %    Eosinophils % 2 1 - 4 %    Basophils 0 0 - 2 %    Immature Granulocytes NOT REPORTED 0 %    Segs Absolute 2.30 1.8 - 7.7 k/uL    Absolute Lymph # 1.90 1.0 - 4.8 k/uL    Absolute Mono # 0.50 0.2 - 0.8 k/uL    Absolute Eos # 0.10 0.0 - 0.4 k/uL    Basophils # 0.00 0.0 - 0.2 k/uL    Absolute Immature Granulocyte NOT REPORTED 0.00 - 0.30 k/uL    WBC Morphology NOT REPORTED     RBC Morphology NOT REPORTED     Platelet Estimate NOT REPORTED    Basic Metabolic Panel    Collection Time: 04/16/19  2:54 AM   Result Value Ref Range    Glucose 114 (H) 70 - 99 mg/dL    BUN 15 8 - 23 mg/dL    CREATININE 0.75 0.50 - 0.90 mg/dL    Bun/Cre Ratio 20 9 - 20    Calcium 9.1 8.6 - 10.4 mg/dL    Sodium 139 135 - 144 mmol/L    Potassium 3.8 3.7 - 5.3 mmol/L    Chloride 103 98 - 107 mmol/L    CO2 25 20 - 31 mmol/L    Anion Gap 11 9 - 17 mmol/L    GFR Non-African American >60 >60 mL/min    GFR African American >60 >60 mL/min    GFR Comment          GFR Staging NOT REPORTED    Brain Natriuretic Peptide    Collection Time: 04/16/19  2:54 AM   Result Value Ref Range    Pro- <300 pg/mL    BNP Interpretation Pro-BNP Reference Range:    CK isoenzymes    Collection Time: 04/16/19  2:54 AM   Result Value Ref Range    Total  26 - 192 U/L    CK-MB 4.3 <5.4 ng/mL    % CKMB 2.4 0.0 - 3.0 %    CKMB Interpretation NORMAL ISOENZYME PATTERN    TROP/MYOGLOBIN    Collection Time: 04/16/19  2:54 AM   Result Value Ref Range    Troponin, High Sensitivity 27 (H) 0 - 14 ng/L    Troponin T NOT REPORTED <0.03 ng/mL    Troponin Interp NOT REPORTED     Myoglobin 58 25 - 58 ng/mL   Magnesium    Collection Time: 04/16/19  2:54 AM   Result Value Ref Range    Magnesium 1.9 1.6 - 2.6 mg/dL   TROP/MYOGLOBIN    Collection Time: 04/16/19  4:56 AM   Result Value Ref Range    Troponin, High Sensitivity 22 (H) 0 - 14 ng/L    Troponin T NOT REPORTED <0.03 ng/mL    Troponin Interp NOT REPORTED     Myoglobin 45 25 - 58 ng/mL       Imaging/Diagnostics:  Narrative EXAMINATION: SINGLE XRAY VIEW OF THE CHEST   4/16/2019 2:01 am     Impression   1. Stable chronic bibasilar reticular opacities which may relate to   interstitial pneumonitis. 2. No acute pneumonia or pulmonary edema. Assessment :      Primary Problem  Chronic obstructive pulmonary disease with acute exacerbation [facility])    Active Hospital Problems    Diagnosis Date Noted    Supplemental oxygen dependent [Z99.81] 05/01/2018    DDD (degenerative disc disease), lumbar [M51.36] 03/20/2017    Benign essential HTN [I10] 11/17/2015    Allergic rhinitis [J30.9] 11/17/2015    Osteoporosis [M81.0] 11/17/2015    Neuropathic pain [M79.2] 11/17/2015    Chronic obstructive pulmonary disease with acute exacerbation ([facility] [address] [J44.1] 06/25/2014       Plan:     Patient status Admit as inpatient in the  Med/Surge    1. Continue IV antibiotics  2. Continue selected home meds  3. start Mucinex twice a day  4. Continue IV Solu-Medrol 40 mg every 6 hours  5. Aerosol protocol  6. Continuous supplemental oxygen  7. Monitor labs, replace electrolytes as needed  8. Monitor and control pain  9.  Monitor and control blood pressure  10. General diet  11. PT/OT evaluation  12. DVT and GI prophylaxis  13. Pulmonary consult, await recommendations  14. Plan discussed with patient and RN      Consultations:   IP CONSULT TO INTERNAL MEDICINE  IP CONSULT TO PULMONOLOGY    Patient is admitted as inpatient status because of co-morbidities listed above, severity of signs and symptoms as outlined, requirement for current medical therapies and most importantly because of direct risk to patient if care not provided in a hospital setting.     JUNIOR HERNANDEZ - NP  4/16/2019  3:52 PM    Copy sent to Dr. Kathryn Joe MD

## 2019-04-16 NOTE — PROGRESS NOTES
Kay Oakley was ordered CoQ-10. As per the [address], herbals and certain dietary supplements will be discontinued. The herbal or dietary supplement may be continued after discharge from the hospital.     If you feel the patient needs to continue their home herbal therapy during the inpatient stay, the patient may bring an unopened bottle for verification and administration pursuant to our home medication use policy. Please contact the pharmacy with any questions or concerns. Thank you.   Pankaj Bradshaw   4/16/2019  6:09 AM

## 2019-04-16 NOTE — CONSULTS
 Valvular heart disease    Pulmonary HTN ([facility]    COPD exacerbation ([facility]       HPI     Jean Pierre Elliott is 77 y.o.,  female, PMH of chronic respiratory failure due to COPD   on home oxygen 3 L 24 hours daily who presents to the emergency department with progressive shortness of breath of 7  days  Duration. She jessica much worse last night and came to  ED . She had associated runny nose but denies fever or chills. She has She uses an inhaler and nebulizer home. She had a dry cough . No  chest pain, palpitations, abdominal pain, nausea or vomiting.        PMHx   Past Medical History      Diagnosis Date    Aortic valve disease     Pt. denies    Arthritis     COPD (chronic obstructive pulmonary disease) ([facility]     Disease of blood and blood forming organ     Diverticulitis     GERD (gastroesophageal reflux disease)     History of blood transfusion     History of gastritis     Lumbar disc disease     Movement disorder     Nerve disease, peripheral 11/17/2015    Osteoporosis     Pneumonia 11/27/2015    Unspecified cerebral artery occlusion with cerebral infarction     TIA's      Past Surgical History        Procedure Laterality Date    ARTHROPLASTY Left 4/18/2017    LEFT FOOT ARTHROPLASTY  4TH DIGIT performed by Beryl Grier DPM at [facility] Left 04/18/2017    L 4th digit arthroplasty     HYSTERECTOMY      Partial    LUMBAR SPINE SURGERY  x 2    SPINAL FUSION  2012    UPPER GASTROINTESTINAL ENDOSCOPY      VARICOSE VEIN SURGERY         Meds    Current Medications    aspirin  81 mg Oral Daily    mometasone-formoterol  2 puff Inhalation BID    calcium carb-cholecalciferol  3 tablet Oral Daily    carvedilol  3.125 mg Oral BID WC    clopidogrel  75 mg Oral Daily    docusate sodium  100 mg Oral BID    ferrous sulfate  325 mg Oral BID    gabapentin  600 mg Oral TID    polyethylene glycol  17 g Oral Daily    vitamin B-6  100 mg Oral Daily    vitamin C  500 mg Oral Daily    sodium chloride flush  10 mL Intravenous 2 times per day    famotidine  20 mg Oral BID    enoxaparin  40 mg Subcutaneous Daily    methylPREDNISolone  40 mg Intravenous Q6H    Followed by   Abbi Friday ON 4/18/2019] predniSONE  40 mg Oral Daily    guaiFENesin  600 mg Oral BID    tiotropium  18 mcg Inhalation Daily    oxyCODONE ER  27 mg Oral Q12H     sodium chloride, sodium chloride flush, magnesium hydroxide, nicotine, albuterol, acetaminophen, ondansetron **OR** ondansetron  IV Drips/Infusions   sodium chloride 75 mL/hr at 04/16/19 0347     Home Medications  Medications Prior to Admission: carvedilol (COREG) 3.125 MG tablet, Take 3.125 mg by mouth 2 times daily (with meals)  vitamin B-6 (PYRIDOXINE) 100 MG tablet, Take 100 mg by mouth daily  vitamin C (ASCORBIC ACID) 500 MG tablet, Take 500 mg by mouth daily  Coenzyme Q10 (COQ-10) 10 MG CAPS, Take 1 tablet by mouth daily  ferrous sulfate 325 (65 Fe) MG tablet, Take 325 mg by mouth 2 times daily  aspirin 81 MG EC tablet, Take 81 mg by mouth daily  Multiple Vitamins-Minerals (THERAPEUTIC MULTIVITAMIN-MINERALS) tablet, Take 1 tablet by mouth daily  sodium chloride (OCEAN, BABY AYR) 0.65 % nasal spray, 1 spray by Nasal route as needed for Congestion  gabapentin (NEURONTIN) 600 MG tablet, Take 600 mg by mouth 3 times daily. Gustavo Heredia levalbuterol (XOPENEX) 1.25 MG/3ML nebulizer solution, Take 1 ampule by nebulization every 4 hours as needed for Wheezing  Calcium Carb-Cholecalciferol (CALCIUM 600 + D) 600-200 MG-UNIT TABS, Take 3 tablets by mouth daily   OxyCODONE ER (XTAMPZA ER) 27 MG C12A, Take 27 mg by mouth every 12 hours. Gustavo Heredia FRANCISCA-HYDROLAC 12 12 % cream, APPLY TO AFFECTED AREA DAILY  tiotropium (SPIRIVA) 18 MCG inhalation capsule, Inhale 1 capsule into the lungs daily. budesonide-formoterol (SYMBICORT) 160-4.5 MCG/ACT AERO, Inhale 2 puffs into the lungs 2 times daily.   albuterol (PROVENTIL HFA;VENTOLIN HFA) 108 (90 BASE) MCG/ACT inhaler, Inhale 2 puffs into the lungs every 6 hours as needed for Wheezing. clopidogrel (PLAVIX) 75 MG tablet, Take 75 mg by mouth daily. fluticasone (FLONASE) 50 MCG/ACT nasal spray, 2 sprays by Nasal route daily as needed   polyethylene glycol (GLYCOLAX) powder, Take 17 g by mouth daily. ibandronate (BONIVA) 150 MG tablet, Take 150 mg by mouth every 30 days. docusate sodium (COLACE) 100 MG capsule, Take 100 mg by mouth 2 times daily. Allergies    Anti-inflammatory enzyme [nutritional supplements]; Ceclor [cefaclor]; Morphine; Nsaids; Penicillins; Propoxyphene; Skelaxin [metaxalone]; Sulfa antibiotics; Tadalafil; Tolmetin; Alendronate; Asa [aspirin];  Erythromycin; Temazepam; Tetracyclines & related; and Tramadol  Social History     Social History     Socioeconomic History    Marital status:      Spouse name: Not on file    Number of children: Not on file    Years of education: Not on file    Highest education level: Not on file   Occupational History    Not on file   Social Needs    Financial resource strain: Not on file    Food insecurity:     Worry: Not on file     Inability: Not on file    Transportation needs:     Medical: Not on file     Non-medical: Not on file   Tobacco Use    Smoking status: Former Smoker     Last attempt to quit: 1994     Years since quittin.7    Smokeless tobacco: Never Used   Substance and Sexual Activity    Alcohol use: No    Drug use: No    Sexual activity: Never   Lifestyle    Physical activity:     Days per week: Not on file     Minutes per session: Not on file    Stress: Not on file   Relationships    Social connections:     Talks on phone: Not on file     Gets together: Not on file     Attends Taoism service: Not on file     Active member of club or organization: Not on file     Attends meetings of clubs or organizations: Not on file     Relationship status: Not on file    Intimate partner violence:     Fear of current or ex partner: Not on file     Emotionally abused: Not on file     Physically abused: Not on file     Forced sexual activity: Not on file   Other Topics Concern    Not on file   Social History Narrative    Not on file     Family History    History reviewed. No pertinent family history. ROS - 11 systems   General Denies any fever or chills  HEENT Denies any diplopia, tinnitus or vertigo  Resp    As above  Cardiac Denies any chest pain, palpitations, claudication or edema  GI Denies any melena, hematochezia, hematemesis or pyrosis   Denies any frequency, urgency, hesitancy or incontinence  Heme Denies bruising or bleeding easily  Endocrine Denies any history of diabetes or thyroid disease  Neuro Denies any focal motor or sensory deficits  Psychiatric Denies anxiety, depression, suicidal ideation  Skin Denies rashes, itching, open sores    Vitals     height is 5' 4\" (1.626 m) and weight is 160 lb (72.6 kg). Her oral temperature is 97.5 °F (36.4 °C). Her blood pressure is 150/64 (abnormal) and her pulse is 85. Her respiration is 16 and oxygen saturation is 96%. Body mass index is 27.46 kg/m². I/O    No intake or output data in the 24 hours ending 04/16/19 1501  No intake/output data recorded. Patient Vitals for the past 96 hrs (Last 3 readings):   Weight   04/16/19 0156 160 lb (72.6 kg)     Exam   General Appearance  Awake, alert, oriented, in no acute distress  HEENT - Head is normocephalic, atraumatic. Pupil reactive to light  Neck - Supple, symmetrical, trachea midline and Soft, trachea midline and straight  Lungs -  Decreased breath sounds mild wheezing   Cardiovascular - Heart sounds are normal.  Regular rhythm normal rate without murmur, gallop or rub. Abdomen - Soft, nontender, nondistended, no masses or organomegaly  Neurologic - CN II-XII are grossly intact.  There are no focal motor deficits  Skin - No bruising or bleeding  Extremities - No cyanosis, clubbing or edema    Labs  - Old records and notes have been reviewed in [facility]   CBC     Lab Results   Component Value Date    WBC 4.8 04/16/2019    RBC 4.35 04/16/2019    HGB 13.5 04/16/2019    HCT 38.9 04/16/2019     04/16/2019    MCV 89.4 04/16/2019    MCH 30.9 04/16/2019    MCHC 34.6 04/16/2019    RDW 14.9 04/16/2019    LYMPHOPCT 40 04/16/2019    MONOPCT 10 04/16/2019    BASOPCT 0 04/16/2019    MONOSABS 0.50 04/16/2019    LYMPHSABS 1.90 04/16/2019    EOSABS 0.10 04/16/2019    BASOSABS 0.00 04/16/2019    DIFFTYPE NOT REPORTED 04/16/2019     BMP   Lab Results   Component Value Date     04/16/2019    K 3.8 04/16/2019     04/16/2019    CO2 25 04/16/2019    BUN 15 04/16/2019    CREATININE 0.75 04/16/2019    GLUCOSE 114 04/16/2019    CALCIUM 9.1 04/16/2019    MG 1.9 04/16/2019     LFTS  Lab Results   Component Value Date    ALKPHOS 85 08/20/2015    ALT 18 08/20/2015    AST 32 08/20/2015    PROT 8.3 08/20/2015    BILITOT 0.34 08/20/2015    BILIDIR 0.09 08/20/2015    IBILI 0.25 08/20/2015    LABALBU 3.9 08/20/2015       Lab Results   Component Value Date    APTT 26.6 11/26/2015     INR   Lab Results   Component Value Date    INR 1.0 11/26/2015    INR 1.0 11/15/2014    PROTIME 10.7 11/26/2015    PROTIME 10.6 11/15/2014     Results for Thermon Nails (MRN [id number]) as of 4/16/2019 15:04   Ref. Range 4/16/2019 02:54   Pro-BNP Latest Ref Range: <300 pg/mL 256   Results for Thermon Nails (MRN [id number]) as of 4/16/2019 15:04   Ref. Range 4/16/2019 02:54 4/16/2019 04:56   Troponin, High Sensitivity Latest Ref Range: 0 - 14 ng/L 27 (H) 22 (H)     Radiology    CXR 4/16/2019  1. Stable chronic bibasilar reticular opacities which may relate to   interstitial pneumonitis. 2. No acute pneumonia or pulmonary edema. Echocardiogram 11/18    Mild left ventricular hypertrophy  Global left ventricular systolic function is normal  Estimated ejection fraction is 65-70 % . Left atrium is mildly dilated. Right atrium is mildly dilated .   Aortic valve sclerosis without stenosis. Mild aortic insufficiency. Mitral annular calcification is seen. Mild mitral regurgitation. Mild tricuspid regurgitation. Mild pulmonary hypertension. No pericardial effusion seen. Normal aortic root dimension. 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 \"Thank you for asking us to see this patient\"    Case discussed with nurse and patient/family. Questions and concerns addressed.     Electronically signed by     Kimo Herrera MD on 4/16/2019 at 3:01 PM

## 2019-04-16 NOTE — PROGRESS NOTES
Pharmacy Note  You patient, Sarita Barahona, has an order for the bisphosphonate Ibandronate(Boniva)    Per [facility] approved policy, this maintenance medication is not continued while the patient is inpatient. Please resume this upon discharge if it remains appropriate.     Lydia Anglin, PharmD   4/16/2019  3:56 AM

## 2019-04-16 NOTE — PROGRESS NOTES
Pt admitted to room 2009-2 per cart in stable condition from ED  VSS  Oriented to room and surroundings  Bed in lowest position, wheels locked, 2/4 side rails up  Call light in reach, room free of clutter, adequate lighting provided  Pt is not a current smoker   Denies any further questions at this time  Instructed to call out with any questions/concerns/new onset of pain and/or n/v   White board updated  Continue to monitor with hourly rounding  STAY WITH ME protocol initiated   Bed alarm on  Signs in place  Fall risk sticker to wrist band  Non-skid socks on/at bedside  [address] in place for patient/family to view & ask questions.

## 2019-04-17 ENCOUNTER — APPOINTMENT (OUTPATIENT)
Dept: GENERAL RADIOLOGY | Age: 79
DRG: 191 | End: 2019-04-17
Payer: COMMERCIAL

## 2019-04-17 LAB
ANION GAP SERPL CALCULATED.3IONS-SCNC: 13 MMOL/L (ref 9–17)
BUN BLDV-MCNC: 12 MG/DL (ref 8–23)
BUN/CREAT BLD: 20 (ref 9–20)
CALCIUM SERPL-MCNC: 9.2 MG/DL (ref 8.6–10.4)
CHLORIDE BLD-SCNC: 107 MMOL/L (ref 98–107)
CO2: 23 MMOL/L (ref 20–31)
CREAT SERPL-MCNC: 0.6 MG/DL (ref 0.5–0.9)
GFR AFRICAN AMERICAN: >60 ML/MIN
GFR NON-AFRICAN AMERICAN: >60 ML/MIN
GFR SERPL CREATININE-BSD FRML MDRD: ABNORMAL ML/MIN/{1.73_M2}
GFR SERPL CREATININE-BSD FRML MDRD: ABNORMAL ML/MIN/{1.73_M2}
GLUCOSE BLD-MCNC: 123 MG/DL (ref 70–99)
HCT VFR BLD CALC: 42.8 % (ref 36–46)
HEMOGLOBIN: 14.4 G/DL (ref 12–16)
MCH RBC QN AUTO: 30.5 PG (ref 26–34)
MCHC RBC AUTO-ENTMCNC: 33.7 G/DL (ref 31–37)
MCV RBC AUTO: 90.5 FL (ref 80–100)
NRBC AUTOMATED: ABNORMAL PER 100 WBC
PDW BLD-RTO: 15 % (ref 11.5–14.5)
PLATELET # BLD: 180 K/UL (ref 130–400)
PMV BLD AUTO: 8.4 FL (ref 6–12)
POTASSIUM SERPL-SCNC: 3.9 MMOL/L (ref 3.7–5.3)
RBC # BLD: 4.73 M/UL (ref 4–5.2)
SODIUM BLD-SCNC: 143 MMOL/L (ref 135–144)
WBC # BLD: 6.2 K/UL (ref 3.5–11)

## 2019-04-17 PROCEDURE — 80048 BASIC METABOLIC PNL TOTAL CA: CPT

## 2019-04-17 PROCEDURE — 71045 X-RAY EXAM CHEST 1 VIEW: CPT

## 2019-04-17 PROCEDURE — 94761 N-INVAS EAR/PLS OXIMETRY MLT: CPT

## 2019-04-17 PROCEDURE — 6370000000 HC RX 637 (ALT 250 FOR IP): Performed by: NURSE PRACTITIONER

## 2019-04-17 PROCEDURE — 85027 COMPLETE CBC AUTOMATED: CPT

## 2019-04-17 PROCEDURE — 2580000003 HC RX 258: Performed by: INTERNAL MEDICINE

## 2019-04-17 PROCEDURE — 99233 SBSQ HOSP IP/OBS HIGH 50: CPT | Performed by: NURSE PRACTITIONER

## 2019-04-17 PROCEDURE — 97162 PT EVAL MOD COMPLEX 30 MIN: CPT

## 2019-04-17 PROCEDURE — 2700000000 HC OXYGEN THERAPY PER DAY

## 2019-04-17 PROCEDURE — 6360000002 HC RX W HCPCS: Performed by: NURSE PRACTITIONER

## 2019-04-17 PROCEDURE — 36415 COLL VENOUS BLD VENIPUNCTURE: CPT

## 2019-04-17 PROCEDURE — 97530 THERAPEUTIC ACTIVITIES: CPT

## 2019-04-17 PROCEDURE — 2580000003 HC RX 258: Performed by: NURSE PRACTITIONER

## 2019-04-17 PROCEDURE — 97116 GAIT TRAINING THERAPY: CPT

## 2019-04-17 PROCEDURE — 1200000000 HC SEMI PRIVATE

## 2019-04-17 PROCEDURE — 6360000002 HC RX W HCPCS: Performed by: INTERNAL MEDICINE

## 2019-04-17 PROCEDURE — 94640 AIRWAY INHALATION TREATMENT: CPT

## 2019-04-17 RX ORDER — OXYCODONE HYDROCHLORIDE AND ACETAMINOPHEN 5; 325 MG/1; MG/1
1 TABLET ORAL EVERY 8 HOURS PRN
Status: DISCONTINUED | OUTPATIENT
Start: 2019-04-17 | End: 2019-04-17

## 2019-04-17 RX ORDER — TROSPIUM CHLORIDE 20 MG/1
20 TABLET, FILM COATED ORAL 2 TIMES DAILY
Status: ON HOLD | COMMUNITY
End: 2019-10-15 | Stop reason: SDUPTHER

## 2019-04-17 RX ORDER — MULTIVIT-MIN/IRON/FOLIC ACID/K 18-600-40
1 CAPSULE ORAL DAILY
COMMUNITY

## 2019-04-17 RX ORDER — OXYCODONE AND ACETAMINOPHEN 10; 325 MG/1; MG/1
1 TABLET ORAL EVERY 6 HOURS PRN
Status: ON HOLD | COMMUNITY
End: 2019-10-10 | Stop reason: SDUPTHER

## 2019-04-17 RX ORDER — TROSPIUM CHLORIDE 20 MG/1
20 TABLET, FILM COATED ORAL
Status: DISCONTINUED | OUTPATIENT
Start: 2019-04-17 | End: 2019-04-19 | Stop reason: HOSPADM

## 2019-04-17 RX ORDER — OXYCODONE HYDROCHLORIDE AND ACETAMINOPHEN 5; 325 MG/1; MG/1
1 TABLET ORAL EVERY 4 HOURS PRN
Status: DISCONTINUED | OUTPATIENT
Start: 2019-04-17 | End: 2019-04-19 | Stop reason: HOSPADM

## 2019-04-17 RX ADMIN — CLOPIDOGREL 75 MG: 75 TABLET, FILM COATED ORAL at 21:36

## 2019-04-17 RX ADMIN — AZITHROMYCIN MONOHYDRATE 500 MG: 500 INJECTION, POWDER, LYOPHILIZED, FOR SOLUTION INTRAVENOUS at 15:00

## 2019-04-17 RX ADMIN — TROSPIUM CHLORIDE 20 MG: 20 TABLET, FILM COATED ORAL at 17:14

## 2019-04-17 RX ADMIN — Medication 500 MG: at 09:22

## 2019-04-17 RX ADMIN — ENOXAPARIN SODIUM 40 MG: 40 INJECTION SUBCUTANEOUS at 09:23

## 2019-04-17 RX ADMIN — ALBUTEROL SULFATE 2.5 MG: 2.5 SOLUTION RESPIRATORY (INHALATION) at 07:22

## 2019-04-17 RX ADMIN — GABAPENTIN 600 MG: 300 CAPSULE ORAL at 15:00

## 2019-04-17 RX ADMIN — ASPIRIN 81 MG: 81 TABLET, COATED ORAL at 21:36

## 2019-04-17 RX ADMIN — SODIUM CHLORIDE: 9 INJECTION, SOLUTION INTRAVENOUS at 22:41

## 2019-04-17 RX ADMIN — OXYCODONE AND ACETAMINOPHEN 1 TABLET: 5; 325 TABLET ORAL at 15:28

## 2019-04-17 RX ADMIN — FAMOTIDINE 20 MG: 20 TABLET ORAL at 09:22

## 2019-04-17 RX ADMIN — DOCUSATE SODIUM 100 MG: 100 CAPSULE, LIQUID FILLED ORAL at 09:21

## 2019-04-17 RX ADMIN — MOMETASONE FUROATE AND FORMOTEROL FUMARATE DIHYDRATE 2 PUFF: 200; 5 AEROSOL RESPIRATORY (INHALATION) at 07:23

## 2019-04-17 RX ADMIN — ALBUTEROL SULFATE 2.5 MG: 2.5 SOLUTION RESPIRATORY (INHALATION) at 10:56

## 2019-04-17 RX ADMIN — METHYLPREDNISOLONE SODIUM SUCCINATE 40 MG: 40 INJECTION, POWDER, FOR SOLUTION INTRAMUSCULAR; INTRAVENOUS at 03:51

## 2019-04-17 RX ADMIN — FERROUS SULFATE TAB EC 325 MG (65 MG FE EQUIVALENT) 325 MG: 325 (65 FE) TABLET DELAYED RESPONSE at 21:37

## 2019-04-17 RX ADMIN — SODIUM CHLORIDE: 9 INJECTION, SOLUTION INTRAVENOUS at 08:54

## 2019-04-17 RX ADMIN — OXYCODONE AND ACETAMINOPHEN 1 TABLET: 5; 325 TABLET ORAL at 22:40

## 2019-04-17 RX ADMIN — MOMETASONE FUROATE AND FORMOTEROL FUMARATE DIHYDRATE 2 PUFF: 200; 5 AEROSOL RESPIRATORY (INHALATION) at 20:38

## 2019-04-17 RX ADMIN — METHYLPREDNISOLONE SODIUM SUCCINATE 40 MG: 40 INJECTION, POWDER, FOR SOLUTION INTRAMUSCULAR; INTRAVENOUS at 15:00

## 2019-04-17 RX ADMIN — ALBUTEROL SULFATE 2.5 MG: 2.5 SOLUTION RESPIRATORY (INHALATION) at 15:44

## 2019-04-17 RX ADMIN — CARVEDILOL 3.12 MG: 3.12 TABLET, FILM COATED ORAL at 17:14

## 2019-04-17 RX ADMIN — ALBUTEROL SULFATE 2.5 MG: 2.5 SOLUTION RESPIRATORY (INHALATION) at 20:38

## 2019-04-17 RX ADMIN — FAMOTIDINE 20 MG: 20 TABLET ORAL at 21:37

## 2019-04-17 RX ADMIN — GUAIFENESIN 600 MG: 600 TABLET, EXTENDED RELEASE ORAL at 09:22

## 2019-04-17 RX ADMIN — GABAPENTIN 600 MG: 300 CAPSULE ORAL at 09:22

## 2019-04-17 RX ADMIN — PYRIDOXINE HCL TAB 50 MG 100 MG: 50 TAB at 09:22

## 2019-04-17 RX ADMIN — METHYLPREDNISOLONE SODIUM SUCCINATE 40 MG: 40 INJECTION, POWDER, FOR SOLUTION INTRAMUSCULAR; INTRAVENOUS at 09:04

## 2019-04-17 RX ADMIN — METHYLPREDNISOLONE SODIUM SUCCINATE 40 MG: 40 INJECTION, POWDER, FOR SOLUTION INTRAMUSCULAR; INTRAVENOUS at 21:37

## 2019-04-17 RX ADMIN — DOCUSATE SODIUM 100 MG: 100 CAPSULE, LIQUID FILLED ORAL at 21:36

## 2019-04-17 RX ADMIN — CARVEDILOL 3.12 MG: 3.12 TABLET, FILM COATED ORAL at 09:23

## 2019-04-17 RX ADMIN — GUAIFENESIN 600 MG: 600 TABLET, EXTENDED RELEASE ORAL at 21:37

## 2019-04-17 RX ADMIN — GABAPENTIN 600 MG: 300 CAPSULE ORAL at 21:37

## 2019-04-17 RX ADMIN — FERROUS SULFATE TAB EC 325 MG (65 MG FE EQUIVALENT) 325 MG: 325 (65 FE) TABLET DELAYED RESPONSE at 09:22

## 2019-04-17 ASSESSMENT — PAIN DESCRIPTION - PAIN TYPE
TYPE: CHRONIC PAIN

## 2019-04-17 ASSESSMENT — PAIN SCALES - GENERAL
PAINLEVEL_OUTOF10: 6
PAINLEVEL_OUTOF10: 8
PAINLEVEL_OUTOF10: 7
PAINLEVEL_OUTOF10: 10
PAINLEVEL_OUTOF10: 8
PAINLEVEL_OUTOF10: 9
PAINLEVEL_OUTOF10: 8

## 2019-04-17 ASSESSMENT — PAIN DESCRIPTION - ORIENTATION
ORIENTATION: RIGHT

## 2019-04-17 ASSESSMENT — PAIN DESCRIPTION - DESCRIPTORS
DESCRIPTORS: ACHING

## 2019-04-17 ASSESSMENT — PAIN DESCRIPTION - FREQUENCY
FREQUENCY: CONTINUOUS

## 2019-04-17 ASSESSMENT — PAIN DESCRIPTION - PROGRESSION
CLINICAL_PROGRESSION: NOT CHANGED
CLINICAL_PROGRESSION: NOT CHANGED

## 2019-04-17 ASSESSMENT — PAIN DESCRIPTION - LOCATION
LOCATION: BACK;HIP
LOCATION: BUTTOCKS;LEG

## 2019-04-17 ASSESSMENT — PAIN DESCRIPTION - ONSET
ONSET: ON-GOING

## 2019-04-17 NOTE — CARE COORDINATION
Social work: noted PT/OT recommendation of SNF. SW met with patient to discuss, adamant she is going home.

## 2019-04-17 NOTE — PROGRESS NOTES
retention    Nsaids      Patient states allergy to anti-inflammatories    Penicillins     Propoxyphene     Skelaxin [Metaxalone]     Sulfa Antibiotics Hives    Tadalafil     Tolmetin      Patient states allergy to anti-inflammatories  Patient states allergy to anti-inflammatories  Patient states allergy to anti-inflammatories    Alendronate Nausea And Vomiting and Nausea Only    Asa [Aspirin] Other (See Comments)     Hx diverticulitis.   Can't T  Take regular aspirin 325mg but can take baby aspirin 81mg    Erythromycin Nausea And Vomiting and Nausea Only    Temazepam Nausea And Vomiting and Nausea Only    Tetracyclines & Related Nausea And Vomiting    Tramadol Nausea And Vomiting and Nausea Only       Current Meds:   Scheduled Meds:    aspirin  81 mg Oral Daily    mometasone-formoterol  2 puff Inhalation BID    calcium carb-cholecalciferol  3 tablet Oral Daily    carvedilol  3.125 mg Oral BID WC    clopidogrel  75 mg Oral Daily    docusate sodium  100 mg Oral BID    ferrous sulfate  325 mg Oral BID    gabapentin  600 mg Oral TID    polyethylene glycol  17 g Oral Daily    vitamin B-6  100 mg Oral Daily    vitamin C  500 mg Oral Daily    sodium chloride flush  10 mL Intravenous 2 times per day    famotidine  20 mg Oral BID    enoxaparin  40 mg Subcutaneous Daily    methylPREDNISolone  40 mg Intravenous Q6H    Followed by   Sophie Shoemaker ON 4/18/2019] predniSONE  40 mg Oral Daily    guaiFENesin  600 mg Oral BID    oxyCODONE ER  27 mg Oral Q12H    azithromycin  500 mg Intravenous Q24H     Continuous Infusions:    sodium chloride 75 mL/hr at 04/16/19 1517     PRN Meds: sodium chloride, sodium chloride flush, magnesium hydroxide, nicotine, albuterol, acetaminophen, ondansetron **OR** ondansetron    Data:     Past Medical History:   has a past medical history of Aortic valve disease, Arthritis, COPD (chronic obstructive pulmonary disease) ([address] Disease of blood and blood forming organ, Diverticulitis, GERD (gastroesophageal reflux disease), History of blood transfusion, History of gastritis, Lumbar disc disease, Movement disorder, Nerve disease, peripheral, Osteoporosis, Pneumonia, and Unspecified cerebral artery occlusion with cerebral infarction. Social History:   reports that she quit smoking about 24 years ago. She has never used smokeless tobacco. She reports that she does not drink alcohol or use drugs. Family History: History reviewed. No pertinent family history. Vitals:  BP (!) 146/78   Pulse 68   Temp 97.3 °F (36.3 °C) (Oral)   Resp 16   Ht 5' 4\" (1.626 m)   Wt 174 lb 11.2 oz (79.2 kg)   SpO2 98%   BMI 29.99 kg/m²   Temp (24hrs), Av.8 °F (36.6 °C), Min:97.3 °F (36.3 °C), Max:98.1 °F (36.7 °C)    No results for input(s): POCGLU in the last 72 hours. I/O (24Hr): Intake/Output Summary (Last 24 hours) at 2019 0843  Last data filed at 2019 0557  Gross per 24 hour   Intake 1847 ml   Output --   Net 1847 ml       Labs:    Hematology:  Recent Labs     19  0225 19  0748   WBC 4.8 6.2   RBC 4.35 4.73   HGB 13.5 14.4   HCT 38.9 42.8   MCV 89.4 90.5   MCH 30.9 30.5   MCHC 34.6 33.7   RDW 14.9* 15.0*    180   MPV 8.4 8.4     Chemistry:  Recent Labs     19  0254 19  0456 19  0748     --  143   K 3.8  --  3.9     --  107   CO2 25  --  23   GLUCOSE 114*  --  123*   BUN 15  --  12   CREATININE 0.75  --  0.60   MG 1.9  --   --    ANIONGAP 11  --  13   LABGLOM >60  --  >60   GFRAA >60  --  >60   CALCIUM 9.1  --  9.2   PROBNP 256  --   --    TROPHS 27* 22*  --    CKTOTAL 182  --   --    CKMB 4.3  --   --    MYOGLOBIN 58 45  --      No results for input(s): PROT, LABALBU, LABA1C, M8GEOFY, N6PHQVR, FT4, TSH, AST, ALT, LDH, GGT, ALKPHOS, LABGGT, BILITOT, BILIDIR, AMMONIA, AMYLASE, LIPASE, LACTATE, CHOL, HDL, LDLCHOLESTEROL, CHOLHDLRATIO, TRIG, VLDL, WDF11OB, PHENYTOIN, PHENYF, URICACID, POCGLU in the last 72 hours.       Lab Results   Component Value Date/Time    SPECIAL NOT REPORTED 11/16/2018 09:07 PM     Lab Results   Component Value Date/Time    CULTURE NO GROWTH 11/16/2018 09:07 PM       Lab Results   Component Value Date    PHART 7.410 11/15/2014    GQH8QSR 42.0 11/15/2014    PO2ART 46.0 11/15/2014    LLV9EGE 26.1 11/15/2014    NBEA NOT REPORTED 11/15/2014    PBEA 1.8 11/15/2014    Y7YSWBAQ 78.6 11/15/2014    FIO2 ROOM AIR 11/15/2014       Radiology:  Narrative EXAMINATION: SINGLE XRAY VIEW OF THE CHEST   4/17/2019 5:40 am   FINDINGS:   Normal heart size. Lungs are hyperinflated, consistent with COPD. Bibasilar   reticular opacities are not significantly changed and may be related to   chronic lung disease. There is no focal consolidation. No evidence of   pleural effusion or pneumothorax. Impression   No acute cardiopulmonary process. Physical Examination:        General appearance:  alert, cooperative and no distress  Mental Status:  oriented to person, place and time and normal affect  Lungs:  diminished to auscultation bilaterally, normal effort  Heart:  regular rate and rhythm, no murmur  Abdomen:  soft, nontender, nondistended, normal bowel sounds, no masses, hepatomegaly, splenomegaly  Extremities:  no edema, redness, tenderness in the calves  Skin:  no gross lesions, rashes, induration    Assessment:        Primary Problem  Chronic obstructive pulmonary disease with acute exacerbation [facility])    Active Hospital Problems    Diagnosis Date Noted    Supplemental oxygen dependent [Z99.81] 05/01/2018    DDD (degenerative disc disease), lumbar [M51.36] 03/20/2017    Benign essential HTN [I10] 11/17/2015    Allergic rhinitis [J30.9] 11/17/2015    Osteoporosis [M81.0] 11/17/2015    Neuropathic pain [M79.2] 11/17/2015    Chronic obstructive pulmonary disease with acute exacerbation ([facility] [address] [J44.1] 06/25/2014       Plan:        1. Continue IV antibiotics, switch to oral tomorrow  2.  Monitor and control pain, home pain medications clarified and reordered  3. Continue mucinex  4. Continue aerosol protocol  5. Switch IV steroids to oral this afterboon  6. Continue PT/OT  7. Continue supplemental oxygen  8. DVT/GI prophylaxis   9. Discharge planning in the next 24 hours  10.  Plan discussed with patient and RN    Radha Vargas, JUNIOR - NP  4/17/2019  8:43 AM

## 2019-04-17 NOTE — PLAN OF CARE
Problem: Falls - Risk of:  Goal: Will remain free from falls  Description  Will remain free from falls  4/17/2019 1815 by Bart Braxton RN  Outcome: Ongoing  Note:   Patient is a fall risk during this admission. Fall risk assessment was performed. Patient is absent of falls. Bed is in the lowest position. Wheels on the bed are locked. Call light and bed side table are within reach. Clutter is removed. Patient was educated to call out when needing assistance or wanting to get out of bed. Patient offered toileting assistance during rounding. Hourly rounds have been performed. Fall precautions in place  4/17/2019 0422 by Kaylin Erickson RN  Outcome: Ongoing  Goal: Absence of physical injury  Description  Absence of physical injury  4/17/2019 0422 by Kaylin Erickson RN  Outcome: Ongoing     Problem: Risk for Impaired Skin Integrity  Goal: Tissue integrity - skin and mucous membranes  Description  Structural intactness and normal physiological function of skin and  mucous membranes. 4/17/2019 1815 by Bart Braxton RN  Outcome: Ongoing  Note:   Skin assessment performed. Patient turns self PRN. Pressure ulcer prevention being practiced. 4/17/2019 0422 by Kaylin Erickson RN  Outcome: Ongoing     Problem: ABCDS Injury Assessment  Goal: Absence of physical injury  4/17/2019 0422 by Kaylin Erickson RN  Outcome: Ongoing     Problem: Pain:  Goal: Pain level will decrease  Description  Pain level will decrease  4/17/2019 1815 by Bart Braxton RN  Outcome: Ongoing  Note:   Pain level assessment complete.    Patient educated on pain scale and control interventions  PRN pain medication given per patient request  Patient instructed to call out with new onset of pain or unrelieved pain , PRN Percocet added to routine pain meds with stated relief  4/17/2019 0422 by Kaylin Erickson RN  Outcome: Ongoing  Goal: Control of acute pain  Description  Control of acute pain  4/17/2019 0422 by Kaylin Erickson RN  Outcome: Ongoing  Goal: Control of chronic pain  Description  Control of chronic pain  4/17/2019 0422 by Brody Nunez RN  Outcome: Ongoing     Problem: Discharge Planning:  Goal: Discharged to appropriate level of care  Description  Discharged to appropriate level of care  4/17/2019 0422 by Brody Nuenz RN  Outcome: Ongoing     Problem:  Activity Intolerance:  Goal: Ability to tolerate increased activity will improve  Description  Ability to tolerate increased activity will improve  4/17/2019 1815 by Cary Cruz RN  Outcome: Ongoing  Note:   Pt able to walk to the bathroom using the walker, movements are all very slow but steady, PT encouraged pt to go to SNF but she is refusing  4/17/2019 0422 by Brody Nunez RN  Outcome: Ongoing     Problem: Airway Clearance - Ineffective:  Goal: Ability to maintain a clear airway will improve  Description  Ability to maintain a clear airway will improve  4/17/2019 1815 by Cary Cruz RN  Outcome: Ongoing  Note:   Airway is clear, using O2 cont as she does at home  4/17/2019 0422 by Brody Nunez RN  Outcome: Ongoing     Problem: Breathing Pattern - Ineffective:  Goal: Ability to achieve and maintain a regular respiratory rate will improve  Description  Ability to achieve and maintain a regular respiratory rate will improve  4/17/2019 1815 by Cary Cruz RN  Outcome: Ongoing  4/17/2019 0422 by Brody Nunez RN  Outcome: Ongoing     Problem: Gas Exchange - Impaired:  Goal: Levels of oxygenation will improve  Description  Levels of oxygenation will improve  4/17/2019 1815 by Cary Cruz RN  Outcome: Ongoing  Note:   Sats between 91-94% with the use of oxygen all day  4/17/2019 0422 by Brody Nunez RN  Outcome: Ongoing

## 2019-04-17 NOTE — PROGRESS NOTES
Physical Therapy    Facility/Department: [facility]  Initial Assessment    NAME: Lor Awad  : 1940  MRN: [id number]    Date of Service: 2019    Discharge Recommendations:  [address]   Pt presented to ED on 4/15/19 due to worsening shortness of breath.  Patient has known history of valvular heart disease, COPD and previous episodes of pulmonary edema.  She states that she is always a little short of breath.  She became progressively more short of breath through the evening.  She states she was trying to wait until tomorrow but her symptoms were too severe.     RN reports patient is medically stable for therapy treatment this date. Chart reviewed prior to treatment and patient is agreeable for therapy. Assessment   Body structures, Functions, Activity limitations: Decreased functional mobility ; Decreased strength;Decreased safe awareness;Decreased endurance;Decreased balance  Assessment: Pt requires continued skilled PT & is appropriate to D/C to [address] to increase independence with functional mobility, balance, safety & activity tolerance. Pt HIGH risk for falls and recommend SNF at D/C to return to baseline function and a safe D/C back home            Prognosis: Good  Decision Making: Medium Complexity  Exam: ROM, MMT, functional mobility, activity tolerance, Balance, & [employer] AM-PAC 6 Clicks Basic Mobility   Clinical Presentation: evolving  Patient Education: PT POC, functional mobility, safety awareness, prevention of sedentary complications, LE ex's & issued written pt education  REQUIRES PT FOLLOW UP: Yes  Activity Tolerance  Activity Tolerance: Patient limited by fatigue;Patient limited by endurance; Patient limited by pain       Patient Diagnosis(es): The primary encounter diagnosis was Dyspnea and respiratory abnormalities.  Diagnoses of Interstitial pneumonitis ([address] and COPD exacerbation ([address] were also pertinent to this visit. has a past medical history of Aortic valve disease, Arthritis, COPD (chronic obstructive pulmonary disease) ([address] Disease of blood and blood forming organ, Diverticulitis, GERD (gastroesophageal reflux disease), History of blood transfusion, History of gastritis, Lumbar disc disease, Movement disorder, Nerve disease, peripheral, Osteoporosis, Pneumonia, and Unspecified cerebral artery occlusion with cerebral infarction. has a past surgical history that includes Spinal fusion (2012); Varicose vein surgery; Lumbar spine surgery (x 2); Upper gastrointestinal endoscopy; Colonoscopy; Hysterectomy; Foot surgery (Left, 04/18/2017); and arthroplasty (Left, 4/18/2017).     Restrictions  Restrictions/Precautions  Restrictions/Precautions: Fall Risk  Required Braces or Orthoses?: No  Position Activity Restriction  Other position/activity restrictions: up as tolerated, telemetry, RUE IV, 2L O2 per NC, bed alarm  Vision/Hearing  Vision: Impaired  Vision Exceptions: Wears glasses for reading  Hearing: Within functional limits     Subjective  General  Chart Reviewed: Yes  Patient assessed for rehabilitation services?: Yes  Additional Pertinent Hx: Aortic valve disease, OA, COPD, GERD, TIA  General Comment  Comments: RNAvani PT  Subjective  Subjective: Pt agreeable to PT  Pain Screening  Patient Currently in Pain: Yes  Pain Assessment  Pain Assessment: 0-10  Pain Level: 8  Pain Type: Chronic pain  Pain Location: Buttocks;Leg  Pain Orientation: Right  Vital Signs  Patient Currently in Pain: Yes       Orientation  Orientation  Overall Orientation Status: Within Functional Limits  Social/Functional History  Social/Functional History  Lives With: Alone  Type of Home: Apartment  Home Layout: One level  Home Access: Level entry  Bathroom Shower/Tub: Tub/Shower unit  Bathroom Toilet: Standard(holds onto sink)  Bathroom Equipment: Shower chair, Grab bars in shower  Home Equipment: Rolling walker, assistance  Scooting: Minimal assistance  Comment: Cues/assist to reach to bedrail & use of upper body to assist  Transfers  Sit to Stand: Moderate Assistance  Stand to sit: Moderate Assistance  Bed to Chair: Moderate assistance  Stand Pivot Transfers: Minimal Assistance  Lateral Transfers: Minimal Assistance  Comment: Ed on proper hand placement of UB for safe sit/stand  Ambulation  Ambulation?: Yes  More Ambulation?: Yes  Ambulation 1  Surface: level tile  Device: Rolling Walker  Assistance: Moderate assistance  Quality of Gait: step to pattern, wide [place] & flexed trunk with heavy UE support arms of walker  Distance: 10ft  Pt amb to BR for toileting, Mod Assistance to sit to toilet. Pt stood with Mod Assistance,stood 3 minutes for pericare & to pull up brief, amb 2ft to sink for hand hygthen ambulated 20 ft with R/walker & sat to EOB   Pt rested 5 minutes  Ambulation 2  Surface - 2: level tile  Device 2: Rolling Walker  Assistance 2: Minimal assistance  Quality of Gait 2: step to pattern, wide [place] & flexed trunk with heavy UE support arms of walker  Distance: 20ft     Balance  Sitting - Static: Good  Sitting - Dynamic: Good  Standing - Static: Fair;+  Standing - Dynamic: Fair  Exercises  Comments: Ed on functional mobility, safety awareness, prevention of sedentary complications, LE ex's & issued written pt education    All lines intact, call light within reach, and patient positioned comfortably at end of treatment. All patient needs addressed prior to ending therapy session.         Plan   Plan  Times per week: 1-2x/D,5-6D/week  Current Treatment Recommendations: Strengthening, Balance Training, Functional Mobility Training, Transfer Training, Endurance Training, Gait Training, Home Exercise Program, Safety Education & Training, Patient/Caregiver Education & Training  Safety Devices  Type of devices: Bed alarm in place, Gait belt, Patient at risk for falls, Left in bed, Nurse notified    G-Code       OutComes Score  [place] Balance Score: 7                                               AM-PAC Score  AM-PAC Inpatient Mobility Raw Score : 13  AM-PAC Inpatient T-Scale Score : 36.74  Mobility Inpatient CMS 0-100% Score: 64.91  Mobility Inpatient CMS G-Code Modifier : CL          Goals  Short term goals  Time Frame for Short term goals: 12 visits  Short term goal 1: Inc bed-mobility & transfers to independent to enable pt to safely get in/OOB & return to PLOF & decrease risk for falls; Short term goal 2: Inc gait to amb 200ft or > indep w/ RW to enable pt to return to previous level of independence & tolerance of community ambulation; Short term goal 3: Pt able to tolerate 30-40 min of activity to include 15-20 reps of ex & functional mobility including 5 minutes of standing to facilitate activity tolerance to [facility]; Short term goal 4: Inc standing balance to good with device to facilitate pt independence for performance of ADL's & functional mobility, & reduce fall risk;   Short term goal 5: Ed pt on home ex's, safety & energy principles & issue written home program;       Therapy Time   Individual Concurrent Group Co-treatment   Time In 1133         Time Out 1218         Minutes [address] [facility], PT

## 2019-04-17 NOTE — PROGRESS NOTES
Pulmonary Critical Care Progress Note  Kenyatta Rodas CNP / Dr. Mercy Morales M.D. Patient seen for the follow up of Chronic obstructive pulmonary disease with acute exacerbation ([facility] [address]     Subjective:  She continues to complain of some shortness of breath, slightly improved compared to yesterday. She has cough with blood tinged sputum, unchanged. She denies chest pain. No acute events noted overnight. Examination:  Vitals: BP (!) 119/58   Pulse 72   Temp 98 °F (36.7 °C) (Axillary)   Resp 18   Ht 5' 4\" (1.626 m)   Wt 174 lb 11.2 oz (79.2 kg)   SpO2 93%   BMI 29.99 kg/m²     General appearance: alert and cooperative with exam, nursing at bedside  Neck: No JVD  Lungs: Moderate air exchange, fine crackles bilateral bases  Heart: regular rate and rhythm, S1, S2 normal, no gallop  Abdomen: Soft, non tender, + BS  Extremities: no cyanosis or clubbing.  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 Ana Cristina Gilbert and Sleep Medicine,    Patient seen and evaluated by me. She is sitting up in the bed. She denies any chest pain. She does have cough. She does not have good appetite. Lung exam reveals moderate exchange with occasional basal crackles. Plan is continue IV Zithromax/IV Solu-Medrol. Continue bronchodilators. Repeat x-ray chest and CBC in the morning. Slow clinical improvement. Above was reviewed and discussed with John Uriarte CNP. And I agree with assessment and plan of care.   Electronically signed by     Apryl Browne MD on 4/17/2019 at 3:45 PM  Pulmonary Critical Care and Sleep Medicine,  [facility]  Cell: [phone]  Office: [phone]

## 2019-04-17 NOTE — DISCHARGE INSTR - COC
Continuity of Care Form    Patient Name: Ericka Farris   :  1940  MRN:  [id number]    Admit date:  2019  Discharge date:  19    Code Status Order: Full Code   Advance Directives:   [facility] Documentation     Date/Time Healthcare Directive Type of Healthcare Directive Copy in [address] Agent's Name Healthcare Agent's Phone Number    [phone]  No, patient does not have an advance directive for healthcare treatment -- -- -- -- --          Admitting Physician:  Dustin Urban MD  PCP: Isrrael Jaimes MD    Discharging Nurse: [facility] Unit/Room#:   Discharging Unit Phone Number: [phone]    Emergency Contact:   Extended Emergency Contact Information  Primary Emergency Contact: Karla Quinonez   [facility] [address]  Phone: [phone]  Relation: Child  Secondary Emergency Contact: Dalia Quinonez   [facility] [address] [facility] Phone: [phone]  Relation: Grandchild    Past Surgical History:  Past Surgical History:   Procedure Laterality Date    ARTHROPLASTY Left 2017    LEFT FOOT ARTHROPLASTY  4TH DIGIT performed by [facility] Lu D[facility] at [facility] 3 Left 2017    L 4th digit arthroplasty     HYSTERECTOMY      Partial    LUMBAR SPINE SURGERY  x 2    SPINAL FUSION      UPPER GASTROINTESTINAL ENDOSCOPY      VARICOSE VEIN SURGERY         Immunization History:   Immunization History   Administered Date(s) Administered    Influenza, MDCK, Preservative free 2012, 2013, 10/17/2014, 2015, 10/13/2016    Pneumococcal 13-valent Conjugate ([id number]) 2017    Pneumococcal Polysaccharide ([id number]) 11/15/2010, 2015       Active Problems:  Patient Active Problem List   Diagnosis Code    Hypoxia R09.02    Chronic obstructive pulmonary disease with acute exacerbation ([facility] J44.1    Hypokalemia E87.6    Chronic bilateral low back pain with bilateral sciatica M54.42, M54.41, G89.29    Altered mental status, unspecified R41.82    Pneumonia J18.9    Abnormal gait R26.9    Adiposity E66.9    Aortic valve insufficiency I35.1    Allergic rhinitis J30.9    Benign essential HTN I10    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    Constipation K59.00    Displacement of intervertebral disc without myelopathy [id number]    Dry eye syndrome H04.129    Eczema L30.9    Insomnia G47.00    DDD (degenerative disc disease), lumbar M51.36    Neuropathic pain M79.2    Osteoporosis M81.0    Nerve disease, peripheral G62.9    Posterior vitreous detachment H43.819    Failed back syndrome of lumbar spine M96.1    Pseudophakia Z96.1    Temporary cerebral vascular dysfunction G93.9    Bursitis, trochanteric M70.60    Vitamin D deficiency E55.9    Lumbosacral radiculopathy M54.17    Generalized muscle weakness M62.81    Neurogenic bladder N31.9    Syncope and collapse R55    Acute bronchitis due to other specified organisms J20.8    Bilateral carpal tunnel syndrome G56.03    Epistaxis R04.0    Supplemental oxygen dependent Z99.81    Excessive cerumen in ear canal, right H61.21    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    LVH (left ventricular hypertrophy) I51.7    Valvular heart disease I38    Pulmonary HTN (HCC) I27.20    COPD exacerbation (HCC) J44.1       Isolation/Infection:   Isolation          No Isolation            Nurse Assessment:  Last Vital Signs: BP (!) 146/78   Pulse 68   Temp 97.3 °F (36.3 °C) (Oral)   Resp 16   Ht 5' 4\" (1.626 m)   Wt 174 lb 11.2 oz (79.2 kg)   SpO2 98%   BMI 29.99 kg/m²     Last documented pain score (0-10 scale): Pain Level: 6  Last Weight:   Wt Readings from Last 1 Encounters:   04/17/19 174 lb 11.2 oz (79.2 kg)     Mental Status:  oriented and alert    IV Access:  - None    Nursing Mobility/ADLs:  Walking   Assisted  Transfer  Assisted  Bathing  Assisted  Dressing Assisted  Toileting  Independent  Feeding  Independent  Med Admin  Independent  Med Delivery   whole    Wound Care Documentation and Therapy:        Elimination:  Continence:   · Bowel: Yes  · Bladder: Yes  Urinary Catheter: None   Colostomy/Ileostomy/Ileal Conduit: No       Date of Last BM: ***    Intake/Output Summary (Last 24 hours) at 4/17/2019 0840  Last data filed at 4/17/2019 0557  Gross per 24 hour   Intake 1847 ml   Output --   Net 1847 ml     I/O last 3 completed shifts: In: 1847 [P.O.:300;  I.V.:1547]  Out: -     Safety Concerns:     [facility] Safety Concerns:[id number]}    Impairments/Disabilities:      {[facility] Impairments/Disabilities:[id number]}    Nutrition Therapy:  Current Nutrition Therapy:   - Oral Diet:  General    Routes of Feeding: Oral  Liquids: No Restrictions  Daily Fluid Restriction: no  Last Modified Barium Swallow with Video (Video Swallowing Test): not done    Treatments at the Time of Hospital Discharge:   Respiratory Treatments: ***  Oxygen Therapy:  Is on home oxygen at 3 liters  Ventilator:    - No ventilator support    Rehab Therapies: Physical Therapy and Occupational Therapy  Weight Bearing Status/Restrictions: No weight bearing restirctions  Other Medical Equipment (for information only, NOT a DME order):  walker  Other Treatments: Skilled nursing assessment, med teaching and compliance, Resume HHA 3 days a week, SW for community services if needed and safety concerns     Patient's personal belongings (please select all that are sent with patient):  waleska    RN SIGNATURE:  Electronically signed by Dayron Linton RN on 4/19/19 at 8:42 AM    CASE MANAGEMENT/SOCIAL WORK SECTION    Inpatient Status Date: ***    Readmission Risk Assessment Score:  Readmission Risk              Risk of Unplanned Readmission:        18           Discharging to Facility/ Agency   · Name: [facility]  · Address:  · Phone: [phone]  · Fax: [phone]    [facility]    Phone: [phone]  Fax: [phone]    Dialysis Facility (if applicable)     / signature: Electronically signed by Moses Michel RN on 4/17/19 at 2:41 PM    PHYSICIAN SECTION    Prognosis: Fair    Condition at Discharge: Stable    Rehab Potential (if transferring to Rehab): Good    Recommended Labs or Other Treatments After Discharge: ***    Physician Certification: I certify the above information and transfer of Isaac Meek  is necessary for the continuing treatment of the diagnosis listed and that she requires Home Care for greater 30 days.      Update Admission H&P: No change in H&P    PHYSICIAN SIGNATURE:  Electronically signed by Bambi Alcocer DO on 4/17/19 at 9:00 AM

## 2019-04-17 NOTE — FLOWSHEET NOTE
Patient was awake and alert while reclining in bed. Patient engages in conversation and reports that she has a \"bad\" day. Writer provides listening presence and emotional support. Patient reports that her grand daughter has sickle cell and she is concerned about her. Writer offers a prayer for patient and grand daugher. Patient accepts and writer offers a prayer for healing and peace in the lives of both patient and grand daughter. Patient expresses gratitude for the prayer and the visit. Patient seemed to be coping better after the visit. Spiritual care will follow for possible further emotional or spiritual concerns. 04/17/19 5149   Encounter Summary   Services provided to: Patient   Referral/Consult From: [facility] Family members   Continue Visiting   (4/17/19)   Complexity of Encounter Moderate   Length of Encounter 30 minutes   Spiritual Assessment Completed Yes   Routine   Type Initial   Assessment Approachable; Anxious   Intervention Active listening;Explored feelings, thoughts, concerns;Explored coping resources;Nurtured hope;Prayer;Sustaining presence/ Ministry of presence   Outcome Expressed gratitude

## 2019-04-17 NOTE — CARE COORDINATION
Case Management Initial Discharge Plan  Sandi Morales,             Met with:patient to discuss discharge plans. Information verified: address, contacts, phone number, , insurance Yes  PCP: Jose Whitehead MD  Date of last visit: 19    Insurance Provider: Eulalio Velasquez Dual      Discharge Planning    Living Arrangements:  Alone   Support Systems:  Family Members, Children, Friends/Neighbors, Jose Antonio Chong has 1 stories  0 stairs to climb to get into front door, 0 stairs to climb to reach second floor  Location of bedroom/bathroom in home  - main floor    Patient able to perform ADL's:Assisted    Current Services (outpatient & in home) HHA 3 times a week  DME equipment: O2 3L 24/7, nebulizer, walker  DME provider: [facility]    Pharmacy: [facility] Medications:  No  Does patient want to participate in local refill/ meds to beds program?  No    Potential Assistance Needed:  SN, PT/OT    Patient agreeable to home care: Yes  Freedom of choice provided:  yes    Prior SNF/Rehab Placement and Facility: [facility]  Agreeable to SNF/Rehab: No  [place] of choice provided: n/a   Evaluation: n/a    Expected Discharge date:  19  Patient expects to be discharged to:  home  Follow Up Appointment: Best Day/ Time: Tuesday AM    Transportation provider: family  Transportation arrangements needed for discharge: No    Readmission Risk              Risk of Unplanned Readmission:        19             Does patient have a readmission risk score greater than 14?: Yes  If yes, follow-up appointment must be made within 7 days of discharge. Discharge Plan: Met with patient at bedside. Lives alone and uses [employer] service for transportation. Came to ER for SOB and has history of COPD. Has home O2 at 3L 24/7 and nebulizer from [facility] [place].   Follows with Dr. Kathleen Starr and has appointment 19 at 1:30pm.  Discussed SNF with pt per therapies

## 2019-04-17 NOTE — PLAN OF CARE
increased activity will improve  4/17/2019 0422 by Morro Guzman RN  Outcome: Ongoing  4/16/2019 1733 by Juanis Myers RN  Outcome: Ongoing     Problem: Airway Clearance - Ineffective:  Goal: Ability to maintain a clear airway will improve  Description  Ability to maintain a clear airway will improve  4/17/2019 0422 by Morro Guzman RN  Outcome: Ongoing  4/16/2019 1733 by Juanis Myers RN  Outcome: Ongoing     Problem: Breathing Pattern - Ineffective:  Goal: Ability to achieve and maintain a regular respiratory rate will improve  Description  Ability to achieve and maintain a regular respiratory rate will improve  4/17/2019 0422 by Morro Gzuman RN  Outcome: Ongoing  4/16/2019 1733 by Juanis Myers RN  Outcome: Ongoing     Problem: Gas Exchange - Impaired:  Goal: Levels of oxygenation will improve  Description  Levels of oxygenation will improve  4/17/2019 0422 by Morro Guzman RN  Outcome: Ongoing  4/16/2019 1733 by Juanis Myers RN  Outcome: Ongoing

## 2019-04-18 PROCEDURE — 6370000000 HC RX 637 (ALT 250 FOR IP): Performed by: NURSE PRACTITIONER

## 2019-04-18 PROCEDURE — 94640 AIRWAY INHALATION TREATMENT: CPT

## 2019-04-18 PROCEDURE — 97535 SELF CARE MNGMENT TRAINING: CPT

## 2019-04-18 PROCEDURE — 2580000003 HC RX 258: Performed by: NURSE PRACTITIONER

## 2019-04-18 PROCEDURE — 6360000002 HC RX W HCPCS: Performed by: NURSE PRACTITIONER

## 2019-04-18 PROCEDURE — 97530 THERAPEUTIC ACTIVITIES: CPT

## 2019-04-18 PROCEDURE — 1200000000 HC SEMI PRIVATE

## 2019-04-18 PROCEDURE — 99233 SBSQ HOSP IP/OBS HIGH 50: CPT | Performed by: NURSE PRACTITIONER

## 2019-04-18 RX ORDER — GUAIFENESIN 600 MG/1
600 TABLET, EXTENDED RELEASE ORAL 2 TIMES DAILY
Qty: 60 TABLET | Refills: 1 | Status: SHIPPED | OUTPATIENT
Start: 2019-04-18

## 2019-04-18 RX ORDER — HYDROCHLOROTHIAZIDE 12.5 MG/1
12.5 CAPSULE, GELATIN COATED ORAL DAILY
Status: ON HOLD | COMMUNITY
End: 2019-10-09

## 2019-04-18 RX ORDER — AZITHROMYCIN 500 MG/1
500 TABLET, FILM COATED ORAL DAILY
Qty: 10 TABLET | Refills: 0 | Status: SHIPPED | OUTPATIENT
Start: 2019-04-18 | End: 2019-04-28

## 2019-04-18 RX ORDER — AZITHROMYCIN 250 MG/1
500 TABLET, FILM COATED ORAL DAILY
Status: DISCONTINUED | OUTPATIENT
Start: 2019-04-18 | End: 2019-04-19 | Stop reason: HOSPADM

## 2019-04-18 RX ORDER — PREDNISONE 10 MG/1
TABLET ORAL
Qty: 30 TABLET | Refills: 0 | Status: SHIPPED | OUTPATIENT
Start: 2019-04-18 | End: 2019-04-30

## 2019-04-18 RX ADMIN — ALBUTEROL SULFATE 2.5 MG: 2.5 SOLUTION RESPIRATORY (INHALATION) at 14:29

## 2019-04-18 RX ADMIN — OXYCODONE AND ACETAMINOPHEN 1 TABLET: 5; 325 TABLET ORAL at 22:39

## 2019-04-18 RX ADMIN — GABAPENTIN 600 MG: 300 CAPSULE ORAL at 11:13

## 2019-04-18 RX ADMIN — ASPIRIN 81 MG: 81 TABLET, COATED ORAL at 22:40

## 2019-04-18 RX ADMIN — Medication 10 ML: at 11:17

## 2019-04-18 RX ADMIN — TROSPIUM CHLORIDE 20 MG: 20 TABLET, FILM COATED ORAL at 06:27

## 2019-04-18 RX ADMIN — AZITHROMYCIN 500 MG: 250 TABLET, FILM COATED ORAL at 11:21

## 2019-04-18 RX ADMIN — ALBUTEROL SULFATE 2.5 MG: 2.5 SOLUTION RESPIRATORY (INHALATION) at 18:15

## 2019-04-18 RX ADMIN — MOMETASONE FUROATE AND FORMOTEROL FUMARATE DIHYDRATE 2 PUFF: 200; 5 AEROSOL RESPIRATORY (INHALATION) at 18:16

## 2019-04-18 RX ADMIN — PREDNISONE 40 MG: 20 TABLET ORAL at 11:21

## 2019-04-18 RX ADMIN — GUAIFENESIN 600 MG: 600 TABLET, EXTENDED RELEASE ORAL at 11:13

## 2019-04-18 RX ADMIN — METHYLPREDNISOLONE SODIUM SUCCINATE 40 MG: 40 INJECTION, POWDER, FOR SOLUTION INTRAMUSCULAR; INTRAVENOUS at 02:07

## 2019-04-18 RX ADMIN — FERROUS SULFATE TAB EC 325 MG (65 MG FE EQUIVALENT) 325 MG: 325 (65 FE) TABLET DELAYED RESPONSE at 11:13

## 2019-04-18 RX ADMIN — Medication 3 TABLET: at 11:21

## 2019-04-18 RX ADMIN — PYRIDOXINE HCL TAB 50 MG 100 MG: 50 TAB at 11:13

## 2019-04-18 RX ADMIN — ENOXAPARIN SODIUM 40 MG: 40 INJECTION SUBCUTANEOUS at 11:14

## 2019-04-18 RX ADMIN — GUAIFENESIN 600 MG: 600 TABLET, EXTENDED RELEASE ORAL at 22:39

## 2019-04-18 RX ADMIN — OXYCODONE AND ACETAMINOPHEN 1 TABLET: 5; 325 TABLET ORAL at 14:24

## 2019-04-18 RX ADMIN — CARVEDILOL 3.12 MG: 3.12 TABLET, FILM COATED ORAL at 17:16

## 2019-04-18 RX ADMIN — ALBUTEROL SULFATE 2.5 MG: 2.5 SOLUTION RESPIRATORY (INHALATION) at 11:24

## 2019-04-18 RX ADMIN — Medication 500 MG: at 11:12

## 2019-04-18 RX ADMIN — CLOPIDOGREL 75 MG: 75 TABLET, FILM COATED ORAL at 22:43

## 2019-04-18 RX ADMIN — FAMOTIDINE 20 MG: 20 TABLET ORAL at 11:14

## 2019-04-18 RX ADMIN — GABAPENTIN 600 MG: 300 CAPSULE ORAL at 22:39

## 2019-04-18 RX ADMIN — Medication 10 ML: at 22:43

## 2019-04-18 RX ADMIN — CARVEDILOL 3.12 MG: 3.12 TABLET, FILM COATED ORAL at 11:21

## 2019-04-18 RX ADMIN — SODIUM CHLORIDE: 9 INJECTION, SOLUTION INTRAVENOUS at 22:50

## 2019-04-18 RX ADMIN — TROSPIUM CHLORIDE 20 MG: 20 TABLET, FILM COATED ORAL at 17:16

## 2019-04-18 RX ADMIN — ALBUTEROL SULFATE 2.5 MG: 2.5 SOLUTION RESPIRATORY (INHALATION) at 06:24

## 2019-04-18 RX ADMIN — MOMETASONE FUROATE AND FORMOTEROL FUMARATE DIHYDRATE 2 PUFF: 200; 5 AEROSOL RESPIRATORY (INHALATION) at 06:24

## 2019-04-18 RX ADMIN — FAMOTIDINE 20 MG: 20 TABLET ORAL at 22:40

## 2019-04-18 RX ADMIN — FERROUS SULFATE TAB EC 325 MG (65 MG FE EQUIVALENT) 325 MG: 325 (65 FE) TABLET DELAYED RESPONSE at 22:39

## 2019-04-18 ASSESSMENT — PAIN DESCRIPTION - LOCATION: LOCATION: BACK;HIP

## 2019-04-18 ASSESSMENT — PAIN DESCRIPTION - ONSET: ONSET: ON-GOING

## 2019-04-18 ASSESSMENT — PAIN DESCRIPTION - PAIN TYPE: TYPE: CHRONIC PAIN

## 2019-04-18 ASSESSMENT — PAIN SCALES - GENERAL
PAINLEVEL_OUTOF10: 8
PAINLEVEL_OUTOF10: 9

## 2019-04-18 ASSESSMENT — PAIN DESCRIPTION - DESCRIPTORS: DESCRIPTORS: ACHING

## 2019-04-18 ASSESSMENT — PAIN DESCRIPTION - FREQUENCY: FREQUENCY: CONTINUOUS

## 2019-04-18 ASSESSMENT — PAIN DESCRIPTION - PROGRESSION: CLINICAL_PROGRESSION: NOT CHANGED

## 2019-04-18 ASSESSMENT — PAIN - FUNCTIONAL ASSESSMENT: PAIN_FUNCTIONAL_ASSESSMENT: ACTIVITIES ARE NOT PREVENTED

## 2019-04-18 ASSESSMENT — PAIN DESCRIPTION - ORIENTATION: ORIENTATION: RIGHT

## 2019-04-18 NOTE — PLAN OF CARE
Problem:  Activity Intolerance:  Goal: Ability to tolerate increased activity will improve  Description  Ability to tolerate increased activity will improve  4/18/2019 1636 by Chelsea Motley RN  Outcome: Ongoing     Problem: Airway Clearance - Ineffective:  Goal: Ability to maintain a clear airway will improve  Description  Ability to maintain a clear airway will improve  4/18/2019 1636 by Chelsea Motley RN  Outcome: Ongoing     Problem: Breathing Pattern - Ineffective:  Goal: Ability to achieve and maintain a regular respiratory rate will improve  Description  Ability to achieve and maintain a regular respiratory rate will improve  4/18/2019 1636 by Chelsea Motley RN  Outcome: Ongoing

## 2019-04-18 NOTE — PROGRESS NOTES
[facility]    Progress Note    4/18/2019    9:36 AM    Name:   Netta Valle  MRN:     [id number]     Acct:      [de-identified]   Room:   2002/2002-02  IP Day:  2  Admit Date:  4/16/2019  1:45 AM    PCP:   Jennifer León MD  Code Status:  Full Code    Subjective:     C/C:   Chief Complaint   Patient presents with    Shortness of Breath     Interval History Status: improved. She states her shortness of breath continues to improve. She is eating and drinking ok. She is refusing SNF for rehab, would like to go home and have PT as an outpatient  Brief History:     The patient is a 77 y.o. Non-/non  female who presents with Shortness of Breath   and she is admitted to the hospital for the management of COPD exacerbation. She was brought in by her son to the ED for worsening shortness of breath. She states that she is always a little short of breath and wears home O2 continuously at 3 L. She reports that she was going to see her PCP but that her symptoms got worse overnight so she came here. She denies any chest pain, N/V/D/ or dizziness. She states she had a more productive cough at home. She denies fevers or chills    Review of Systems:     Constitutional:  negative for chills, fevers, sweats  Respiratory:  positive for cough, dyspnea on exertion, shortness of breath, negative for wheezing  Cardiovascular:  negative for chest pain, chest pressure/discomfort, lower extremity edema, palpitations  Gastrointestinal:  negative for abdominal pain, constipation, diarrhea, nausea, vomiting  Neurological:  negative for dizziness, headache    Medications: Allergies:     Allergies   Allergen Reactions    Anti-Inflammatory Enzyme [Nutritional Supplements]      Patient states allergy to anti inflammatories    Ceclor [Cefaclor]      Doesn't remember    Morphine Other (See Comments)     Urinary retention    Nsaids      Patient states allergy Results   Component Value Date/Time    CULTURE NO GROWTH 11/16/2018 09:07 PM       Lab Results   Component Value Date    PHART 7.410 11/15/2014    SBO0NGR 42.0 11/15/2014    PO2ART 46.0 11/15/2014    UQH3RKA 26.1 11/15/2014    NBEA NOT REPORTED 11/15/2014    PBEA 1.8 11/15/2014    J1EHONJK 78.6 11/15/2014    FIO2 ROOM AIR 11/15/2014       Radiology:  No new imaging    Physical Examination:        General appearance:  alert, cooperative and no distress  Mental Status:  oriented to person, place and time and normal affect  Lungs:  diminished to auscultation bilaterally, normal effort  Heart:  regular rate and rhythm, no murmur  Abdomen:  soft, nontender, nondistended, normal bowel sounds, no masses, hepatomegaly, splenomegaly  Extremities:  no edema, redness, tenderness in the calves  Skin:  no gross lesions, rashes, induration    Assessment:        Primary Problem  Chronic obstructive pulmonary disease with acute exacerbation [facility])    Active Hospital Problems    Diagnosis Date Noted    Supplemental oxygen dependent [Z99.81] 05/01/2018    DDD (degenerative disc disease), lumbar [M51.36] 03/20/2017    Benign essential HTN [I10] 11/17/2015    Allergic rhinitis [J30.9] 11/17/2015    Osteoporosis [M81.0] 11/17/2015    Neuropathic pain [M79.2] 11/17/2015    Chronic obstructive pulmonary disease with acute exacerbation ([facility] [J44.1] 06/25/2014       Plan:        1. Switch IV azithromycin to P.O.  2. Monitor and control pain  3. Monitor labs, replace electrolytes as needed  4. Continue mucinex  5. Continue aerosol protocol  6. IV steroids switched to oral  7. Continue PT/OT  8. Continue supplemental oxygen  9. DVT/GI prophylaxis   10. Durable medical equipment orders toilet safety frame and transfer tub bench due to generalized weakness causing decreased ADL status, endurance and functional mobility. 11. Discharge today when DME arrangements and home health care set up  12.  Plan discussed with patient and RN    CHRISTOPHER Kay Raymond NP  4/18/2019  9:36 AM

## 2019-04-18 NOTE — CARE COORDINATION
Made multiple visits to pt . Discussed order for bath bench and toilet safety frame. Pt states she already has a bath bench. Showed her pictures of toilet safety frame and she would like one. Order and demographics faxed to [facility] at [phone] and they will check pt's insurance to see if they can provide it. They will deliver to pt's home but will not install it. Explained this to pt. Mary at [facility] informed of d/c 4-19  Pt's dtr will have to bring her clothes and her portable O2 tank. Pt has already contacted [facility] for her HHA but that service will not resume until 4-22. Pt very particular about plans and information provided several times.

## 2019-04-18 NOTE — PROGRESS NOTES
Spoke with patient's daughter via phone in regards to discharge. Daughter states she is not able to pick patient up due to work. Daughter states if she knew about it yesterday she would have been prepared. Writer asked about granddaughter who is listed as ER contact. Terral Brunner (patient'dtr) states her daughter will not be in town until tomorrow. Writer offered life quad (d/t oxygen accessibility and help with transfer) daughter states someone will need to be there to help pt. Get set up with meds etc... Terral Brunner asked to speak with physician. Writer states she will call to inform.

## 2019-04-18 NOTE — PROGRESS NOTES
Medicine,    Patient seen and evaluated by me. She is resting in the bed, no acute events noted overnight. Lung exam reveals good air exchange with no wheeze. She has mild shortness of breath without cough. Plan is to switch her antibiotics to oral, switch steroids to oral, OK for discharge planning. Above was reviewed and discussed with Nat Menchaca CNP. And I agree with assessment and plan of care.   Electronically signed by     Destiny Chandler MD on 4/18/2019 at 12:29 PM  Pulmonary Critical Care and Sleep Medicine,  [facility]  Cell: [phone]  Office: [phone]

## 2019-04-18 NOTE — PROGRESS NOTES
Occupational Therapy  Facility/Department: [facility] MED SURG  Daily Treatment Note  NAME: Janki Mcknight  : 1940  MRN: [id number]    Date of Service: 2019    Discharge Recommendations:  Home with assist PRN, Home with nursing aide       Assessment   Performance deficits / Impairments: Decreased functional mobility ; Decreased ADL status; Decreased strength;Decreased endurance;Decreased high-level IADLs;Decreased balance  Prognosis: Fair  Patient Education: Issued pt handouts on EC/WS, home/community safety, fall prevention  REQUIRES OT FOLLOW UP: Yes  Activity Tolerance  Activity Tolerance: Patient Tolerated treatment well  Activity Tolerance: Pt moves very slowly and is very talkative, req'd EXTRA time for simple tasks  Safety Devices  Safety Devices in place: Yes  Type of devices: All fall risk precautions in place;Nurse notified;Gait belt;Bed alarm in place;Call light within reach; Patient at risk for falls; Left in bed         Patient Diagnosis(es): The primary encounter diagnosis was Dyspnea and respiratory abnormalities. Diagnoses of Interstitial pneumonitis ([address] and COPD exacerbation ([address] were also pertinent to this visit. has a past medical history of Aortic valve disease, Arthritis, COPD (chronic obstructive pulmonary disease) ([address] Disease of blood and blood forming organ, Diverticulitis, GERD (gastroesophageal reflux disease), History of blood transfusion, History of gastritis, Lumbar disc disease, Movement disorder, Nerve disease, peripheral, Osteoporosis, Pneumonia, and Unspecified cerebral artery occlusion with cerebral infarction. has a past surgical history that includes Spinal fusion (); Varicose vein surgery; Lumbar spine surgery (x 2); Upper gastrointestinal endoscopy; Colonoscopy; Hysterectomy; Foot surgery (Left, 2017); and arthroplasty (Left, 2017).     Restrictions  Restrictions/Precautions  Restrictions/Precautions: Fall Risk  Required Braces or Orthoses?: No  Position Activity Restriction  Other position/activity restrictions: up as tolerated, telemetry, RUE IV, 2L O2 per NC, bed alarm  Subjective   General  Chart Reviewed: Yes  Patient assessed for rehabilitation services?: Yes  Response to previous treatment: Patient with no complaints from previous session  Family / Caregiver Present: No  Diagnosis: Pt stated  Subjective  Subjective: Pt stated she is going home from hospital and not to rehab facility  General Comment  Comments: Writer consulted CHARO Mora whom indicated pt. was medically stable for OT this date. Orientation  Orientation  Overall Orientation Status: Within Functional Limits  Objective    ADL  Grooming: Supervision(standing at sink wash hands and brush teeth)  LE Dressing: Moderate assistance;Maximum assistance(underwear/socks)  Toileting: Stand by assistance        Balance  Sitting Balance: Supervision  Standing Balance: Stand by assistance  Standing Balance  Time: 10+ min   Activity: standing at sink  Sit to stand: Stand by assistance  Stand to sit: Stand by assistance  Comment: verbal cues for posture  Functional Mobility  Functional - Mobility Device: Rolling Walker  Activity: To/from bathroom  Assist Level: Stand by assistance  Functional Mobility Comments: verbal cues for posture and Max A for line mgmt  Toilet Transfers  Toilet - Technique: Ambulating  Equipment Used: Grab bars  Toilet Transfer: Stand by assistance  Bed mobility  Sit to Supine: Contact guard assistance;Minimal assistance  Transfers  Stand Step Transfers: Stand by assistance  Sit to stand: Stand by assistance  Stand to sit: Stand by assistance  Transfer Comments: verbal cues for posture, max a with line mgmt                       Cognition  Overall Cognitive Status: Exceptions  Arousal/Alertness: Appropriate responses to stimuli  Following Commands:  Follows all commands without difficulty  Attention Span: Appears intact  Memory: Appears intact  Safety Judgement: Decreased awareness of need for assistance;Decreased awareness of need for safety  Problem Solving: Decreased awareness of errors;Assistance required to identify errors made;Assistance required to correct errors made  Insights: Decreased awareness of deficits  Initiation: Requires cues for some  Sequencing: Requires cues for some                                         Plan   Plan  Times per week: 4-6x/week  Times per day: Daily  Current Treatment Recommendations: Strengthening, Functional Mobility Training, Patient/Caregiver Education & Training, Equipment Evaluation, Education, & procurement, Endurance Training, Balance Training, Safety Education & Training, Self-Care / ADL                                   AM-PAC Score        AM-PAC Inpatient Daily Activity Raw Score: 19  AM-PAC Inpatient ADL T-Scale Score : 40.22  ADL Inpatient CMS 0-100% Score: 42.8  ADL Inpatient CMS G-Code Modifier : CK    Goals  Short term goals  Time Frame for Short term goals: 8-10 sessions  Short term goal 1: Pt. will demo Mod-I with all aspects of basic self care using A.E. Short term goal 2: Pt. will demo Mod-I with functional transfers using RW and appropriate DME with good safety. Short term goal 3: Pt. will demo Mod-I with functional mobility for ADL completion/setup with good safety and pacing. Short term goal 4: Pt. will improve standing tolerance to 8-10 min for increased INDEP with functional mobility and ADL setup. Short term goal 5: Pt. will independently demo and verb good understanding of fall prevention techs, EC/WS techs and possible equip needs for home. Patient Goals   Patient goals :  \"To go home\"       Therapy Time   Individual Concurrent Group Co-treatment   Time In 1010         Time Out 1103         Minutes [address] [facility]

## 2019-04-18 NOTE — PLAN OF CARE
Discharged to appropriate level of care  Description  Discharged to appropriate level of care  Outcome: Ongoing     Problem:  Activity Intolerance:  Goal: Ability to tolerate increased activity will improve  Description  Ability to tolerate increased activity will improve  4/18/2019 0315 by Miguelina Schulz RN  Outcome: Ongoing  4/17/2019 1815 by Sabrina Kay RN  Outcome: Ongoing  Note:   Pt able to walk to the bathroom using the walker, movements are all very slow but steady, PT encouraged pt to go to SNF but she is refusing     Problem: Airway Clearance - Ineffective:  Goal: Ability to maintain a clear airway will improve  Description  Ability to maintain a clear airway will improve  4/18/2019 0315 by Miguelina Schulz RN  Outcome: Ongoing  4/17/2019 1815 by Sabrina Kay RN  Outcome: Ongoing  Note:   Airway is clear, using O2 cont as she does at home     Problem: Breathing Pattern - Ineffective:  Goal: Ability to achieve and maintain a regular respiratory rate will improve  Description  Ability to achieve and maintain a regular respiratory rate will improve  4/18/2019 0315 by Miguelina Schulz RN  Outcome: Ongoing  4/17/2019 1815 by Sabrina Kay RN  Outcome: Ongoing     Problem: Gas Exchange - Impaired:  Goal: Levels of oxygenation will improve  Description  Levels of oxygenation will improve  4/18/2019 0315 by Miguelina Schulz RN  Outcome: Ongoing  4/17/2019 1815 by Sabrina Kay RN  Outcome: Ongoing  Note:   Sats between 91-94% with the use of oxygen all day

## 2019-04-18 NOTE — DISCHARGE SUMMARY
[facility]    Discharge Summary     Patient ID: Ramo Mccauley  :  1940   MRN: [id number]     ACCOUNT:  [de-identified]   Patient's PCP: Tory Lugo MD  Admit Date: 2019   Discharge Date: 2019     Length of Stay: 3  Code Status:  Full Code  Admitting Physician: Nagi Gibbons MD  Discharge Physician: JUNIOR Oro - SILVIANO     Active Discharge Diagnoses:     Hospital Problem Lists:  Principal Problem:    Chronic obstructive pulmonary disease with acute exacerbation ([facility] [address]  Active Problems:    Allergic rhinitis    Benign essential HTN    DDD (degenerative disc disease), lumbar    Neuropathic pain    Osteoporosis    Supplemental oxygen dependent  Resolved Problems:    * No resolved hospital problems. *      Admission Condition:  fair     Discharged Condition: stable    Hospital Stay:     Hospital Course:  Ramo Mccauley is a 77 y.o. female who was admitted for the management of  Chronic obstructive pulmonary disease with acute exacerbation ([facility] [address] , presented to ER with Shortness of Breath  She was brought in by her son to the ED for worsening shortness of breath.  She states that she is always a little short of breath and wears home O2 continuously at 3 L.  She reports that she was going to see her PCP but that her symptoms got worse overnight so she came here. Brad Das denies any chest pain, N/V/D/ or dizziness. She states she had a more productive cough at home. She denies fevers or chills    She was treated with IV antibiotics, steroids and aerosol treatments  SNF was recommended, patient refused. Discharge delayed due family issues.      Significant therapeutic interventions: see above    Significant Diagnostic Studies:   Labs / Micro:  CBC:   Lab Results   Component Value Date    WBC 6.2 2019    RBC 4.73 2019    HGB 14.4 2019    HCT 42.8 2019    MCV 90.5 2019    MCH 30.5 2019    MCHC 33.7 04/17/2019    RDW 15.0 04/17/2019     04/17/2019     BMP:    Lab Results   Component Value Date    GLUCOSE 123 04/17/2019     04/17/2019    K 3.9 04/17/2019     04/17/2019    CO2 23 04/17/2019    ANIONGAP 13 04/17/2019    BUN 12 04/17/2019    CREATININE 0.60 04/17/2019    BUNCRER 20 04/17/2019    CALCIUM 9.2 04/17/2019    LABGLOM >60 04/17/2019    GFRAA >60 04/17/2019    GFR      04/17/2019    GFR NOT REPORTED 04/17/2019     Radiology:  Xr Chest Portable    Result Date: 4/18/2019  EXAMINATION: SINGLE XRAY VIEW OF THE CHEST 4/17/2019 5:40 am COMPARISON: Chest radiograph 04/16/2019 HISTORY: ORDERING SYSTEM PROVIDED HISTORY: AECOPD TECHNOLOGIST PROVIDED HISTORY: AECOPD FINDINGS: Normal heart size. Lungs are hyperinflated, consistent with COPD. Bibasilar reticular opacities are not significantly changed and may be related to chronic lung disease. There is no focal consolidation. No evidence of pleural effusion or pneumothorax. No acute cardiopulmonary process. Xr Chest Portable    Result Date: 4/16/2019  EXAMINATION: SINGLE XRAY VIEW OF THE CHEST 4/16/2019 2:01 am COMPARISON: Chest radiograph 11/20/2018. HISTORY: ORDERING SYSTEM PROVIDED HISTORY: Chest Pain TECHNOLOGIST PROVIDED HISTORY: Chest Pain Ordering Physician Provided Reason for Exam: sob Acuity: Acute Type of Exam: Initial Relevant Medical/Surgical History: copd FINDINGS: Single view provided. Moderate rotation. The mediastinal, cardiac, and hilar silhouettes are stable with enlarged pulmonary arteries. Evidence of remote healed granulomatous disease. Pulmonary hyperinflation. Stable basilar predominant fine reticular opacities which are seen on the prior exams. No acute consolidation. 1. Stable chronic bibasilar reticular opacities which may relate to interstitial pneumonitis. 2. No acute pneumonia or pulmonary edema.          Consultations:    Consults:     Final Specialist Recommendations/Findings:   IP CONSULT TO INTERNAL MEDICINE  IP CONSULT TO PULMONOLOGY      The patient was seen and examined on day of discharge and this discharge summary is in conjunction with any daily progress note from day of discharge. Discharge plan:     Disposition: Home with [facility]    Physician Follow Up:   [facility]  [facility]. [address] [facility] [address]    [address] [address]  [phone]    Skilled home care    Ofelia Patrick MD  . [address] [address]  [phone]    Go on 5/2/2019  Appointment 5-2-19 at 1:30pm    Kathryn Joe MD  [facility] [address]  [facility] [address] [place] [address]  [phone]    In 2 weeks      [facility] and [address] [facility] [address] [address] [phone]    toilet safety frame       Requiring Further Evaluation/Follow Up POST HOSPITALIZATION/Incidental Findings: follow up as an outpatient for physical therapy     Diet: cardiac diet    Activity: As tolerated    Instructions to Patient: take medications as prescribed,take antibiotics until completion    Discharge Medications:      Medication List      START taking these medications    azithromycin 500 MG tablet  Commonly known as:  ZITHROMAX  Take 1 tablet by mouth daily for 10 days     guaiFENesin 600 MG extended release tablet  Commonly known as:  MUCINEX  Take 1 tablet by mouth 2 times daily     predniSONE 10 MG tablet  Commonly known as:  DELTASONE  Take 4 tablets by mouth daily for 3 days, THEN 3 tablets daily for 3 days, THEN 2 tablets daily for 3 days, THEN 1 tablet daily for 3 days. Start taking on:  4/18/2019        CHANGE how you take these medications    trospium 20 MG tablet  Commonly known as:  SANCTURA  What changed:  Another medication with the same name was removed. Continue taking this medication, and follow the directions you see here.         CONTINUE taking these medications    albuterol sulfate  (90 Base) MCG/ACT inhaler aspirin 81 MG EC tablet     budesonide-formoterol 160-4.5 MCG/ACT Aero  Commonly known as:  SYMBICORT  Inhale 2 puffs into the lungs 2 times daily. CALCIUM 600 + D 600-200 MG-UNIT Tabs  Generic drug:  Calcium Carb-Cholecalciferol     clopidogrel 75 MG tablet  Commonly known as:  PLAVIX     CoQ-10 10 MG Caps     docusate sodium 100 MG capsule  Commonly known as:  COLACE     ferrous sulfate 325 (65 Fe) MG tablet     fluticasone 50 MCG/ACT nasal spray  Commonly known as:  FLONASE     gabapentin 600 MG tablet  Commonly known as:  NEURONTIN     FRANCISCA-HYDROLAC 12 12 % cream  Generic drug:  ammonium lactate  APPLY TO AFFECTED AREA DAILY     hydrochlorothiazide 12.5 MG capsule  Commonly known as:  MICROZIDE     oxyCODONE-acetaminophen 5-325 MG per tablet  Commonly known as:  PERCOCET     polyethylene glycol powder  Commonly known as:  GLYCOLAX     sodium chloride 0.65 % nasal spray  Commonly known as:  OCEAN, BABY AYR     therapeutic multivitamin-minerals tablet     tiotropium 18 MCG inhalation capsule  Commonly known as:  SPIRIVA  Inhale 1 capsule into the lungs daily. vitamin B-6 100 MG tablet  Commonly known as:  PYRIDOXINE     vitamin C 500 MG tablet  Commonly known as:  ASCORBIC ACID     vitamin D 2000 units Caps capsule     XOPENEX 1.25 MG/3ML nebulizer solution  Generic drug:  levalbuterol     XTAMPZA ER 27 MG C12a  Generic drug:  oxyCODONE ER           Where to Get Your Medications      These medications were sent to [facility] [address], [address] [facility] [phone] - [phone]  [address] [place] [address], [address] [place] [address]    Phone:  [phone]   · azithromycin 500 MG tablet  · guaiFENesin 600 MG extended release tablet  · predniSONE 10 MG tablet         Time Spent on discharge is  39 mins in patient examination, evaluation, counseling as well as medication reconciliation, prescriptions for required medications, discharge plan and follow up.     Electronically

## 2019-04-18 NOTE — PROGRESS NOTES
Transitions of Care Pharmacy Service   Medication Review    The patient's list of current home medications has been reviewed. Source(s) of information: Patient unable to confirm medications, [employer] ([phone]), [facility][phone]), [employer]    Based on information provided by the above source(s), I have updated the patient's home med list as described below. I changed or updated the following medications on the patient's home medication list:  Discontinued Carvedilol 3.125mg - old prescription (12/2018)  Ibandronate 150mg - old prescription (2016)     Added Hydrochlorothiazide 12.5mg - daily   Vitamin D 2000 unit - daily     Adjusted   Levalbuterol 1.25mg/3ml - changed from every 4 hours to every 8 hours per [facility] 5/325mg - changed from q8h to q6h prn pain  Glycolax Powder - changed from daily to bid   Trospium Chloride 20mg - added strength  CoQ 10 (10mg) - changed from daily to three times daily      Other Notes Unable to confirm OTC and prescription medications with patient, she did not want to discuss her prescriptions when asked. Medications verified with [employer] (maintenance) and [employer] (controls)            Please feel free to call me with any questions about this encounter. Thank you. This note will be reviewed and co-signed by the Transitions of Care Pharmacist. The pharmacist will review inpatient orders and contact the physician about any discrepancies. Antonia Mckoy, pharmacy technician  Transitions [facility] Service  Phone:  [phone]  Fax: [phone]      Electronically signed by Antonia Mckoy on 4/18/2019 at 9:58 AM       Prior to Admission medications    Medication Sig Start Date End Date Taking?  Authorizing Provider   hydrochlorothiazide (MICROZIDE) 12.5 MG capsule Take 12.5 mg by mouth daily       oxyCODONE-acetaminophen (PERCOCET) 5-325 MG per tablet Take 1 tablet by mouth every 6 hours as needed for Pain.        trospium (SANCTURA) 20 MG tablet Take 20 mg by mouth 2 times daily       Cholecalciferol (VITAMIN D) 2000 units CAPS capsule Take 1 capsule by mouth daily               vitamin B-6 (PYRIDOXINE) 100 MG tablet Take 100 mg by mouth daily       vitamin C (ASCORBIC ACID) 500 MG tablet Take 500 mg by mouth daily       Coenzyme Q10 (COQ-10) 10 MG CAPS Take 1 tablet by mouth 3 times daily        ferrous sulfate 325 (65 Fe) MG tablet Take 325 mg by mouth 2 times daily       aspirin 81 MG EC tablet Take 81 mg by mouth daily       Multiple Vitamins-Minerals (THERAPEUTIC MULTIVITAMIN-MINERALS) tablet Take 1 tablet by mouth daily       sodium chloride (OCEAN, BABY AYR) 0.65 % nasal spray 1 spray by Nasal route as needed for Congestion       gabapentin (NEURONTIN) 600 MG tablet Take 600 mg by mouth 3 times daily. Allayne Ania levalbuterol (XOPENEX) 1.25 MG/3ML nebulizer solution Take 1 ampule by nebulization every 8 hours as needed for Wheezing        Calcium Carb-Cholecalciferol (CALCIUM 600 + D) 600-200 MG-UNIT TABS Take 3 tablets by mouth daily        OxyCODONE ER (XTAMPZA ER) 27 MG C12A Take 27 mg by mouth every 12 hours. Allayne Ania FRANCISCA-HYDROLAC 12 12 % cream APPLY TO AFFECTED AREA DAILY       tiotropium (SPIRIVA) 18 MCG inhalation capsule Inhale 1 capsule into the lungs daily. budesonide-formoterol (SYMBICORT) 160-4.5 MCG/ACT AERO Inhale 2 puffs into the lungs 2 times daily. albuterol (PROVENTIL HFA;VENTOLIN HFA) 108 (90 BASE) MCG/ACT inhaler Inhale 2 puffs into the lungs every 6 hours as needed for Wheezing. clopidogrel (PLAVIX) 75 MG tablet Take 75 mg by mouth daily. fluticasone (FLONASE) 50 MCG/ACT nasal spray 2 sprays by Nasal route daily as needed        polyethylene glycol (GLYCOLAX) powder Take 17 g by mouth 2 times daily        docusate sodium (COLACE) 100 MG capsule Take 100 mg by mouth 2 times daily.

## 2019-04-19 VITALS
TEMPERATURE: 97.5 F | SYSTOLIC BLOOD PRESSURE: 124 MMHG | DIASTOLIC BLOOD PRESSURE: 62 MMHG | HEIGHT: 64 IN | BODY MASS INDEX: 26.17 KG/M2 | RESPIRATION RATE: 18 BRPM | OXYGEN SATURATION: 92 % | HEART RATE: 73 BPM | WEIGHT: 153.3 LBS

## 2019-04-19 LAB
BILIRUBIN URINE: NEGATIVE
COLOR: YELLOW
COMMENT UA: NORMAL
EKG ATRIAL RATE: 95 BPM
EKG P AXIS: 25 DEGREES
EKG P-R INTERVAL: 176 MS
EKG Q-T INTERVAL: 368 MS
EKG QRS DURATION: 98 MS
EKG QTC CALCULATION (BAZETT): 462 MS
EKG R AXIS: -53 DEGREES
EKG T AXIS: 42 DEGREES
EKG VENTRICULAR RATE: 95 BPM
GLUCOSE URINE: NEGATIVE
KETONES, URINE: NEGATIVE
LEUKOCYTE ESTERASE, URINE: NEGATIVE
NITRITE, URINE: NEGATIVE
PH UA: 5.5 (ref 5–8)
PROTEIN UA: NEGATIVE
SPECIFIC GRAVITY UA: 1.01 (ref 1–1.03)
TURBIDITY: CLEAR
URINE HGB: NEGATIVE
UROBILINOGEN, URINE: NORMAL

## 2019-04-19 PROCEDURE — 6370000000 HC RX 637 (ALT 250 FOR IP): Performed by: NURSE PRACTITIONER

## 2019-04-19 PROCEDURE — 81003 URINALYSIS AUTO W/O SCOPE: CPT

## 2019-04-19 PROCEDURE — 94640 AIRWAY INHALATION TREATMENT: CPT

## 2019-04-19 PROCEDURE — 99239 HOSP IP/OBS DSCHRG MGMT >30: CPT | Performed by: NURSE PRACTITIONER

## 2019-04-19 PROCEDURE — 6360000002 HC RX W HCPCS: Performed by: NURSE PRACTITIONER

## 2019-04-19 PROCEDURE — 2500000003 HC RX 250 WO HCPCS: Performed by: NURSE PRACTITIONER

## 2019-04-19 PROCEDURE — 2580000003 HC RX 258: Performed by: NURSE PRACTITIONER

## 2019-04-19 RX ORDER — METOPROLOL TARTRATE 5 MG/5ML
5 INJECTION INTRAVENOUS EVERY 4 HOURS PRN
Status: DISCONTINUED | OUTPATIENT
Start: 2019-04-19 | End: 2019-04-19 | Stop reason: HOSPADM

## 2019-04-19 RX ADMIN — FERROUS SULFATE TAB EC 325 MG (65 MG FE EQUIVALENT) 325 MG: 325 (65 FE) TABLET DELAYED RESPONSE at 09:16

## 2019-04-19 RX ADMIN — GABAPENTIN 600 MG: 300 CAPSULE ORAL at 15:17

## 2019-04-19 RX ADMIN — TROSPIUM CHLORIDE 20 MG: 20 TABLET, FILM COATED ORAL at 06:07

## 2019-04-19 RX ADMIN — ENOXAPARIN SODIUM 40 MG: 40 INJECTION SUBCUTANEOUS at 09:37

## 2019-04-19 RX ADMIN — PYRIDOXINE HCL TAB 50 MG 100 MG: 50 TAB at 09:16

## 2019-04-19 RX ADMIN — METOPROLOL TARTRATE 5 MG: 5 INJECTION INTRAVENOUS at 03:36

## 2019-04-19 RX ADMIN — PREDNISONE 40 MG: 20 TABLET ORAL at 09:15

## 2019-04-19 RX ADMIN — Medication 3 TABLET: at 09:16

## 2019-04-19 RX ADMIN — AZITHROMYCIN 500 MG: 250 TABLET, FILM COATED ORAL at 09:16

## 2019-04-19 RX ADMIN — ALBUTEROL SULFATE 2.5 MG: 2.5 SOLUTION RESPIRATORY (INHALATION) at 06:11

## 2019-04-19 RX ADMIN — ALBUTEROL SULFATE 2.5 MG: 2.5 SOLUTION RESPIRATORY (INHALATION) at 09:55

## 2019-04-19 RX ADMIN — Medication 10 ML: at 09:20

## 2019-04-19 RX ADMIN — OXYCODONE AND ACETAMINOPHEN 1 TABLET: 5; 325 TABLET ORAL at 09:16

## 2019-04-19 RX ADMIN — Medication 500 MG: at 09:16

## 2019-04-19 RX ADMIN — OXYCODONE AND ACETAMINOPHEN 1 TABLET: 5; 325 TABLET ORAL at 15:14

## 2019-04-19 RX ADMIN — FAMOTIDINE 20 MG: 20 TABLET ORAL at 09:16

## 2019-04-19 RX ADMIN — GABAPENTIN 600 MG: 300 CAPSULE ORAL at 09:16

## 2019-04-19 RX ADMIN — OXYCODONE AND ACETAMINOPHEN 1 TABLET: 5; 325 TABLET ORAL at 03:44

## 2019-04-19 RX ADMIN — MOMETASONE FUROATE AND FORMOTEROL FUMARATE DIHYDRATE 2 PUFF: 200; 5 AEROSOL RESPIRATORY (INHALATION) at 06:11

## 2019-04-19 RX ADMIN — GUAIFENESIN 600 MG: 600 TABLET, EXTENDED RELEASE ORAL at 09:16

## 2019-04-19 RX ADMIN — CARVEDILOL 3.12 MG: 3.12 TABLET, FILM COATED ORAL at 09:37

## 2019-04-19 ASSESSMENT — PAIN DESCRIPTION - DESCRIPTORS: DESCRIPTORS: ACHING

## 2019-04-19 ASSESSMENT — PAIN DESCRIPTION - LOCATION: LOCATION: BACK

## 2019-04-19 ASSESSMENT — PAIN DESCRIPTION - PROGRESSION: CLINICAL_PROGRESSION: GRADUALLY IMPROVING

## 2019-04-19 ASSESSMENT — PAIN SCALES - GENERAL
PAINLEVEL_OUTOF10: 9
PAINLEVEL_OUTOF10: 7
PAINLEVEL_OUTOF10: 8
PAINLEVEL_OUTOF10: 9

## 2019-04-19 ASSESSMENT — PAIN DESCRIPTION - FREQUENCY: FREQUENCY: CONTINUOUS

## 2019-04-19 ASSESSMENT — PAIN DESCRIPTION - ORIENTATION: ORIENTATION: RIGHT

## 2019-04-19 ASSESSMENT — PAIN DESCRIPTION - PAIN TYPE: TYPE: CHRONIC PAIN

## 2019-04-19 ASSESSMENT — PAIN - FUNCTIONAL ASSESSMENT: PAIN_FUNCTIONAL_ASSESSMENT: ACTIVITIES ARE NOT PREVENTED

## 2019-04-19 ASSESSMENT — PAIN DESCRIPTION - ONSET: ONSET: ON-GOING

## 2019-04-19 NOTE — PLAN OF CARE
Problem: Falls - Risk of:  Goal: Will remain free from falls  Description  Will remain free from falls  4/19/2019 1338 by Afia Krause RN  Outcome: Ongoing     Problem: Pain:  Goal: Control of acute pain  Description  Control of acute pain  Outcome: Ongoing     Problem:  Activity Intolerance:  Goal: Ability to tolerate increased activity will improve  Description  Ability to tolerate increased activity will improve  Outcome: Ongoing

## 2019-04-19 NOTE — PLAN OF CARE
Problem: Falls - Risk of:  Goal: Will remain free from falls  Description  Will remain free from falls  Outcome: Ongoing  Note:   Siderails up x 2  Hourly rounding  Call light in reach  Instructed to call for assist before attempting out of bed. Remains free from falls and accidental injury at this time   Floor free from obstacles  Bed is locked and in lowest position  Adequate lighting provided  Bed alarm on, [place] star and Stay with Me signs posted         Problem: Risk for Impaired Skin Integrity  Goal: Tissue integrity - skin and mucous membranes  Description  Structural intactness and normal physiological function of skin and  mucous membranes. Outcome: Ongoing     Problem: Pain:  Goal: Pain level will decrease  Description  Pain level will decrease  Outcome: Ongoing  Note:   Pain level assessment complete.    Patient educated on pain scale and control interventions  PRN pain medication given per patient request  Patient instructed to call out with new onset of pain or unrelieved pain       Problem: Musculor/Skeletal Functional Status  Goal: Highest potential functional level  Outcome: Ongoing     Problem: Breathing Pattern - Ineffective:  Goal: Ability to achieve and maintain a regular respiratory rate will improve  Description  Ability to achieve and maintain a regular respiratory rate will improve  4/19/2019 0046 by Shasha Moreland RN  Outcome: Ongoing

## 2019-04-19 NOTE — PROGRESS NOTES
Patient called daughter Katey Mccullough for discharge transportation. No answer.  VM left to call back

## 2019-04-19 NOTE — PROGRESS NOTES
Writer asked if daughter returned call. patient states daughterElli will be in when she's done \"feeding the baby\". writer asked patient if [employer] transportation could be provided for her, if daughter is having trouble with time management due to other obligations? Ravinder Mayorga And writer could provided scrub pants from housekeeping? . Patient states, no daughter is picking her up.  Writer will continue to eval. Sangita Dillon NP notified and Jose Viveros RN lead nurse

## 2019-04-19 NOTE — PROGRESS NOTES
Patient discharged home with belongings and home medications. Patient accompanied by staff member and friend outside to [facility] and [facility].

## 2019-04-19 NOTE — PROGRESS NOTES
CLINICAL PHARMACY NOTE: MEDS TO [address] [facility] Patient?: No  Total # of Prescriptions Filled: 3   The following medications were delivered to the patient:  · AZITHROMYCIN  · MUCINEX  · PREDNISONE  Total # of Interventions Completed: 1  Time Spent (min): 30    Additional Documentation:    NO ISSUES FILLING MEDICATIONS.  PT HAD ZERO CO-PAY

## 2019-06-07 ENCOUNTER — APPOINTMENT (OUTPATIENT)
Dept: CT IMAGING | Age: 79
End: 2019-06-07
Payer: COMMERCIAL

## 2019-06-07 ENCOUNTER — HOSPITAL ENCOUNTER (EMERGENCY)
Age: 79
Discharge: HOME | End: 2019-06-07
Attending: EMERGENCY MEDICINE
Payer: COMMERCIAL

## 2019-06-07 VITALS
OXYGEN SATURATION: 90 % | DIASTOLIC BLOOD PRESSURE: 65 MMHG | TEMPERATURE: 98.4 F | HEART RATE: 96 BPM | RESPIRATION RATE: 20 BRPM | SYSTOLIC BLOOD PRESSURE: 117 MMHG

## 2019-06-07 DIAGNOSIS — S09.90XA CLOSED HEAD INJURY, INITIAL ENCOUNTER: Primary | ICD-10-CM

## 2019-06-07 PROCEDURE — 99283 EMERGENCY DEPT VISIT LOW MDM: CPT

## 2019-06-07 PROCEDURE — 6370000000 HC RX 637 (ALT 250 FOR IP): Performed by: PHYSICIAN ASSISTANT

## 2019-06-07 PROCEDURE — 70450 CT HEAD/BRAIN W/O DYE: CPT

## 2019-06-07 RX ORDER — GABAPENTIN 300 MG/1
600 CAPSULE ORAL ONCE
Status: COMPLETED | OUTPATIENT
Start: 2019-06-07 | End: 2019-06-07

## 2019-06-07 RX ADMIN — GABAPENTIN 600 MG: 300 CAPSULE ORAL at 16:45

## 2019-06-07 NOTE — ED PROVIDER NOTES
[address] [place] [address] ED  [facility]      Pt Name: Rhonda Haji  MRN: [id number]  [place] 1940  Date ofevaluation: 6/7/2019  Provider: Lindsay Larios Dr       Chief Complaint   Patient presents with    Head Injury     STRUCK HEADON UTILITY CUPBOARD DOOR         HISTORY OF PRESENT ILLNESS  (Location/Symptom, Timing/Onset, Context/Setting, Quality, Duration, Modifying Factors, Severity.)   Rhonda Haji is a 77 y.o. female who presents to the emergency department with head injury. Home Wednesday night patient started up and hit her head on a piece of furniture. No LOC. No nausea vomiting. Patient is on Plavix. Patient was to make sure that her head is okay  No definite alleviating or aggravating factors. Nursing Notes were reviewed. ALLERGIES     Anti-inflammatory enzyme [nutritional supplements]; Ceclor [cefaclor]; Morphine; Nsaids; Penicillins; Propoxyphene; Skelaxin [metaxalone]; Sulfa antibiotics; Tadalafil; Tolmetin; Alendronate; Asa [aspirin]; Erythromycin; Temazepam; Tetracyclines & related; and Tramadol    CURRENT MEDICATIONS       Discharge Medication List as of 6/7/2019  5:32 PM      CONTINUE these medications which have NOT CHANGED    Details   oxyCODONE-acetaminophen (PERCOCET) 5-325 MG per tablet Take 1 tablet by mouth every 6 hours as needed for Pain.  Historical Med      hydrochlorothiazide (MICROZIDE) 12.5 MG capsule Take 12.5 mg by mouth dailyHistorical Med      guaiFENesin (MUCINEX) 600 MG extended release tablet Take 1 tablet by mouth 2 times daily, Disp-60 tablet, R-1Normal      trospium (SANCTURA) 20 MG tablet Take 20 mg by mouth 2 times dailyHistorical Med      Cholecalciferol (VITAMIN D) 2000 units CAPS capsule Take 1 capsule by mouth dailyHistorical Med      vitamin B-6 (PYRIDOXINE) 100 MG tablet Take 100 mg by mouth dailyHistorical Med      vitamin C (ASCORBIC ACID) 500 MG tablet Take 500 mg by mouth dailyHistorical Med noted.   Psychiatric: She has a normal mood and affect. Her behavior is normal.               DIAGNOSTIC RESULTS     EKG: All EKG's are interpreted by the Emergency Department Physician who either signs or Co-signs this chart in the absence of a cardiologist.        RADIOLOGY:   Non-plain film images such as CT, Ultrasound and MRI are read by the radiologist. Plain radiographic images arevisualized and preliminarily interpreted by the emergency physician with the below findings:        Interpretation per the Radiologist below, if available at thetime of this note:          ED BEDSIDE ULTRASOUND:   Performed by ED Physician - none    LABS:  Labs Reviewed - No data to display    All other labs were within normal range or not returned as of this dictation. EMERGENCY DEPARTMENT COURSE and DIFFERENTIAL DIAGNOSIS/MDM:   Vitals:    Vitals:    06/07/19 1534   BP: 117/65   Pulse: 96   Resp: 20   Temp: 98.4 °F (36.9 °C)   TempSrc: Oral   SpO2: 90%      CT of brain is negative. Patient requested that she receive her Neurontin that she is due for while in the ER. This was given. Patient discharged home. CONSULTS:  None    PROCEDURES:  Procedures        FINAL IMPRESSION      1.  Closed head injury, initial encounter          DISPOSITION/PLAN   DISPOSITION Decision To Discharge 06/07/2019 05:32:10 PM      PATIENTREFERRED TO:   Jennifer León MD  [facility] [address]  [facility] [place] [phone]    In 3 days        DISCHARGE MEDICATIONS:     Discharge Medication List as of 6/7/2019  5:32 PM              (Please note that portions of this note were completed with a voice recognition program.  Efforts were made to edit thedictations but occasionally words are mis-transcribed.)    SOWMYA Cardozo PA-C  06/07/19 [id number]

## 2019-06-07 NOTE — ED PROVIDER NOTES
eMERGENCY dEPARTMENT eNCOUnter   Attending Attestation     Pt Name: Kayla Bailon  MRN: [id number]  [place] 1940  Date of evaluation: 6/7/19   Kayla Bailon is a 77 y.o. female with CC: Head Injury ([address])    MDM:   The patient is a 76-year-old female who presented to the emergency department status post hitting her head on a couple her chest on Plavix. No focal neuro deficits on exam.  CT head no acute findings. Patient discharged home with outpatient follow-up and given parameters for return to the emergency department. CRITICAL CARE:       EKG: All EKG's are interpreted by the Emergency Department Physician who either signs or Co-signs this chart in the absence of a cardiologist.      RADIOLOGY:All plain film, CT, MRI, and formal ultrasound images (except ED bedside ultrasound) are read by the radiologist, see reports below, unless otherwise noted in MDM or here. CT Head WO Contrast   Final Result   No acute intracranial abnormality. LABS: All lab results were reviewed by myself, and all abnormals are listed below. Labs Reviewed - No data to display        I personally evaluated and examined the patient in conjunction with the APC and agree with the assessment, treatment plan, and disposition of the patient as recorded by the APC.    Parisa Onofre MD  Attending Emergency Physician          Parisa Onofre MD  17/62/49 [id number]

## 2019-08-23 ENCOUNTER — INITIAL CONSULT (OUTPATIENT)
Dept: PAIN MANAGEMENT | Age: 79
End: 2019-08-23
Payer: COMMERCIAL

## 2019-08-23 VITALS
HEIGHT: 66 IN | HEART RATE: 93 BPM | DIASTOLIC BLOOD PRESSURE: 62 MMHG | BODY MASS INDEX: 26.84 KG/M2 | WEIGHT: 167 LBS | SYSTOLIC BLOOD PRESSURE: 118 MMHG

## 2019-08-23 DIAGNOSIS — Z79.891 ENCOUNTER FOR LONG-TERM OPIATE ANALGESIC USE: ICD-10-CM

## 2019-08-23 DIAGNOSIS — M70.61 TROCHANTERIC BURSITIS OF RIGHT HIP: ICD-10-CM

## 2019-08-23 DIAGNOSIS — M47.817 LUMBOSACRAL SPONDYLOSIS WITHOUT MYELOPATHY: ICD-10-CM

## 2019-08-23 DIAGNOSIS — M96.1 POSTLAMINECTOMY SYNDROME, LUMBAR REGION: Primary | ICD-10-CM

## 2019-08-23 PROCEDURE — 99204 OFFICE O/P NEW MOD 45 MIN: CPT | Performed by: PAIN MEDICINE

## 2019-08-23 ASSESSMENT — ENCOUNTER SYMPTOMS
BACK PAIN: 1
BOWEL INCONTINENCE: 0
DOUBLE VISION: 0
ABDOMINAL PAIN: 0
SHORTNESS OF BREATH: 1

## 2019-08-23 NOTE — PROGRESS NOTES
HPI:     Back Pain   This is a chronic problem. The current episode started more than 1 year ago. The problem occurs constantly. The problem is unchanged. The pain is present in the lumbar spine. The quality of the pain is described as stabbing and burning. The pain is at a severity of 8/10. The pain is severe. The pain is the same all the time. The symptoms are aggravated by standing. Associated symptoms include bladder incontinence, headaches, leg pain, numbness, tingling, weakness and weight loss. Pertinent negatives include no abdominal pain, bowel incontinence, chest pain or fever. Pain in the low back and the right hip. Has had several back surgeries and continues to have lingering low back pain. X-ray lumbar spine with previous multilevel fusion as well as advanced degenerative changes. She is been opioid dependent and was seeing pain management at [facility] but has recently been discharged for noncompliance and missed appointments. She comes in today for second opinion. States that she has had bursa injections in the past with some benefit. Patient denies any new neurological symptoms. No bowel or bladder incontinence, no weakness, and no falling. Review of OARRS does not show any aberrant prescription behavior.     Past Medical History:   Diagnosis Date    Aortic valve disease     Pt. denies    Arthritis     Chronic back pain     on 6/7/19 pt states she is in pain mgmnt    COPD (chronic obstructive pulmonary disease) ([facility] [address]     Disease of blood and blood forming organ     Diverticulitis     GERD (gastroesophageal reflux disease)     History of blood transfusion     History of gastritis     Lumbar disc disease     Movement disorder     Nerve disease, peripheral 11/17/2015    Osteoporosis     Pneumonia 11/27/2015    Unspecified cerebral artery occlusion with cerebral infarction     TIA's       Past Surgical History:   Procedure Laterality Date    ARTHROPLASTY Left 4/18/2017    LEFT FOOT ARTHROPLASTY  4TH DIGIT performed by Anitha Gonzales DPM at [facility] 3 Left 04/18/2017    L 4th digit arthroplasty     HYSTERECTOMY      Partial    LUMBAR SPINE SURGERY  x 2    SPINAL FUSION  2012    UPPER GASTROINTESTINAL ENDOSCOPY      VARICOSE VEIN SURGERY         Allergies   Allergen Reactions    Anti-Inflammatory Enzyme [Nutritional Supplements]      Patient states allergy to anti inflammatories    Ceclor [Cefaclor]      Doesn't remember    Morphine Other (See Comments)     Urinary retention    Nsaids      Patient states allergy to anti-inflammatories    Penicillins     Propoxyphene     Skelaxin [Metaxalone]     Sulfa Antibiotics Hives    Tadalafil     Tolmetin      Patient states allergy to anti-inflammatories  Patient states allergy to anti-inflammatories  Patient states allergy to anti-inflammatories    Alendronate Nausea And Vomiting and Nausea Only    Asa [Aspirin] Other (See Comments)     Hx diverticulitis.   Can't T  Take regular aspirin 325mg but can take baby aspirin 81mg    Erythromycin Nausea And Vomiting and Nausea Only    Temazepam Nausea And Vomiting and Nausea Only    Tetracyclines & Related Nausea And Vomiting    Tramadol Nausea And Vomiting and Nausea Only         Current Outpatient Medications:     oxyCODONE-acetaminophen (PERCOCET) 5-325 MG per tablet, Take 1 tablet by mouth every 6 hours as needed for Pain. , Disp: , Rfl:     trospium (SANCTURA) 20 MG tablet, Take 20 mg by mouth 2 times daily, Disp: , Rfl:     Cholecalciferol (VITAMIN D) 2000 units CAPS capsule, Take 1 capsule by mouth daily, Disp: , Rfl:     vitamin B-6 (PYRIDOXINE) 100 MG tablet, Take 100 mg by mouth daily, Disp: , Rfl:     vitamin C (ASCORBIC ACID) 500 MG tablet, Take 500 mg by mouth daily, Disp: , Rfl:     Coenzyme Q10 (COQ-10) 10 MG CAPS, Take 1 tablet by mouth 3 times daily , Disp: , Rfl:     ferrous sulfate 325 (65 Fe) MG tablet, Take 325 mg by mouth 2

## 2019-08-27 ENCOUNTER — TELEPHONE (OUTPATIENT)
Dept: PAIN MANAGEMENT | Age: 79
End: 2019-08-27

## 2019-09-17 ENCOUNTER — HOSPITAL ENCOUNTER (OUTPATIENT)
Dept: VASCULAR LAB | Age: 79
Discharge: HOME | End: 2019-09-17
Payer: COMMERCIAL

## 2019-09-17 ENCOUNTER — OFFICE VISIT (OUTPATIENT)
Dept: PODIATRY | Age: 79
End: 2019-09-17
Payer: COMMERCIAL

## 2019-09-17 VITALS — WEIGHT: 138 LBS | BODY MASS INDEX: 22.99 KG/M2 | HEIGHT: 65 IN | RESPIRATION RATE: 16 BRPM

## 2019-09-17 DIAGNOSIS — I87.2 EDEMA OF LEFT LOWER LEG DUE TO PERIPHERAL VENOUS INSUFFICIENCY: ICD-10-CM

## 2019-09-17 DIAGNOSIS — I73.9 PERIPHERAL VASCULAR DISORDER (HCC): ICD-10-CM

## 2019-09-17 DIAGNOSIS — R26.2 TROUBLE WALKING: ICD-10-CM

## 2019-09-17 DIAGNOSIS — B35.1 DERMATOPHYTOSIS OF NAIL: ICD-10-CM

## 2019-09-17 DIAGNOSIS — M79.672 FOOT PAIN, BILATERAL: Primary | ICD-10-CM

## 2019-09-17 DIAGNOSIS — M79.671 FOOT PAIN, BILATERAL: Primary | ICD-10-CM

## 2019-09-17 DIAGNOSIS — D23.72 BENIGN NEOPLASM OF SKIN OF LOWER LIMB, INCLUDING HIP, LEFT: ICD-10-CM

## 2019-09-17 DIAGNOSIS — R60.0 EDEMA OF LEFT LOWER LEG DUE TO PERIPHERAL VENOUS INSUFFICIENCY: ICD-10-CM

## 2019-09-17 PROCEDURE — 11721 DEBRIDE NAIL 6 OR MORE: CPT | Performed by: PODIATRIST

## 2019-09-17 PROCEDURE — 99213 OFFICE O/P EST LOW 20 MIN: CPT | Performed by: PODIATRIST

## 2019-09-17 PROCEDURE — 17110 DESTRUCTION B9 LES UP TO 14: CPT | Performed by: PODIATRIST

## 2019-09-17 PROCEDURE — 93971 EXTREMITY STUDY: CPT

## 2019-09-17 NOTE — PROGRESS NOTES
[facility]  [facility]  [address] [place] [address]  Dept: [phone]  Dept Fax: [phone]     PAIN PROGRESS NOTE  Date of patient's visit: 9/17/2019  Patient's Name:  Glorious Done YOB: 1940            Patient Care Team:  Karie Recinos MD as PCP - General (Family Medicine)  Yahaira Kirby MD as Consulting Physician (Cardiology)  Jennifer Stanton DPM as Physician (Podiatry)      Chief Complaint   Patient presents with    Foot Pain    Nail Problem    Benign Neoplasm       Subjective: This Glorious Done comes to clinic for foot and nail care. Pt currently has complaint of thickened, painful, elongated nails that he/she cannot manage by themselves. Pt. Relates pain to nails with shoe gear. Pt's primary care physician is Karie Recinos MD last seen 7/23/2019. Pt has a new complaint of swelling to her left and right calf muscles and it has been tender. She has not been moving around a lot lately and is sitting in her chair.   Past Medical History:   Diagnosis Date    Aortic valve disease     Pt. denies    Arthritis     Chronic back pain     on 6/7/19 pt states she is in pain mgmnt    COPD (chronic obstructive pulmonary disease) ([address]     Disease of blood and blood forming organ     Diverticulitis     GERD (gastroesophageal reflux disease)     History of blood transfusion     History of gastritis     Lumbar disc disease     Movement disorder     Nerve disease, peripheral 11/17/2015    Osteoporosis     Pneumonia 11/27/2015    Unspecified cerebral artery occlusion with cerebral infarction     TIA's       Allergies   Allergen Reactions    Anti-Inflammatory Enzyme [Nutritional Supplements]      Patient states allergy to anti inflammatories    Ceclor [Cefaclor]      Doesn't remember    Morphine Other (See Comments)     Urinary retention    Nsaids      Patient states allergy to anti-inflammatories    Penicillins     Wheezing       Calcium Carb-Cholecalciferol (CALCIUM 600 + D) 600-200 MG-UNIT TABS Take 3 tablets by mouth daily       OxyCODONE ER (XTAMPZA ER) 27 MG C12A Take 27 mg by mouth every 12 hours. .      FRANCISCA-HYDROLAC 12 12 % cream APPLY TO AFFECTED AREA DAILY 420 g 0    tiotropium (SPIRIVA) 18 MCG inhalation capsule Inhale 1 capsule into the lungs daily. 30 capsule 0    budesonide-formoterol (SYMBICORT) 160-4.5 MCG/ACT AERO Inhale 2 puffs into the lungs 2 times daily. 1 Inhaler 3    albuterol (PROVENTIL HFA;VENTOLIN HFA) 108 (90 BASE) MCG/ACT inhaler Inhale 2 puffs into the lungs every 6 hours as needed for Wheezing.  clopidogrel (PLAVIX) 75 MG tablet Take 75 mg by mouth daily.  polyethylene glycol (GLYCOLAX) powder Take 17 g by mouth 2 times daily       docusate sodium (COLACE) 100 MG capsule Take 100 mg by mouth 2 times daily. No current facility-administered medications on file prior to visit. Review of Systems. Review of Systems:   History obtained from chart review and the patient  General ROS: negative for - chills, fatigue, fever, night sweats or weight gain  Constitutional: Negative for chills, diaphoresis, fatigue, fever and unexpected weight change. Musculoskeletal: Positive for arthralgias, gait problem and joint swelling. Neurological ROS: negative for - behavioral changes, confusion, headaches or seizures. Negative for weakness and numbness. Dermatological ROS: negative for - mole changes, rash  Cardiovascular: Negative for leg swelling. Gastrointestinal: Negative for constipation, diarrhea, nausea and vomiting. Objective:  General: AAO x 3 in NAD.     Derm  Toenail Description  Sites of Onychomycosis Involvement (Check affected area)  [x] [x] [x] [x] [x] [x] [x] [x] [x] [x]  5 4 3 2 1 1 2 3 4 5                          Right                                        Left    Thickness  [x] [x] [x] [x] [x] [x] [x] [x] [x] [x]  5 4 3 2 1 1 2 3 4 5 Right                                        Left    Dystrophic Changes   [x] [x] [x] [x] [x] [x] [x] [x] [x] [x]  5 4 3 2 1 1 2 3 4 5                         Right                                        Left    Color  [x] [x] [x] [x] [x] [x] [x] [x] [x] [x]  5 4 3 2 1 1 2 3 4 5                          Right                                        Left    Incurvation/Ingrowin   [] [] [] [] [] [] [] [] [] []  5 4 3 2 1 1 2 3 4 5                         Right                                        Left    Inflammation/Pain   [x] [x] [x] [x] [x] [x] [x] [x] [x] [x]  5 4 3  2 1 1 2 3 4 5                         Right                                        Left       Nails are painful 1-10 with direct palpation. Dermatologic:    Skin lesion present to the right and left plantar foot with a central core and petchaie noted to the lesions periphery. Pain on palpation of the lesion     Musculoskeletal:     1st MPJ ROM decreased, Bilateral.  Muscle strength 5/5, Bilateral.  Pain present upon palpation of toenails 1-5, Bilateral. decreased medial longitudinal arch, Bilateral.  Ankle ROM decreased,Bilateral.    Dorsally contracted digits present digits 2, Bilateral.     Vascular:    Very tender calf muscles with compressino and 2+ pititng edema     DP and PT pulses palpable 1/4, Bilateral.  CFT <5 seconds, Bilateral.  Hair growth absent to the level of the digits, Bilateral.  Edema present, Bilateral.  Varicosities absent, Bilateral. Erythema absent, Bilateral    Integument: Warm, dry, supple, Bilateral.  Open lesion absent, Bilateral.  Interdigital maceration absent to web spaces 4, Bilateral.  Nails 1-5 left and 1-5 right thickened > 3.0 mm, dystrophic and crumbly, discolored with subungual debris. Fissures absent, Bilateral.   Neurological:  Sensation present to light touch to level of digits, Bilateral.      Assessment:  [de-identified] y.o. female with:    Diagnosis Orders   1.  Foot pain, bilateral  [address] - [place] [facility] OF

## 2019-10-07 ENCOUNTER — HOSPITAL ENCOUNTER (INPATIENT)
Age: 79
LOS: 7 days | Discharge: HOME HEALTH | DRG: 884 | End: 2019-10-15
Attending: EMERGENCY MEDICINE | Admitting: INTERNAL MEDICINE
Payer: COMMERCIAL

## 2019-10-07 ENCOUNTER — APPOINTMENT (OUTPATIENT)
Dept: GENERAL RADIOLOGY | Age: 79
DRG: 884 | End: 2019-10-07
Payer: COMMERCIAL

## 2019-10-07 ENCOUNTER — APPOINTMENT (OUTPATIENT)
Dept: CT IMAGING | Age: 79
DRG: 884 | End: 2019-10-07
Payer: COMMERCIAL

## 2019-10-07 LAB
ABSOLUTE EOS #: 0.09 K/UL (ref 0–0.44)
ABSOLUTE IMMATURE GRANULOCYTE: 0.02 K/UL (ref 0–0.3)
ABSOLUTE LYMPH #: 2.19 K/UL (ref 1.1–3.7)
ABSOLUTE MONO #: 0.59 K/UL (ref 0.1–1.2)
ANION GAP SERPL CALCULATED.3IONS-SCNC: 10 MMOL/L (ref 9–17)
BASOPHILS # BLD: 1 % (ref 0–2)
BASOPHILS ABSOLUTE: 0.03 K/UL (ref 0–0.2)
BNP INTERPRETATION: NORMAL
BUN BLDV-MCNC: 14 MG/DL (ref 8–23)
BUN/CREAT BLD: 21 (ref 9–20)
CALCIUM SERPL-MCNC: 9.9 MG/DL (ref 8.6–10.4)
CHLORIDE BLD-SCNC: 100 MMOL/L (ref 98–107)
CO2: 28 MMOL/L (ref 20–31)
CREAT SERPL-MCNC: 0.68 MG/DL (ref 0.5–0.9)
D-DIMER QUANTITATIVE: 18.97 MG/L FEU
DIFFERENTIAL TYPE: ABNORMAL
EOSINOPHILS RELATIVE PERCENT: 2 % (ref 1–4)
GFR AFRICAN AMERICAN: >60 ML/MIN
GFR NON-AFRICAN AMERICAN: >60 ML/MIN
GFR SERPL CREATININE-BSD FRML MDRD: ABNORMAL ML/MIN/{1.73_M2}
GFR SERPL CREATININE-BSD FRML MDRD: ABNORMAL ML/MIN/{1.73_M2}
GLUCOSE BLD-MCNC: 123 MG/DL (ref 70–99)
HCT VFR BLD CALC: 43.2 % (ref 36.3–47.1)
HEMOGLOBIN: 14 G/DL (ref 11.9–15.1)
IMMATURE GRANULOCYTES: 0 %
LYMPHOCYTES # BLD: 37 % (ref 24–43)
MAGNESIUM: 1.7 MG/DL (ref 1.6–2.6)
MCH RBC QN AUTO: 29.5 PG (ref 25.2–33.5)
MCHC RBC AUTO-ENTMCNC: 32.4 G/DL (ref 28.4–34.8)
MCV RBC AUTO: 91.1 FL (ref 82.6–102.9)
MONOCYTES # BLD: 10 % (ref 3–12)
MYOGLOBIN: 97 NG/ML (ref 25–58)
NRBC AUTOMATED: 0 PER 100 WBC
PDW BLD-RTO: 13.5 % (ref 11.8–14.4)
PLATELET # BLD: 134 K/UL (ref 138–453)
PLATELET ESTIMATE: ABNORMAL
PMV BLD AUTO: 11.3 FL (ref 8.1–13.5)
POTASSIUM SERPL-SCNC: 3.5 MMOL/L (ref 3.7–5.3)
PRO-BNP: 196 PG/ML
RBC # BLD: 4.74 M/UL (ref 3.95–5.11)
RBC # BLD: ABNORMAL 10*6/UL
SEG NEUTROPHILS: 51 % (ref 36–65)
SEGMENTED NEUTROPHILS ABSOLUTE COUNT: 3.02 K/UL (ref 1.5–8.1)
SODIUM BLD-SCNC: 138 MMOL/L (ref 135–144)
TROPONIN INTERP: ABNORMAL
TROPONIN T: ABNORMAL NG/ML
TROPONIN, HIGH SENSITIVITY: 47 NG/L (ref 0–14)
WBC # BLD: 5.9 K/UL (ref 3.5–11.3)
WBC # BLD: ABNORMAL 10*3/UL

## 2019-10-07 PROCEDURE — 2580000003 HC RX 258: Performed by: NURSE PRACTITIONER

## 2019-10-07 PROCEDURE — 83735 ASSAY OF MAGNESIUM: CPT

## 2019-10-07 PROCEDURE — 93005 ELECTROCARDIOGRAM TRACING: CPT | Performed by: NURSE PRACTITIONER

## 2019-10-07 PROCEDURE — 83874 ASSAY OF MYOGLOBIN: CPT

## 2019-10-07 PROCEDURE — 6360000004 HC RX CONTRAST MEDICATION: Performed by: NURSE PRACTITIONER

## 2019-10-07 PROCEDURE — 51701 INSERT BLADDER CATHETER: CPT

## 2019-10-07 PROCEDURE — 85379 FIBRIN DEGRADATION QUANT: CPT

## 2019-10-07 PROCEDURE — 71045 X-RAY EXAM CHEST 1 VIEW: CPT

## 2019-10-07 PROCEDURE — 71260 CT THORAX DX C+: CPT

## 2019-10-07 PROCEDURE — 99285 EMERGENCY DEPT VISIT HI MDM: CPT

## 2019-10-07 PROCEDURE — 85025 COMPLETE CBC W/AUTO DIFF WBC: CPT

## 2019-10-07 PROCEDURE — 84484 ASSAY OF TROPONIN QUANT: CPT

## 2019-10-07 PROCEDURE — 83880 ASSAY OF NATRIURETIC PEPTIDE: CPT

## 2019-10-07 PROCEDURE — 80048 BASIC METABOLIC PNL TOTAL CA: CPT

## 2019-10-07 RX ORDER — ASPIRIN 81 MG/1
324 TABLET, CHEWABLE ORAL ONCE
Status: COMPLETED | OUTPATIENT
Start: 2019-10-07 | End: 2019-10-08

## 2019-10-07 RX ORDER — MORPHINE SULFATE 2 MG/ML
2 INJECTION, SOLUTION INTRAMUSCULAR; INTRAVENOUS ONCE
Status: COMPLETED | OUTPATIENT
Start: 2019-10-07 | End: 2019-10-08

## 2019-10-07 RX ORDER — SODIUM CHLORIDE 9 MG/ML
INJECTION, SOLUTION INTRAVENOUS CONTINUOUS
Status: DISCONTINUED | OUTPATIENT
Start: 2019-10-07 | End: 2019-10-08

## 2019-10-07 RX ORDER — 0.9 % SODIUM CHLORIDE 0.9 %
80 INTRAVENOUS SOLUTION INTRAVENOUS ONCE
Status: COMPLETED | OUTPATIENT
Start: 2019-10-08 | End: 2019-10-08

## 2019-10-07 RX ORDER — SODIUM CHLORIDE 0.9 % (FLUSH) 0.9 %
10 SYRINGE (ML) INJECTION PRN
Status: DISCONTINUED | OUTPATIENT
Start: 2019-10-07 | End: 2019-10-15 | Stop reason: HOSPADM

## 2019-10-07 RX ADMIN — SODIUM CHLORIDE: 9 INJECTION, SOLUTION INTRAVENOUS at 23:30

## 2019-10-07 ASSESSMENT — ENCOUNTER SYMPTOMS
VOMITING: 0
WHEEZING: 0
NAUSEA: 0
SHORTNESS OF BREATH: 0
COUGH: 0
ABDOMINAL PAIN: 0

## 2019-10-08 ENCOUNTER — APPOINTMENT (OUTPATIENT)
Dept: CT IMAGING | Age: 79
DRG: 884 | End: 2019-10-08
Payer: COMMERCIAL

## 2019-10-08 PROBLEM — R55 NEAR SYNCOPE: Status: ACTIVE | Noted: 2019-10-08

## 2019-10-08 PROBLEM — J96.11 CHRONIC RESPIRATORY FAILURE WITH HYPOXIA (HCC): Status: ACTIVE | Noted: 2019-10-08

## 2019-10-08 PROBLEM — R07.9 CHEST PAIN AT REST: Status: ACTIVE | Noted: 2019-10-08
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
## 2019-10-08 PROBLEM — N39.0 UTI (URINARY TRACT INFECTION): Status: ACTIVE | Noted: 2019-10-08

## 2019-10-08 PROBLEM — R30.0 DYSURIA: Status: ACTIVE | Noted: 2019-10-08

## 2019-10-08 LAB
-: ABNORMAL
ADENOVIRUS PCR: NOT DETECTED
ALBUMIN SERPL-MCNC: 3.7 G/DL (ref 3.5–5.2)
ALBUMIN/GLOBULIN RATIO: NORMAL (ref 1–2.5)
ALP BLD-CCNC: 47 U/L (ref 35–104)
ALT SERPL-CCNC: 11 U/L (ref 5–33)
AMORPHOUS: ABNORMAL
AST SERPL-CCNC: 28 U/L
BACTERIA: ABNORMAL
BILIRUB SERPL-MCNC: 0.48 MG/DL (ref 0.3–1.2)
BILIRUBIN DIRECT: 0.12 MG/DL
BILIRUBIN URINE: NEGATIVE
BILIRUBIN, INDIRECT: 0.36 MG/DL (ref 0–1)
BORDETELLA PERTUSSIS PCR: NOT DETECTED
CASTS UA: ABNORMAL /LPF
CHLAMYDIA PNEUMONIAE BY PCR: NOT DETECTED
COLOR: YELLOW
COMMENT UA: ABNORMAL
CORONAVIRUS 229E PCR: NOT DETECTED
CORONAVIRUS HKU1 PCR: NOT DETECTED
CORONAVIRUS NL63 PCR: NOT DETECTED
CORONAVIRUS OC43 PCR: NOT DETECTED
CRYSTALS, UA: ABNORMAL /HPF
EKG ATRIAL RATE: 78 BPM
EKG P AXIS: 45 DEGREES
EKG P-R INTERVAL: 188 MS
EKG Q-T INTERVAL: 398 MS
EKG QRS DURATION: 102 MS
EKG QTC CALCULATION (BAZETT): 453 MS
EKG R AXIS: -37 DEGREES
EKG T AXIS: 35 DEGREES
EKG VENTRICULAR RATE: 78 BPM
EPITHELIAL CELLS UA: ABNORMAL /HPF (ref 0–5)
GLOBULIN: NORMAL G/DL (ref 1.5–3.8)
GLUCOSE URINE: NEGATIVE
HUMAN METAPNEUMOVIRUS PCR: NOT DETECTED
INFLUENZA A BY PCR: NOT DETECTED
INFLUENZA A H1 (2009) PCR: NORMAL
INFLUENZA A H1 PCR: NORMAL
INFLUENZA A H3 PCR: NORMAL
INFLUENZA B BY PCR: NOT DETECTED
KETONES, URINE: NEGATIVE
LEUKOCYTE ESTERASE, URINE: ABNORMAL
LV EF: 60 %
LVEF MODALITY: NORMAL
MUCUS: ABNORMAL
MYCOPLASMA PNEUMONIAE PCR: NOT DETECTED
MYOGLOBIN: 38 NG/ML (ref 25–58)
MYOGLOBIN: 48 NG/ML (ref 25–58)
MYOGLOBIN: 66 NG/ML (ref 25–58)
MYOGLOBIN: 78 NG/ML (ref 25–58)
NITRITE, URINE: NEGATIVE
OTHER OBSERVATIONS UA: ABNORMAL
PARAINFLUENZA 1 PCR: NOT DETECTED
PARAINFLUENZA 2 PCR: NOT DETECTED
PARAINFLUENZA 3 PCR: NOT DETECTED
PARAINFLUENZA 4 PCR: NOT DETECTED
PH UA: 5.5 (ref 5–8)
PROTEIN UA: NEGATIVE
RBC UA: ABNORMAL /HPF (ref 0–2)
RENAL EPITHELIAL, UA: ABNORMAL /HPF
RESP SYNCYTIAL VIRUS PCR: NOT DETECTED
RHINO/ENTEROVIRUS PCR: NOT DETECTED
SEDIMENTATION RATE, ERYTHROCYTE: 8 MM (ref 0–20)
SPECIFIC GRAVITY UA: 1.02 (ref 1–1.03)
SPECIMEN DESCRIPTION: NORMAL
TOTAL PROTEIN: 7 G/DL (ref 6.4–8.3)
TRICHOMONAS: ABNORMAL
TROPONIN INTERP: ABNORMAL
TROPONIN T: ABNORMAL NG/ML
TROPONIN, HIGH SENSITIVITY: 35 NG/L (ref 0–14)
TROPONIN, HIGH SENSITIVITY: 41 NG/L (ref 0–14)
TROPONIN, HIGH SENSITIVITY: 42 NG/L (ref 0–14)
TROPONIN, HIGH SENSITIVITY: 45 NG/L (ref 0–14)
TSH SERPL DL<=0.05 MIU/L-ACNC: 1.25 MIU/L (ref 0.3–5)
TURBIDITY: ABNORMAL
URINE HGB: NEGATIVE
UROBILINOGEN, URINE: NORMAL
WBC UA: ABNORMAL /HPF (ref 0–5)
YEAST: ABNORMAL

## 2019-10-08 PROCEDURE — 96374 THER/PROPH/DIAG INJ IV PUSH: CPT

## 2019-10-08 PROCEDURE — 6370000000 HC RX 637 (ALT 250 FOR IP): Performed by: NURSE PRACTITIONER

## 2019-10-08 PROCEDURE — 2700000000 HC OXYGEN THERAPY PER DAY

## 2019-10-08 PROCEDURE — 6370000000 HC RX 637 (ALT 250 FOR IP): Performed by: FAMILY MEDICINE

## 2019-10-08 PROCEDURE — 87581 M.PNEUMON DNA AMP PROBE: CPT

## 2019-10-08 PROCEDURE — 93010 ELECTROCARDIOGRAM REPORT: CPT | Performed by: INTERNAL MEDICINE

## 2019-10-08 PROCEDURE — 87798 DETECT AGENT NOS DNA AMP: CPT

## 2019-10-08 PROCEDURE — 72125 CT NECK SPINE W/O DYE: CPT

## 2019-10-08 PROCEDURE — 87633 RESP VIRUS 12-25 TARGETS: CPT

## 2019-10-08 PROCEDURE — 80076 HEPATIC FUNCTION PANEL: CPT

## 2019-10-08 PROCEDURE — 6360000004 HC RX CONTRAST MEDICATION: Performed by: NURSE PRACTITIONER

## 2019-10-08 PROCEDURE — 81001 URINALYSIS AUTO W/SCOPE: CPT

## 2019-10-08 PROCEDURE — 96365 THER/PROPH/DIAG IV INF INIT: CPT

## 2019-10-08 PROCEDURE — 83874 ASSAY OF MYOGLOBIN: CPT

## 2019-10-08 PROCEDURE — 87086 URINE CULTURE/COLONY COUNT: CPT

## 2019-10-08 PROCEDURE — APPSS45 APP SPLIT SHARED TIME 31-45 MINUTES: Performed by: NURSE PRACTITIONER

## 2019-10-08 PROCEDURE — 84484 ASSAY OF TROPONIN QUANT: CPT

## 2019-10-08 PROCEDURE — 70450 CT HEAD/BRAIN W/O DYE: CPT

## 2019-10-08 PROCEDURE — 6360000002 HC RX W HCPCS: Performed by: NURSE PRACTITIONER

## 2019-10-08 PROCEDURE — 36415 COLL VENOUS BLD VENIPUNCTURE: CPT

## 2019-10-08 PROCEDURE — 94760 N-INVAS EAR/PLS OXIMETRY 1: CPT

## 2019-10-08 PROCEDURE — 96361 HYDRATE IV INFUSION ADD-ON: CPT

## 2019-10-08 PROCEDURE — 6360000002 HC RX W HCPCS: Performed by: EMERGENCY MEDICINE

## 2019-10-08 PROCEDURE — 96366 THER/PROPH/DIAG IV INF ADDON: CPT

## 2019-10-08 PROCEDURE — 84443 ASSAY THYROID STIM HORMONE: CPT

## 2019-10-08 PROCEDURE — 93306 TTE W/DOPPLER COMPLETE: CPT

## 2019-10-08 PROCEDURE — 99222 1ST HOSP IP/OBS MODERATE 55: CPT | Performed by: FAMILY MEDICINE

## 2019-10-08 PROCEDURE — 94640 AIRWAY INHALATION TREATMENT: CPT

## 2019-10-08 PROCEDURE — 85651 RBC SED RATE NONAUTOMATED: CPT

## 2019-10-08 PROCEDURE — 96375 TX/PRO/DX INJ NEW DRUG ADDON: CPT

## 2019-10-08 PROCEDURE — 2060000000 HC ICU INTERMEDIATE R&B

## 2019-10-08 PROCEDURE — 87486 CHLMYD PNEUM DNA AMP PROBE: CPT

## 2019-10-08 PROCEDURE — 2580000003 HC RX 258: Performed by: NURSE PRACTITIONER

## 2019-10-08 PROCEDURE — 2580000003 HC RX 258: Performed by: FAMILY MEDICINE

## 2019-10-08 RX ORDER — OXYCODONE HYDROCHLORIDE AND ACETAMINOPHEN 5; 325 MG/1; MG/1
1 TABLET ORAL EVERY 6 HOURS PRN
Status: DISCONTINUED | OUTPATIENT
Start: 2019-10-08 | End: 2019-10-15 | Stop reason: HOSPADM

## 2019-10-08 RX ORDER — GUAIFENESIN 600 MG/1
600 TABLET, EXTENDED RELEASE ORAL 2 TIMES DAILY
Status: DISCONTINUED | OUTPATIENT
Start: 2019-10-08 | End: 2019-10-15 | Stop reason: HOSPADM

## 2019-10-08 RX ORDER — NICOTINE 21 MG/24HR
1 PATCH, TRANSDERMAL 24 HOURS TRANSDERMAL DAILY PRN
Status: DISCONTINUED | OUTPATIENT
Start: 2019-10-08 | End: 2019-10-08

## 2019-10-08 RX ORDER — CALCIUM CARBONATE/VITAMIN D3 600 MG-10
3 TABLET ORAL DAILY
Status: DISCONTINUED | OUTPATIENT
Start: 2019-10-08 | End: 2019-10-15 | Stop reason: HOSPADM

## 2019-10-08 RX ORDER — ACETAMINOPHEN 325 MG/1
650 TABLET ORAL EVERY 4 HOURS PRN
Status: DISCONTINUED | OUTPATIENT
Start: 2019-10-08 | End: 2019-10-15 | Stop reason: HOSPADM

## 2019-10-08 RX ORDER — GABAPENTIN 300 MG/1
600 CAPSULE ORAL 3 TIMES DAILY
Status: DISCONTINUED | OUTPATIENT
Start: 2019-10-08 | End: 2019-10-15

## 2019-10-08 RX ORDER — POTASSIUM CHLORIDE 7.45 MG/ML
10 INJECTION INTRAVENOUS PRN
Status: DISCONTINUED | OUTPATIENT
Start: 2019-10-08 | End: 2019-10-15 | Stop reason: HOSPADM

## 2019-10-08 RX ORDER — HYDROCHLOROTHIAZIDE 12.5 MG/1
12.5 CAPSULE, GELATIN COATED ORAL DAILY
Status: DISCONTINUED | OUTPATIENT
Start: 2019-10-08 | End: 2019-10-14

## 2019-10-08 RX ORDER — SODIUM CHLORIDE 0.9 % (FLUSH) 0.9 %
10 SYRINGE (ML) INJECTION EVERY 12 HOURS SCHEDULED
Status: DISCONTINUED | OUTPATIENT
Start: 2019-10-08 | End: 2019-10-15 | Stop reason: HOSPADM

## 2019-10-08 RX ORDER — ONDANSETRON 2 MG/ML
4 INJECTION INTRAMUSCULAR; INTRAVENOUS EVERY 6 HOURS PRN
Status: DISCONTINUED | OUTPATIENT
Start: 2019-10-08 | End: 2019-10-15 | Stop reason: HOSPADM

## 2019-10-08 RX ORDER — CLOPIDOGREL BISULFATE 75 MG/1
75 TABLET ORAL DAILY
Status: DISCONTINUED | OUTPATIENT
Start: 2019-10-08 | End: 2019-10-15 | Stop reason: HOSPADM

## 2019-10-08 RX ORDER — SODIUM CHLORIDE 0.9 % (FLUSH) 0.9 %
10 SYRINGE (ML) INJECTION PRN
Status: DISCONTINUED | OUTPATIENT
Start: 2019-10-08 | End: 2019-10-08 | Stop reason: SDUPTHER

## 2019-10-08 RX ORDER — M-VIT,TX,IRON,MINS/CALC/FOLIC 27MG-0.4MG
1 TABLET ORAL DAILY
Status: DISCONTINUED | OUTPATIENT
Start: 2019-10-08 | End: 2019-10-15 | Stop reason: HOSPADM

## 2019-10-08 RX ORDER — LANOLIN ALCOHOL/MO/W.PET/CERES
325 CREAM (GRAM) TOPICAL 2 TIMES DAILY
Status: DISCONTINUED | OUTPATIENT
Start: 2019-10-08 | End: 2019-10-15

## 2019-10-08 RX ORDER — CIPROFLOXACIN 2 MG/ML
400 INJECTION, SOLUTION INTRAVENOUS ONCE
Status: COMPLETED | OUTPATIENT
Start: 2019-10-08 | End: 2019-10-08

## 2019-10-08 RX ORDER — ONDANSETRON 4 MG/1
4 TABLET, ORALLY DISINTEGRATING ORAL EVERY 6 HOURS PRN
Status: DISCONTINUED | OUTPATIENT
Start: 2019-10-08 | End: 2019-10-15 | Stop reason: HOSPADM

## 2019-10-08 RX ORDER — DOCUSATE SODIUM 100 MG/1
100 CAPSULE, LIQUID FILLED ORAL 2 TIMES DAILY
Status: DISCONTINUED | OUTPATIENT
Start: 2019-10-08 | End: 2019-10-15 | Stop reason: HOSPADM

## 2019-10-08 RX ORDER — ASCORBIC ACID 500 MG
500 TABLET ORAL DAILY
Status: DISCONTINUED | OUTPATIENT
Start: 2019-10-08 | End: 2019-10-15 | Stop reason: HOSPADM

## 2019-10-08 RX ORDER — LANOLIN ALCOHOL/MO/W.PET/CERES
100 CREAM (GRAM) TOPICAL DAILY
Status: DISCONTINUED | OUTPATIENT
Start: 2019-10-08 | End: 2019-10-15 | Stop reason: HOSPADM

## 2019-10-08 RX ORDER — ALBUTEROL SULFATE 2.5 MG/3ML
2.5 SOLUTION RESPIRATORY (INHALATION) EVERY 8 HOURS PRN
Status: DISCONTINUED | OUTPATIENT
Start: 2019-10-08 | End: 2019-10-12

## 2019-10-08 RX ORDER — CIPROFLOXACIN 2 MG/ML
400 INJECTION, SOLUTION INTRAVENOUS EVERY 12 HOURS
Status: DISCONTINUED | OUTPATIENT
Start: 2019-10-08 | End: 2019-10-10

## 2019-10-08 RX ORDER — SODIUM CHLORIDE 9 MG/ML
INJECTION, SOLUTION INTRAVENOUS CONTINUOUS
Status: DISCONTINUED | OUTPATIENT
Start: 2019-10-08 | End: 2019-10-10

## 2019-10-08 RX ORDER — ONDANSETRON 2 MG/ML
4 INJECTION INTRAMUSCULAR; INTRAVENOUS EVERY 6 HOURS PRN
Status: DISCONTINUED | OUTPATIENT
Start: 2019-10-08 | End: 2019-10-08

## 2019-10-08 RX ORDER — TROSPIUM CHLORIDE 20 MG/1
20 TABLET, FILM COATED ORAL
Status: DISCONTINUED | OUTPATIENT
Start: 2019-10-08 | End: 2019-10-15 | Stop reason: HOSPADM

## 2019-10-08 RX ORDER — POTASSIUM CHLORIDE 20 MEQ/1
40 TABLET, EXTENDED RELEASE ORAL PRN
Status: DISCONTINUED | OUTPATIENT
Start: 2019-10-08 | End: 2019-10-15 | Stop reason: HOSPADM

## 2019-10-08 RX ADMIN — POTASSIUM CHLORIDE 40 MEQ: 20 TABLET, EXTENDED RELEASE ORAL at 10:35

## 2019-10-08 RX ADMIN — FERROUS SULFATE TAB EC 325 MG (65 MG FE EQUIVALENT) 325 MG: 325 (65 FE) TABLET DELAYED RESPONSE at 20:15

## 2019-10-08 RX ADMIN — GABAPENTIN 600 MG: 300 CAPSULE ORAL at 13:48

## 2019-10-08 RX ADMIN — DOCUSATE SODIUM 100 MG: 100 CAPSULE, LIQUID FILLED ORAL at 20:15

## 2019-10-08 RX ADMIN — OXYCODONE AND ACETAMINOPHEN 1 TABLET: 5; 325 TABLET ORAL at 14:50

## 2019-10-08 RX ADMIN — GABAPENTIN 600 MG: 300 CAPSULE ORAL at 20:15

## 2019-10-08 RX ADMIN — GABAPENTIN 600 MG: 300 CAPSULE ORAL at 09:56

## 2019-10-08 RX ADMIN — ALBUTEROL SULFATE 2.5 MG: 2.5 SOLUTION RESPIRATORY (INHALATION) at 16:09

## 2019-10-08 RX ADMIN — GUAIFENESIN 600 MG: 600 TABLET, EXTENDED RELEASE ORAL at 09:57

## 2019-10-08 RX ADMIN — TROSPIUM CHLORIDE 20 MG: 20 TABLET, FILM COATED ORAL at 09:56

## 2019-10-08 RX ADMIN — MOMETASONE FUROATE AND FORMOTEROL FUMARATE DIHYDRATE 2 PUFF: 200; 5 AEROSOL RESPIRATORY (INHALATION) at 10:02

## 2019-10-08 RX ADMIN — VITAMIN D, TAB 1000IU (100/BT) 2000 UNITS: 25 TAB at 09:56

## 2019-10-08 RX ADMIN — Medication 3 TABLET: at 09:56

## 2019-10-08 RX ADMIN — DOCUSATE SODIUM 100 MG: 100 CAPSULE, LIQUID FILLED ORAL at 09:56

## 2019-10-08 RX ADMIN — IOPAMIDOL 75 ML: 755 INJECTION, SOLUTION INTRAVENOUS at 00:23

## 2019-10-08 RX ADMIN — GUAIFENESIN 600 MG: 600 TABLET, EXTENDED RELEASE ORAL at 20:15

## 2019-10-08 RX ADMIN — CIPROFLOXACIN 400 MG: 2 INJECTION, SOLUTION INTRAVENOUS at 06:13

## 2019-10-08 RX ADMIN — ASPIRIN 81 MG 324 MG: 81 TABLET ORAL at 00:06

## 2019-10-08 RX ADMIN — PYRIDOXINE HCL TAB 50 MG 100 MG: 50 TAB at 09:56

## 2019-10-08 RX ADMIN — Medication 10 ML: at 00:26

## 2019-10-08 RX ADMIN — SODIUM CHLORIDE 80 ML: 9 INJECTION, SOLUTION INTRAVENOUS at 00:24

## 2019-10-08 RX ADMIN — MOMETASONE FUROATE AND FORMOTEROL FUMARATE DIHYDRATE 2 PUFF: 200; 5 AEROSOL RESPIRATORY (INHALATION) at 20:35

## 2019-10-08 RX ADMIN — CIPROFLOXACIN 400 MG: 2 INJECTION, SOLUTION INTRAVENOUS at 18:19

## 2019-10-08 RX ADMIN — OXYCODONE HYDROCHLORIDE AND ACETAMINOPHEN 500 MG: 500 TABLET ORAL at 09:57

## 2019-10-08 RX ADMIN — OXYCODONE AND ACETAMINOPHEN 1 TABLET: 5; 325 TABLET ORAL at 21:13

## 2019-10-08 RX ADMIN — FERROUS SULFATE TAB EC 325 MG (65 MG FE EQUIVALENT) 325 MG: 325 (65 FE) TABLET DELAYED RESPONSE at 09:57

## 2019-10-08 RX ADMIN — SODIUM CHLORIDE: 9 INJECTION, SOLUTION INTRAVENOUS at 09:58

## 2019-10-08 RX ADMIN — TROSPIUM CHLORIDE 20 MG: 20 TABLET, FILM COATED ORAL at 18:18

## 2019-10-08 RX ADMIN — TIOTROPIUM BROMIDE INHALATION SPRAY 2 PUFF: 3.12 SPRAY, METERED RESPIRATORY (INHALATION) at 10:02

## 2019-10-08 RX ADMIN — MULTIPLE VITAMINS W/ MINERALS TAB 1 TABLET: TAB at 09:57

## 2019-10-08 RX ADMIN — SODIUM CHLORIDE: 9 INJECTION, SOLUTION INTRAVENOUS at 22:21

## 2019-10-08 RX ADMIN — ALBUTEROL SULFATE 2.5 MG: 2.5 SOLUTION RESPIRATORY (INHALATION) at 20:34

## 2019-10-08 RX ADMIN — ENOXAPARIN SODIUM 40 MG: 40 INJECTION SUBCUTANEOUS at 09:56

## 2019-10-08 RX ADMIN — Medication 2 MG: at 05:42

## 2019-10-08 RX ADMIN — CLOPIDOGREL BISULFATE 75 MG: 75 TABLET ORAL at 09:57

## 2019-10-08 ASSESSMENT — PAIN SCALES - GENERAL
PAINLEVEL_OUTOF10: 7
PAINLEVEL_OUTOF10: 5
PAINLEVEL_OUTOF10: 7
PAINLEVEL_OUTOF10: 8
PAINLEVEL_OUTOF10: 7
PAINLEVEL_OUTOF10: 0
PAINLEVEL_OUTOF10: 7
PAINLEVEL_OUTOF10: 7

## 2019-10-08 ASSESSMENT — PAIN DESCRIPTION - LOCATION
LOCATION: BACK;SHOULDER
LOCATION: BACK

## 2019-10-08 ASSESSMENT — PAIN DESCRIPTION - PAIN TYPE
TYPE: CHRONIC PAIN

## 2019-10-08 ASSESSMENT — ENCOUNTER SYMPTOMS
DIARRHEA: 0
SHORTNESS OF BREATH: 1
SINUS PRESSURE: 0
PHOTOPHOBIA: 0
VOMITING: 0
WHEEZING: 0
NAUSEA: 0
ABDOMINAL DISTENTION: 0
SINUS PAIN: 0
ABDOMINAL PAIN: 0
COLOR CHANGE: 0
CONSTIPATION: 0

## 2019-10-08 NOTE — H&P
weakness and chest pain is preventing her from completing even the most basic of daily tasks. Past Medical History:     Past Medical History:   Diagnosis Date    Aortic valve disease     Pt. denies    Arthritis     Chronic back pain     on 6/7/19 pt states she is in pain mgmnt    COPD (chronic obstructive pulmonary disease) ([facility] [address]     Disease of blood and blood forming organ     Diverticulitis     GERD (gastroesophageal reflux disease)     History of blood transfusion     History of gastritis     Lumbar disc disease     Movement disorder     Nerve disease, peripheral 11/17/2015    Osteoporosis     Pneumonia 11/27/2015    Unspecified cerebral artery occlusion with cerebral infarction     TIA's        Past Surgical History:     Past Surgical History:   Procedure Laterality Date    ARTHROPLASTY Left 4/18/2017    LEFT FOOT ARTHROPLASTY  4TH DIGIT performed by Dedrick Obrien DPM at [place] [address] Left 04/18/2017    L 4th digit arthroplasty     HYSTERECTOMY      Partial    LUMBAR SPINE SURGERY  x 2    SPINAL FUSION  2012    UPPER GASTROINTESTINAL ENDOSCOPY      VARICOSE VEIN SURGERY          Medications Prior to Admission:     Prior to Admission medications    Medication Sig Start Date End Date Taking? Authorizing Provider   hydrochlorothiazide (MICROZIDE) 12.5 MG capsule Take 12.5 mg by mouth daily    Historical Provider, MD   guaiFENesin (MUCINEX) 600 MG extended release tablet Take 1 tablet by mouth 2 times daily 4/18/19   JUNIOR Carcamo NP   oxyCODONE-acetaminophen (PERCOCET) 5-325 MG per tablet Take 1 tablet by mouth every 6 hours as needed for Pain.      Historical Provider, MD   trospium (SANCTURA) 20 MG tablet Take 20 mg by mouth 2 times daily    Historical Provider, MD   Cholecalciferol (VITAMIN D) 2000 units CAPS capsule Take 1 capsule by mouth daily    Historical Provider, MD   vitamin B-6 (PYRIDOXINE) 100 MG tablet Take 100 mg by mouth daily Historical Provider, MD   vitamin C (ASCORBIC ACID) 500 MG tablet Take 500 mg by mouth daily    Historical Provider, MD   Coenzyme Q10 (COQ-10) 10 MG CAPS Take 1 tablet by mouth 3 times daily     Historical Provider, MD   ferrous sulfate 325 (65 Fe) MG tablet Take 325 mg by mouth 2 times daily    Historical Provider, MD   aspirin 81 MG EC tablet Take 81 mg by mouth daily    Historical Provider, MD   Multiple Vitamins-Minerals (THERAPEUTIC MULTIVITAMIN-MINERALS) tablet Take 1 tablet by mouth daily    Historical Provider, MD   sodium chloride (OCEAN, BABY AYR) 0.65 % nasal spray 1 spray by Nasal route as needed for Congestion    Historical Provider, MD   gabapentin (NEURONTIN) 600 MG tablet Take 600 mg by mouth 3 times daily. Jacob Hdez Historical Provider, MD   levalbuterol (XOPENEX) 1.25 MG/3ML nebulizer solution Take 1 ampule by nebulization every 8 hours as needed for Wheezing     Historical Provider, MD   Calcium Carb-Cholecalciferol (CALCIUM 600 + D) 600-200 MG-UNIT TABS Take 3 tablets by mouth daily     Historical Provider, MD   OxyCODONE ER (XTAMPZA ER) 27 MG C12A Take 27 mg by mouth every 12 hours. .    Historical Provider, MD   FRANCISCA-HYDROLAC 12 12 % cream APPLY TO AFFECTED AREA DAILY 7/30/18   Michelle Dow DPM   tiotropium (SPIRIVA) 18 MCG inhalation capsule Inhale 1 capsule into the lungs daily. 6/27/14   Bharat Garcia MD   budesonide-formoterol (SYMBICORT) 160-4.5 MCG/ACT AERO Inhale 2 puffs into the lungs 2 times daily. 6/27/14   [place] Samuels MD   albuterol (PROVENTIL HFA;VENTOLIN HFA) 108 (90 BASE) MCG/ACT inhaler Inhale 2 puffs into the lungs every 6 hours as needed for Wheezing. Historical Provider, MD   clopidogrel (PLAVIX) 75 MG tablet Take 75 mg by mouth daily. Historical Provider, MD   polyethylene glycol (GLYCOLAX) powder Take 17 g by mouth 2 times daily     Historical Provider, MD   docusate sodium (COLACE) 100 MG capsule Take 100 mg by mouth 2 times daily.     Historical Provider, Max:97.9 °F (36.6 °C)    No results for input(s): POCGLU in the last 72 hours. No intake or output data in the 24 hours ending 10/08/19 1124    Physical Exam   Constitutional: She is oriented to person, place, and time. She appears well-developed and well-nourished. No distress. HENT:   Head: Normocephalic and atraumatic. Eyes: Pupils are equal, round, and reactive to light. Right eye exhibits no discharge. Left eye exhibits no discharge. Neck: Normal range of motion. Neck supple. Cardiovascular: Normal rate, regular rhythm, S1 normal, S2 normal, intact distal pulses and normal pulses. Murmur heard. Systolic murmur is present with a grade of 4/6. Pulmonary/Chest: No accessory muscle usage. No tachypnea. No respiratory distress. She has decreased breath sounds in the right lower field and the left lower field. She has no wheezes. She has no rhonchi. She has no rales. Abdominal: Soft. Bowel sounds are normal. She exhibits no distension and no mass. There is no tenderness. Musculoskeletal: Normal range of motion. Neurological: She is alert and oriented to person, place, and time. Skin: Skin is warm and dry. Capillary refill takes less than 2 seconds. She is not diaphoretic. No erythema. No pallor. Psychiatric: She has a normal mood and affect.  Her behavior is normal. Judgment and thought content normal.       Investigations:      Laboratory Testing:  Recent Results (from the past 24 hour(s))   EKG 12 Lead    Collection Time: 10/07/19 10:30 PM   Result Value Ref Range    Ventricular Rate 78 BPM    Atrial Rate 78 BPM    P-R Interval 188 ms    QRS Duration 102 ms    Q-T Interval 398 ms    QTc Calculation (Bazett) 453 ms    P Axis 45 degrees    R Axis -37 degrees    T Axis 35 degrees   CBC with DIFF    Collection Time: 10/07/19 11:01 PM   Result Value Ref Range    WBC 5.9 3.5 - 11.3 k/uL    RBC 4.74 3.95 - 5.11 m/uL    Hemoglobin 14.0 11.9 - 15.1 g/dL    Hematocrit 43.2 36.3 - 47.1 %    MCV 91.1

## 2019-10-08 NOTE — ED PROVIDER NOTES
[facility] ED  EMERGENCY DEPARTMENT ENCOUNTER      Pt Name: Lg Mullen  MRN: [id number]  [place] 1940  Date of evaluation: 10/7/2019  Provider: JUNIOR Scanlon [id number]       Chief Complaint   Patient presents with    Chest Pain    Leg Pain     bilat         HISTORY OFPRESENT ILLNESS  (Location/Symptom, Timing/Onset, Context/Setting, Quality, Duration, Modifying Factors, Severity.)   Lg Mullen is a [de-identified] y.o. female who presents to the emergency department for evaluation of chest pain, dysuria and bilateral leg pain. Patient states she developed pain left side of her chest earlier today. Pain is intermittent and is a 5 out of 10. She denies injury. She denies cough or shortness of breath. She does have a history of COPD and is on home O2. She denies abdominal pain, nausea or vomiting. She complains of bilateral leg pain with the left leg hurting her more than the right leg. Her leg pain is 9 out of 10. This is a chronic issue for her. She had an outpatient ultrasound of her left lower extremity on September 17, 2019 which was negative for DVT. She denies injury. He also complains of foul-smelling urine and dysuria for the past couple days. She is concerned she may have urinary tract infection. The patient lives at home alone but was brought to emergency department by her granddaughter who left after the patient was brought back into the emergency room. Patient also complains of a painful bump in the back of her head after she fell 5 days ago at the back of her head. Nursing Notes were reviewed.     PASTMEDICAL HISTORY     Past Medical History:   Diagnosis Date    Aortic valve disease     Pt. denies    Arthritis     Chronic back pain     on 6/7/19 pt states she is in pain mgmnt    COPD (chronic obstructive pulmonary disease) ([facility] [address]     Disease of blood and blood forming organ     Diverticulitis     GERD (gastroesophageal reflux disease)     History of blood transfusion     History of gastritis     Lumbar disc disease     Movement disorder     Nerve disease, peripheral 11/17/2015    Osteoporosis     Pneumonia 11/27/2015    Unspecified cerebral artery occlusion with cerebral infarction     TIA's         SURGICAL HISTORY       Past Surgical History:   Procedure Laterality Date    ARTHROPLASTY Left 4/18/2017    LEFT FOOT ARTHROPLASTY  4TH DIGIT performed by Jo Ann Bowman DPM at [place] [id number] Left 04/18/2017    L 4th digit arthroplasty     HYSTERECTOMY      Partial    LUMBAR SPINE SURGERY  x 2    SPINAL FUSION  2012    UPPER GASTROINTESTINAL ENDOSCOPY      VARICOSE VEIN SURGERY           CURRENT MEDICATIONS     Previous Medications    ALBUTEROL (PROVENTIL HFA;VENTOLIN HFA) 108 (90 BASE) MCG/ACT INHALER    Inhale 2 puffs into the lungs every 6 hours as needed for Wheezing. ASPIRIN 81 MG EC TABLET    Take 81 mg by mouth daily    BUDESONIDE-FORMOTEROL (SYMBICORT) 160-4.5 MCG/ACT AERO    Inhale 2 puffs into the lungs 2 times daily. CALCIUM CARB-CHOLECALCIFEROL (CALCIUM 600 + D) 600-200 MG-UNIT TABS    Take 3 tablets by mouth daily     CHOLECALCIFEROL (VITAMIN D) 2000 UNITS CAPS CAPSULE    Take 1 capsule by mouth daily    CLOPIDOGREL (PLAVIX) 75 MG TABLET    Take 75 mg by mouth daily. COENZYME Q10 (COQ-10) 10 MG CAPS    Take 1 tablet by mouth 3 times daily     DOCUSATE SODIUM (COLACE) 100 MG CAPSULE    Take 100 mg by mouth 2 times daily. FERROUS SULFATE 325 (65 FE) MG TABLET    Take 325 mg by mouth 2 times daily    GABAPENTIN (NEURONTIN) 600 MG TABLET    Take 600 mg by mouth 3 times daily. Jaime Temperance     FRANCISCA-HYDROLAC 12 12 % CREAM    APPLY TO AFFECTED AREA DAILY    GUAIFENESIN (MUCINEX) 600 MG EXTENDED RELEASE TABLET    Take 1 tablet by mouth 2 times daily    HYDROCHLOROTHIAZIDE (MICROZIDE) 12.5 MG CAPSULE    Take 12.5 mg by mouth daily    LEVALBUTEROL (XOPENEX) 1.25 MG/3ML NEBULIZER SOLUTION    Take 1 ampule by nebulization every 8 hours as needed for Wheezing     MULTIPLE VITAMINS-MINERALS (THERAPEUTIC MULTIVITAMIN-MINERALS) TABLET    Take 1 tablet by mouth daily    OXYCODONE ER (XTAMPZA ER) 27 MG C12A    Take 27 mg by mouth every 12 hours. .    OXYCODONE-ACETAMINOPHEN (PERCOCET) 5-325 MG PER TABLET    Take 1 tablet by mouth every 6 hours as needed for Pain. POLYETHYLENE GLYCOL (GLYCOLAX) POWDER    Take 17 g by mouth 2 times daily     SODIUM CHLORIDE (OCEAN, BABY AYR) 0.65 % NASAL SPRAY    1 spray by Nasal route as needed for Congestion    TIOTROPIUM (SPIRIVA) 18 MCG INHALATION CAPSULE    Inhale 1 capsule into the lungs daily. TROSPIUM (SANCTURA) 20 MG TABLET    Take 20 mg by mouth 2 times daily    VITAMIN B-6 (PYRIDOXINE) 100 MG TABLET    Take 100 mg by mouth daily    VITAMIN C (ASCORBIC ACID) 500 MG TABLET    Take 500 mg by mouth daily       ALLERGIES     Anti-inflammatory enzyme [nutritional supplements]; Ceclor [cefaclor]; Morphine; Nsaids; Penicillins; Propoxyphene; Skelaxin [metaxalone]; Sulfa antibiotics; Tadalafil; Tolmetin; Alendronate; Asa [aspirin]; Erythromycin; Temazepam; Tetracyclines & related; and Tramadol    FAMILY HISTORY     History reviewed. No pertinent family history.        SOCIAL HISTORY       Social History     Socioeconomic History    Marital status:      Spouse name: None    Number of children: None    Years of education: None    Highest education level: None   Occupational History    None   Social Needs    Financial resource strain: None    Food insecurity:     Worry: None     Inability: None    Transportation needs:     Medical: None     Non-medical: None   Tobacco Use    Smoking status: Former Smoker     Last attempt to quit: 1994     Years since quittin.2    Smokeless tobacco: Never Used   Substance and Sexual Activity    Alcohol use: No    Drug use: No    Sexual activity: Never   Lifestyle    Physical activity:     Days per week: None     Minutes per session: None    Stress: None   Relationships    Social connections:     Talks on phone: None     Gets together: None     Attends [employer] service: None     Active member of club or organization: None     Attends meetings of clubs or organizations: None     Relationship status: None    Intimate partner violence:     Fear of current or ex partner: None     Emotionally abused: None     Physically abused: None     Forced sexual activity: None   Other Topics Concern    None   Social History Narrative    None         REVIEW OF SYSTEMS    (2-9 systems for level 4, 10 or more for level 5)     Review of Systems   Constitutional: Positive for activity change. Respiratory: Negative for cough, shortness of breath and wheezing. Cardiovascular: Positive for chest pain and leg swelling. Gastrointestinal: Negative for abdominal pain, nausea and vomiting. Genitourinary: Positive for dysuria. Musculoskeletal: Positive for arthralgias and gait problem. Neurological: Negative for dizziness and headaches. All other systems reviewed and are negative. Except as noted above the remainder of the review of systems was reviewed and negative. PHYSICAL EXAM    (up to 7 for level 4, 8 or more for level 5)     ED Triage Vitals [10/07/19 2241]   BP Temp Temp src Pulse Resp SpO2 Height Weight   104/85 -- -- 78 19 96 % -- --       Physical Exam   Constitutional: She is oriented to person, place, and time. She appears well-developed and well-nourished. HENT:   Head: Normocephalic and atraumatic. Head is without abrasion, without contusion and without laceration. Right Ear: Hearing and external ear normal.   Left Ear: Hearing and external ear normal.   Nose: Nose normal.   Mouth/Throat: Uvula is midline, oropharynx is clear and moist and mucous membranes are normal.   Eyes: Pupils are equal, round, and reactive to light.  Conjunctivae, EOM and lids are normal.   Neck: Normal range of motion and full passive range of motion without pain. Neck supple. Cardiovascular: Normal rate, regular rhythm, S1 normal, S2 normal, normal heart sounds, intact distal pulses and normal pulses. Pulmonary/Chest: Effort normal and breath sounds normal. She exhibits no tenderness. Abdominal: Soft. Normal appearance and bowel sounds are normal. She exhibits no distension. There is no tenderness. Musculoskeletal: Normal range of motion. Right lower leg: She exhibits tenderness and edema. Left lower leg: She exhibits tenderness and edema. Legs:  1+ edema noted to both lower extremities. There is no asymmetry. No erythema or ecchymosis. There is tenderness palpation to the left and right calf as well as the left upper leg. Neurological: She is alert and oriented to person, place, and time. She has normal strength. No cranial nerve deficit or sensory deficit. GCS eye subscore is 4. GCS verbal subscore is 5. GCS motor subscore is 6. Skin: Skin is warm, dry and intact. Capillary refill takes less than 2 seconds. No ecchymosis noted. No erythema. Psychiatric: She has a normal mood and affect.  Her behavior is normal. Judgment and thought content normal.         DIAGNOSTIC RESULTS     EKG:All EKG's are interpreted by the Emergency Department Physician who either signs or Co-signs this chart in the absence of a cardiologist.    EKG interpreted by attending physician    RADIOLOGY:   Non-plain film images such as CT, Ultrasound and MRI are read by theradiologist. Lobo Fine radiographic images are visualized and preliminarily interpreted by the emergency physician with the below findings:    Xr Chest Portable    Result Date: 10/8/2019  EXAMINATION: ONE XRAY VIEW OF THE CHEST 10/7/2019 11:32 pm COMPARISON: 04/17/2019 HISTORY: ORDERING SYSTEM PROVIDED HISTORY: SOB TECHNOLOGIST PROVIDED HISTORY: SOB Reason for Exam: chest pain   lower left Relevant Medical/Surgical History: COPD   GERD FINDINGS: The cardiac silhouette is within

## 2019-10-08 NOTE — ED NOTES
Pt daughter called and informed staff about knot on back of pt head from a fall. Small contusion and swelling noted to back of head of pt. Pt states she rolled out of bed last Wednesday. Pt denies loss of consciousness from fall. Pt denies nausea, vomiting. Pt denies any dizziness.       Jenna Fisher, RN  10/08/19 1300 [facility] Parag Means RN  10/08/19 0110

## 2019-10-08 NOTE — ED NOTES
Pt arrived to ED with family with c/o bilateral lower leg swelling and pain, chest pain. Pt family concerned because of pt bilateral leg pain and states pt legs are more swollen than normal. Pt denies chest pain at this time. Pt talking in full sentences without complication. Pt wears home O2 at all time. Pt denies nausea, vomiting. Respirations non labored. Skin warm and dry. Skin color appropriate to race. Pt A&Ox4.       Felicitas Centeno RN  10/08/19 2519

## 2019-10-09 PROBLEM — R54 AGE-RELATED PHYSICAL DEBILITY: Status: ACTIVE | Noted: 2019-10-09

## 2019-10-09 LAB
-: NORMAL
AMORPHOUS: NORMAL
ANION GAP SERPL CALCULATED.3IONS-SCNC: 9 MMOL/L (ref 9–17)
BACTERIA: NORMAL
BILIRUBIN URINE: NEGATIVE
BUN BLDV-MCNC: 10 MG/DL (ref 8–23)
BUN/CREAT BLD: 17 (ref 9–20)
CALCIUM SERPL-MCNC: 9 MG/DL (ref 8.6–10.4)
CASTS UA: NORMAL /LPF
CHLORIDE BLD-SCNC: 109 MMOL/L (ref 98–107)
CO2: 24 MMOL/L (ref 20–31)
COLOR: YELLOW
COMMENT UA: ABNORMAL
CREAT SERPL-MCNC: 0.6 MG/DL (ref 0.5–0.9)
CRYSTALS, UA: NORMAL /HPF
CULTURE: NORMAL
EKG ATRIAL RATE: 89 BPM
EKG P AXIS: 22 DEGREES
EKG P-R INTERVAL: 180 MS
EKG Q-T INTERVAL: 402 MS
EKG QRS DURATION: 100 MS
EKG QTC CALCULATION (BAZETT): 489 MS
EKG R AXIS: -46 DEGREES
EKG T AXIS: 21 DEGREES
EKG VENTRICULAR RATE: 89 BPM
EPITHELIAL CELLS UA: NORMAL /HPF (ref 0–5)
GFR AFRICAN AMERICAN: >60 ML/MIN
GFR NON-AFRICAN AMERICAN: >60 ML/MIN
GFR SERPL CREATININE-BSD FRML MDRD: ABNORMAL ML/MIN/{1.73_M2}
GFR SERPL CREATININE-BSD FRML MDRD: ABNORMAL ML/MIN/{1.73_M2}
GLUCOSE BLD-MCNC: 92 MG/DL (ref 70–99)
GLUCOSE URINE: NEGATIVE
HCT VFR BLD CALC: 34.7 % (ref 36.3–47.1)
HEMOGLOBIN: 11.2 G/DL (ref 11.9–15.1)
INR BLD: 1.1
KETONES, URINE: NEGATIVE
LEUKOCYTE ESTERASE, URINE: ABNORMAL
Lab: NORMAL
MCH RBC QN AUTO: 29.7 PG (ref 25.2–33.5)
MCHC RBC AUTO-ENTMCNC: 32.3 G/DL (ref 28.4–34.8)
MCV RBC AUTO: 92 FL (ref 82.6–102.9)
MUCUS: NORMAL
NITRITE, URINE: NEGATIVE
NRBC AUTOMATED: 0 PER 100 WBC
OTHER OBSERVATIONS UA: NORMAL
PDW BLD-RTO: 13.5 % (ref 11.8–14.4)
PH UA: 6 (ref 5–8)
PLATELET # BLD: 125 K/UL (ref 138–453)
PMV BLD AUTO: 10.9 FL (ref 8.1–13.5)
POTASSIUM SERPL-SCNC: 3.7 MMOL/L (ref 3.7–5.3)
PROTEIN UA: NEGATIVE
PROTHROMBIN TIME: 11 SEC (ref 9.7–11.6)
RBC # BLD: 3.77 M/UL (ref 3.95–5.11)
RBC UA: NORMAL /HPF (ref 0–2)
RENAL EPITHELIAL, UA: NORMAL /HPF
SODIUM BLD-SCNC: 142 MMOL/L (ref 135–144)
SPECIFIC GRAVITY UA: 1.01 (ref 1–1.03)
SPECIMEN DESCRIPTION: NORMAL
TRICHOMONAS: NORMAL
TURBIDITY: CLEAR
URINE HGB: NEGATIVE
UROBILINOGEN, URINE: NORMAL
WBC # BLD: 3.5 K/UL (ref 3.5–11.3)
WBC UA: NORMAL /HPF (ref 0–5)
YEAST: NORMAL

## 2019-10-09 PROCEDURE — 97166 OT EVAL MOD COMPLEX 45 MIN: CPT

## 2019-10-09 PROCEDURE — 96366 THER/PROPH/DIAG IV INF ADDON: CPT

## 2019-10-09 PROCEDURE — 6370000000 HC RX 637 (ALT 250 FOR IP): Performed by: NURSE PRACTITIONER

## 2019-10-09 PROCEDURE — 96361 HYDRATE IV INFUSION ADD-ON: CPT

## 2019-10-09 PROCEDURE — 99232 SBSQ HOSP IP/OBS MODERATE 35: CPT | Performed by: INTERNAL MEDICINE

## 2019-10-09 PROCEDURE — 2700000000 HC OXYGEN THERAPY PER DAY

## 2019-10-09 PROCEDURE — 94640 AIRWAY INHALATION TREATMENT: CPT

## 2019-10-09 PROCEDURE — 85027 COMPLETE CBC AUTOMATED: CPT

## 2019-10-09 PROCEDURE — 6370000000 HC RX 637 (ALT 250 FOR IP): Performed by: FAMILY MEDICINE

## 2019-10-09 PROCEDURE — 81001 URINALYSIS AUTO W/SCOPE: CPT

## 2019-10-09 PROCEDURE — 80048 BASIC METABOLIC PNL TOTAL CA: CPT

## 2019-10-09 PROCEDURE — 97535 SELF CARE MNGMENT TRAINING: CPT

## 2019-10-09 PROCEDURE — 97162 PT EVAL MOD COMPLEX 30 MIN: CPT

## 2019-10-09 PROCEDURE — 2060000000 HC ICU INTERMEDIATE R&B

## 2019-10-09 PROCEDURE — 2580000003 HC RX 258: Performed by: NURSE PRACTITIONER

## 2019-10-09 PROCEDURE — 97530 THERAPEUTIC ACTIVITIES: CPT

## 2019-10-09 PROCEDURE — 94760 N-INVAS EAR/PLS OXIMETRY 1: CPT

## 2019-10-09 PROCEDURE — 36415 COLL VENOUS BLD VENIPUNCTURE: CPT

## 2019-10-09 PROCEDURE — 85610 PROTHROMBIN TIME: CPT

## 2019-10-09 PROCEDURE — APPSS45 APP SPLIT SHARED TIME 31-45 MINUTES: Performed by: NURSE PRACTITIONER

## 2019-10-09 PROCEDURE — 6360000002 HC RX W HCPCS: Performed by: NURSE PRACTITIONER

## 2019-10-09 PROCEDURE — 97116 GAIT TRAINING THERAPY: CPT

## 2019-10-09 PROCEDURE — 87086 URINE CULTURE/COLONY COUNT: CPT

## 2019-10-09 RX ORDER — TETRAHYDROZOLINE HCL 0.05 %
1 DROPS OPHTHALMIC (EYE) EVERY 4 HOURS PRN
Status: DISCONTINUED | OUTPATIENT
Start: 2019-10-09 | End: 2019-10-15 | Stop reason: HOSPADM

## 2019-10-09 RX ORDER — AMMONIUM LACTATE 12 G/100G
LOTION TOPICAL PRN
Status: DISCONTINUED | OUTPATIENT
Start: 2019-10-09 | End: 2019-10-15 | Stop reason: HOSPADM

## 2019-10-09 RX ADMIN — OXYCODONE AND ACETAMINOPHEN 1 TABLET: 5; 325 TABLET ORAL at 09:27

## 2019-10-09 RX ADMIN — GABAPENTIN 600 MG: 300 CAPSULE ORAL at 13:13

## 2019-10-09 RX ADMIN — OXYCODONE HYDROCHLORIDE AND ACETAMINOPHEN 500 MG: 500 TABLET ORAL at 09:27

## 2019-10-09 RX ADMIN — DOCUSATE SODIUM 100 MG: 100 CAPSULE, LIQUID FILLED ORAL at 09:26

## 2019-10-09 RX ADMIN — TIOTROPIUM BROMIDE INHALATION SPRAY 2 PUFF: 3.12 SPRAY, METERED RESPIRATORY (INHALATION) at 08:41

## 2019-10-09 RX ADMIN — Medication 3 TABLET: at 09:24

## 2019-10-09 RX ADMIN — TROSPIUM CHLORIDE 20 MG: 20 TABLET, FILM COATED ORAL at 09:54

## 2019-10-09 RX ADMIN — VITAMIN D, TAB 1000IU (100/BT) 2000 UNITS: 25 TAB at 09:28

## 2019-10-09 RX ADMIN — DOCUSATE SODIUM 100 MG: 100 CAPSULE, LIQUID FILLED ORAL at 21:33

## 2019-10-09 RX ADMIN — CLOPIDOGREL BISULFATE 75 MG: 75 TABLET ORAL at 09:24

## 2019-10-09 RX ADMIN — GUAIFENESIN 600 MG: 600 TABLET, EXTENDED RELEASE ORAL at 21:34

## 2019-10-09 RX ADMIN — SODIUM CHLORIDE: 9 INJECTION, SOLUTION INTRAVENOUS at 21:48

## 2019-10-09 RX ADMIN — MOMETASONE FUROATE AND FORMOTEROL FUMARATE DIHYDRATE 2 PUFF: 200; 5 AEROSOL RESPIRATORY (INHALATION) at 08:39

## 2019-10-09 RX ADMIN — MULTIPLE VITAMINS W/ MINERALS TAB 1 TABLET: TAB at 09:27

## 2019-10-09 RX ADMIN — GABAPENTIN 600 MG: 300 CAPSULE ORAL at 09:24

## 2019-10-09 RX ADMIN — GABAPENTIN 600 MG: 300 CAPSULE ORAL at 21:33

## 2019-10-09 RX ADMIN — ALBUTEROL SULFATE 2.5 MG: 2.5 SOLUTION RESPIRATORY (INHALATION) at 08:37

## 2019-10-09 RX ADMIN — FERROUS SULFATE TAB EC 325 MG (65 MG FE EQUIVALENT) 325 MG: 325 (65 FE) TABLET DELAYED RESPONSE at 09:24

## 2019-10-09 RX ADMIN — Medication 1 DROP: at 13:14

## 2019-10-09 RX ADMIN — CIPROFLOXACIN 400 MG: 2 INJECTION, SOLUTION INTRAVENOUS at 16:56

## 2019-10-09 RX ADMIN — PYRIDOXINE HCL TAB 50 MG 100 MG: 50 TAB at 09:28

## 2019-10-09 RX ADMIN — SODIUM CHLORIDE: 9 INJECTION, SOLUTION INTRAVENOUS at 09:58

## 2019-10-09 RX ADMIN — FERROUS SULFATE TAB EC 325 MG (65 MG FE EQUIVALENT) 325 MG: 325 (65 FE) TABLET DELAYED RESPONSE at 21:34

## 2019-10-09 RX ADMIN — GUAIFENESIN 600 MG: 600 TABLET, EXTENDED RELEASE ORAL at 09:27

## 2019-10-09 RX ADMIN — TROSPIUM CHLORIDE 20 MG: 20 TABLET, FILM COATED ORAL at 21:44

## 2019-10-09 RX ADMIN — SODIUM CHLORIDE, PRESERVATIVE FREE 10 ML: 5 INJECTION INTRAVENOUS at 09:28

## 2019-10-09 ASSESSMENT — PAIN DESCRIPTION - PAIN TYPE
TYPE: CHRONIC PAIN

## 2019-10-09 ASSESSMENT — PAIN DESCRIPTION - FREQUENCY
FREQUENCY: CONTINUOUS
FREQUENCY: CONTINUOUS

## 2019-10-09 ASSESSMENT — PAIN DESCRIPTION - LOCATION
LOCATION: BACK
LOCATION: BACK;BREAST
LOCATION: BACK

## 2019-10-09 ASSESSMENT — PAIN - FUNCTIONAL ASSESSMENT
PAIN_FUNCTIONAL_ASSESSMENT: PREVENTS OR INTERFERES SOME ACTIVE ACTIVITIES AND ADLS
PAIN_FUNCTIONAL_ASSESSMENT: PREVENTS OR INTERFERES SOME ACTIVE ACTIVITIES AND ADLS

## 2019-10-09 ASSESSMENT — PAIN DESCRIPTION - DESCRIPTORS
DESCRIPTORS: ACHING;BURNING;HEAVINESS
DESCRIPTORS: ACHING;BURNING;CONSTANT

## 2019-10-09 ASSESSMENT — PAIN SCALES - GENERAL
PAINLEVEL_OUTOF10: 5
PAINLEVEL_OUTOF10: 0
PAINLEVEL_OUTOF10: 7

## 2019-10-09 ASSESSMENT — PAIN DESCRIPTION - ONSET
ONSET: ON-GOING
ONSET: ON-GOING

## 2019-10-09 ASSESSMENT — PAIN DESCRIPTION - ORIENTATION
ORIENTATION: LOWER
ORIENTATION: LOWER;LEFT

## 2019-10-09 ASSESSMENT — PAIN DESCRIPTION - PROGRESSION
CLINICAL_PROGRESSION: NOT CHANGED

## 2019-10-09 NOTE — CONSULTS
nausea, vomiting, diarrhea, or constipation. Denies epistaxis, hemoptysis, hematemesis, hematuria, hematochezia, or melena. History: Allergies as of 10/07/2019 - Review Complete 10/07/2019   Allergen Reaction Noted    Anti-inflammatory enzyme [nutritional supplements]  11/27/2015    Ceclor [cefaclor]  11/17/2015    Morphine Other (See Comments) 07/07/2017    Nsaids  11/27/2015    Penicillins  11/17/2015    Propoxyphene  11/17/2015    Skelaxin [metaxalone]  11/27/2015    Sulfa antibiotics Hives 07/07/2017    Tadalafil  04/13/2017    Tolmetin  11/27/2015    Alendronate Nausea And Vomiting and Nausea Only 06/25/2014    Asa [aspirin] Other (See Comments) 06/25/2014    Erythromycin Nausea And Vomiting and Nausea Only 06/25/2014    Temazepam Nausea And Vomiting and Nausea Only 06/25/2014    Tetracyclines & related Nausea And Vomiting 06/25/2014    Tramadol Nausea And Vomiting and Nausea Only 06/25/2014       Prior to Admission medications    Medication Sig Start Date End Date Taking? Authorizing Provider   hydrochlorothiazide (MICROZIDE) 12.5 MG capsule Take 12.5 mg by mouth daily    Historical Provider, MD   guaiFENesin (MUCINEX) 600 MG extended release tablet Take 1 tablet by mouth 2 times daily 4/18/19   JUNIOR Carcamo NP   oxyCODONE-acetaminophen (PERCOCET) 5-325 MG per tablet Take 1 tablet by mouth every 6 hours as needed for Pain.      Historical Provider, MD   trospium (SANCTURA) 20 MG tablet Take 20 mg by mouth 2 times daily    Historical Provider, MD   Cholecalciferol (VITAMIN D) 2000 units CAPS capsule Take 1 capsule by mouth daily    Historical Provider, MD   vitamin B-6 (PYRIDOXINE) 100 MG tablet Take 100 mg by mouth daily    Historical Provider, MD   vitamin C (ASCORBIC ACID) 500 MG tablet Take 500 mg by mouth daily    Historical Provider, MD   Coenzyme Q10 (COQ-10) 10 MG CAPS Take 1 tablet by mouth 3 times daily     Historical Provider, MD   ferrous sulfate 325 (65 Fe) MG She has never used smokeless tobacco. She reports that she does not drink alcohol or use drugs. She She indicated that her mother is . She indicated that her father is . Her family history includes Heart Disease in her father and mother. Review of Systems:       As above in HPI, otherwise negative, and/or noncontributory. Physical Examination:     BP (!) 111/58   Pulse 69   Temp 98.1 °F (36.7 °C) (Oral)   Resp 18   Ht 5' 4.96\" (1.65 m)   Wt 138 lb 0.1 oz (62.6 kg)   SpO2 97%   BMI 22.99 kg/m²     General: Awake, alert. Oriented. Cooperative. Well developed and well nourished. Head: Normocephalic. Atraumatic. Eyes: PERRL. No erythema. No scleral icterus. Neck: Supple. Atraumatic. Trachea midline. Respiratory: Lung sounds diminished. Regular rate. No distress noted with respirations. Cardiovascular: S1S2. Regular rate and rhythm. + Systolic murmur noted. No edema. Carotids 2+ without bruit bilaterally. PPP. Gastrointestinal: Bowel sounds active. Non-tender, non-distended. Soft. Musculoskeletal: Strength equal bilaterally. Normal tone. Normal ROM throughout. Dermatological: Skin intact. No rash. No lesions. No jaundice. Hematological: No bruising or bleeding. Neurological: Oriented to person, place, and time. Cranial nerves II-XII grossly intact. Diagnostic Testing:       CBC:   Recent Labs     10/07/19  2301   WBC 5.9   HGB 14.0   HCT 43.2   MCV 91.1   *      BMP:   Recent Labs     10/07/19  2301      K 3.5*      CO2 28   BUN 14   CREATININE 0.68     Magnesium:   Recent Labs     10/07/19  2301   MG 1.7     Troponin: Invalid input(s): TROP    ECG/Telemetry reviewed- Appears sinus. 10/7/2019 Echo:  Left ventricle is normal in size. Mild left ventricular hypertrophy. Global left ventricular systolic function is normal with an estimated  ejection fraction of 60 % . No obvious wall motion abnormality seen.   Grade I (mild) left ventricular diastolic dysfunction. Left atrium is moderately dilated. Right atrium is moderately dilated . Aortic valve sclerosis without stenosis. Mild aortic insufficiency. Mitral annular calcification is seen with thickened mitral valve leaflets. Mild to moderate mitral regurgitation. Mild to Moderate tricuspid regurgitation. Moderate pulmonary hypertension with an estimated right ventricular systolic  pressure of 52 mmHg. No significant pericardial effusion is seen. Cardiovascular Database:     11/2018 Echo ([facility]):  Mild left ventricular hypertrophy  Global left ventricular systolic function is normal  Estimated ejection fraction is 65-70 % . Left atrium is mildly dilated. Right atrium is mildly dilated . Aortic valve sclerosis without stenosis. Mild aortic insufficiency. Mitral annular calcification is seen. Mild mitral regurgitation. Mild tricuspid regurgitation. Mild pulmonary hypertension. No pericardial effusion seen. Normal aortic root dimension. 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 12/2018 Echo ([facility]):  · The estimated left ventricular ejection fraction is 55 - 60%. Normal left ventricular ejection fraction. · The inferior vena cava is dilated suggesting increased right atrial pressure. A lack of respiratory variation of the inferior vena cava is noted. · The interatrial septum appears aneurysmal.  · Aortic stenosis: mild. The calculated aortic valve area is 1.8 cm2. The peak gradient is 13 mmHg. The mean gradient is 6 mmHg. Mild aortic valve regurgitation. · Mild mitral regurgitation  · Mild to moderate tricuspid valve regurgitation. Calculated RVSP: 61 mmHg. Patient case will be discussed with Dr. Maribel Crawford and any follow-up will be done accordingly by them. Thank you for the kind referral. If anything is needed in regards to patient care, please do not hesitate to call for further assistance. JUNIOR Loaiza-CNP, MSN      Office:  [phone])

## 2019-10-09 NOTE — PROGRESS NOTES
disease, OA, GERD, TIA  General Comment  Comments: RNMeliza PT  Subjective  Subjective: Pt agreeable to PT  Pain Screening  Patient Currently in Pain: Yes  Pain Assessment  Pain Assessment: 0-10  Pain Type: Chronic pain  Pain Location: Back  Vital Signs  Patient Currently in Pain: Yes       Orientation  Orientation  Overall Orientation Status: Within Functional Limits  Social/Functional History  Social/Functional History  Lives With: Alone  Type of Home: Apartment  Home Layout: One level  Home Access: Level entry  Bathroom Shower/Tub: Tub/Shower unit  Bathroom Toilet: Standard(pt has a RTS and not currently on )  Bathroom Equipment: Shower chair  Home Equipment: [employer], Rolling walker, Oxygen  Receives Help From: Family, Personal care attendant(pt states her dtr is supportive and is local ; HHA 5x/week 5 hrs a day )  ADL Assistance: Needs assistance(pt is I except for HHA assists with shower )  Homemaking Assistance: Needs assistance(HHA assists with all IADLS )  Homemaking Responsibilities: No  Ambulation Assistance: Independent(with RW )  Transfer Assistance: Independent  Active : No  Mode of Transportation: Cab  Occupation: Retired  Type of occupation: selling insurance, [employer]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   Leisure & Hobbies: reading and watching TV   Cognition   Cognition  Overall Cognitive Status: Exceptions  Arousal/Alertness: Appropriate responses to stimuli  Following Commands: Follows one step commands with increased time; Follows one step commands with repetition  Attention Span: Attends with cues to redirect  Memory: Decreased short term memory  Safety Judgement: Decreased awareness of need for safety;Decreased awareness of need for assistance  Problem Solving: Decreased awareness of errors;Assistance required to identify errors made;Assistance required to implement solutions;Assistance required to correct errors made;Assistance required to generate solutions  Insights: Decreased awareness

## 2019-10-09 NOTE — FLOWSHEET NOTE
Luis Angel Jim called per writer for topical cream pt states she uses on her feet. Per Luis Angel Jim it is Ammonium Lactate 12% to affected area as needed.  Dr Edilma Hagen notified and ordered

## 2019-10-09 NOTE — FLOWSHEET NOTE
Patient is awake and alert while reclining in bed with family member bedside. Patient is approachable and engages in conversation. Writer is familiar with patient from previous admission. Patient shares that she expects to be here [place] day or two before going to rehab. \" Patient requests a prayer and writer offers a prayer for healing, peace, and a restful night. Patient appears to be coping and seems to have adequate family support. Patient expresses appreciation for the visit. Spiritual care will follow as needed.        10/09/19 1850   Encounter Summary   Services provided to: Patient and family together   Referral/Consult From: [address] [facility] Children;Family members   Continue Visiting   (10/9/19)   Complexity of Encounter Low   Length of Encounter 15 minutes   Spiritual Assessment Completed Yes   Routine   Type Initial   Assessment Approachable   Intervention Active listening;Explored feelings, thoughts, concerns;Explored coping resources;Nurtured hope;Prayer   Outcome Expressed gratitude

## 2019-10-09 NOTE — PROGRESS NOTES
Occupational Therapy   Occupational Therapy Initial Assessment  Date: 10/9/2019   Patient Name: John Herman  MRN: [id number]     : 1940    RN Meliza reports patient is medically stable for therapy treatment this date. Chart reviewed prior to treatment and patient is agreeable for therapy. All lines intact and patient positioned comfortably at end of treatment. All patient needs addressed prior to ending therapy session. Date of Service: 10/9/2019    Discharge Recommendations:  Subacute/Skilled Nursing Facility  OT Equipment Recommendations  Equipment Needed: Yes  Mobility Devices: ADL Assistive Devices  ADL Assistive Devices: Long-handled Sponge;Reacher;Long-handled Shoe Horn;Emergency Alert System; Toileting - 3-in-1 Commode;Grab Bars - shower    Assessment   Performance deficits / Impairments: Decreased functional mobility ; Decreased ADL status; Decreased balance;Decreased high-level IADLs;Decreased safe awareness;Decreased strength;Decreased endurance;Decreased vision/visual deficit; Decreased ROM; Decreased coordination  Assessment: Skilled OT POC is recommended to address above deficits and maximize I and safety as able to return home with family assist as needed.     Prognosis: Fair  Decision Making: Medium Complexity  OT Education: OT Role;Transfer Training;Plan of Care  Patient Education: DC recommendations, safety in function, call light use/fall prevention, focus in tx, pursed lip breathing, proper bed mob tech, EC/WS tech, postural control and weight shifting   Barriers to Learning: STM deficits and tangent speech/easily distracted and max instruction/cues needed for focus   REQUIRES OT FOLLOW UP: Yes  Activity Tolerance  Activity Tolerance: Patient limited by fatigue;Patient limited by pain(limited by tangent speech/easily distracted and max redirection needed in function )  Activity Tolerance: fair minus   Safety Devices  Safety Devices in place: Yes  Type of devices: Call light within reach; Left in chair;Patient at risk for falls;Gait belt;Nurse notified(RN in room with pt )           Patient Diagnosis(es): The primary encounter diagnosis was Urinary tract infection without hematuria, site unspecified. A diagnosis of Myalgia was also pertinent to this visit. has a past medical history of Aortic valve disease, Arthritis, Chronic back pain, COPD (chronic obstructive pulmonary disease) ([address] Disease of blood and blood forming organ, Diverticulitis, GERD (gastroesophageal reflux disease), History of blood transfusion, History of gastritis, Lumbar disc disease, Movement disorder, Nerve disease, peripheral, Osteoporosis, Pneumonia, and Unspecified cerebral artery occlusion with cerebral infarction. has a past surgical history that includes Spinal fusion (2012); Varicose vein surgery; Lumbar spine surgery (x 2); Upper gastrointestinal endoscopy; Colonoscopy; Hysterectomy; Foot surgery (Left, 04/18/2017); and arthroplasty (Left, 4/18/2017). PER ER notes:  Carmenza Marrero is a [de-identified] y.o. female who presents to the emergency department for evaluation of chest pain, dysuria and bilateral leg pain. Patient states she developed pain left side of her chest earlier today. Pain is intermittent and is a 5 out of 10. She denies injury. She denies cough or shortness of breath. She does have a history of COPD and is on home O2. She denies abdominal pain, nausea or vomiting. She complains of bilateral leg pain with the left leg hurting her more than the right leg. Her leg pain is 9 out of 10. This is a chronic issue for her. She had an outpatient ultrasound of her left lower extremity on September 17, 2019 which was negative for DVT. She denies injury. He also complains of foul-smelling urine and dysuria for the past couple days. She is concerned she may have urinary tract infection.   The patient lives at home alone but was brought to emergency department by her granddaughter who left after and max verbal instruction/tactile assist for upright/midline posture )  Observation: O2 per NC, BUE IV. Pt with tangent speech, easily distracted and difficutlies with focus, max redirection needed in function   Edema: none   Balance  Sitting Balance: Contact guard assistance(CG to SBA )  Standing Balance: Minimal assistance(with RW )  Standing Balance  Time: stand chon < 40 seconds with RW for self care and functional tasks   Functional Mobility  Activity: (bed to [facility] and to bedside chair all with increased time and max redirection needed for focus and pt easily distracted and with tangent speech )  Assist Level: Minimal assistance  Functional Mobility Comments: max verbal instruction/tactile assist for upright posture, RW safety, weight shifting, pursed lip breahting, staying focused on task at hand and awareness/assist with all lines   Toilet Transfers  Toilet - Technique: Ambulating  Equipment Used: Standard bedside commode  Toilet Transfer: Moderate assistance  Toilet Transfers Comments: max verbal instruction/tactile assist for hand placement, upright posture, RW safety, pursed lip breathing, staying focused on task at hand, weight shifting, controlled stand to sits, and awareness/assist with all lines  ADL  Feeding: Setup  Grooming: Setup;Minimal assistance(seated for dentures )  UE Bathing: Setup;Minimal assistance  LE Bathing: Setup;Maximum assistance  UE Dressing: Setup; Moderate assistance(with hosp gown )  LE Dressing: Maximum assistance for B socks   Toileting: Maximum assistance(with use of BSC and max assist for clothing mgt down and up; CG/min assist for balance standing with RW for erica hygiene after urination )  *Increased time needed to complete any functional tasks due to pt easily distracted/unable to focus, tangent speech and max redirection needed.     Tone RUE  RUE Tone: Normotonic  Tone LUE  LUE Tone: Normotonic  Coordination  Movements Are Fluid And Coordinated: Yes  Coordination and

## 2019-10-09 NOTE — PROGRESS NOTES
[Nutritional Supplements]      Patient states allergy to anti inflammatories    Ceclor [Cefaclor]      Doesn't remember    Morphine Other (See Comments)     Urinary retention    Nsaids      Patient states allergy to anti-inflammatories    Penicillins     Propoxyphene     Skelaxin [Metaxalone]     Sulfa Antibiotics Hives    Tadalafil     Tolmetin      Patient states allergy to anti-inflammatories  Patient states allergy to anti-inflammatories  Patient states allergy to anti-inflammatories    Alendronate Nausea And Vomiting and Nausea Only    Asa [Aspirin] Other (See Comments)     Hx diverticulitis.   Can't T  Take regular aspirin 325mg but can take baby aspirin 81mg    Erythromycin Nausea And Vomiting and Nausea Only    Temazepam Nausea And Vomiting and Nausea Only    Tetracyclines & Related Nausea And Vomiting    Tramadol Nausea And Vomiting and Nausea Only       Current Meds:   Scheduled Meds:    mometasone-formoterol  2 puff Inhalation BID    calcium carb-cholecalciferol  3 tablet Oral Daily    vitamin D  2,000 Units Oral Daily    clopidogrel  75 mg Oral Daily    docusate sodium  100 mg Oral BID    ferrous sulfate  325 mg Oral BID    gabapentin  600 mg Oral TID    guaiFENesin  600 mg Oral BID    [Held by provider] hydrochlorothiazide  12.5 mg Oral Daily    therapeutic multivitamin-minerals  1 tablet Oral Daily    tiotropium  2 puff Inhalation Daily    trospium  20 mg Oral BID AC    vitamin B-6  100 mg Oral Daily    vitamin C  500 mg Oral Daily    sodium chloride flush  10 mL Intravenous 2 times per day    enoxaparin  40 mg Subcutaneous Daily    ciprofloxacin  400 mg Intravenous Q12H     Continuous Infusions:    sodium chloride 100 mL/hr at 10/09/19 0958     PRN Meds: tetrahydrozoline, albuterol, sodium chloride, magnesium hydroxide, acetaminophen, ondansetron **OR** ondansetron, potassium chloride **OR** potassium alternative oral replacement **OR** potassium chloride,

## 2019-10-10 LAB
CULTURE: NORMAL
Lab: NORMAL
SPECIMEN DESCRIPTION: NORMAL

## 2019-10-10 PROCEDURE — 97535 SELF CARE MNGMENT TRAINING: CPT

## 2019-10-10 PROCEDURE — 6360000002 HC RX W HCPCS: Performed by: NURSE PRACTITIONER

## 2019-10-10 PROCEDURE — 6370000000 HC RX 637 (ALT 250 FOR IP): Performed by: FAMILY MEDICINE

## 2019-10-10 PROCEDURE — 97530 THERAPEUTIC ACTIVITIES: CPT

## 2019-10-10 PROCEDURE — 97110 THERAPEUTIC EXERCISES: CPT

## 2019-10-10 PROCEDURE — APPSS45 APP SPLIT SHARED TIME 31-45 MINUTES: Performed by: NURSE PRACTITIONER

## 2019-10-10 PROCEDURE — 94640 AIRWAY INHALATION TREATMENT: CPT

## 2019-10-10 PROCEDURE — 99232 SBSQ HOSP IP/OBS MODERATE 35: CPT | Performed by: INTERNAL MEDICINE

## 2019-10-10 PROCEDURE — 2580000003 HC RX 258: Performed by: NURSE PRACTITIONER

## 2019-10-10 PROCEDURE — 6370000000 HC RX 637 (ALT 250 FOR IP): Performed by: NURSE PRACTITIONER

## 2019-10-10 PROCEDURE — 2700000000 HC OXYGEN THERAPY PER DAY

## 2019-10-10 PROCEDURE — 2060000000 HC ICU INTERMEDIATE R&B

## 2019-10-10 PROCEDURE — 97116 GAIT TRAINING THERAPY: CPT

## 2019-10-10 PROCEDURE — 96366 THER/PROPH/DIAG IV INF ADDON: CPT

## 2019-10-10 RX ORDER — OXYCODONE AND ACETAMINOPHEN 10; 325 MG/1; MG/1
1 TABLET ORAL EVERY 6 HOURS PRN
Qty: 10 TABLET | Refills: 0 | Status: SHIPPED | OUTPATIENT
Start: 2019-10-10 | End: 2019-10-17

## 2019-10-10 RX ADMIN — FERROUS SULFATE TAB EC 325 MG (65 MG FE EQUIVALENT) 325 MG: 325 (65 FE) TABLET DELAYED RESPONSE at 20:19

## 2019-10-10 RX ADMIN — DOCUSATE SODIUM 100 MG: 100 CAPSULE, LIQUID FILLED ORAL at 09:22

## 2019-10-10 RX ADMIN — GABAPENTIN 600 MG: 300 CAPSULE ORAL at 13:46

## 2019-10-10 RX ADMIN — DOCUSATE SODIUM 100 MG: 100 CAPSULE, LIQUID FILLED ORAL at 20:19

## 2019-10-10 RX ADMIN — Medication 3 TABLET: at 09:21

## 2019-10-10 RX ADMIN — OXYCODONE AND ACETAMINOPHEN 1 TABLET: 5; 325 TABLET ORAL at 00:34

## 2019-10-10 RX ADMIN — GABAPENTIN 600 MG: 300 CAPSULE ORAL at 20:19

## 2019-10-10 RX ADMIN — ALBUTEROL SULFATE 2.5 MG: 2.5 SOLUTION RESPIRATORY (INHALATION) at 23:51

## 2019-10-10 RX ADMIN — CIPROFLOXACIN 400 MG: 2 INJECTION, SOLUTION INTRAVENOUS at 07:27

## 2019-10-10 RX ADMIN — GUAIFENESIN 600 MG: 600 TABLET, EXTENDED RELEASE ORAL at 09:24

## 2019-10-10 RX ADMIN — ENOXAPARIN SODIUM 40 MG: 40 INJECTION SUBCUTANEOUS at 10:04

## 2019-10-10 RX ADMIN — OXYCODONE AND ACETAMINOPHEN 1 TABLET: 5; 325 TABLET ORAL at 16:05

## 2019-10-10 RX ADMIN — GABAPENTIN 600 MG: 300 CAPSULE ORAL at 09:22

## 2019-10-10 RX ADMIN — SODIUM CHLORIDE: 9 INJECTION, SOLUTION INTRAVENOUS at 07:30

## 2019-10-10 RX ADMIN — FERROUS SULFATE TAB EC 325 MG (65 MG FE EQUIVALENT) 325 MG: 325 (65 FE) TABLET DELAYED RESPONSE at 09:23

## 2019-10-10 RX ADMIN — TROSPIUM CHLORIDE 20 MG: 20 TABLET, FILM COATED ORAL at 07:27

## 2019-10-10 RX ADMIN — TROSPIUM CHLORIDE 20 MG: 20 TABLET, FILM COATED ORAL at 20:19

## 2019-10-10 RX ADMIN — TIOTROPIUM BROMIDE INHALATION SPRAY 2 PUFF: 3.12 SPRAY, METERED RESPIRATORY (INHALATION) at 10:36

## 2019-10-10 RX ADMIN — OXYCODONE HYDROCHLORIDE AND ACETAMINOPHEN 500 MG: 500 TABLET ORAL at 09:23

## 2019-10-10 RX ADMIN — MOMETASONE FUROATE AND FORMOTEROL FUMARATE DIHYDRATE 2 PUFF: 200; 5 AEROSOL RESPIRATORY (INHALATION) at 20:12

## 2019-10-10 RX ADMIN — MULTIPLE VITAMINS W/ MINERALS TAB 1 TABLET: TAB at 09:22

## 2019-10-10 RX ADMIN — CLOPIDOGREL BISULFATE 75 MG: 75 TABLET ORAL at 09:21

## 2019-10-10 RX ADMIN — SODIUM CHLORIDE, PRESERVATIVE FREE 10 ML: 5 INJECTION INTRAVENOUS at 20:22

## 2019-10-10 RX ADMIN — PYRIDOXINE HCL TAB 50 MG 100 MG: 50 TAB at 09:21

## 2019-10-10 RX ADMIN — MOMETASONE FUROATE AND FORMOTEROL FUMARATE DIHYDRATE 2 PUFF: 200; 5 AEROSOL RESPIRATORY (INHALATION) at 10:35

## 2019-10-10 RX ADMIN — GUAIFENESIN 600 MG: 600 TABLET, EXTENDED RELEASE ORAL at 20:19

## 2019-10-10 RX ADMIN — SODIUM CHLORIDE, PRESERVATIVE FREE 10 ML: 5 INJECTION INTRAVENOUS at 10:04

## 2019-10-10 RX ADMIN — VITAMIN D, TAB 1000IU (100/BT) 2000 UNITS: 25 TAB at 09:24

## 2019-10-10 ASSESSMENT — PAIN SCALES - GENERAL
PAINLEVEL_OUTOF10: 7
PAINLEVEL_OUTOF10: 8
PAINLEVEL_OUTOF10: 8
PAINLEVEL_OUTOF10: 7
PAINLEVEL_OUTOF10: 8

## 2019-10-10 ASSESSMENT — PAIN DESCRIPTION - PROGRESSION
CLINICAL_PROGRESSION: NOT CHANGED

## 2019-10-10 ASSESSMENT — PAIN DESCRIPTION - LOCATION
LOCATION: BACK

## 2019-10-10 ASSESSMENT — PAIN DESCRIPTION - PAIN TYPE
TYPE: CHRONIC PAIN

## 2019-10-10 NOTE — DISCHARGE INSTR - COC
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    Constipation K59.00    Displacement of intervertebral disc without myelopathy [id number]    Dry eye syndrome H04.129    Eczema L30.9    Insomnia G47.00    DDD (degenerative disc disease), lumbar M51.36    Neuropathic pain M79.2    Osteoporosis M81.0    Nerve disease, peripheral G62.9    Posterior vitreous detachment H43.819    Failed back syndrome of lumbar spine M96.1    Pseudophakia Z96.1    Temporary cerebral vascular dysfunction G93.9    Bursitis, trochanteric M70.60    Vitamin D deficiency E55.9    Lumbosacral radiculopathy M54.17    Generalized muscle weakness M62.81    Neurogenic bladder N31.9    Syncope and collapse R55    Acute bronchitis due to other specified organisms J20.8    Bilateral carpal tunnel syndrome G56.03    Epistaxis R04.0    Supplemental oxygen dependent Z99.81    Excessive cerumen in ear canal, right H61.21    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 Rella Sack Age-related physical debility R54       Isolation/Infection:   Isolation          No Isolation            Nurse Assessment:  Last Vital Signs: BP (!)

## 2019-10-10 NOTE — PROGRESS NOTES
(chronic obstructive pulmonary disease) ([facility] [address] Disease of blood and blood forming organ, Diverticulitis, GERD (gastroesophageal reflux disease), History of blood transfusion, History of gastritis, Lumbar disc disease, Movement disorder, Nerve disease, peripheral, Osteoporosis, Pneumonia, and Unspecified cerebral artery occlusion with cerebral infarction. has a past surgical history that includes Spinal fusion (2012); Varicose vein surgery; Lumbar spine surgery (x 2); Upper gastrointestinal endoscopy; Colonoscopy; Hysterectomy; Foot surgery (Left, 04/18/2017); and arthroplasty (Left, 4/18/2017). Restrictions  Restrictions/Precautions  Restrictions/Precautions: Fall Risk, Up as Tolerated  Required Braces or Orthoses?: No  Position Activity Restriction  Other position/activity restrictions: up with assist, heels off bed if pt unable to move LE's, O2 via NC @ 2L/min  Subjective   General  Chart Reviewed: Yes  Patient assessed for rehabilitation services?: Yes  Response to previous treatment: Patient with no complaints from previous session  Family / Caregiver Present: No      Orientation  Orientation  Overall Orientation Status: Within Functional Limits  Objective    ADL  LE Dressing: Maximum assistance  Toileting: Maximum assistance(Pt was able to complete toilet hygiene (after voiding) independently, Max A to pull pants/underwear up/down)        Balance  Sitting Balance: Stand by assistance  Standing Balance: Minimal assistance(CGA-MIN A with poor posture)  Toilet Transfers  Toilet - Technique: Stand step  Equipment Used: Standard bedside commode  Toilet Transfer: Minimal assistance  Toilet Transfers Comments: max verbal instruction/tactile assist for hand placement, upright posture, RW safety, pursed lip breathing, staying focused on task at hand, weight shifting, controlled stand to sits, and awareness/assist with all lines  Bed mobility  Sit to Supine:  Moderate assistance;Maximum assistance  Scooting: Maximal assistance(to position self in center of bed/HOB)  Transfers  Stand Step Transfers: Minimal assistance  Sit to stand: Minimal assistance; Moderate assistance  Stand to sit: Minimal assistance  Transfer Comments: Pt used RW to stand step transfer from bedside chair to [facility] and BSC to EOB with Min A and max verbal cues to stay on task, upright posture and awareness/assist with all lines. Cognition  Overall Cognitive Status: Exceptions  Arousal/Alertness: Appropriate responses to stimuli  Following Commands: Follows one step commands with increased time; Follows one step commands with repetition  Attention Span: Appears intact; Attends with cues to redirect  Memory: Decreased short term memory  Safety Judgement: Decreased awareness of need for safety;Decreased awareness of need for assistance  Problem Solving: Decreased awareness of errors;Assistance required to identify errors made;Assistance required to implement solutions;Assistance required to correct errors made;Assistance required to generate solutions  Insights: Decreased awareness of deficits  Initiation: Requires cues for some  Sequencing: Requires cues for some     Perception  Overall Perceptual Status: [facility]                                   Plan   Plan  Times per week: 4-5x/week 1x/day as chon   Current Treatment Recommendations: Strengthening, ROM, Balance Training, Functional Mobility Training, Home Management Training, Safety Education & Training, Self-Care / ADL, Pain Management, Endurance Training, Neuromuscular Re-education, Patient/Caregiver Education & Training, Equipment Evaluation, Education, & procurement           AM-PAC Score        AM-PAC Inpatient Daily Activity Raw Score: 15 (10/10/19 1316)  AM-PAC Inpatient ADL T-Scale Score : 34.69 (10/10/19 1316)  ADL Inpatient CMS 0-100% Score: 56.46 (10/10/19 1316)  ADL Inpatient CMS G-Code Modifier : CK (10/10/19 1316)    Goals  Short term goals  Time Frame for Short term goals: by

## 2019-10-10 NOTE — PROGRESS NOTES
[facility]    Progress Note    10/10/2019    9:37 AM    Name:   Javier Regalado  MRN:     [id number]     [place]:      [de-identified]   Room:   [address] [place] [address] Day:  2  Admit Date:  10/7/2019 10:32 PM    PCP:   Yoselyn Hall MD  Code Status:  Full Code    Subjective:     C/C:   Chief Complaint   Patient presents with    Chest Pain    Leg Pain     bilat     Interval History Status: improved. Patient condition continues to improve. She is no longer complaining of chest pain or shortness of breath. She reports that her shoulder discomfort has resolved overnight as well. Patient does continue to endorse burning with urination. Her frequency and urgency has resolved. Patient's repeat urinalysis does not reveal an active urinary tract infection. Patient has completed a 3-day course of Cipro and will be therefore taken off of the antibiotic. Will recommend outpatient follow-up with urology if persists. Cardiology is rounded on this patient and intends no further dilation or interventions follow-up is recommended for 1 month with their office. Patient continues to work with physical and occupational therapy and they have recommended skilled nursing for acute rehab. Family is supposed to be making this decision today and we will work towards discharge family finalizes their decision. From medical standpoint patient has dramatically improved and is stable for discharge. Brief History:     10/8 -admitted for chest pain bilateral lower extremity weakness with pain. 3-week history of general fatigue and frequent falls. Troponins elevated however they are trending down with hydration. Cardiology consultation requested. Also reporting symptoms consistent with urinary tract infection.     10/9 -condition improves. Cardiology plans no further diagnostics or intervention. Patient working with PT/OT.   Recommended SNF.    10/10 -condition continues to hydrochlorothiazide  12.5 mg Oral Daily    therapeutic multivitamin-minerals  1 tablet Oral Daily    tiotropium  2 puff Inhalation Daily    trospium  20 mg Oral BID AC    vitamin B-6  100 mg Oral Daily    vitamin C  500 mg Oral Daily    sodium chloride flush  10 mL Intravenous 2 times per day    enoxaparin  40 mg Subcutaneous Daily    ciprofloxacin  400 mg Intravenous Q12H     Continuous Infusions:    sodium chloride 100 mL/hr at 10/10/19 0730     PRN Meds: tetrahydrozoline, ammonium lactate, albuterol, sodium chloride, magnesium hydroxide, acetaminophen, ondansetron **OR** ondansetron, potassium chloride **OR** potassium alternative oral replacement **OR** potassium chloride, oxyCODONE-acetaminophen, sodium chloride flush    Data:     Past Medical History:   has a past medical history of Aortic valve disease, Arthritis, Chronic back pain, COPD (chronic obstructive pulmonary disease) ([address] Disease of blood and blood forming organ, Diverticulitis, GERD (gastroesophageal reflux disease), History of blood transfusion, History of gastritis, Lumbar disc disease, Movement disorder, Nerve disease, peripheral, Osteoporosis, Pneumonia, and Unspecified cerebral artery occlusion with cerebral infarction. Social History:   reports that she quit smoking about 25 years ago. She has never used smokeless tobacco. She reports that she does not drink alcohol or use drugs. Family History:   Family History   Problem Relation Age of Onset    Heart Disease Mother     Heart Disease Father        Vitals:  BP (!) 113/59   Pulse 73   Temp 97.4 °F (36.3 °C) (Oral)   Resp 20   Ht 5' 4.96\" (1.65 m)   Wt 136 lb (61.7 kg)   SpO2 95%   BMI 22.66 kg/m²   Temp (24hrs), Av.1 °F (36.7 °C), Min:97.4 °F (36.3 °C), Max:99.3 °F (37.4 °C)    No results for input(s): POCGLU in the last 72 hours. I/O (24Hr):     Intake/Output Summary (Last 24 hours) at 10/10/2019 0959  Last data filed at 10/10/2019 0627  Gross per 24 hour

## 2019-10-11 PROCEDURE — 2700000000 HC OXYGEN THERAPY PER DAY

## 2019-10-11 PROCEDURE — 99232 SBSQ HOSP IP/OBS MODERATE 35: CPT | Performed by: INTERNAL MEDICINE

## 2019-10-11 PROCEDURE — 6370000000 HC RX 637 (ALT 250 FOR IP): Performed by: NURSE PRACTITIONER

## 2019-10-11 PROCEDURE — 6370000000 HC RX 637 (ALT 250 FOR IP): Performed by: FAMILY MEDICINE

## 2019-10-11 PROCEDURE — 6360000002 HC RX W HCPCS: Performed by: NURSE PRACTITIONER

## 2019-10-11 PROCEDURE — 94640 AIRWAY INHALATION TREATMENT: CPT

## 2019-10-11 PROCEDURE — 2060000000 HC ICU INTERMEDIATE R&B

## 2019-10-11 PROCEDURE — 6370000000 HC RX 637 (ALT 250 FOR IP): Performed by: INTERNAL MEDICINE

## 2019-10-11 PROCEDURE — 94760 N-INVAS EAR/PLS OXIMETRY 1: CPT

## 2019-10-11 PROCEDURE — APPSS45 APP SPLIT SHARED TIME 31-45 MINUTES: Performed by: NURSE PRACTITIONER

## 2019-10-11 PROCEDURE — 2580000003 HC RX 258: Performed by: NURSE PRACTITIONER

## 2019-10-11 RX ORDER — PHENAZOPYRIDINE HYDROCHLORIDE 200 MG/1
200 TABLET, FILM COATED ORAL
Status: DISCONTINUED | OUTPATIENT
Start: 2019-10-11 | End: 2019-10-11

## 2019-10-11 RX ORDER — PHENAZOPYRIDINE HYDROCHLORIDE 200 MG/1
200 TABLET, FILM COATED ORAL
Status: DISPENSED | OUTPATIENT
Start: 2019-10-11 | End: 2019-10-13

## 2019-10-11 RX ADMIN — GABAPENTIN 600 MG: 300 CAPSULE ORAL at 09:31

## 2019-10-11 RX ADMIN — CLOPIDOGREL BISULFATE 75 MG: 75 TABLET ORAL at 09:32

## 2019-10-11 RX ADMIN — GUAIFENESIN 600 MG: 600 TABLET, EXTENDED RELEASE ORAL at 20:33

## 2019-10-11 RX ADMIN — Medication 3 TABLET: at 09:32

## 2019-10-11 RX ADMIN — PYRIDOXINE HCL TAB 50 MG 100 MG: 50 TAB at 09:32

## 2019-10-11 RX ADMIN — Medication 1 DROP: at 22:49

## 2019-10-11 RX ADMIN — SODIUM CHLORIDE, PRESERVATIVE FREE 10 ML: 5 INJECTION INTRAVENOUS at 09:33

## 2019-10-11 RX ADMIN — MOMETASONE FUROATE AND FORMOTEROL FUMARATE DIHYDRATE 2 PUFF: 200; 5 AEROSOL RESPIRATORY (INHALATION) at 07:42

## 2019-10-11 RX ADMIN — ALBUTEROL SULFATE 2.5 MG: 2.5 SOLUTION RESPIRATORY (INHALATION) at 08:01

## 2019-10-11 RX ADMIN — OXYCODONE HYDROCHLORIDE AND ACETAMINOPHEN 500 MG: 500 TABLET ORAL at 09:32

## 2019-10-11 RX ADMIN — SODIUM CHLORIDE, PRESERVATIVE FREE 10 ML: 5 INJECTION INTRAVENOUS at 20:33

## 2019-10-11 RX ADMIN — GENTAMICIN SULFATE 0.5 INCH: 3 OINTMENT OPHTHALMIC at 20:33

## 2019-10-11 RX ADMIN — FERROUS SULFATE TAB EC 325 MG (65 MG FE EQUIVALENT) 325 MG: 325 (65 FE) TABLET DELAYED RESPONSE at 09:31

## 2019-10-11 RX ADMIN — OXYCODONE AND ACETAMINOPHEN 1 TABLET: 5; 325 TABLET ORAL at 04:15

## 2019-10-11 RX ADMIN — OXYCODONE AND ACETAMINOPHEN 1 TABLET: 5; 325 TABLET ORAL at 15:06

## 2019-10-11 RX ADMIN — MULTIPLE VITAMINS W/ MINERALS TAB 1 TABLET: TAB at 09:31

## 2019-10-11 RX ADMIN — GENTAMICIN SULFATE 0.5 INCH: 3 OINTMENT OPHTHALMIC at 12:06

## 2019-10-11 RX ADMIN — Medication: at 20:33

## 2019-10-11 RX ADMIN — TIOTROPIUM BROMIDE INHALATION SPRAY 2 PUFF: 3.12 SPRAY, METERED RESPIRATORY (INHALATION) at 07:42

## 2019-10-11 RX ADMIN — GABAPENTIN 600 MG: 300 CAPSULE ORAL at 20:33

## 2019-10-11 RX ADMIN — Medication 1 DROP: at 15:50

## 2019-10-11 RX ADMIN — FERROUS SULFATE TAB EC 325 MG (65 MG FE EQUIVALENT) 325 MG: 325 (65 FE) TABLET DELAYED RESPONSE at 20:33

## 2019-10-11 RX ADMIN — DOCUSATE SODIUM 100 MG: 100 CAPSULE, LIQUID FILLED ORAL at 09:31

## 2019-10-11 RX ADMIN — ENOXAPARIN SODIUM 40 MG: 40 INJECTION SUBCUTANEOUS at 09:31

## 2019-10-11 RX ADMIN — VITAMIN D, TAB 1000IU (100/BT) 2000 UNITS: 25 TAB at 09:31

## 2019-10-11 RX ADMIN — GABAPENTIN 600 MG: 300 CAPSULE ORAL at 14:09

## 2019-10-11 RX ADMIN — TROSPIUM CHLORIDE 20 MG: 20 TABLET, FILM COATED ORAL at 20:33

## 2019-10-11 RX ADMIN — MOMETASONE FUROATE AND FORMOTEROL FUMARATE DIHYDRATE 2 PUFF: 200; 5 AEROSOL RESPIRATORY (INHALATION) at 19:58

## 2019-10-11 RX ADMIN — PHENAZOPYRIDINE 200 MG: 200 TABLET ORAL at 17:22

## 2019-10-11 RX ADMIN — GUAIFENESIN 600 MG: 600 TABLET, EXTENDED RELEASE ORAL at 09:31

## 2019-10-11 RX ADMIN — DOCUSATE SODIUM 100 MG: 100 CAPSULE, LIQUID FILLED ORAL at 20:33

## 2019-10-11 RX ADMIN — OXYCODONE AND ACETAMINOPHEN 1 TABLET: 5; 325 TABLET ORAL at 20:42

## 2019-10-11 RX ADMIN — ALBUTEROL SULFATE 2.5 MG: 2.5 SOLUTION RESPIRATORY (INHALATION) at 19:58

## 2019-10-11 RX ADMIN — TROSPIUM CHLORIDE 20 MG: 20 TABLET, FILM COATED ORAL at 09:31

## 2019-10-11 ASSESSMENT — PAIN DESCRIPTION - DESCRIPTORS
DESCRIPTORS: ACHING;DISCOMFORT
DESCRIPTORS: ACHING

## 2019-10-11 ASSESSMENT — PAIN DESCRIPTION - PAIN TYPE
TYPE: CHRONIC PAIN

## 2019-10-11 ASSESSMENT — PAIN DESCRIPTION - ONSET
ONSET: GRADUAL
ONSET: ON-GOING

## 2019-10-11 ASSESSMENT — PAIN SCALES - GENERAL
PAINLEVEL_OUTOF10: 8
PAINLEVEL_OUTOF10: 7
PAINLEVEL_OUTOF10: 0

## 2019-10-11 ASSESSMENT — PAIN DESCRIPTION - PROGRESSION: CLINICAL_PROGRESSION: GRADUALLY WORSENING

## 2019-10-11 ASSESSMENT — PAIN DESCRIPTION - LOCATION
LOCATION: BACK
LOCATION: BACK;KNEE
LOCATION: BACK

## 2019-10-11 ASSESSMENT — PAIN DESCRIPTION - FREQUENCY
FREQUENCY: INTERMITTENT
FREQUENCY: INTERMITTENT

## 2019-10-11 NOTE — PROGRESS NOTES
[facility]    Progress Note    10/11/2019    9:45 AM    Name:   Bev Angulo  MRN:     [id number]     [place]:      [de-identified]   Room:   [address] [place] [address] Day:  3  Admit Date:  10/7/2019 10:32 PM    PCP:   Luis Meneses MD  Code Status:  Full Code    Subjective:     C/C:   Chief Complaint   Patient presents with    Chest Pain    Leg Pain     bilat     Interval History Status: improved. Patient condition continues to improve. She no longer endorses chest pain, shortness of breath or shoulder pain. Patient also reports that her burning sensation during urination has improved. Cardiology has signed off and recommends a one-month outpatient follow-up. Physical and Occupational Therapy has recommended skilled nursing facility for acute rehab. Patient does endorse irritation to the right eye this morning. Right eye is injected and sclera does appear inflamed. Patient reported that she had mild irritation for the past several days. Antibiotic ointment will be added to her daily medication regiment. This will be evaluated for resolution. We are awaiting facility decision from family. From a medical standpoint patient is stable for discharge      Brief History:     10/8 -admitted for chest pain bilateral lower extremity weakness with pain. 3-week history of general fatigue and frequent falls. Troponins elevated however they are trending down with hydration. Cardiology consultation requested. Also reporting symptoms consistent with urinary tract infection.     10/9 -condition improves. Cardiology plans no further diagnostics or intervention. Patient working with PT/OT. Recommended SNF.    10/10 -condition continues to improve. Patient's repeat urinalysis is negative. Chest pains and shortness of breath have completely resolved as has the shoulder pain. Awaiting placement at skilled nursing. 10/11 -continued improvement.   Reports gabapentin  600 mg Oral TID    guaiFENesin  600 mg Oral BID    [Held by provider] hydrochlorothiazide  12.5 mg Oral Daily    therapeutic multivitamin-minerals  1 tablet Oral Daily    tiotropium  2 puff Inhalation Daily    trospium  20 mg Oral BID AC    vitamin B-6  100 mg Oral Daily    vitamin C  500 mg Oral Daily    sodium chloride flush  10 mL Intravenous 2 times per day    enoxaparin  40 mg Subcutaneous Daily     Continuous Infusions:     PRN Meds: tetrahydrozoline, ammonium lactate, albuterol, sodium chloride, magnesium hydroxide, acetaminophen, ondansetron **OR** ondansetron, potassium chloride **OR** potassium alternative oral replacement **OR** potassium chloride, oxyCODONE-acetaminophen, sodium chloride flush    Data:     Past Medical History:   has a past medical history of Aortic valve disease, Arthritis, Chronic back pain, COPD (chronic obstructive pulmonary disease) ([address] Disease of blood and blood forming organ, Diverticulitis, GERD (gastroesophageal reflux disease), History of blood transfusion, History of gastritis, Lumbar disc disease, Movement disorder, Nerve disease, peripheral, Osteoporosis, Pneumonia, and Unspecified cerebral artery occlusion with cerebral infarction. Social History:   reports that she quit smoking about 25 years ago. She has never used smokeless tobacco. She reports that she does not drink alcohol or use drugs. Family History:   Family History   Problem Relation Age of Onset    Heart Disease Mother     Heart Disease Father        Vitals:  BP (!) 97/53   Pulse 67   Temp 97.7 °F (36.5 °C) (Oral)   Resp 20   Ht 5' 4.96\" (1.65 m)   Wt 136 lb 1.6 oz (61.7 kg)   SpO2 100%   BMI 22.68 kg/m²   Temp (24hrs), Av.1 °F (36.7 °C), Min:97.7 °F (36.5 °C), Max:98.6 °F (37 °C)    No results for input(s): POCGLU in the last 72 hours. I/O (24Hr):     Intake/Output Summary (Last 24 hours) at 10/11/2019 5981  Last data filed at 10/11/2019 0416  Gross per 24 hour appearance:  alert, cooperative and no distress  Mental Status:  oriented to person, place and time and normal affect  Lungs:  clear to auscultation bilaterally, normal effort  Heart:  regular rate and rhythm, no murmur  Abdomen:  soft, nontender, nondistended, normal bowel sounds, no masses, hepatomegaly, splenomegaly  Extremities:  no edema, redness, tenderness in the calves  Skin:  no gross lesions, rashes, induration    Assessment:        Hospital Problems           Last Modified POA    * (Principal) Chest pain at rest 10/8/2019 Yes    Hypokalemia 10/8/2019 Yes    Abnormal gait 10/8/2019 Yes    Aortic valve insufficiency 10/8/2019 Yes    Allergic rhinitis 10/8/2019 Yes    Benign essential HTN 10/8/2019 Yes    DDD (degenerative disc disease), lumbar 10/8/2019 Yes    Neurogenic bladder 10/8/2019 Yes    Supplemental oxygen dependent 10/8/2019 Yes    LVH (left ventricular hypertrophy) 10/8/2019 Yes    Valvular heart disease 10/8/2019 Yes    Dysuria 10/8/2019 Yes    Near syncope 10/8/2019 Yes    Chronic respiratory failure with hypoxia ([facility] [address] 10/8/2019 Yes    Falls 10/8/2019 Yes    Age-related physical debility 10/9/2019 Yes          Plan:        1. Stop antibiotics as no UTI is evident and she has received 3 doses. 2. Continue physical/ occupational therapy  3. Continue cardiology consultation while in the hospital.  Recommend follow-up in 1 month  4. Pain control of chronic pain with home medication regiment  5. electrolyte monitoring and replacement  6. GI/DVT prophylaxis  7. Antibiotic eye ointment for irritation and injection.   8. [facility], APRN - NP  10/11/2019  9:45 AM

## 2019-10-12 PROCEDURE — 99231 SBSQ HOSP IP/OBS SF/LOW 25: CPT | Performed by: INTERNAL MEDICINE

## 2019-10-12 PROCEDURE — 6370000000 HC RX 637 (ALT 250 FOR IP): Performed by: FAMILY MEDICINE

## 2019-10-12 PROCEDURE — 2580000003 HC RX 258: Performed by: NURSE PRACTITIONER

## 2019-10-12 PROCEDURE — 6370000000 HC RX 637 (ALT 250 FOR IP): Performed by: NURSE PRACTITIONER

## 2019-10-12 PROCEDURE — 6360000002 HC RX W HCPCS: Performed by: NURSE PRACTITIONER

## 2019-10-12 PROCEDURE — 97110 THERAPEUTIC EXERCISES: CPT

## 2019-10-12 PROCEDURE — 6360000002 HC RX W HCPCS: Performed by: INTERNAL MEDICINE

## 2019-10-12 PROCEDURE — 2060000000 HC ICU INTERMEDIATE R&B

## 2019-10-12 PROCEDURE — 2700000000 HC OXYGEN THERAPY PER DAY

## 2019-10-12 PROCEDURE — 94640 AIRWAY INHALATION TREATMENT: CPT

## 2019-10-12 PROCEDURE — 94760 N-INVAS EAR/PLS OXIMETRY 1: CPT

## 2019-10-12 PROCEDURE — 6370000000 HC RX 637 (ALT 250 FOR IP): Performed by: INTERNAL MEDICINE

## 2019-10-12 PROCEDURE — 90686 IIV4 VACC NO PRSV 0.5 ML IM: CPT | Performed by: INTERNAL MEDICINE

## 2019-10-12 PROCEDURE — G0008 ADMIN INFLUENZA VIRUS VAC: HCPCS | Performed by: INTERNAL MEDICINE

## 2019-10-12 RX ORDER — AMMONIUM LACTATE 12 G/100G
LOTION TOPICAL PRN
Status: DISCONTINUED | OUTPATIENT
Start: 2019-10-12 | End: 2019-10-12 | Stop reason: SDUPTHER

## 2019-10-12 RX ORDER — ALBUTEROL SULFATE 2.5 MG/3ML
2.5 SOLUTION RESPIRATORY (INHALATION) 3 TIMES DAILY
Status: DISCONTINUED | OUTPATIENT
Start: 2019-10-12 | End: 2019-10-15 | Stop reason: HOSPADM

## 2019-10-12 RX ADMIN — INFLUENZA A VIRUS A/BRISBANE/02/2018 IVR-190 (H1N1) ANTIGEN (PROPIOLACTONE INACTIVATED), INFLUENZA A VIRUS A/KANSAS/14/2017 X-327 (H3N2) ANTIGEN (PROPIOLACTONE INACTIVATED), INFLUENZA B VIRUS B/MARYLAND/15/2016 ANTIGEN (PROPIOLACTONE INACTIVATED), INFLUENZA B VIRUS B/PHUKET/3073/2013 BVR-1B ANTIGEN (PROPIOLACTONE INACTIVATED) 0.5 ML: 15; 15; 15; 15 INJECTION, SUSPENSION INTRAMUSCULAR at 16:38

## 2019-10-12 RX ADMIN — DOCUSATE SODIUM 100 MG: 100 CAPSULE, LIQUID FILLED ORAL at 12:03

## 2019-10-12 RX ADMIN — Medication 3 TABLET: at 10:02

## 2019-10-12 RX ADMIN — SODIUM CHLORIDE, PRESERVATIVE FREE 10 ML: 5 INJECTION INTRAVENOUS at 10:05

## 2019-10-12 RX ADMIN — PYRIDOXINE HCL TAB 50 MG 100 MG: 50 TAB at 10:02

## 2019-10-12 RX ADMIN — CLOPIDOGREL BISULFATE 75 MG: 75 TABLET ORAL at 10:02

## 2019-10-12 RX ADMIN — MOMETASONE FUROATE AND FORMOTEROL FUMARATE DIHYDRATE 2 PUFF: 200; 5 AEROSOL RESPIRATORY (INHALATION) at 10:54

## 2019-10-12 RX ADMIN — ALBUTEROL SULFATE 2.5 MG: 2.5 SOLUTION RESPIRATORY (INHALATION) at 10:53

## 2019-10-12 RX ADMIN — PHENAZOPYRIDINE 200 MG: 200 TABLET ORAL at 10:19

## 2019-10-12 RX ADMIN — ALBUTEROL SULFATE 2.5 MG: 2.5 SOLUTION RESPIRATORY (INHALATION) at 20:11

## 2019-10-12 RX ADMIN — FERROUS SULFATE TAB EC 325 MG (65 MG FE EQUIVALENT) 325 MG: 325 (65 FE) TABLET DELAYED RESPONSE at 21:41

## 2019-10-12 RX ADMIN — MOMETASONE FUROATE AND FORMOTEROL FUMARATE DIHYDRATE 2 PUFF: 200; 5 AEROSOL RESPIRATORY (INHALATION) at 20:11

## 2019-10-12 RX ADMIN — GUAIFENESIN 600 MG: 600 TABLET, EXTENDED RELEASE ORAL at 21:42

## 2019-10-12 RX ADMIN — DOCUSATE SODIUM 100 MG: 100 CAPSULE, LIQUID FILLED ORAL at 21:41

## 2019-10-12 RX ADMIN — PHENAZOPYRIDINE 200 MG: 200 TABLET ORAL at 16:37

## 2019-10-12 RX ADMIN — GENTAMICIN SULFATE 0.5 INCH: 3 OINTMENT OPHTHALMIC at 12:02

## 2019-10-12 RX ADMIN — GENTAMICIN SULFATE 0.5 INCH: 3 OINTMENT OPHTHALMIC at 21:47

## 2019-10-12 RX ADMIN — SODIUM CHLORIDE, PRESERVATIVE FREE 10 ML: 5 INJECTION INTRAVENOUS at 21:44

## 2019-10-12 RX ADMIN — FERROUS SULFATE TAB EC 325 MG (65 MG FE EQUIVALENT) 325 MG: 325 (65 FE) TABLET DELAYED RESPONSE at 10:03

## 2019-10-12 RX ADMIN — OXYCODONE HYDROCHLORIDE AND ACETAMINOPHEN 500 MG: 500 TABLET ORAL at 10:03

## 2019-10-12 RX ADMIN — TIOTROPIUM BROMIDE INHALATION SPRAY 2 PUFF: 3.12 SPRAY, METERED RESPIRATORY (INHALATION) at 10:53

## 2019-10-12 RX ADMIN — TROSPIUM CHLORIDE 20 MG: 20 TABLET, FILM COATED ORAL at 21:41

## 2019-10-12 RX ADMIN — GABAPENTIN 600 MG: 300 CAPSULE ORAL at 14:12

## 2019-10-12 RX ADMIN — VITAMIN D, TAB 1000IU (100/BT) 2000 UNITS: 25 TAB at 10:02

## 2019-10-12 RX ADMIN — OXYCODONE AND ACETAMINOPHEN 1 TABLET: 5; 325 TABLET ORAL at 10:19

## 2019-10-12 RX ADMIN — OXYCODONE AND ACETAMINOPHEN 1 TABLET: 5; 325 TABLET ORAL at 21:55

## 2019-10-12 RX ADMIN — GABAPENTIN 600 MG: 300 CAPSULE ORAL at 21:41

## 2019-10-12 RX ADMIN — MULTIPLE VITAMINS W/ MINERALS TAB 1 TABLET: TAB at 16:37

## 2019-10-12 RX ADMIN — ENOXAPARIN SODIUM 40 MG: 40 INJECTION SUBCUTANEOUS at 10:02

## 2019-10-12 RX ADMIN — TROSPIUM CHLORIDE 20 MG: 20 TABLET, FILM COATED ORAL at 10:02

## 2019-10-12 RX ADMIN — GABAPENTIN 600 MG: 300 CAPSULE ORAL at 10:03

## 2019-10-12 RX ADMIN — GUAIFENESIN 600 MG: 600 TABLET, EXTENDED RELEASE ORAL at 10:03

## 2019-10-12 ASSESSMENT — PAIN DESCRIPTION - ONSET: ONSET: ON-GOING

## 2019-10-12 ASSESSMENT — PAIN DESCRIPTION - ORIENTATION
ORIENTATION: LEFT;LOWER
ORIENTATION: RIGHT

## 2019-10-12 ASSESSMENT — PAIN DESCRIPTION - LOCATION
LOCATION: BACK
LOCATION: HIP

## 2019-10-12 ASSESSMENT — PAIN DESCRIPTION - PROGRESSION: CLINICAL_PROGRESSION: NOT CHANGED

## 2019-10-12 ASSESSMENT — PAIN DESCRIPTION - DESCRIPTORS: DESCRIPTORS: ACHING

## 2019-10-12 ASSESSMENT — PAIN SCALES - GENERAL
PAINLEVEL_OUTOF10: 8

## 2019-10-12 ASSESSMENT — PAIN DESCRIPTION - FREQUENCY: FREQUENCY: INTERMITTENT

## 2019-10-12 ASSESSMENT — PAIN DESCRIPTION - PAIN TYPE: TYPE: CHRONIC PAIN

## 2019-10-12 ASSESSMENT — PAIN - FUNCTIONAL ASSESSMENT: PAIN_FUNCTIONAL_ASSESSMENT: ACTIVITIES ARE NOT PREVENTED

## 2019-10-12 NOTE — PROGRESS NOTES
Physical Therapy  Facility/Department: [facility] PROGRESSIVE CARE  Daily Treatment Note  NAME: Javier Regalado  : 1940  MRN: [id number]    Date of Service: 10/12/2019    Discharge Recommendations:  Subacute/Skilled Nursing Facility        Assessment   Body structures, Functions, Activity limitations: Decreased functional mobility ; Decreased strength;Decreased endurance;Decreased balance  Assessment: Pt tolerated session but has deficits noted in bed mobility, transfers, ambulation, balance, safety awareness and requires extended time to complete for activity tolerance limited by pain & impaired aerobic capacity. Pt to benefit with cont'd inpatient PT services and D/C to [address] to increase independence with functional mobility, balance, safety & activity tolerance to ensure safe D/C home  Activity Tolerance  Activity Tolerance: Patient limited by fatigue;Patient limited by endurance     Patient Diagnosis(es): The primary encounter diagnosis was Urinary tract infection without hematuria, site unspecified. Diagnoses of Myalgia, Chronic bilateral low back pain with bilateral sciatica, and Chest pain at rest were also pertinent to this visit. has a past medical history of Aortic valve disease, Arthritis, Chronic back pain, COPD (chronic obstructive pulmonary disease) ([address] Disease of blood and blood forming organ, Diverticulitis, GERD (gastroesophageal reflux disease), History of blood transfusion, History of gastritis, Lumbar disc disease, Movement disorder, Nerve disease, peripheral, Osteoporosis, Pneumonia, and Unspecified cerebral artery occlusion with cerebral infarction. has a past surgical history that includes Spinal fusion (); Varicose vein surgery; Lumbar spine surgery (x 2); Upper gastrointestinal endoscopy; Colonoscopy; Hysterectomy;  Foot surgery (Left, 2017); and arthroplasty (Left, 4/18/2017). Restrictions  Restrictions/Precautions  Restrictions/Precautions: Fall Risk, Up as Tolerated  Required Braces or Orthoses?: No  Position Activity Restriction  Other position/activity restrictions: up with assist, heels off bed if pt unable to move LE's, O2 via NC @ 2L/min  Subjective   General  Chart Reviewed: Yes  Additional Pertinent Hx: COPD, chronic back pain, Aortic valve disease, OA, GERD, TIA  Response To Previous Treatment: Patient with no complaints from previous session. Subjective  Subjective: Pt agreeable to PT, chairside as pt already sitting in chair  Pain Screening  Patient Currently in Pain: Yes  Pain Assessment  Pain Assessment: 0-10  Pain Level: 8  Pain Type: Chronic pain  Pain Location: Back  Pain Orientation: Left; Lower  Vital Signs  Patient Currently in Pain: Yes       Orientation     Cognition      Objective         Ambulation  Ambulation?: No        Exercises  Comments: seated ex x 10 reps issued HEP for LE AROM reviewed all ex with pt                        G-Code     OutComes Score                                                     AM-PAC Score  AM-PAC Inpatient Mobility Raw Score : 13 (10/12/19 1602)  AM-PAC Inpatient T-Scale Score : 36.74 (10/12/19 1602)  Mobility Inpatient CMS 0-100% Score: 64.91 (10/12/19 1602)  Mobility Inpatient CMS G-Code Modifier : CL (10/12/19 1602)          Goals  Short term goals  Time Frame for Short term goals: 12 visits  Short term goal 1: Inc bed-mobility & transfers to independent to enable pt to safely get in/OOB  Short term goal 2: Inc gait to amb 100ft or > indep w/ RW to enable pt to return to previous level of independence; Short term goal 3: Inc standing balance to good with device to facilitate pt independence for performance of ADL's & functional mobility, & reduce fall risk;   Short term goal 4: Pt able to tolerate 30-40 min of activity to include 15-20 reps of ex & functional mobility including 5 minutes of standing to facilitate

## 2019-10-12 NOTE — PROGRESS NOTES
[facility]    Progress Note    10/12/2019    10:51 AM    Name:   Carmenza Marrero  MRN:     [id number]     [place]:      [de-identified]   Room:   [address] [place] [address] Day:  4  Admit Date:  10/7/2019 10:32 PM    PCP:   Nicole Brice MD  Code Status:  Full Code    Subjective:     C/C:   Chief Complaint   Patient presents with    Chest Pain    Leg Pain     bilat     Interval History Status: not changed. No acute issues overnight  Symptoms stable  Still having intermittent eye pain  No other complaints  Awaiting placement      Brief History:     [de-identified] F admitted for chest pain bilateral lower extremity weakness with pain.  3-week history of general fatigue and frequent falls.  Troponins elevated however they are trending down with hydration.  Cardiology consulted, no further diagnostics or intervention. Chest pain resolved. Patient working with PT/OT.  Recommended SNF.        Review of Systems:     Constitutional:  negative for chills, fevers, sweats  Respiratory:  negative for cough, dyspnea on exertion, shortness of breath, wheezing  Cardiovascular:  negative for chest pain, chest pressure/discomfort, lower extremity edema, palpitations  Gastrointestinal:  negative for abdominal pain, constipation, diarrhea, nausea, vomiting  Neurological:  negative for dizziness, headache    Medications: Allergies:     Allergies   Allergen Reactions    Anti-Inflammatory Enzyme [Nutritional Supplements]      Patient states allergy to anti inflammatories    Ceclor [Cefaclor]      Doesn't remember    Morphine Other (See Comments)     Urinary retention    Nsaids      Patient states allergy to anti-inflammatories    Penicillins     Propoxyphene     Skelaxin [Metaxalone]     Sulfa Antibiotics Hives    Tadalafil     Tolmetin      Patient states allergy to anti-inflammatories  Patient states allergy to anti-inflammatories  Patient states allergy to anti-inflammatories    Alendronate Nausea And Vomiting and Nausea Only    Asa [Aspirin] Other (See Comments)     Hx diverticulitis.   Can't T  Take regular aspirin 325mg but can take baby aspirin 81mg    Erythromycin Nausea And Vomiting and Nausea Only    Temazepam Nausea And Vomiting and Nausea Only    Tetracyclines & Related Nausea And Vomiting    Tramadol Nausea And Vomiting and Nausea Only       Current Meds:   Scheduled Meds:    influenza virus vaccine  0.5 mL Intramuscular Once    gentamicin  0.5 inch Ophthalmic TID    phenazopyridine  200 mg Oral TID WC    mometasone-formoterol  2 puff Inhalation BID    calcium carb-cholecalciferol  3 tablet Oral Daily    vitamin D  2,000 Units Oral Daily    clopidogrel  75 mg Oral Daily    docusate sodium  100 mg Oral BID    ferrous sulfate  325 mg Oral BID    gabapentin  600 mg Oral TID    guaiFENesin  600 mg Oral BID    [Held by provider] hydrochlorothiazide  12.5 mg Oral Daily    therapeutic multivitamin-minerals  1 tablet Oral Daily    tiotropium  2 puff Inhalation Daily    trospium  20 mg Oral BID AC    vitamin B-6  100 mg Oral Daily    vitamin C  500 mg Oral Daily    sodium chloride flush  10 mL Intravenous 2 times per day    enoxaparin  40 mg Subcutaneous Daily     Continuous Infusions:   PRN Meds: tetrahydrozoline, ammonium lactate, albuterol, sodium chloride, magnesium hydroxide, acetaminophen, ondansetron **OR** ondansetron, potassium chloride **OR** potassium alternative oral replacement **OR** potassium chloride, oxyCODONE-acetaminophen, sodium chloride flush    Data:     Past Medical History:   has a past medical history of Aortic valve disease, Arthritis, Chronic back pain, COPD (chronic obstructive pulmonary disease) ([facility] [address] Disease of blood and blood forming organ, Diverticulitis, GERD (gastroesophageal reflux disease), History of blood transfusion, History of gastritis, Lumbar disc disease, Movement disorder, Nerve disease, peripheral, Osteoporosis, CULTURE NO SIGNIFICANT GROWTH 10/09/2019 01:12 PM       Radiology:  Ct Head Wo Contrast    Result Date: 10/8/2019  No acute intracranial abnormality. Chronic small vessel ischemic changes     Ct Cervical Spine Wo Contrast    Result Date: 10/8/2019  Moderate multilevel degenerate changes. No acute fracture traumatic malalignment. Xr Chest Portable    Result Date: 10/8/2019  No acute cardiopulmonary abnormality. Ct Chest Pulmonary Embolism W Contrast    Result Date: 10/8/2019  No acute pulmonary embolism. Interstitial thickening both lung bases favor interstitial pulmonary edema versus atelectasis.  Cardiomegaly       Physical Examination:        General appearance:  alert, cooperative and no distress  Mental Status:  oriented to person, place and time and normal affect  Lungs:  clear to auscultation bilaterally, normal effort  Heart:  regular rate and rhythm  Abdomen:  soft, nontender, nondistended, normal bowel sounds  Extremities:  no edema, redness, tenderness in the calves  Skin:  no gross lesions, rashes, induration    Assessment:        Hospital Problems           Last Modified POA    * (Principal) Chest pain at rest 10/8/2019 Yes    Hypokalemia 10/8/2019 Yes    Abnormal gait 10/8/2019 Yes    Aortic valve insufficiency 10/8/2019 Yes    Allergic rhinitis 10/8/2019 Yes    Benign essential HTN 10/8/2019 Yes    DDD (degenerative disc disease), lumbar 10/8/2019 Yes    Neurogenic bladder 10/8/2019 Yes    Supplemental oxygen dependent 10/8/2019 Yes    LVH (left ventricular hypertrophy) 10/8/2019 Yes    Valvular heart disease 10/8/2019 Yes    Dysuria 10/8/2019 Yes    Near syncope 10/8/2019 Yes    Chronic respiratory failure with hypoxia ([address] 10/8/2019 Yes    Falls 10/8/2019 Yes    Age-related physical debility 10/9/2019 Yes          Plan:        - Cardiology consulted - chest pain atypical - no further workup  - Labs and medications reviewed  - Continue home medications  - PT/OT as tolerated  - DC planning in progress - awaiting precert       Gisselle Londono MD  10/12/2019  10:51 AM

## 2019-10-13 PROCEDURE — 94760 N-INVAS EAR/PLS OXIMETRY 1: CPT

## 2019-10-13 PROCEDURE — 99231 SBSQ HOSP IP/OBS SF/LOW 25: CPT | Performed by: INTERNAL MEDICINE

## 2019-10-13 PROCEDURE — 6370000000 HC RX 637 (ALT 250 FOR IP): Performed by: FAMILY MEDICINE

## 2019-10-13 PROCEDURE — 97530 THERAPEUTIC ACTIVITIES: CPT

## 2019-10-13 PROCEDURE — 6370000000 HC RX 637 (ALT 250 FOR IP): Performed by: NURSE PRACTITIONER

## 2019-10-13 PROCEDURE — 6360000002 HC RX W HCPCS: Performed by: NURSE PRACTITIONER

## 2019-10-13 PROCEDURE — 94640 AIRWAY INHALATION TREATMENT: CPT

## 2019-10-13 PROCEDURE — 6370000000 HC RX 637 (ALT 250 FOR IP): Performed by: INTERNAL MEDICINE

## 2019-10-13 PROCEDURE — 97110 THERAPEUTIC EXERCISES: CPT

## 2019-10-13 PROCEDURE — 2580000003 HC RX 258: Performed by: NURSE PRACTITIONER

## 2019-10-13 PROCEDURE — 97116 GAIT TRAINING THERAPY: CPT

## 2019-10-13 PROCEDURE — 6360000002 HC RX W HCPCS: Performed by: INTERNAL MEDICINE

## 2019-10-13 PROCEDURE — 2060000000 HC ICU INTERMEDIATE R&B

## 2019-10-13 RX ADMIN — GUAIFENESIN 600 MG: 600 TABLET, EXTENDED RELEASE ORAL at 10:09

## 2019-10-13 RX ADMIN — PHENAZOPYRIDINE 200 MG: 200 TABLET ORAL at 10:09

## 2019-10-13 RX ADMIN — OXYCODONE AND ACETAMINOPHEN 1 TABLET: 5; 325 TABLET ORAL at 21:00

## 2019-10-13 RX ADMIN — SODIUM CHLORIDE, PRESERVATIVE FREE 10 ML: 5 INJECTION INTRAVENOUS at 14:31

## 2019-10-13 RX ADMIN — GENTAMICIN SULFATE 0.5 INCH: 3 OINTMENT OPHTHALMIC at 10:12

## 2019-10-13 RX ADMIN — Medication 1 DROP: at 06:24

## 2019-10-13 RX ADMIN — TROSPIUM CHLORIDE 20 MG: 20 TABLET, FILM COATED ORAL at 20:52

## 2019-10-13 RX ADMIN — DOCUSATE SODIUM 100 MG: 100 CAPSULE, LIQUID FILLED ORAL at 10:10

## 2019-10-13 RX ADMIN — MOMETASONE FUROATE AND FORMOTEROL FUMARATE DIHYDRATE 2 PUFF: 200; 5 AEROSOL RESPIRATORY (INHALATION) at 20:03

## 2019-10-13 RX ADMIN — PYRIDOXINE HCL TAB 50 MG 100 MG: 50 TAB at 12:59

## 2019-10-13 RX ADMIN — Medication 3 TABLET: at 10:22

## 2019-10-13 RX ADMIN — GABAPENTIN 600 MG: 300 CAPSULE ORAL at 12:59

## 2019-10-13 RX ADMIN — DOCUSATE SODIUM 100 MG: 100 CAPSULE, LIQUID FILLED ORAL at 20:52

## 2019-10-13 RX ADMIN — MAGNESIUM HYDROXIDE 30 ML: 400 SUSPENSION ORAL at 10:08

## 2019-10-13 RX ADMIN — ENOXAPARIN SODIUM 40 MG: 40 INJECTION SUBCUTANEOUS at 10:08

## 2019-10-13 RX ADMIN — FERROUS SULFATE TAB EC 325 MG (65 MG FE EQUIVALENT) 325 MG: 325 (65 FE) TABLET DELAYED RESPONSE at 10:10

## 2019-10-13 RX ADMIN — MULTIPLE VITAMINS W/ MINERALS TAB 1 TABLET: TAB at 12:59

## 2019-10-13 RX ADMIN — FERROUS SULFATE TAB EC 325 MG (65 MG FE EQUIVALENT) 325 MG: 325 (65 FE) TABLET DELAYED RESPONSE at 20:51

## 2019-10-13 RX ADMIN — ALBUTEROL SULFATE 2.5 MG: 2.5 SOLUTION RESPIRATORY (INHALATION) at 14:31

## 2019-10-13 RX ADMIN — GABAPENTIN 600 MG: 300 CAPSULE ORAL at 20:51

## 2019-10-13 RX ADMIN — GUAIFENESIN 600 MG: 600 TABLET, EXTENDED RELEASE ORAL at 20:51

## 2019-10-13 RX ADMIN — SODIUM CHLORIDE, PRESERVATIVE FREE 10 ML: 5 INJECTION INTRAVENOUS at 20:52

## 2019-10-13 RX ADMIN — ALBUTEROL SULFATE 2.5 MG: 2.5 SOLUTION RESPIRATORY (INHALATION) at 20:03

## 2019-10-13 RX ADMIN — CLOPIDOGREL BISULFATE 75 MG: 75 TABLET ORAL at 10:09

## 2019-10-13 RX ADMIN — GABAPENTIN 600 MG: 300 CAPSULE ORAL at 10:09

## 2019-10-13 RX ADMIN — OXYCODONE HYDROCHLORIDE AND ACETAMINOPHEN 500 MG: 500 TABLET ORAL at 10:08

## 2019-10-13 RX ADMIN — VITAMIN D, TAB 1000IU (100/BT) 2000 UNITS: 25 TAB at 10:10

## 2019-10-13 RX ADMIN — TROSPIUM CHLORIDE 20 MG: 20 TABLET, FILM COATED ORAL at 10:08

## 2019-10-13 RX ADMIN — GENTAMICIN SULFATE 0.5 INCH: 3 OINTMENT OPHTHALMIC at 20:52

## 2019-10-13 RX ADMIN — PHENAZOPYRIDINE 200 MG: 200 TABLET ORAL at 13:00

## 2019-10-13 RX ADMIN — OXYCODONE AND ACETAMINOPHEN 1 TABLET: 5; 325 TABLET ORAL at 10:22

## 2019-10-13 ASSESSMENT — PAIN DESCRIPTION - DESCRIPTORS: DESCRIPTORS: ACHING

## 2019-10-13 ASSESSMENT — PAIN DESCRIPTION - LOCATION: LOCATION: HIP

## 2019-10-13 ASSESSMENT — PAIN SCALES - GENERAL
PAINLEVEL_OUTOF10: 8
PAINLEVEL_OUTOF10: 8
PAINLEVEL_OUTOF10: 7

## 2019-10-13 ASSESSMENT — PAIN DESCRIPTION - ONSET: ONSET: ON-GOING

## 2019-10-13 ASSESSMENT — PAIN DESCRIPTION - FREQUENCY: FREQUENCY: INTERMITTENT

## 2019-10-13 ASSESSMENT — PAIN DESCRIPTION - ORIENTATION: ORIENTATION: RIGHT

## 2019-10-13 ASSESSMENT — PAIN - FUNCTIONAL ASSESSMENT: PAIN_FUNCTIONAL_ASSESSMENT: ACTIVITIES ARE NOT PREVENTED

## 2019-10-13 ASSESSMENT — PAIN DESCRIPTION - PAIN TYPE: TYPE: CHRONIC PAIN

## 2019-10-13 NOTE — PROGRESS NOTES
Endurance Training  Safety Devices  Type of devices: Gait belt, Patient at risk for falls, Call light within reach, All fall risk precautions in place, Bed alarm in place, Left in bed, Nurse notified     Therapy Time   Individual Concurrent Group Co-treatment   Time In 1027         Time Out 1105         Minutes [place], [place]

## 2019-10-13 NOTE — PROGRESS NOTES
anti-inflammatories    Alendronate Nausea And Vomiting and Nausea Only    Asa [Aspirin] Other (See Comments)     Hx diverticulitis.   Can't T  Take regular aspirin 325mg but can take baby aspirin 81mg    Erythromycin Nausea And Vomiting and Nausea Only    Temazepam Nausea And Vomiting and Nausea Only    Tetracyclines & Related Nausea And Vomiting    Tramadol Nausea And Vomiting and Nausea Only       Current Meds:   Scheduled Meds:    albuterol  2.5 mg Nebulization TID    gentamicin  0.5 inch Ophthalmic TID    phenazopyridine  200 mg Oral TID WC    mometasone-formoterol  2 puff Inhalation BID    calcium carb-cholecalciferol  3 tablet Oral Daily    vitamin D  2,000 Units Oral Daily    clopidogrel  75 mg Oral Daily    docusate sodium  100 mg Oral BID    ferrous sulfate  325 mg Oral BID    gabapentin  600 mg Oral TID    guaiFENesin  600 mg Oral BID    [Held by provider] hydrochlorothiazide  12.5 mg Oral Daily    therapeutic multivitamin-minerals  1 tablet Oral Daily    tiotropium  2 puff Inhalation Daily    trospium  20 mg Oral BID AC    vitamin B-6  100 mg Oral Daily    vitamin C  500 mg Oral Daily    sodium chloride flush  10 mL Intravenous 2 times per day    enoxaparin  40 mg Subcutaneous Daily     Continuous Infusions:   PRN Meds: tetrahydrozoline, ammonium lactate, sodium chloride, magnesium hydroxide, acetaminophen, ondansetron **OR** ondansetron, potassium chloride **OR** potassium alternative oral replacement **OR** potassium chloride, oxyCODONE-acetaminophen, sodium chloride flush    Data:     Past Medical History:   has a past medical history of Aortic valve disease, Arthritis, Chronic back pain, COPD (chronic obstructive pulmonary disease) ([facility] [address] Disease of blood and blood forming organ, Diverticulitis, GERD (gastroesophageal reflux disease), History of blood transfusion, History of gastritis, Lumbar disc disease, Movement disorder, Nerve disease, peripheral, Osteoporosis, Pneumonia,

## 2019-10-14 PROBLEM — R29.898 WEAKNESS OF BOTH LOWER EXTREMITIES: Status: ACTIVE | Noted: 2019-10-14

## 2019-10-14 PROCEDURE — 94760 N-INVAS EAR/PLS OXIMETRY 1: CPT

## 2019-10-14 PROCEDURE — 97110 THERAPEUTIC EXERCISES: CPT

## 2019-10-14 PROCEDURE — 2060000000 HC ICU INTERMEDIATE R&B

## 2019-10-14 PROCEDURE — 97116 GAIT TRAINING THERAPY: CPT

## 2019-10-14 PROCEDURE — 94640 AIRWAY INHALATION TREATMENT: CPT

## 2019-10-14 PROCEDURE — 6360000002 HC RX W HCPCS: Performed by: NURSE PRACTITIONER

## 2019-10-14 PROCEDURE — 2700000000 HC OXYGEN THERAPY PER DAY

## 2019-10-14 PROCEDURE — 6370000000 HC RX 637 (ALT 250 FOR IP): Performed by: NURSE PRACTITIONER

## 2019-10-14 PROCEDURE — 97535 SELF CARE MNGMENT TRAINING: CPT

## 2019-10-14 PROCEDURE — 97530 THERAPEUTIC ACTIVITIES: CPT

## 2019-10-14 PROCEDURE — 99232 SBSQ HOSP IP/OBS MODERATE 35: CPT | Performed by: INTERNAL MEDICINE

## 2019-10-14 PROCEDURE — 6370000000 HC RX 637 (ALT 250 FOR IP): Performed by: FAMILY MEDICINE

## 2019-10-14 PROCEDURE — 81003 URINALYSIS AUTO W/O SCOPE: CPT

## 2019-10-14 PROCEDURE — 6360000002 HC RX W HCPCS: Performed by: INTERNAL MEDICINE

## 2019-10-14 PROCEDURE — 2580000003 HC RX 258: Performed by: NURSE PRACTITIONER

## 2019-10-14 RX ADMIN — OXYCODONE AND ACETAMINOPHEN 1 TABLET: 5; 325 TABLET ORAL at 10:07

## 2019-10-14 RX ADMIN — GUAIFENESIN 600 MG: 600 TABLET, EXTENDED RELEASE ORAL at 20:39

## 2019-10-14 RX ADMIN — MOMETASONE FUROATE AND FORMOTEROL FUMARATE DIHYDRATE 2 PUFF: 200; 5 AEROSOL RESPIRATORY (INHALATION) at 09:03

## 2019-10-14 RX ADMIN — TIOTROPIUM BROMIDE INHALATION SPRAY 2 PUFF: 3.12 SPRAY, METERED RESPIRATORY (INHALATION) at 09:03

## 2019-10-14 RX ADMIN — DOCUSATE SODIUM 100 MG: 100 CAPSULE, LIQUID FILLED ORAL at 09:26

## 2019-10-14 RX ADMIN — GUAIFENESIN 600 MG: 600 TABLET, EXTENDED RELEASE ORAL at 09:26

## 2019-10-14 RX ADMIN — SODIUM CHLORIDE, PRESERVATIVE FREE 10 ML: 5 INJECTION INTRAVENOUS at 09:27

## 2019-10-14 RX ADMIN — SODIUM CHLORIDE, PRESERVATIVE FREE 10 ML: 5 INJECTION INTRAVENOUS at 20:40

## 2019-10-14 RX ADMIN — ALBUTEROL SULFATE 2.5 MG: 2.5 SOLUTION RESPIRATORY (INHALATION) at 22:03

## 2019-10-14 RX ADMIN — Medication 3 TABLET: at 09:26

## 2019-10-14 RX ADMIN — FERROUS SULFATE TAB EC 325 MG (65 MG FE EQUIVALENT) 325 MG: 325 (65 FE) TABLET DELAYED RESPONSE at 20:40

## 2019-10-14 RX ADMIN — CLOPIDOGREL BISULFATE 75 MG: 75 TABLET ORAL at 09:27

## 2019-10-14 RX ADMIN — FERROUS SULFATE TAB EC 325 MG (65 MG FE EQUIVALENT) 325 MG: 325 (65 FE) TABLET DELAYED RESPONSE at 09:26

## 2019-10-14 RX ADMIN — PYRIDOXINE HCL TAB 50 MG 100 MG: 50 TAB at 09:26

## 2019-10-14 RX ADMIN — ALBUTEROL SULFATE 2.5 MG: 2.5 SOLUTION RESPIRATORY (INHALATION) at 09:28

## 2019-10-14 RX ADMIN — GABAPENTIN 600 MG: 300 CAPSULE ORAL at 14:14

## 2019-10-14 RX ADMIN — DOCUSATE SODIUM 100 MG: 100 CAPSULE, LIQUID FILLED ORAL at 20:40

## 2019-10-14 RX ADMIN — OXYCODONE HYDROCHLORIDE AND ACETAMINOPHEN 500 MG: 500 TABLET ORAL at 09:27

## 2019-10-14 RX ADMIN — OXYCODONE AND ACETAMINOPHEN 1 TABLET: 5; 325 TABLET ORAL at 16:12

## 2019-10-14 RX ADMIN — ALBUTEROL SULFATE 2.5 MG: 2.5 SOLUTION RESPIRATORY (INHALATION) at 15:36

## 2019-10-14 RX ADMIN — ENOXAPARIN SODIUM 40 MG: 40 INJECTION SUBCUTANEOUS at 09:27

## 2019-10-14 RX ADMIN — GENTAMICIN SULFATE 0.5 INCH: 3 OINTMENT OPHTHALMIC at 14:15

## 2019-10-14 RX ADMIN — GENTAMICIN SULFATE 0.5 INCH: 3 OINTMENT OPHTHALMIC at 09:38

## 2019-10-14 RX ADMIN — GABAPENTIN 600 MG: 300 CAPSULE ORAL at 20:39

## 2019-10-14 RX ADMIN — GABAPENTIN 600 MG: 300 CAPSULE ORAL at 09:26

## 2019-10-14 RX ADMIN — MULTIPLE VITAMINS W/ MINERALS TAB 1 TABLET: TAB at 09:27

## 2019-10-14 RX ADMIN — TROSPIUM CHLORIDE 20 MG: 20 TABLET, FILM COATED ORAL at 20:40

## 2019-10-14 RX ADMIN — MOMETASONE FUROATE AND FORMOTEROL FUMARATE DIHYDRATE 2 PUFF: 200; 5 AEROSOL RESPIRATORY (INHALATION) at 22:04

## 2019-10-14 RX ADMIN — Medication 1 DROP: at 22:30

## 2019-10-14 RX ADMIN — TROSPIUM CHLORIDE 20 MG: 20 TABLET, FILM COATED ORAL at 09:26

## 2019-10-14 RX ADMIN — GENTAMICIN SULFATE 0.5 INCH: 3 OINTMENT OPHTHALMIC at 20:45

## 2019-10-14 RX ADMIN — VITAMIN D, TAB 1000IU (100/BT) 2000 UNITS: 25 TAB at 09:26

## 2019-10-14 ASSESSMENT — PAIN DESCRIPTION - ORIENTATION
ORIENTATION: LOWER;RIGHT
ORIENTATION: OTHER (COMMENT)

## 2019-10-14 ASSESSMENT — PAIN DESCRIPTION - LOCATION
LOCATION: SHOULDER;HIP
LOCATION: GENERALIZED
LOCATION: BACK;HIP

## 2019-10-14 ASSESSMENT — PAIN SCALES - GENERAL
PAINLEVEL_OUTOF10: 9
PAINLEVEL_OUTOF10: 8
PAINLEVEL_OUTOF10: 4
PAINLEVEL_OUTOF10: 7
PAINLEVEL_OUTOF10: 4
PAINLEVEL_OUTOF10: 8

## 2019-10-14 ASSESSMENT — PAIN DESCRIPTION - PAIN TYPE
TYPE: CHRONIC PAIN

## 2019-10-14 ASSESSMENT — PAIN DESCRIPTION - DESCRIPTORS
DESCRIPTORS: ACHING

## 2019-10-14 ASSESSMENT — PAIN DESCRIPTION - FREQUENCY
FREQUENCY: INTERMITTENT

## 2019-10-14 NOTE — PROGRESS NOTES
Physical Therapy  Facility/Department: [facility] PROGRESSIVE CARE  Daily Treatment Note  NAME: Richie Guerin  : 1940  MRN: [id number]    Date of Service: 10/14/2019    Discharge Recommendations:  Subacute/Skilled Nursing Facility        Assessment   Body structures, Functions, Activity limitations: Decreased functional mobility ; Decreased strength;Decreased endurance;Decreased balance  Assessment: Pt tolerated session still deficits noted of bed mobility, transfers, ambulation, balance, safety awareness and requires extended time to complete for activity tolerance limited by pain & impaired aerobic capacity. Pt to benefit with cont'd inpatient PT services and D/C to [address] to increase independence with functional mobility, balance, safety & activity tolerance to ensure safe D/C home  Prognosis: Good  Decision Making: Medium Complexity  REQUIRES PT FOLLOW UP: Yes  Activity Tolerance  Activity Tolerance: Patient limited by fatigue;Patient limited by endurance     Patient Diagnosis(es): The primary encounter diagnosis was Urinary tract infection without hematuria, site unspecified. Diagnoses of Myalgia, Chronic bilateral low back pain with bilateral sciatica, and Chest pain at rest were also pertinent to this visit. has a past medical history of Aortic valve disease, Arthritis, Chronic back pain, COPD (chronic obstructive pulmonary disease) ([facility] [address] Disease of blood and blood forming organ, Diverticulitis, GERD (gastroesophageal reflux disease), History of blood transfusion, History of gastritis, Lumbar disc disease, Movement disorder, Nerve disease, peripheral, Osteoporosis, Pneumonia, and Unspecified cerebral artery occlusion with cerebral infarction. has a past surgical history that includes Spinal fusion (); Varicose vein surgery; Lumbar spine surgery (x 2); Upper gastrointestinal endoscopy; Colonoscopy; Hysterectomy;  Foot surgery (Left, 2017); and arthroplasty (Left, 4/18/2017). Restrictions  Restrictions/Precautions  Restrictions/Precautions: Fall Risk, Up as Tolerated  Required Braces or Orthoses?: No  Position Activity Restriction  Other position/activity restrictions: up with assist, heels off bed if pt unable to move LE's, O2 via NC @ 2L/min  Subjective   General  Chart Reviewed: Yes  Additional Pertinent Hx: COPD, chronic back pain, Aortic valve disease, OA, GERD, TIA  Response To Previous Treatment: Patient with no complaints from previous session. General Comment  Comments: RN, Othella Sessions PT  Pain Screening  Patient Currently in Pain: Yes  Pain Assessment  Pain Assessment: 0-10  Pain Type: Chronic pain  Pain Location: Back; Hip  Pain Orientation: Lower;Right  Response to Pain Intervention: Patient Satisfied  Vital Signs  Patient Currently in Pain: Yes  Oxygen Therapy  O2 Device: Nasal cannula       Orientation   WFL       Objective   Bed mobility  Rolling to Left: Minimal assistance  Rolling to Right: Minimal assistance  Sit to Supine: Moderate assistance(assist to bring legs into bed)  Scooting: Moderate assistance  Transfers  Sit to Stand: Minimal Assistance  Stand to sit: Minimal Assistance  Bed to Chair: Minimal assistance  Stand Pivot Transfers: Minimal Assistance  Lateral Transfers: Minimal Assistance  Ambulation  Ambulation?: Yes  Ambulation 1  Surface: level tile  Device: Rolling Walker  Other Apparatus: O2  Assistance: Minimal assistance  Quality of Gait: step to pattern, amb with kyphotic spine, heavy relaince on arms of walker  Gait Deviations: Slow Nusrat;Decreased step length;Decreased step height  Distance: 15ft   Pt amb to [facility] for toileting, Mod Assistance to sit to toilet.   Pt stood with Mod Assistance,stood 3 minutes for pericare & to pull up brief & LB clothing         Balance  Sitting - Static: Good  Sitting - Dynamic: Good  Standing - Static: Fair;+  Standing - Dynamic: Fair  Exercises  Comments: seated ex x 12 reps for LE AROM, UE AROM x 10 reps

## 2019-10-14 NOTE — PROGRESS NOTES
Varicose vein surgery; Lumbar spine surgery (x 2); Upper gastrointestinal endoscopy; Colonoscopy; Hysterectomy; Foot surgery (Left, 04/18/2017); and arthroplasty (Left, 4/18/2017). Restrictions  Restrictions/Precautions  Restrictions/Precautions: Fall Risk, Up as Tolerated  Required Braces or Orthoses?: No  Position Activity Restriction  Other position/activity restrictions: up with assist, heels off bed if pt unable to move LE's, O2 via NC @ 2L/min  Subjective   General  Chart Reviewed: Yes  Patient assessed for rehabilitation services?: Yes  Response to previous treatment: Patient with no complaints from previous session  Family / Caregiver Present: No  Pain Assessment  Pain Assessment: 0-10  Pain Level: 7  Pain Type: Chronic pain  Pain Location: Generalized  Vital Signs  Patient Currently in Pain: Yes   Orientation  Orientation  Overall Orientation Status: Within Normal Limits  Objective    ADL  LE Dressing: Moderate assistance  Toileting: Moderate assistance   Balance  Sitting Balance: Stand by assistance  Standing Balance: Moderate assistance  Standing Balance  Comment: flexed posture  Functional Mobility  Functional - Mobility Device: Rolling Walker  Assist Level: Moderate assistance  Toilet Transfers  Toilet - Technique: Stand step  Equipment Used: Standard bedside commode  Toilet Transfer: Moderate assistance     Transfers  Stand Step Transfers: Moderate assistance  Stand Pivot Transfers: Moderate assistance  Sit Pivot Transfers: Moderate assistance  Sit to stand: Moderate assistance  Stand to sit: Moderate assistance   Cognition  Overall Cognitive Status: Exceptions  Arousal/Alertness: Appropriate responses to stimuli  Following Commands: Follows one step commands with increased time; Follows one step commands with repetition  Attention Span: Appears intact; Attends with cues to redirect  Memory: Decreased short term memory  Safety Judgement: Decreased awareness of need for safety;Decreased awareness of

## 2019-10-14 NOTE — PROGRESS NOTES
reports that she quit smoking about 25 years ago. She has never used smokeless tobacco. She reports that she does not drink alcohol or use drugs. Family History:   Family History   Problem Relation Age of Onset    Heart Disease Mother     Heart Disease Father        Vitals:  BP (!) 99/52   Pulse 70   Temp 98.2 °F (36.8 °C) (Axillary)   Resp 16   Ht 5' 4.96\" (1.65 m)   Wt 149 lb 11.2 oz (67.9 kg)   SpO2 96%   BMI 24.94 kg/m²   Temp (24hrs), Av.2 °F (36.8 °C), Min:97.5 °F (36.4 °C), Max:98.8 °F (37.1 °C)    No results for input(s): POCGLU in the last 72 hours. I/O (24Hr): Intake/Output Summary (Last 24 hours) at 10/14/2019 0804  Last data filed at 10/13/2019 9893  Gross per 24 hour   Intake --   Output 400 ml   Net -400 ml       Labs:  Hematology:No results for input(s): WBC, RBC, HGB, HCT, MCV, MCH, MCHC, RDW, PLT, MPV, SEDRATE, CRP, INR, DDIMER, VQ8FITAK, LABABSO in the last 72 hours. Invalid input(s): PT  Chemistry:No results for input(s): NA, K, CL, CO2, GLUCOSE, BUN, CREATININE, MG, ANIONGAP, LABGLOM, GFRAA, CALCIUM, CAION, PHOS, PSA, PROBNP, TROPHS, CKTOTAL, CKMB, CKMBINDEX, MYOGLOBIN, DIGOXIN, LACTACIDWB in the last 72 hours. No results for input(s): PROT, LABALBU, LABA1C, Q6WDYFP, V8CCDYE, FT4, TSH, AST, ALT, LDH, GGT, ALKPHOS, LABGGT, BILITOT, BILIDIR, AMMONIA, AMYLASE, LIPASE, LACTATE, CHOL, HDL, LDLCHOLESTEROL, CHOLHDLRATIO, TRIG, VLDL, EYO77TU, PHENYTOIN, PHENYF, URICACID, POCGLU in the last 72 hours.   ABG:  Lab Results   Component Value Date    PHART 7.410 11/15/2014    PYP0NEW 42.0 11/15/2014    PO2ART 46.0 11/15/2014    QDO5NLN 26.1 11/15/2014    NBEA NOT REPORTED 11/15/2014    PBEA 1.8 11/15/2014    Y8HGPSVT 78.6 11/15/2014    FIO2 ROOM AIR 11/15/2014     Lab Results   Component Value Date/Time    SPECIAL NOT REPORTED 10/09/2019 01:12 PM     Lab Results   Component Value Date/Time    CULTURE NO SIGNIFICANT GROWTH 10/09/2019 01:12 PM       Radiology:  Ct Head Wo

## 2019-10-14 NOTE — CARE COORDINATION
JENNIFER spoke with Choco Yang from [employer] regarding referral was received and reviewed, pt has been accepted and the insurance pre-cert has been started.
The therapy team is recommending SNF, JENNIFER called pt daughter Chiquis Santos to inform and discuss discharge plan. Chiquis Santos requested time to review the SNF list and visit a few facilities. JENNIFER gave Chiquis Santos contact information to call back with choices.    JENNIFER following
pancho in past and open to referral. Therapy was in room while assessment being completed and SNF will be recommended. Did leave SNF  List at table and notify ss of the likely need fo rsnf. Did notify rory of the dual referral. And will place SYLVIA in epic. Will await therapy notes. Patient will be precert once accepted at a facility     Call back from [facility] and she confirmed all the above. She is requesting dc instructions.  Fax [phone]      Electronically signed by Gilberto Mcmillan RN on 10/9/19 at 11:28 AM

## 2019-10-15 VITALS
TEMPERATURE: 98.2 F | WEIGHT: 157.4 LBS | RESPIRATION RATE: 18 BRPM | HEIGHT: 65 IN | HEART RATE: 89 BPM | DIASTOLIC BLOOD PRESSURE: 60 MMHG | BODY MASS INDEX: 26.23 KG/M2 | SYSTOLIC BLOOD PRESSURE: 104 MMHG | OXYGEN SATURATION: 93 %

## 2019-10-15 LAB
BILIRUBIN URINE: NEGATIVE
COLOR: YELLOW
COMMENT UA: NORMAL
GLUCOSE URINE: NEGATIVE
KETONES, URINE: NEGATIVE
LEUKOCYTE ESTERASE, URINE: NEGATIVE
NITRITE, URINE: NEGATIVE
PH UA: 6 (ref 5–8)
PROTEIN UA: NEGATIVE
SPECIFIC GRAVITY UA: 1.01 (ref 1–1.03)
TURBIDITY: CLEAR
URINE HGB: NEGATIVE
UROBILINOGEN, URINE: NORMAL

## 2019-10-15 PROCEDURE — 6360000002 HC RX W HCPCS: Performed by: INTERNAL MEDICINE

## 2019-10-15 PROCEDURE — 2580000003 HC RX 258: Performed by: NURSE PRACTITIONER

## 2019-10-15 PROCEDURE — 97530 THERAPEUTIC ACTIVITIES: CPT | Performed by: NURSE PRACTITIONER

## 2019-10-15 PROCEDURE — 2700000000 HC OXYGEN THERAPY PER DAY

## 2019-10-15 PROCEDURE — 6370000000 HC RX 637 (ALT 250 FOR IP): Performed by: FAMILY MEDICINE

## 2019-10-15 PROCEDURE — 36415 COLL VENOUS BLD VENIPUNCTURE: CPT

## 2019-10-15 PROCEDURE — 6360000002 HC RX W HCPCS: Performed by: NURSE PRACTITIONER

## 2019-10-15 PROCEDURE — 94760 N-INVAS EAR/PLS OXIMETRY 1: CPT

## 2019-10-15 PROCEDURE — 6370000000 HC RX 637 (ALT 250 FOR IP): Performed by: NURSE PRACTITIONER

## 2019-10-15 PROCEDURE — 82746 ASSAY OF FOLIC ACID SERUM: CPT

## 2019-10-15 PROCEDURE — 82607 VITAMIN B-12: CPT

## 2019-10-15 PROCEDURE — 82306 VITAMIN D 25 HYDROXY: CPT

## 2019-10-15 PROCEDURE — 99239 HOSP IP/OBS DSCHRG MGMT >30: CPT | Performed by: FAMILY MEDICINE

## 2019-10-15 PROCEDURE — 94640 AIRWAY INHALATION TREATMENT: CPT

## 2019-10-15 PROCEDURE — 97535 SELF CARE MNGMENT TRAINING: CPT | Performed by: NURSE PRACTITIONER

## 2019-10-15 RX ORDER — GABAPENTIN 100 MG/1
300 CAPSULE ORAL 2 TIMES DAILY
Qty: 90 CAPSULE | Refills: 3 | DISCHARGE
Start: 2019-10-15 | End: 2020-02-11

## 2019-10-15 RX ORDER — TROSPIUM CHLORIDE 20 MG/1
20 TABLET, FILM COATED ORAL
Qty: 60 TABLET | Refills: 3 | DISCHARGE
Start: 2019-10-15 | End: 2020-06-11

## 2019-10-15 RX ORDER — GABAPENTIN 100 MG/1
200 CAPSULE ORAL 2 TIMES DAILY
Status: DISCONTINUED | OUTPATIENT
Start: 2019-10-15 | End: 2019-10-15 | Stop reason: HOSPADM

## 2019-10-15 RX ADMIN — Medication 3 TABLET: at 08:00

## 2019-10-15 RX ADMIN — MOMETASONE FUROATE AND FORMOTEROL FUMARATE DIHYDRATE 2 PUFF: 200; 5 AEROSOL RESPIRATORY (INHALATION) at 10:08

## 2019-10-15 RX ADMIN — MULTIPLE VITAMINS W/ MINERALS TAB 1 TABLET: TAB at 08:00

## 2019-10-15 RX ADMIN — OXYCODONE HYDROCHLORIDE AND ACETAMINOPHEN 500 MG: 500 TABLET ORAL at 08:00

## 2019-10-15 RX ADMIN — OXYCODONE AND ACETAMINOPHEN 1 TABLET: 5; 325 TABLET ORAL at 08:00

## 2019-10-15 RX ADMIN — TROSPIUM CHLORIDE 20 MG: 20 TABLET, FILM COATED ORAL at 07:59

## 2019-10-15 RX ADMIN — PYRIDOXINE HCL TAB 50 MG 100 MG: 50 TAB at 08:00

## 2019-10-15 RX ADMIN — SODIUM CHLORIDE, PRESERVATIVE FREE 10 ML: 5 INJECTION INTRAVENOUS at 08:03

## 2019-10-15 RX ADMIN — DOCUSATE SODIUM 100 MG: 100 CAPSULE, LIQUID FILLED ORAL at 08:00

## 2019-10-15 RX ADMIN — ENOXAPARIN SODIUM 40 MG: 40 INJECTION SUBCUTANEOUS at 07:59

## 2019-10-15 RX ADMIN — OXYCODONE AND ACETAMINOPHEN 1 TABLET: 5; 325 TABLET ORAL at 14:10

## 2019-10-15 RX ADMIN — GABAPENTIN 600 MG: 300 CAPSULE ORAL at 08:00

## 2019-10-15 RX ADMIN — OXYCODONE AND ACETAMINOPHEN 1 TABLET: 5; 325 TABLET ORAL at 02:09

## 2019-10-15 RX ADMIN — GENTAMICIN SULFATE 0.5 INCH: 3 OINTMENT OPHTHALMIC at 08:02

## 2019-10-15 RX ADMIN — VITAMIN D, TAB 1000IU (100/BT) 2000 UNITS: 25 TAB at 07:59

## 2019-10-15 RX ADMIN — FERROUS SULFATE TAB EC 325 MG (65 MG FE EQUIVALENT) 325 MG: 325 (65 FE) TABLET DELAYED RESPONSE at 08:00

## 2019-10-15 RX ADMIN — TIOTROPIUM BROMIDE INHALATION SPRAY 2 PUFF: 3.12 SPRAY, METERED RESPIRATORY (INHALATION) at 10:08

## 2019-10-15 RX ADMIN — GENTAMICIN SULFATE 0.5 INCH: 3 OINTMENT OPHTHALMIC at 14:10

## 2019-10-15 RX ADMIN — GUAIFENESIN 600 MG: 600 TABLET, EXTENDED RELEASE ORAL at 08:00

## 2019-10-15 RX ADMIN — CLOPIDOGREL BISULFATE 75 MG: 75 TABLET ORAL at 08:00

## 2019-10-15 RX ADMIN — ALBUTEROL SULFATE 2.5 MG: 2.5 SOLUTION RESPIRATORY (INHALATION) at 10:08

## 2019-10-15 ASSESSMENT — PAIN DESCRIPTION - ORIENTATION: ORIENTATION: LEFT;LOWER;RIGHT

## 2019-10-15 ASSESSMENT — PAIN SCALES - GENERAL
PAINLEVEL_OUTOF10: 6
PAINLEVEL_OUTOF10: 8
PAINLEVEL_OUTOF10: 4
PAINLEVEL_OUTOF10: 8

## 2019-10-15 ASSESSMENT — PAIN DESCRIPTION - LOCATION
LOCATION: GENERALIZED

## 2019-10-15 ASSESSMENT — PAIN DESCRIPTION - DESCRIPTORS
DESCRIPTORS: ACHING
DESCRIPTORS: ACHING;DISCOMFORT

## 2019-10-15 ASSESSMENT — PAIN DESCRIPTION - FREQUENCY
FREQUENCY: OTHER (COMMENT)
FREQUENCY: CONTINUOUS
FREQUENCY: CONTINUOUS
FREQUENCY: INTERMITTENT

## 2019-10-15 ASSESSMENT — PAIN DESCRIPTION - PAIN TYPE
TYPE: CHRONIC PAIN

## 2019-10-15 ASSESSMENT — PAIN DESCRIPTION - ONSET: ONSET: ON-GOING

## 2019-10-15 ASSESSMENT — PAIN - FUNCTIONAL ASSESSMENT
PAIN_FUNCTIONAL_ASSESSMENT: PREVENTS OR INTERFERES WITH MANY ACTIVE NOT PASSIVE ACTIVITIES
PAIN_FUNCTIONAL_ASSESSMENT: PREVENTS OR INTERFERES WITH MANY ACTIVE NOT PASSIVE ACTIVITIES

## 2019-10-15 ASSESSMENT — PAIN DESCRIPTION - PROGRESSION: CLINICAL_PROGRESSION: GRADUALLY WORSENING

## 2019-10-15 NOTE — PROGRESS NOTES
Patient was discharged with her own home wheelchair, cell phone, , belongings, robe, pjs, socks, and oxygen tank. Pt alert, oriented, and vitals stable. Pt verbalized understanding of discharge and was made aware that she would be discharging to the facility via wc and transportation.

## 2019-10-15 NOTE — FLOWSHEET NOTE
Patient is awake and alert while sitting up in a chair. Patient is approachable and engages in conversation. Patient reports feeling better but shares concerns about going to rehab and then assisted living. Patient is saddened by loss of independence but seems willing to give assisted living a try. Writer provides listening presence and emotional support as patient shares concerns about this next step in her life. Patient seems to have adequate support and seems to be coping. Patient's sanjana seems to be helping in this area. Writer offers a prayer and patient accepts. Writer offers a prayer for continued healing as well as guidance a patient makes the transition. Patient expresses appreciation for the prayer and the visit. Spiritual Care will follow as needed.        10/14/19 2057   Encounter Summary   Services provided to: Patient   Referral/Consult From: [address] [place] [address] Children;Family members   Continue Visiting   (10/14/19)   Complexity of Encounter Moderate   Length of Encounter 30 minutes   Spiritual Assessment Completed Yes   Routine   Type Follow up   Spiritual/Mosque   Type Spiritual support   Assessment Approachable   Intervention Active listening;Explored feelings, thoughts, concerns;Explored coping resources;Nurtured hope;Prayer   Outcome Expressed gratitude

## 2019-10-15 NOTE — PLAN OF CARE
Problem: Falls - Risk of:  Goal: Will remain free from falls  Description  Will remain free from falls  Outcome: Ongoing  Note:   Siderails up x 2  Hourly rounding  Call light in reach  Instructed to call for assist before attempting out of bed. Remains free from falls and accidental injury at this time   Floor free from obstacles  Bed is locked and in lowest position  Adequate lighting provided  Bed alarm on, Red [place]alling star and Stay with Me signs posted      Goal: Absence of physical injury  Description  Absence of physical injury  Outcome: Ongoing     Problem: Risk for Impaired Skin Integrity  Goal: Tissue integrity - skin and mucous membranes  Description  Structural intactness and normal physiological function of skin and  mucous membranes. Outcome: Ongoing     Problem: Pain:  Description  Pain management should include both nonpharmacologic and pharmacologic interventions. Goal: Pain level will decrease  Description  Pain level will decrease  Outcome: Ongoing  Note:   Patient complaining of pain in her left hand and legs upon touching. Refused any prn pain meds.   Patient instructed to call out with new onset of pain or unrelieved pain    Goal: Control of acute pain  Description  Control of acute pain  Outcome: Ongoing  Goal: Control of chronic pain  Description  Control of chronic pain  Outcome: Ongoing

## 2019-10-15 NOTE — PROGRESS NOTES
oxyCODONE-acetaminophen, sodium chloride flush    Data:     Past Medical History:   has a past medical history of Aortic valve disease, Arthritis, Chronic back pain, COPD (chronic obstructive pulmonary disease) ([address] Disease of blood and blood forming organ, Diverticulitis, GERD (gastroesophageal reflux disease), History of blood transfusion, History of gastritis, Lumbar disc disease, Movement disorder, Nerve disease, peripheral, Osteoporosis, Pneumonia, and Unspecified cerebral artery occlusion with cerebral infarction. Social History:   reports that she quit smoking about 25 years ago. She has never used smokeless tobacco. She reports that she does not drink alcohol or use drugs. Family History:   Family History   Problem Relation Age of Onset    Heart Disease Mother     Heart Disease Father        Vitals:  BP (!) 99/52   Pulse 76   Temp 98.1 °F (36.7 °C) (Oral)   Resp 18   Ht 5' 4.96\" (1.65 m)   Wt 157 lb 6.4 oz (71.4 kg)   SpO2 96%   BMI 26.22 kg/m²   Temp (24hrs), Av.2 °F (36.8 °C), Min:98.1 °F (36.7 °C), Max:98.2 °F (36.8 °C)    No results for input(s): POCGLU in the last 72 hours. I/O (24Hr): Intake/Output Summary (Last 24 hours) at 10/15/2019 0850  Last data filed at 10/15/2019 0802  Gross per 24 hour   Intake 970 ml   Output 1700 ml   Net -730 ml       Labs:  Hematology:No results for input(s): WBC, RBC, HGB, HCT, MCV, MCH, MCHC, RDW, PLT, MPV, SEDRATE, CRP, INR, DDIMER, YP7NVPOT, LABABSO in the last 72 hours. Invalid input(s): PT  Chemistry:No results for input(s): NA, K, CL, CO2, GLUCOSE, BUN, CREATININE, MG, ANIONGAP, LABGLOM, GFRAA, CALCIUM, CAION, PHOS, PSA, PROBNP, TROPHS, CKTOTAL, CKMB, CKMBINDEX, MYOGLOBIN, DIGOXIN, LACTACIDWB in the last 72 hours. No results for input(s): PROT, LABALBU, LABA1C, P7FNPSH, I8VSKLR, FT4, TSH, AST, ALT, LDH, GGT, ALKPHOS, LABGGT, BILITOT, BILIDIR, AMMONIA, AMYLASE, LIPASE, LACTATE, CHOL, HDL, LDLCHOLESTEROL, CHOLHDLRATIO, TRIG, VLDL, ZUL16YC, PHENYTOIN, PHENYF, URICACID, POCGLU in the last 72 hours. ABG:  Lab Results   Component Value Date    PHART 7.410 11/15/2014    IHM5DAW 42.0 11/15/2014    PO2ART 46.0 11/15/2014    FVG8HHW 26.1 11/15/2014    NBEA NOT REPORTED 11/15/2014    PBEA 1.8 11/15/2014    L1JBURQL 78.6 11/15/2014    FIO2 ROOM AIR 11/15/2014     Lab Results   Component Value Date/Time    SPECIAL NOT REPORTED 10/09/2019 01:12 PM     Lab Results   Component Value Date/Time    CULTURE NO SIGNIFICANT GROWTH 10/09/2019 01:12 PM       Radiology:  No results found. Physical Examination:        General appearance:  alert, cooperative and no distress  Mental Status:  oriented to person, place and time and normal affect  Lungs:  clear to auscultation bilaterally, normal effort  Heart:  regular rate and rhythm, no murmur  Abdomen:  soft, nontender, nondistended, normal bowel sounds, no masses, hepatomegaly, splenomegaly  Extremities:  no edema, redness, tenderness in the calves  Skin:  no gross lesions, rashes, induration    Assessment:        Hospital Problems           Last Modified POA    * (Principal) Weakness of both lower extremities 10/14/2019 Yes    Hypokalemia 10/8/2019 Yes    Abnormal gait 10/8/2019 Yes    Aortic valve insufficiency 10/8/2019 Yes    Allergic rhinitis 10/8/2019 Yes    Benign essential HTN 10/8/2019 Yes    DDD (degenerative disc disease), lumbar 10/8/2019 Yes    Neurogenic bladder 10/8/2019 Yes    Supplemental oxygen dependent 10/8/2019 Yes    LVH (left ventricular hypertrophy) 10/8/2019 Yes    Valvular heart disease 10/8/2019 Yes    Dysuria 10/8/2019 Yes    Near syncope 10/8/2019 Yes    Chest pain at rest 10/14/2019 Yes    Chronic respiratory failure with hypoxia ([address] 10/8/2019 Yes    Falls 10/8/2019 Yes    Age-related physical debility 10/9/2019 Yes          Plan:        1. Awaiting Precert. 2. Decrease Neurontin to 300 mg twice daily as it can cause gait ataxia leg edema and falls.   3. In view of recurrent falls

## 2019-10-15 NOTE — PROGRESS NOTES
blood transfusion, History of gastritis, Lumbar disc disease, Movement disorder, Nerve disease, peripheral, Osteoporosis, Pneumonia, and Unspecified cerebral artery occlusion with cerebral infarction. has a past surgical history that includes Spinal fusion (2012); Varicose vein surgery; Lumbar spine surgery (x 2); Upper gastrointestinal endoscopy; Colonoscopy; Hysterectomy; Foot surgery (Left, 04/18/2017); and arthroplasty (Left, 4/18/2017). Restrictions  Restrictions/Precautions  Restrictions/Precautions: Fall Risk, Up as Tolerated  Required Braces or Orthoses?: No  Position Activity Restriction  Other position/activity restrictions: up with assist, heels off bed if pt unable to move LE's, O2 via NC @ 2L/min  Subjective   General  Chart Reviewed: Yes  Patient assessed for rehabilitation services?: Yes  Response to previous treatment: Patient with no complaints from previous session  Family / Caregiver Present: No  Pain Assessment  Functional Pain Assessment: Prevents or interferes with many active not passive activities  Oxygen Therapy  O2 Device: Nasal cannula  O2 Flow Rate (L/min): 2 L/min   Orientation     Objective    ADL  Grooming: Setup;Minimal assistance; Increased time to complete;Verbal cueing  Additional Comments: Extended time to complete, VC for breathing tech and wearing O2 as well as attention to task        Balance  Sitting Balance: Stand by assistance  Standing Balance: Moderate assistance  Functional Mobility  Functional - Mobility Device: Rolling Walker  Activity: To/from bathroom  Assist Level:  Moderate assistance  Functional Mobility Comments: max verbal instruction/tactile assist for upright posture, RW safety, weight shifting, pursed lip breahting, staying focused on task at hand and awareness/assist with all lines      Transfers  Sit to stand: Minimal assistance  Stand to sit: Minimal assistance  Transfer Comments: VC                       Cognition  Overall Cognitive Status:

## 2019-10-16 LAB
FOLATE: 9.8 NG/ML
VITAMIN B-12: 802 PG/ML (ref 232–1245)
VITAMIN D 25-HYDROXY: 31.6 NG/ML (ref 30–100)

## 2019-10-18 ENCOUNTER — HOSPITAL ENCOUNTER (OUTPATIENT)
Age: 79
Setting detail: SPECIMEN
Discharge: HOME | End: 2019-10-18
Payer: COMMERCIAL

## 2019-10-18 LAB
ANION GAP SERPL CALCULATED.3IONS-SCNC: 14 MMOL/L (ref 9–17)
BUN BLDV-MCNC: 25 MG/DL (ref 8–23)
BUN/CREAT BLD: 29 (ref 9–20)
CALCIUM SERPL-MCNC: 9.5 MG/DL (ref 8.6–10.4)
CHLORIDE BLD-SCNC: 105 MMOL/L (ref 98–107)
CO2: 25 MMOL/L (ref 20–31)
CREAT SERPL-MCNC: 0.86 MG/DL (ref 0.5–0.9)
GFR AFRICAN AMERICAN: >60 ML/MIN
GFR NON-AFRICAN AMERICAN: >60 ML/MIN
GFR SERPL CREATININE-BSD FRML MDRD: ABNORMAL ML/MIN/{1.73_M2}
GFR SERPL CREATININE-BSD FRML MDRD: ABNORMAL ML/MIN/{1.73_M2}
GLUCOSE BLD-MCNC: 90 MG/DL (ref 70–99)
HCT VFR BLD CALC: 40.8 % (ref 36.3–47.1)
HEMOGLOBIN: 13.4 G/DL (ref 11.9–15.1)
MCH RBC QN AUTO: 29.6 PG (ref 25.2–33.5)
MCHC RBC AUTO-ENTMCNC: 32.8 G/DL (ref 28.4–34.8)
MCV RBC AUTO: 90.1 FL (ref 82.6–102.9)
NRBC AUTOMATED: 0 PER 100 WBC
PDW BLD-RTO: 14.6 % (ref 11.8–14.4)
PLATELET # BLD: 244 K/UL (ref 138–453)
PMV BLD AUTO: 10.3 FL (ref 8.1–13.5)
POTASSIUM SERPL-SCNC: 3.9 MMOL/L (ref 3.7–5.3)
PREALBUMIN: 23.1 MG/DL (ref 20–40)
RBC # BLD: 4.53 M/UL (ref 3.95–5.11)
SODIUM BLD-SCNC: 144 MMOL/L (ref 135–144)
T4 TOTAL: 7.7 UG/DL (ref 4.5–12)
TSH SERPL DL<=0.05 MIU/L-ACNC: 3.58 MIU/L (ref 0.3–5)
WBC # BLD: 4.8 K/UL (ref 3.5–11.3)

## 2019-10-18 PROCEDURE — P9603 ONE-WAY ALLOW PRORATED MILES: HCPCS

## 2019-10-18 PROCEDURE — 85027 COMPLETE CBC AUTOMATED: CPT

## 2019-10-18 PROCEDURE — 84443 ASSAY THYROID STIM HORMONE: CPT

## 2019-10-18 PROCEDURE — 84134 ASSAY OF PREALBUMIN: CPT

## 2019-10-18 PROCEDURE — 80048 BASIC METABOLIC PNL TOTAL CA: CPT

## 2019-10-18 PROCEDURE — 84436 ASSAY OF TOTAL THYROXINE: CPT

## 2019-10-18 PROCEDURE — 36415 COLL VENOUS BLD VENIPUNCTURE: CPT

## 2019-10-30 NOTE — DISCHARGE SUMMARY
[facility] [address]    Discharge Summary     Patient ID: Becky Salazar  :  1940   MRN: [id number]     ACCOUNT:  [de-identified]   Patient's PCP: Radha Kline MD  Admit Date: 10/7/2019   Discharge Date: 10/15/2019     Length of Stay: 7  Code Status:  Prior  Admitting Physician: Elyse Mcgowan MD  Discharge Physician: Romeo Alatorre MD     Active Discharge Diagnoses:     Hospital Problem Lists:  Principal Problem:    Weakness of both lower extremities  Active Problems:    Hypokalemia    Abnormal gait    Aortic valve insufficiency    Allergic rhinitis    Benign essential HTN    DDD (degenerative disc disease), lumbar    Neurogenic bladder    Supplemental oxygen dependent    LVH (left ventricular hypertrophy)    Valvular heart disease    Dysuria    Near syncope    Chest pain at rest    Chronic respiratory failure with hypoxia ([address]    Falls    Age-related physical debility  Resolved Problems:    * No resolved hospital problems. *      Admission Condition:  fair     Discharged Condition: good    Hospital Stay:     Hospital Course: This 79-year-old female patient was had 3-week history of generalized fatigue and frequent falls with lower extremity weakness had elevated troponins which trended downwards with hydration cardiology suggested no further diagnostics or intervention. She was evaluated by the physical therapy and Occupational Therapy and based on her increasing dependence on activities of daily living and instrumental activities of daily living she is being referred to skilled nursing facility for continued rehabilitation. We added vitamin D level on the day of discharge to the labs.   Patient is currently on Neurontin 600 mg 3 times a day which is high dose and Neurontin can cause falls gait ataxia and hence the dose was decreased 200 twice daily and patient tolerated the change in medications well and she is to be discharged back to NEGATIVE 10/14/2019    BILIRUBINUR NEGATIVE 10/14/2019    UROBILINOGEN Normal 10/14/2019    NITRU NEGATIVE 10/14/2019    LEUKOCYTESUR NEGATIVE 10/14/2019     TSH:    Lab Results   Component Value Date    TSH 3.58 10/18/2019        Radiology:  No results found. Consultations:    Consults:     Final Specialist Recommendations/Findings:   IP CONSULT TO HOSPITALIST  IP CONSULT TO CASE MANAGEMENT  IP CONSULT TO CARDIOLOGY      The patient was seen and examined on day of discharge and this discharge summary is in conjunction with any daily progress note from day of discharge. Discharge plan:     Disposition: Skilled nursing facility    Physician Follow Up:     MD Larry Rodriguez [address]  [place] [address]  [phone]    Schedule an appointment as soon as possible for a visit  Please call and make a follow up appointment    Blaise Flores MD  [address]  [phone]    In 1 month  CARDIOLOGY       Requiring Further Evaluation/Follow Up POST HOSPITALIZATION/Incidental Findings: Frailty, polypharmacy    Diet: regular diet    Activity: As tolerated    Instructions to Patient: Patient will need follow-up for memory evaluation most likely has underlying cognitive impairment    Discharge Medications:      Medication List      START taking these medications    gabapentin 100 MG capsule  Commonly known as:  NEURONTIN  Take 3 capsules by mouth 2 times daily for 60 days. Replaces:  gabapentin 600 MG tablet        CHANGE how you take these medications    SPIRIVA RESPIMAT 2.5 MCG/ACT Aers inhaler  Generic drug:  tiotropium  What changed:  Another medication with the same name was removed. Continue taking this medication, and follow the directions you see here.      trospium 20 MG tablet  Commonly known as:  SANCTURA  Take 1 tablet by mouth Daily with supper  What changed:  when to take this        CONTINUE taking these medications    albuterol sulfate  (90 Base) MCG/ACT inhaler

## 2019-11-07 PROBLEM — N39.0 UTI (URINARY TRACT INFECTION): Status: RESOLVED | Noted: 2019-10-08 | Resolved: 2019-11-07

## 2020-02-11 ENCOUNTER — HOSPITAL ENCOUNTER (OUTPATIENT)
Age: 80
Setting detail: OBSERVATION
Discharge: HOME | End: 2020-02-22
Attending: EMERGENCY MEDICINE | Admitting: FAMILY MEDICINE
Payer: COMMERCIAL

## 2020-02-11 PROBLEM — R41.82 MENTAL STATUS ALTERATION: Status: ACTIVE | Noted: 2020-02-11

## 2020-02-11 LAB
-: ABNORMAL
ABSOLUTE EOS #: 0.13 K/UL (ref 0–0.44)
ABSOLUTE IMMATURE GRANULOCYTE: 0.01 K/UL (ref 0–0.3)
ABSOLUTE LYMPH #: 2.79 K/UL (ref 1.1–3.7)
ABSOLUTE MONO #: 0.34 K/UL (ref 0.1–1.2)
AMORPHOUS: ABNORMAL
ANION GAP SERPL CALCULATED.3IONS-SCNC: 10 MMOL/L (ref 9–17)
BACTERIA: ABNORMAL
BASOPHILS # BLD: 1 % (ref 0–2)
BASOPHILS ABSOLUTE: 0.03 K/UL (ref 0–0.2)
BILIRUBIN URINE: NEGATIVE
BUN BLDV-MCNC: 12 MG/DL (ref 8–23)
BUN/CREAT BLD: 16 (ref 9–20)
CALCIUM SERPL-MCNC: 9.9 MG/DL (ref 8.6–10.4)
CASTS UA: ABNORMAL /LPF
CHLORIDE BLD-SCNC: 105 MMOL/L (ref 98–107)
CO2: 24 MMOL/L (ref 20–31)
COLOR: YELLOW
COMMENT UA: ABNORMAL
CREAT SERPL-MCNC: 0.74 MG/DL (ref 0.5–0.9)
CRYSTALS, UA: ABNORMAL /HPF
DIFFERENTIAL TYPE: ABNORMAL
EOSINOPHILS RELATIVE PERCENT: 3 % (ref 1–4)
EPITHELIAL CELLS UA: ABNORMAL /HPF (ref 0–5)
GFR AFRICAN AMERICAN: >60 ML/MIN
GFR NON-AFRICAN AMERICAN: >60 ML/MIN
GFR SERPL CREATININE-BSD FRML MDRD: NORMAL ML/MIN/{1.73_M2}
GFR SERPL CREATININE-BSD FRML MDRD: NORMAL ML/MIN/{1.73_M2}
GLUCOSE BLD-MCNC: 92 MG/DL (ref 70–99)
GLUCOSE URINE: NEGATIVE
HCT VFR BLD CALC: 46.7 % (ref 36.3–47.1)
HEMOGLOBIN: 14.9 G/DL (ref 11.9–15.1)
IMMATURE GRANULOCYTES: 0 %
KETONES, URINE: NEGATIVE
LEUKOCYTE ESTERASE, URINE: NEGATIVE
LYMPHOCYTES # BLD: 52 % (ref 24–43)
MCH RBC QN AUTO: 29.2 PG (ref 25.2–33.5)
MCHC RBC AUTO-ENTMCNC: 31.9 G/DL (ref 28.4–34.8)
MCV RBC AUTO: 91.6 FL (ref 82.6–102.9)
MONOCYTES # BLD: 7 % (ref 3–12)
MUCUS: ABNORMAL
NITRITE, URINE: NEGATIVE
NRBC AUTOMATED: 0 PER 100 WBC
OTHER OBSERVATIONS UA: ABNORMAL
PDW BLD-RTO: 12.9 % (ref 11.8–14.4)
PH UA: 6 (ref 5–8)
PLATELET # BLD: 194 K/UL (ref 138–453)
PLATELET ESTIMATE: ABNORMAL
PMV BLD AUTO: 10.3 FL (ref 8.1–13.5)
POTASSIUM SERPL-SCNC: 4.5 MMOL/L (ref 3.7–5.3)
PROTEIN UA: NEGATIVE
RBC # BLD: 5.1 M/UL (ref 3.95–5.11)
RBC # BLD: ABNORMAL 10*6/UL
RBC UA: ABNORMAL /HPF (ref 0–2)
RENAL EPITHELIAL, UA: ABNORMAL /HPF
SEG NEUTROPHILS: 37 % (ref 36–65)
SEGMENTED NEUTROPHILS ABSOLUTE COUNT: 1.97 K/UL (ref 1.5–8.1)
SODIUM BLD-SCNC: 139 MMOL/L (ref 135–144)
SPECIFIC GRAVITY UA: 1.01 (ref 1–1.03)
TRICHOMONAS: ABNORMAL
TURBIDITY: ABNORMAL
URINE HGB: NEGATIVE
UROBILINOGEN, URINE: NORMAL
WBC # BLD: 5.3 K/UL (ref 3.5–11.3)
WBC # BLD: ABNORMAL 10*3/UL
WBC UA: ABNORMAL /HPF (ref 0–5)
YEAST: ABNORMAL

## 2020-02-11 PROCEDURE — G0378 HOSPITAL OBSERVATION PER HR: HCPCS

## 2020-02-11 PROCEDURE — 81001 URINALYSIS AUTO W/SCOPE: CPT

## 2020-02-11 PROCEDURE — 80048 BASIC METABOLIC PNL TOTAL CA: CPT

## 2020-02-11 PROCEDURE — 6370000000 HC RX 637 (ALT 250 FOR IP): Performed by: INTERNAL MEDICINE

## 2020-02-11 PROCEDURE — 2580000003 HC RX 258: Performed by: INTERNAL MEDICINE

## 2020-02-11 PROCEDURE — 99285 EMERGENCY DEPT VISIT HI MDM: CPT

## 2020-02-11 PROCEDURE — 85025 COMPLETE CBC W/AUTO DIFF WBC: CPT

## 2020-02-11 PROCEDURE — 99219 PR INITIAL OBSERVATION CARE/DAY 50 MINUTES: CPT | Performed by: NURSE PRACTITIONER

## 2020-02-11 PROCEDURE — 96374 THER/PROPH/DIAG INJ IV PUSH: CPT

## 2020-02-11 PROCEDURE — 6360000002 HC RX W HCPCS: Performed by: NURSE PRACTITIONER

## 2020-02-11 RX ORDER — DOCUSATE SODIUM 100 MG/1
100 CAPSULE, LIQUID FILLED ORAL 2 TIMES DAILY
Status: DISCONTINUED | OUTPATIENT
Start: 2020-02-11 | End: 2020-02-22 | Stop reason: HOSPADM

## 2020-02-11 RX ORDER — SODIUM CHLORIDE 0.9 % (FLUSH) 0.9 %
10 SYRINGE (ML) INJECTION EVERY 12 HOURS SCHEDULED
Status: DISCONTINUED | OUTPATIENT
Start: 2020-02-11 | End: 2020-02-22 | Stop reason: HOSPADM

## 2020-02-11 RX ORDER — ACETAMINOPHEN 325 MG/1
650 TABLET ORAL EVERY 4 HOURS PRN
Status: DISCONTINUED | OUTPATIENT
Start: 2020-02-11 | End: 2020-02-22 | Stop reason: HOSPADM

## 2020-02-11 RX ORDER — PYRIDOXINE HCL (VITAMIN B6) 100 MG
3 TABLET ORAL DAILY
Status: DISCONTINUED | OUTPATIENT
Start: 2020-02-11 | End: 2020-02-11

## 2020-02-11 RX ORDER — GABAPENTIN 300 MG/1
300 CAPSULE ORAL 2 TIMES DAILY
Status: DISCONTINUED | OUTPATIENT
Start: 2020-02-11 | End: 2020-02-22 | Stop reason: HOSPADM

## 2020-02-11 RX ORDER — ONDANSETRON 2 MG/ML
4 INJECTION INTRAMUSCULAR; INTRAVENOUS EVERY 6 HOURS PRN
Status: DISCONTINUED | OUTPATIENT
Start: 2020-02-11 | End: 2020-02-11

## 2020-02-11 RX ORDER — POTASSIUM CHLORIDE 7.45 MG/ML
10 INJECTION INTRAVENOUS PRN
Status: DISCONTINUED | OUTPATIENT
Start: 2020-02-11 | End: 2020-02-22 | Stop reason: HOSPADM

## 2020-02-11 RX ORDER — M-VIT,TX,IRON,MINS/CALC/FOLIC 27MG-0.4MG
1 TABLET ORAL DAILY
Status: DISCONTINUED | OUTPATIENT
Start: 2020-02-11 | End: 2020-02-22 | Stop reason: HOSPADM

## 2020-02-11 RX ORDER — BUDESONIDE AND FORMOTEROL FUMARATE DIHYDRATE 160; 4.5 UG/1; UG/1
2 AEROSOL RESPIRATORY (INHALATION) 2 TIMES DAILY
Status: DISCONTINUED | OUTPATIENT
Start: 2020-02-11 | End: 2020-02-22 | Stop reason: HOSPADM

## 2020-02-11 RX ORDER — GABAPENTIN 600 MG/1
600 TABLET ORAL 3 TIMES DAILY
COMMUNITY

## 2020-02-11 RX ORDER — ASCORBIC ACID 500 MG
500 TABLET ORAL DAILY
Status: DISCONTINUED | OUTPATIENT
Start: 2020-02-11 | End: 2020-02-22 | Stop reason: HOSPADM

## 2020-02-11 RX ORDER — ALBUTEROL SULFATE 90 UG/1
2 AEROSOL, METERED RESPIRATORY (INHALATION) EVERY 6 HOURS PRN
Status: DISCONTINUED | OUTPATIENT
Start: 2020-02-11 | End: 2020-02-22 | Stop reason: HOSPADM

## 2020-02-11 RX ORDER — GUAIFENESIN 600 MG/1
600 TABLET, EXTENDED RELEASE ORAL 2 TIMES DAILY
Status: DISCONTINUED | OUTPATIENT
Start: 2020-02-11 | End: 2020-02-22 | Stop reason: HOSPADM

## 2020-02-11 RX ORDER — VITAMIN B COMPLEX
2000 TABLET ORAL DAILY
Status: DISCONTINUED | OUTPATIENT
Start: 2020-02-11 | End: 2020-02-22 | Stop reason: HOSPADM

## 2020-02-11 RX ORDER — TROSPIUM CHLORIDE 20 MG/1
20 TABLET, FILM COATED ORAL NIGHTLY
Status: DISCONTINUED | OUTPATIENT
Start: 2020-02-11 | End: 2020-02-22 | Stop reason: HOSPADM

## 2020-02-11 RX ORDER — POLYETHYLENE GLYCOL 3350 17 G/17G
17 POWDER, FOR SOLUTION ORAL 2 TIMES DAILY
Status: DISCONTINUED | OUTPATIENT
Start: 2020-02-11 | End: 2020-02-22 | Stop reason: HOSPADM

## 2020-02-11 RX ORDER — POTASSIUM CHLORIDE 20 MEQ/1
40 TABLET, EXTENDED RELEASE ORAL PRN
Status: DISCONTINUED | OUTPATIENT
Start: 2020-02-11 | End: 2020-02-22 | Stop reason: HOSPADM

## 2020-02-11 RX ORDER — ONDANSETRON 2 MG/ML
4 INJECTION INTRAMUSCULAR; INTRAVENOUS EVERY 6 HOURS PRN
Status: DISCONTINUED | OUTPATIENT
Start: 2020-02-11 | End: 2020-02-22 | Stop reason: HOSPADM

## 2020-02-11 RX ORDER — SODIUM CHLORIDE 0.9 % (FLUSH) 0.9 %
10 SYRINGE (ML) INJECTION PRN
Status: DISCONTINUED | OUTPATIENT
Start: 2020-02-11 | End: 2020-02-15

## 2020-02-11 RX ORDER — FENTANYL CITRATE 50 UG/ML
25 INJECTION, SOLUTION INTRAMUSCULAR; INTRAVENOUS ONCE
Status: COMPLETED | OUTPATIENT
Start: 2020-02-11 | End: 2020-02-11

## 2020-02-11 RX ORDER — CLOPIDOGREL BISULFATE 75 MG/1
75 TABLET ORAL DAILY
Status: DISCONTINUED | OUTPATIENT
Start: 2020-02-11 | End: 2020-02-22 | Stop reason: HOSPADM

## 2020-02-11 RX ORDER — MAGNESIUM SULFATE 1 G/100ML
1 INJECTION INTRAVENOUS PRN
Status: DISCONTINUED | OUTPATIENT
Start: 2020-02-11 | End: 2020-02-22 | Stop reason: HOSPADM

## 2020-02-11 RX ORDER — LEVALBUTEROL 1.25 MG/.5ML
1 SOLUTION, CONCENTRATE RESPIRATORY (INHALATION) EVERY 8 HOURS PRN
Status: DISCONTINUED | OUTPATIENT
Start: 2020-02-11 | End: 2020-02-22 | Stop reason: HOSPADM

## 2020-02-11 RX ORDER — LANOLIN ALCOHOL/MO/W.PET/CERES
100 CREAM (GRAM) TOPICAL DAILY
Status: DISCONTINUED | OUTPATIENT
Start: 2020-02-11 | End: 2020-02-22 | Stop reason: HOSPADM

## 2020-02-11 RX ORDER — ONDANSETRON 4 MG/1
4 TABLET, ORALLY DISINTEGRATING ORAL EVERY 6 HOURS PRN
Status: DISCONTINUED | OUTPATIENT
Start: 2020-02-11 | End: 2020-02-22 | Stop reason: HOSPADM

## 2020-02-11 RX ADMIN — GABAPENTIN 300 MG: 300 CAPSULE ORAL at 22:16

## 2020-02-11 RX ADMIN — TROSPIUM CHLORIDE 20 MG: 20 TABLET, FILM COATED ORAL at 22:16

## 2020-02-11 RX ADMIN — GUAIFENESIN 600 MG: 600 TABLET, EXTENDED RELEASE ORAL at 22:16

## 2020-02-11 RX ADMIN — Medication 10 ML: at 22:17

## 2020-02-11 RX ADMIN — FENTANYL CITRATE 25 MCG: 50 INJECTION, SOLUTION INTRAMUSCULAR; INTRAVENOUS at 22:16

## 2020-02-11 RX ADMIN — DOCUSATE SODIUM 100 MG: 100 CAPSULE, LIQUID FILLED ORAL at 22:17

## 2020-02-11 ASSESSMENT — PAIN SCALES - GENERAL: PAINLEVEL_OUTOF10: 8

## 2020-02-11 ASSESSMENT — ENCOUNTER SYMPTOMS
ABDOMINAL PAIN: 0
CHEST TIGHTNESS: 0
BACK PAIN: 0
ABDOMINAL DISTENTION: 0
EYE DISCHARGE: 0
SHORTNESS OF BREATH: 0
EYE PAIN: 0
FACIAL SWELLING: 0

## 2020-02-11 ASSESSMENT — PAIN DESCRIPTION - LOCATION: LOCATION: GENERALIZED

## 2020-02-11 ASSESSMENT — PAIN DESCRIPTION - PAIN TYPE: TYPE: CHRONIC PAIN

## 2020-02-11 ASSESSMENT — PAIN DESCRIPTION - ONSET: ONSET: OTHER (COMMENT)

## 2020-02-11 ASSESSMENT — PAIN DESCRIPTION - DESCRIPTORS: DESCRIPTORS: ACHING

## 2020-02-11 ASSESSMENT — PAIN DESCRIPTION - FREQUENCY: FREQUENCY: INTERMITTENT

## 2020-02-11 NOTE — CARE COORDINATION
Patient's daughter Karime Parry requested a referral be sent to [facility]. Karime Parry stated she has been in contact with [address] [place] [address] regarding long term care. LSW contacted Kath Quintana at [address] [place] [address] and she stated patient will need pre-cert prior to admission, and HENS completed. RN and social work notified.

## 2020-02-11 NOTE — ED NOTES
Bed: 21  Expected date: 2/11/20  Expected time: 3:32 PM  Means of arrival:   Comments:  [facility].  [facility]  02/11/20 5424

## 2020-02-11 NOTE — ED PROVIDER NOTES
EMERGENCY DEPARTMENT ENCOUNTER    Pt Name: Hugo Clinton  MRN: [id number]  [place] 1940  Date of evaluation: 2/11/20  CHIEF COMPLAINT       Chief Complaint   Patient presents with    Other     daughter wants her to go to respite due to being combative     HISTORY OF PRESENT ILLNESS   HPI   Patient is a 69-year-old female who presented  to the emergency department via EMS with reported history of confusion and dementia. Initial EMS call was for combative patient with dementia. However upon further investigation, patient lives with her daughter, they hip verbal altercations and of escalated over the last several weeks. Patient feels of her daughter treats her like a child and does not listen to her. Previously lived at home alone but lives with her daughter secondary to her previous in infested with roaches. Patient denied any trauma or injury, she denied chest pain, shortness of breath, nausea, vomiting, fevers or chills. Patient stressed that she does not want to go back to live with her daughter. REVIEW OF SYSTEMS     Review of Systems   Constitutional: Negative for chills, diaphoresis and fever. HENT: Negative for congestion, ear pain and facial swelling. Eyes: Negative for pain, discharge and visual disturbance. Respiratory: Negative for chest tightness and shortness of breath. Cardiovascular: Negative for chest pain and palpitations. Gastrointestinal: Negative for abdominal distention and abdominal pain. Genitourinary: Negative for difficulty urinating and flank pain. Musculoskeletal: Negative for back pain. Skin: Negative for wound. Neurological: Negative for dizziness, light-headedness and headaches.      PASTMEDICAL HISTORY     Past Medical History:   Diagnosis Date    Aortic valve disease     Pt. denies    Arthritis     Chronic back pain     on 6/7/19 pt states she is in pain mgmnt    COPD (chronic obstructive pulmonary disease) [address] [address]     Disease of blood and blood forming organ     Diverticulitis     GERD (gastroesophageal reflux disease)     History of blood transfusion     History of gastritis     Lumbar disc disease     Movement disorder     Nerve disease, peripheral 11/17/2015    Osteoporosis     Pneumonia 11/27/2015    Unspecified cerebral artery occlusion with cerebral infarction     TIA's     SURGICAL HISTORY       Past Surgical History:   Procedure Laterality Date    ARTHROPLASTY Left 4/18/2017    LEFT FOOT ARTHROPLASTY  4TH DIGIT performed by Radha Starr DPM at [place] [address] Left 04/18/2017    L 4th digit arthroplasty     HYSTERECTOMY      Partial    LUMBAR SPINE SURGERY  x 2    SPINAL FUSION  2012    UPPER GASTROINTESTINAL ENDOSCOPY      VARICOSE VEIN SURGERY       CURRENT MEDICATIONS       Current Discharge Medication List      CONTINUE these medications which have NOT CHANGED    Details   gabapentin (NEURONTIN) 600 MG tablet Take 600 mg by mouth 3 times daily.       trospium (SANCTURA) 20 MG tablet Take 1 tablet by mouth Daily with supper  Qty: 60 tablet, Refills: 3      tiotropium (SPIRIVA RESPIMAT) 2.5 MCG/ACT AERS inhaler Inhale 2 puffs into the lungs daily      guaiFENesin (MUCINEX) 600 MG extended release tablet Take 1 tablet by mouth 2 times daily  Qty: 60 tablet, Refills: 1      Cholecalciferol (VITAMIN D) 2000 units CAPS capsule Take 1 capsule by mouth daily      vitamin B-6 (PYRIDOXINE) 100 MG tablet Take 100 mg by mouth daily      vitamin C (ASCORBIC ACID) 500 MG tablet Take 500 mg by mouth daily      Multiple Vitamins-Minerals (THERAPEUTIC MULTIVITAMIN-MINERALS) tablet Take 1 tablet by mouth daily      sodium chloride (OCEAN, BABY AYR) 0.65 % nasal spray 1 spray by Nasal route as needed for Congestion      levalbuterol (XOPENEX) 1.25 MG/3ML nebulizer solution Take 1 ampule by nebulization every 8 hours as needed for Wheezing       Calcium Carb-Cholecalciferol (CALCIUM 600 + D) 600-200 MG-UNIT TABS Take 3 tablets by mouth daily       FRANCISCA-HYDROLAC 12 12 % cream APPLY TO AFFECTED AREA DAILY  Qty: 420 g, Refills: 0      budesonide-formoterol (SYMBICORT) 160-4.5 MCG/ACT AERO Inhale 2 puffs into the lungs 2 times daily. Qty: 1 Inhaler, Refills: 3      albuterol (PROVENTIL HFA;VENTOLIN HFA) 108 (90 BASE) MCG/ACT inhaler Inhale 2 puffs into the lungs every 6 hours as needed for Wheezing. clopidogrel (PLAVIX) 75 MG tablet Take 75 mg by mouth daily. polyethylene glycol (GLYCOLAX) powder Take 17 g by mouth 2 times daily       docusate sodium (COLACE) 100 MG capsule Take 100 mg by mouth 2 times daily. ALLERGIES     is allergic to anti-inflammatory enzyme [nutritional supplements]; ceclor [cefaclor]; morphine; nsaids; penicillins; propoxyphene; skelaxin [metaxalone]; sulfa antibiotics; tadalafil; tetracycline; tolmetin; alendronate; alendronate sodium; asa [aspirin]; erythromycin; erythromycin base; temazepam; tetracyclines & related; and tramadol. FAMILY HISTORY     She indicated that her mother is . She indicated that her father is . SOCIAL HISTORY       Social History     Tobacco Use    Smoking status: Former Smoker     Last attempt to quit: 1994     Years since quittin.6    Smokeless tobacco: Never Used   Substance Use Topics    Alcohol use: No    Drug use: No     PHYSICAL EXAM     INITIAL VITALS: /65   Pulse 70   Temp 98.2 °F (36.8 °C) (Oral)   Resp 16   Ht 5' 7\" (1.702 m)   Wt 147 lb 3.2 oz (66.8 kg)   SpO2 94%   BMI 23.05 kg/m²    Physical Exam  Vitals signs and nursing note reviewed. Constitutional:       General: She is not in acute distress. Appearance: She is well-developed. She is not diaphoretic. HENT:      Head: Normocephalic and atraumatic. Eyes:      Pupils: Pupils are equal, round, and reactive to light. Neck:      Musculoskeletal: Normal range of motion and neck supple.    Cardiovascular:      Rate and Rhythm: Normal rate and regular rhythm. Pulmonary:      Effort: Pulmonary effort is normal.      Breath sounds: Normal breath sounds. Abdominal:      General: Bowel sounds are normal.      Palpations: Abdomen is soft. Musculoskeletal: Normal range of motion. Skin:     General: Skin is warm. Capillary Refill: Capillary refill takes less than 2 seconds. Neurological:      Mental Status: She is alert and oriented to person, place, and time. Comments: Alert to name place circumstance, date and president. MEDICAL DECISION MAKING:   The patient wasseen and examined. Patient is a 14-year-old whopresented to the emergency department secondary to reported history of dementia. On arrival in the emerge part patient is pleasant answering questions appropriately appropriate mood in no acute distress. Patient's daughter was contacted by nursing and stated patient was scheduled to be placed in a respite facility [facility] today. Given this original was contacted through social work dilatated patient can be transferred there given that she does not want to go back to her daughter's home. Also work was able to contact [facility], patient will need to be certified and will need to be admitted to the hospital overnight, given this basic labs obtained. Patient will be discussed with the internal medicine service. Patient discussed with Dr. Jhonny Wilkins with the [facility] service who agreed to admit patient for further evaluation and treatment, pending transfer to facility. All patient's question's and concerns were answered prior to disposition and patient and/or family expressed understanding and agreement of treatment plan.         CRITICAL CARE:              NIH STROKE SCALE:            PROCEDURES:    Procedures    DIAGNOSTIC RESULTS   EKG:All EKG's are interpreted by the Emergency Department Physician who either signs or Co-signs this chart in the absence of a cardiologist.        RADIOLOGY:All plain film, CT, budesonide-formoterol (SYMBICORT) 160-4.5 MCG/ACT inhaler 2 puff    levalbuterol (XOPENEX) nebulizer solution 1.25 mg    DISCONTD: Calcium Carb-Cholecalciferol 600-200 MG-UNIT TABS 3 tablet    vitamin B-6 (PYRIDOXINE) tablet 100 mg    vitamin C (ASCORBIC ACID) tablet 500 mg    therapeutic multivitamin-minerals 1 tablet    sodium chloride (OCEAN, BABY AYR) 0.65 % nasal spray 1 spray    Vitamin D (CHOLECALCIFEROL) tablet 2,000 Units    guaiFENesin (MUCINEX) extended release tablet 600 mg    tiotropium (SPIRIVA RESPIMAT) 2.5 MCG/ACT inhaler 2 puff    trospium (SANCTURA) tablet 20 mg    gabapentin (NEURONTIN) capsule 300 mg    sodium chloride flush 0.9 % injection 10 mL    DISCONTD: sodium chloride flush 0.9 % injection 10 mL    OR Linked Order Group     potassium chloride (KLOR-CON M) extended release tablet 40 mEq     potassium bicarb-citric acid (EFFER-K) effervescent tablet 40 mEq     potassium chloride 10 mEq/100 mL IVPB (Peripheral Line)    magnesium sulfate 1 g in dextrose 5% 100 mL IVPB    magnesium hydroxide (MILK OF MAGNESIA) 400 MG/5ML suspension 30 mL    DISCONTD: ondansetron (ZOFRAN) injection 4 mg    acetaminophen (TYLENOL) tablet 650 mg    enoxaparin (LOVENOX) injection 40 mg    fentaNYL (SUBLIMAZE) injection 25 mcg    OR Linked Order Group     ondansetron (ZOFRAN-ODT) disintegrating tablet 4 mg     ondansetron (ZOFRAN) injection 4 mg    fentaNYL (SUBLIMAZE) injection 25 mcg    HYDROcodone-acetaminophen (NORCO) 5-325 MG per tablet 2 tablet    HYDROcodone-acetaminophen (NORCO) 5-325 MG per tablet 2 tablet    fentaNYL (SUBLIMAZE) injection 50 mcg    oxyCODONE-acetaminophen (PERCOCET) 5-325 MG per tablet 1 tablet    polyvinyl alcohol (LIQUIFILM TEARS) 1.4 % ophthalmic solution 1 drop    melatonin tablet 3 mg     CONSULTS:  IP CONSULT TO INTERNAL MEDICINE  IP CONSULT TO SOCIAL WORK    FINAL IMPRESSION      1.  Declining functional status          DISPOSITION/PLAN   DISPOSITION Admitted 02/11/2020 06:39:21 PM      PATIENT REFERRED TO:  Amanda Tamayo MD  [address]  [phone]      As needed    DISCHARGE MEDICATIONS:  Current Discharge Medication List        Aliyah Reynolds MD  Attending Emergency Physician    This note was created with the assistance of a speech-recognition program. While intending to generate a document that actually reflects the content of the visit, no guarantees can be provided that every mistake has been identified and corrected by editing.                     Aliyah Reynolds MD  04/34/90 [address] [facility] [address] [facility] [address], [place]  90/99/63 3310

## 2020-02-11 NOTE — ED NOTES
Pt presents to ed c/o \"combative\" behavior. Her daughter is her POA medical and has been taking care of her and today she became angry and violent. Upon arrival she is alert and oriented, vital signs stable. She is stating that she is the mother and she should not be told what to do. She appears well taken care of at home. Hossein Guillen is her daughter. Pt wanted her to be taken off of POA but she is the POA at this time. Flower Asher was updated about plan of care.       Carlos Diana, CHARO  02/11/20 1646

## 2020-02-12 PROBLEM — R41.82 MENTAL STATUS ALTERATION: Status: RESOLVED | Noted: 2020-02-11 | Resolved: 2020-02-12

## 2020-02-12 PROBLEM — I51.9 LEFT VENTRICULAR DIASTOLIC DYSFUNCTION: Status: ACTIVE | Noted: 2020-02-12

## 2020-02-12 PROBLEM — I07.1 MODERATE TRICUSPID REGURGITATION: Status: ACTIVE | Noted: 2020-02-12

## 2020-02-12 PROBLEM — I34.0 MODERATE MITRAL REGURGITATION: Status: ACTIVE | Noted: 2020-02-12

## 2020-02-12 PROBLEM — I73.9 SMALL VESSEL DISEASE (HCC): Status: ACTIVE | Noted: 2020-02-12

## 2020-02-12 LAB
ANION GAP SERPL CALCULATED.3IONS-SCNC: 6 MMOL/L (ref 9–17)
BUN BLDV-MCNC: 13 MG/DL (ref 8–23)
BUN/CREAT BLD: 18 (ref 9–20)
CALCIUM SERPL-MCNC: 9 MG/DL (ref 8.6–10.4)
CHLORIDE BLD-SCNC: 108 MMOL/L (ref 98–107)
CO2: 26 MMOL/L (ref 20–31)
CREAT SERPL-MCNC: 0.71 MG/DL (ref 0.5–0.9)
GFR AFRICAN AMERICAN: >60 ML/MIN
GFR NON-AFRICAN AMERICAN: >60 ML/MIN
GFR SERPL CREATININE-BSD FRML MDRD: ABNORMAL ML/MIN/{1.73_M2}
GFR SERPL CREATININE-BSD FRML MDRD: ABNORMAL ML/MIN/{1.73_M2}
GLUCOSE BLD-MCNC: 82 MG/DL (ref 70–99)
HCT VFR BLD CALC: 39.2 % (ref 36.3–47.1)
HEMOGLOBIN: 12.9 G/DL (ref 11.9–15.1)
INR BLD: 1.1
MCH RBC QN AUTO: 29.7 PG (ref 25.2–33.5)
MCHC RBC AUTO-ENTMCNC: 32.9 G/DL (ref 28.4–34.8)
MCV RBC AUTO: 90.1 FL (ref 82.6–102.9)
NRBC AUTOMATED: 0 PER 100 WBC
PDW BLD-RTO: 13 % (ref 11.8–14.4)
PLATELET # BLD: 154 K/UL (ref 138–453)
PMV BLD AUTO: 10.5 FL (ref 8.1–13.5)
POTASSIUM SERPL-SCNC: 3.6 MMOL/L (ref 3.7–5.3)
PROTHROMBIN TIME: 11.4 SEC (ref 9.7–11.6)
RBC # BLD: 4.35 M/UL (ref 3.95–5.11)
SODIUM BLD-SCNC: 140 MMOL/L (ref 135–144)
WBC # BLD: 5.3 K/UL (ref 3.5–11.3)

## 2020-02-12 PROCEDURE — 85610 PROTHROMBIN TIME: CPT

## 2020-02-12 PROCEDURE — 2580000003 HC RX 258: Performed by: INTERNAL MEDICINE

## 2020-02-12 PROCEDURE — 97167 OT EVAL HIGH COMPLEX 60 MIN: CPT

## 2020-02-12 PROCEDURE — 6370000000 HC RX 637 (ALT 250 FOR IP): Performed by: INTERNAL MEDICINE

## 2020-02-12 PROCEDURE — 2700000000 HC OXYGEN THERAPY PER DAY

## 2020-02-12 PROCEDURE — 94640 AIRWAY INHALATION TREATMENT: CPT

## 2020-02-12 PROCEDURE — 97162 PT EVAL MOD COMPLEX 30 MIN: CPT

## 2020-02-12 PROCEDURE — 99225 PR SBSQ OBSERVATION CARE/DAY 25 MINUTES: CPT | Performed by: INTERNAL MEDICINE

## 2020-02-12 PROCEDURE — 36415 COLL VENOUS BLD VENIPUNCTURE: CPT

## 2020-02-12 PROCEDURE — 85027 COMPLETE CBC AUTOMATED: CPT

## 2020-02-12 PROCEDURE — 6360000002 HC RX W HCPCS: Performed by: NURSE PRACTITIONER

## 2020-02-12 PROCEDURE — 97110 THERAPEUTIC EXERCISES: CPT

## 2020-02-12 PROCEDURE — 6360000002 HC RX W HCPCS: Performed by: INTERNAL MEDICINE

## 2020-02-12 PROCEDURE — 96372 THER/PROPH/DIAG INJ SC/IM: CPT

## 2020-02-12 PROCEDURE — 96376 TX/PRO/DX INJ SAME DRUG ADON: CPT

## 2020-02-12 PROCEDURE — 6370000000 HC RX 637 (ALT 250 FOR IP): Performed by: NURSE PRACTITIONER

## 2020-02-12 PROCEDURE — 97535 SELF CARE MNGMENT TRAINING: CPT

## 2020-02-12 PROCEDURE — 97530 THERAPEUTIC ACTIVITIES: CPT

## 2020-02-12 PROCEDURE — G0378 HOSPITAL OBSERVATION PER HR: HCPCS

## 2020-02-12 PROCEDURE — 94760 N-INVAS EAR/PLS OXIMETRY 1: CPT

## 2020-02-12 PROCEDURE — 80048 BASIC METABOLIC PNL TOTAL CA: CPT

## 2020-02-12 RX ORDER — HYDROCODONE BITARTRATE AND ACETAMINOPHEN 5; 325 MG/1; MG/1
2 TABLET ORAL ONCE
Status: COMPLETED | OUTPATIENT
Start: 2020-02-12 | End: 2020-02-12

## 2020-02-12 RX ORDER — FENTANYL CITRATE 50 UG/ML
25 INJECTION, SOLUTION INTRAMUSCULAR; INTRAVENOUS ONCE
Status: COMPLETED | OUTPATIENT
Start: 2020-02-12 | End: 2020-02-12

## 2020-02-12 RX ADMIN — LEVALBUTEROL 1.25 MG: 1.25 SOLUTION, CONCENTRATE RESPIRATORY (INHALATION) at 15:30

## 2020-02-12 RX ADMIN — OXYCODONE HYDROCHLORIDE AND ACETAMINOPHEN 500 MG: 500 TABLET ORAL at 09:31

## 2020-02-12 RX ADMIN — DOCUSATE SODIUM 100 MG: 100 CAPSULE, LIQUID FILLED ORAL at 21:02

## 2020-02-12 RX ADMIN — LEVALBUTEROL 1.25 MG: 1.25 SOLUTION, CONCENTRATE RESPIRATORY (INHALATION) at 19:53

## 2020-02-12 RX ADMIN — Medication 10 ML: at 21:02

## 2020-02-12 RX ADMIN — DOCUSATE SODIUM 100 MG: 100 CAPSULE, LIQUID FILLED ORAL at 09:32

## 2020-02-12 RX ADMIN — FENTANYL CITRATE 25 MCG: 50 INJECTION, SOLUTION INTRAMUSCULAR; INTRAVENOUS at 05:00

## 2020-02-12 RX ADMIN — POLYETHYLENE GLYCOL (3350) 17 G: 17 POWDER, FOR SOLUTION ORAL at 09:32

## 2020-02-12 RX ADMIN — GUAIFENESIN 600 MG: 600 TABLET, EXTENDED RELEASE ORAL at 09:32

## 2020-02-12 RX ADMIN — MELATONIN 2000 UNITS: at 09:31

## 2020-02-12 RX ADMIN — TIOTROPIUM BROMIDE INHALATION SPRAY 2 PUFF: 3.12 SPRAY, METERED RESPIRATORY (INHALATION) at 09:35

## 2020-02-12 RX ADMIN — BUDESONIDE AND FORMOTEROL FUMARATE DIHYDRATE 2 PUFF: 160; 4.5 AEROSOL RESPIRATORY (INHALATION) at 19:55

## 2020-02-12 RX ADMIN — GABAPENTIN 300 MG: 300 CAPSULE ORAL at 09:31

## 2020-02-12 RX ADMIN — Medication 10 ML: at 09:41

## 2020-02-12 RX ADMIN — HYDROCODONE BITARTRATE AND ACETAMINOPHEN 2 TABLET: 5; 325 TABLET ORAL at 21:03

## 2020-02-12 RX ADMIN — TROSPIUM CHLORIDE 20 MG: 20 TABLET, FILM COATED ORAL at 21:02

## 2020-02-12 RX ADMIN — GABAPENTIN 300 MG: 300 CAPSULE ORAL at 21:02

## 2020-02-12 RX ADMIN — ENOXAPARIN SODIUM 40 MG: 40 INJECTION SUBCUTANEOUS at 09:32

## 2020-02-12 RX ADMIN — MULTIPLE VITAMINS W/ MINERALS TAB 1 TABLET: TAB at 09:31

## 2020-02-12 RX ADMIN — BUDESONIDE AND FORMOTEROL FUMARATE DIHYDRATE 2 PUFF: 160; 4.5 AEROSOL RESPIRATORY (INHALATION) at 09:35

## 2020-02-12 RX ADMIN — GUAIFENESIN 600 MG: 600 TABLET, EXTENDED RELEASE ORAL at 21:02

## 2020-02-12 RX ADMIN — Medication 10 ML: at 05:00

## 2020-02-12 RX ADMIN — CLOPIDOGREL BISULFATE 75 MG: 75 TABLET ORAL at 09:32

## 2020-02-12 RX ADMIN — PYRIDOXINE HCL TAB 50 MG 100 MG: 50 TAB at 09:31

## 2020-02-12 ASSESSMENT — ENCOUNTER SYMPTOMS
BLOOD IN STOOL: 0
SHORTNESS OF BREATH: 0
WHEEZING: 0
DIARRHEA: 0
VOMITING: 0
CHEST TIGHTNESS: 0
NAUSEA: 0
COUGH: 1
COUGH: 0
ABDOMINAL PAIN: 0
COLOR CHANGE: 0
CONSTIPATION: 0

## 2020-02-12 ASSESSMENT — PAIN DESCRIPTION - DESCRIPTORS
DESCRIPTORS: ACHING
DESCRIPTORS: ACHING

## 2020-02-12 ASSESSMENT — PAIN SCALES - GENERAL
PAINLEVEL_OUTOF10: 8

## 2020-02-12 ASSESSMENT — PAIN DESCRIPTION - FREQUENCY
FREQUENCY: INTERMITTENT
FREQUENCY: INTERMITTENT

## 2020-02-12 ASSESSMENT — PAIN DESCRIPTION - ONSET: ONSET: ON-GOING

## 2020-02-12 ASSESSMENT — PAIN - FUNCTIONAL ASSESSMENT: PAIN_FUNCTIONAL_ASSESSMENT: ACTIVITIES ARE NOT PREVENTED

## 2020-02-12 ASSESSMENT — PAIN DESCRIPTION - LOCATION
LOCATION: GENERALIZED
LOCATION: BACK

## 2020-02-12 ASSESSMENT — PAIN DESCRIPTION - PAIN TYPE
TYPE: CHRONIC PAIN
TYPE: CHRONIC PAIN

## 2020-02-12 NOTE — PROGRESS NOTES
2 puff Inhalation BID    vitamin B-6  100 mg Oral Daily    vitamin C  500 mg Oral Daily    therapeutic multivitamin-minerals  1 tablet Oral Daily    Vitamin D  2,000 Units Oral Daily    guaiFENesin  600 mg Oral BID    tiotropium  2 puff Inhalation Daily    trospium  20 mg Oral Nightly    gabapentin  300 mg Oral BID    sodium chloride flush  10 mL Intravenous 2 times per day    enoxaparin  40 mg Subcutaneous Daily     Continuous Infusions:   PRN Meds: albuterol sulfate HFA, levalbuterol, sodium chloride, sodium chloride flush, potassium chloride **OR** potassium alternative oral replacement **OR** potassium chloride, magnesium sulfate, magnesium hydroxide, acetaminophen, ondansetron **OR** ondansetron    Data:     Past Medical History:   has a past medical history of Aortic valve disease, Arthritis, Chronic back pain, COPD (chronic obstructive pulmonary disease) ([address] Disease of blood and blood forming organ, Diverticulitis, GERD (gastroesophageal reflux disease), History of blood transfusion, History of gastritis, Lumbar disc disease, Movement disorder, Nerve disease, peripheral, Osteoporosis, Pneumonia, and Unspecified cerebral artery occlusion with cerebral infarction. Social History:   reports that she quit smoking about 25 years ago. She has never used smokeless tobacco. She reports that she does not drink alcohol or use drugs. Family History:   Family History   Problem Relation Age of Onset    Heart Disease Mother     Heart Disease Father        Vitals:  BP (!) 106/57   Pulse 73   Temp 97.7 °F (36.5 °C) (Oral)   Resp 20   Ht 5' 7\" (1.702 m)   Wt 145 lb 12.8 oz (66.1 kg)   SpO2 92%   BMI 22.84 kg/m²   Temp (24hrs), Av °F (36.7 °C), Min:97.7 °F (36.5 °C), Max:98.3 °F (36.8 °C)    No results for input(s): POCGLU in the last 72 hours. I/O (24Hr):     Intake/Output Summary (Last 24 hours) at 2020 1320  Last data filed at 2020 2503  Gross per 24 hour   Intake --   Output 1000 ml   Net -1000 ml         Review of Systems:     Review of Systems   Constitutional: Positive for activity change (Diminished) and appetite change (Decreased). Respiratory: Positive for cough (Occasional white to yellow sputum). Negative for shortness of breath. Cardiovascular: Negative for chest pain and palpitations. Gastrointestinal: Negative for abdominal pain, nausea and vomiting. Genitourinary: Negative for flank pain and hematuria. Musculoskeletal: Positive for arthralgias and myalgias. The patient has chronic arthralgias and myalgias  No new musculoskeletal complaints    Psychiatric/Behavioral: Positive for dysphoric mood (Feeling somewhat depressed about her current health status and living arrangements). Negative for suicidal ideas. The patient is nervous/anxious (Awaiting placement). Reports that she is living with her daughter  She will apparently need placement       Physical Examination:        Physical Exam  Vitals signs reviewed. Constitutional:       General: She is not in acute distress. Appearance: She is not diaphoretic. HENT:      Head: Normocephalic. Nose: Nose normal.   Eyes:      General: No scleral icterus. Conjunctiva/sclera: Conjunctivae normal.   Neck:      Musculoskeletal: Neck supple. Trachea: No tracheal deviation. Cardiovascular:      Rate and Rhythm: Normal rate and regular rhythm. Heart sounds: Murmur (Systolic) present. Pulmonary:      Effort: Pulmonary effort is normal. No respiratory distress. Breath sounds: Normal breath sounds. No wheezing or rales. Chest:      Chest wall: No tenderness. Abdominal:      General: Bowel sounds are normal. There is no distension. Palpations: Abdomen is soft. Tenderness: There is no abdominal tenderness. Musculoskeletal:         General: No tenderness. Skin:     General: Skin is warm and dry.    Neurological:      Mental Status: She is alert and oriented to person, planning  Med Rec Done  SYLVIA done    IP CONSULT TO INTERNAL MEDICINE  IP CONSULT TO SOCIAL WORK    [facility], DO  2/12/2020  1:20 PM

## 2020-02-12 NOTE — PROGRESS NOTES
surgery (x 2); Upper gastrointestinal endoscopy; Colonoscopy; Hysterectomy; Foot surgery (Left, 04/18/2017); and arthroplasty (Left, 4/18/2017). Restrictions  Restrictions/Precautions  Restrictions/Precautions: General Precautions, Fall Risk  Required Braces or Orthoses?: No  Position Activity Restriction  Other position/activity restrictions: up with assist, O2 @ 2L/min    Subjective   General  Chart Reviewed: Yes  Patient assessed for rehabilitation services?: Yes  Family / Caregiver Present: No    Social/Functional History  Social/Functional History  Lives With: Daughter  Type of Home: Apartment  Home Layout: One level  Home Access: Level entry  Bathroom Shower/Tub: Tub/Shower unit  Bathroom Toilet: Standard  Bathroom Equipment: Grab bars in shower, Shower chair  Home Equipment: [employer], Rolling walker, Oxygen  Receives Help From: Family, Home health([facility] comes 5x/week for 5hrs per day)  ADL Assistance: Needs assistance(for bathing)  Homemaking Assistance: Needs assistance  Homemaking Responsibilities: No  Ambulation Assistance: Independent(uses R/walker)  Transfer Assistance: Independent  Active : No  Mode of Transportation: [employer]  Occupation: Full time employment  Leisure & Hobbies: reading, watch TV       Objective   Vision: Impaired  Vision Exceptions: Wears glasses at all times  Hearing: Within functional limits    Orientation  Overall Orientation Status: Within Functional Limits  Observation/Palpation  Posture: Poor(very kyphotic, forward head/flexed trunk. )  Observation: resting in bed, o2 via NC @2L/min  Balance  Sitting Balance: Contact guard assistance  Standing Balance: Moderate assistance(x2)  Functional Mobility  Functional - Mobility Device: Rolling Walker  Assist Level: Moderate assistance(x2 for ~2-3 steps between bed and BSC. Pt needing max verbal cues for safety, posture (pt very hunched over and is unable to stand upright).  Pt needing assist to get hands onto walker as welll)  Toilet Transfers  Equipment Used: Standard bedside commode  Toilet Transfer: Moderate assistance;2 Person assistance  ADL  Feeding: Setup  Grooming: Minimal assistance  UE Bathing: Moderate assistance  LE Bathing: Moderate assistance  UE Dressing: Moderate assistance  LE Dressing: Moderate assistance  Toileting: Maximum assistance(pt able to complete own hygiene on BSC, but needing max A for clothing management)  Tone RUE  RUE Tone: Normotonic  Tone LUE  LUE Tone: Normotonic  Coordination  Movements Are Fluid And Coordinated: Yes     Bed mobility  Rolling to Left: Minimal assistance  Rolling to Right: Minimal assistance  Supine to Sit: Moderate assistance;2 Person assistance  Sit to Supine: Moderate assistance;2 Person assistance  Scooting: Moderate assistance;2 Person assistance  Comment: cues +mod A to reach bedrail and use of UB to assist. Increased time to complete all tasks  Transfers  Sit to stand: 2 Person assistance; Moderate assistance  Stand to sit: Moderate assistance;2 Person assistance     Cognition  Overall Cognitive Status: Exceptions  Following Commands:  Follows multistep commands with increased time  Attention Span: Appears intact  Memory: Decreased recall of recent events  Safety Judgement: Decreased awareness of need for assistance;Decreased awareness of need for safety  Problem Solving: Decreased awareness of errors;Good awareness of errors made;Assistance required to identify errors made;Assistance required to correct errors made;Assistance required to generate solutions;Assistance required to implement solutions  Insights: Decreased awareness of deficits  Initiation: Requires cues for some  Sequencing: Requires cues for some  Perception  Overall Perceptual Status: WFL     Sensation  Overall Sensation Status: Impaired(LLE numbness)        LUE AROM (degrees)  LUE AROM : WFL  RUE AROM (degrees)  RUE AROM : WFL  LUE Strength  Gross LUE Strength: WFL  LUE Strength Comment: NAM RODRIGUEZ's 4-/5  RUE Strength  Gross RUE Strength: WFL                   Plan   Plan  Times per week: 4-5x/week, 1-2x/day  Current Treatment Recommendations: Strengthening, Balance Training, Functional Mobility Training, Endurance Training, Equipment Evaluation, Education, & procurement, Patient/Caregiver Education & Training, Self-Care / ADL, Safety Education & Training, Positioning         Goals  Short term goals  Time Frame for Short term goals: by discharge, pt will  Short term goal 1: demo min A with ADL transfers with good safety, approp DME/AD  Short term goal 2: demo min A with functional mob short distance in room for ADL completion with min cues for good safety/pacing  Short term goal 3: demo min A with toileiting routine, inc. clothing management  Short term goal 4: demo min A UB ADLs and mod A LB ADLs with min cues for safety and with approp DME  Short term goal 5: demo SBA with HEP for B UEs to inc. strength for transfers, ADLs  Patient Goals   Patient goals : to go home       Therapy Time   Individual Concurrent Group Co-treatment   Time In 1056         Time Out 1137         Minutes 41           Patient would benefit from SNF for continued occupational therapy to increase independence with  ADL of bathing, dressing, toileting and grooming. Writer recommending SNF placement for for activity tolerance and strength which will increase independence with ADL's coordinated with bed mobility and chair transfers. Continued skilled OT services to address decreased safety awareness with ADL and IADL tasks and for education and increased independence with DME and AE for fall prevention and ec/ws techniques prior to d/c home. Co-treatment with PT warranted secondary to decreased safety and independence requiring 2 skilled therapy professionals to address individual discipline's goals.  OT addressing preparation for ADL transfer, sitting balance for increased ADL performance, sitting/activity tolerance, functional reaching, environmental safety/scanning, fall prevention, functional mobility for ADL transfers, ability to sequence and follow directions and functional UE strength.        Arie León, OT

## 2020-02-12 NOTE — PLAN OF CARE
Problem: Falls - Risk of:  Goal: Will remain free from falls  8/88/5551 2186 by Suresh Azul RN  Outcome: Ongoing  2/12/2020 0502 by Darwin Alejandro RN  Outcome: Ongoing  Goal: Absence of physical injury  9/63/8119 7998 by Suresh Azul RN  Outcome: Ongoing  2/12/2020 0502 by Darwin Alejandro RN  Outcome: Ongoing     Problem: Pain:  Goal: Pain level will decrease  6/62/2969 2464 by Suresh Azul RN  Outcome: Ongoing  2/12/2020 0502 by Darwin Alejandro RN  Outcome: Ongoing  Goal: Control of acute pain  3/82/4383 3842 by Suresh Azul RN  Outcome: Ongoing  2/12/2020 0502 by Darwin Alejandro RN  Outcome: Ongoing  Goal: Control of chronic pain  3/22/5177 8844 by Suresh Azul RN  Outcome: Ongoing  2/12/2020 0502 by Darwin Alejandro RN  Outcome: Ongoing

## 2020-02-12 NOTE — H&P
cerebral artery occlusion with cerebral infarction     TIA's        Past Surgical History:     Past Surgical History:   Procedure Laterality Date    ARTHROPLASTY Left 4/18/2017    LEFT FOOT ARTHROPLASTY  4TH DIGIT performed by Marissa Shanks DPM at [place] [address] Left 04/18/2017    L 4th digit arthroplasty     HYSTERECTOMY      Partial    LUMBAR SPINE SURGERY  x 2    SPINAL FUSION  2012    UPPER GASTROINTESTINAL ENDOSCOPY      VARICOSE VEIN SURGERY          Medications Prior to Admission:     Prior to Admission medications    Medication Sig Start Date End Date Taking? Authorizing Provider   gabapentin (NEURONTIN) 600 MG tablet Take 600 mg by mouth 3 times daily.    Yes Historical Provider, MD   trospium (SANCTURA) 20 MG tablet Take 1 tablet by mouth Daily with supper 10/15/19 6/11/20  Ivonne Gonzalez MD   tiotropium (SPIRIVA RESPIMAT) 2.5 MCG/ACT AERS inhaler Inhale 2 puffs into the lungs daily    Historical Provider, MD   guaiFENesin (MUCINEX) 600 MG extended release tablet Take 1 tablet by mouth 2 times daily 4/18/19   JUNIOR Carcamo - NP   Cholecalciferol (VITAMIN D) 2000 units CAPS capsule Take 1 capsule by mouth daily    Historical Provider, MD   vitamin B-6 (PYRIDOXINE) 100 MG tablet Take 100 mg by mouth daily    Historical Provider, MD   vitamin C (ASCORBIC ACID) 500 MG tablet Take 500 mg by mouth daily    Historical Provider, MD   Multiple Vitamins-Minerals (THERAPEUTIC MULTIVITAMIN-MINERALS) tablet Take 1 tablet by mouth daily    Historical Provider, MD   sodium chloride (OCEAN, BABY AYR) 0.65 % nasal spray 1 spray by Nasal route as needed for Congestion    Historical Provider, MD   levalbuterol (XOPENEX) 1.25 MG/3ML nebulizer solution Take 1 ampule by nebulization every 8 hours as needed for Wheezing     Historical Provider, MD   Calcium Carb-Cholecalciferol (CALCIUM 600 + D) 600-200 MG-UNIT TABS Take 3 tablets by mouth daily     Historical Provider, frequency and urgency. Musculoskeletal: Negative for arthralgias and myalgias. Skin: Negative for color change and rash. Neurological: Negative for dizziness, weakness, light-headedness, numbness and headaches. Hematological: Does not bruise/bleed easily. Psychiatric/Behavioral: The patient is not nervous/anxious. All other systems reviewed and are negative. Physical Exam:   /66   Pulse 86   Temp 97.9 °F (36.6 °C) (Oral)   Resp 18   Ht 5' 7\" (1.702 m)   Wt 140 lb (63.5 kg)   SpO2 98%   BMI 21.93 kg/m²   Temp (24hrs), Av.1 °F (36.7 °C), Min:97.9 °F (36.6 °C), Max:98.3 °F (36.8 °C)    No results for input(s): POCGLU in the last 72 hours. No intake or output data in the 24 hours ending 20 6369    Physical Exam  Vitals signs and nursing note reviewed. Constitutional:       General: She is not in acute distress. Appearance: She is underweight. She is not diaphoretic. HENT:      Head: Normocephalic and atraumatic. Right Ear: Hearing normal.      Left Ear: Hearing normal.      Nose: Nose normal. No rhinorrhea. Eyes:      General: Lids are normal.      Extraocular Movements:      Right eye: Normal extraocular motion. Left eye: Normal extraocular motion. Conjunctiva/sclera: Conjunctivae normal.      Right eye: Right conjunctiva is not injected. Left eye: Left conjunctiva is not injected. Pupils: Pupils are equal, round, and reactive to light. Pupils are equal.      Right eye: Pupil is reactive. Left eye: Pupil is reactive. Neck:      Musculoskeletal: Neck supple. Thyroid: No thyromegaly. Vascular: No carotid bruit. Trachea: Trachea and phonation normal. No tracheal deviation. Cardiovascular:      Rate and Rhythm: Normal rate and regular rhythm. Pulses: Normal pulses. Heart sounds: Murmur present. Pulmonary:      Effort: Pulmonary effort is normal. No respiratory distress. Breath sounds: Normal breath sounds. medications. 5. Hypertension- Monitor vitals, control BP. 6. Age related physical debility- PT/OT. 7. Follow labs. 8. Replete electrolytes as needed. 9. DVT prophylaxis. 10. General diet. 11. Activity as tolerated with assist.    Plan of care discussed with patient and [place] RN.      Consultations:   IP CONSULT TO INTERNAL MEDICINE  IP CONSULT TO SOCIAL WORK      JUNIOR Vincent - CNP  2/11/2020  11:26 PM    Copy sent to Dr. aEn Olivarez MD

## 2020-02-12 NOTE — PROGRESS NOTES
Upon entering room to  Get pt ready to leave for the rehab, this nurse conveyed to the pt that she would be leaving at 1800 to go to the rehab, she became very defiant and refusing to go , stating \"not going to the rehab' I am going home with  My son\", in the mean time the daughter Erlinda Guerra had called and stated that the room at [facility] unacceptable\"  \"It was soiled and she had a roommate and she cannot have a roommate\"  Erlinda Guerra said \"waiting to talk to the [place] [facility] nurse was involved now and tried talking to the pt, he has gotten Jennyferon to come and talk to her and he reassured the pt that she should go to rehab as physical therapy has recommended but the decision is up to her, son Ronald Morgan( who lives in [place]) happened to call in  And this was all discussed with him and he encouraged his mother to go to rehab.   Erlinda Guerra has called back also and now states that she cannot talk to anyone in management at [employer] because they won't be available till tomorrow, Erlinda Guerar is now calling Jose Parent the son in town that the pt says is taking her home to talk about the situation, awaiting Erlinda Charlie to call back

## 2020-02-12 NOTE — PROGRESS NOTES
Physical Therapy    Facility/Department: [facility]  Initial Assessment    NAME: Siri Francois  : 1940  MRN: [id number]    Date of Service: 2020    Discharge Recommendations:  [address]     Pt presented to ED on 20 with complaint of altered mental status. The patient was reportedly brought to the hospital by EMS after becoming combative at home with her daughter, who is her caregiver. ER notes indicate that the patient's daughter stated she was confused. The patient is currently alert and oriented x3, and upon further questioning she endorses having a verbal altercation with her daughter. She states this happens periodically. The patient currently lives with her daughter secondary to her own becoming infested with roaches. She denies specific complaint. She is appropriate at this time. RN reports patient is medically stable for therapy treatment this date. Chart reviewed prior to treatment and patient is agreeable for therapy. Assessment   Body structures, Functions, Activity limitations: Decreased functional mobility ; Decreased safe awareness;Decreased balance;Decreased endurance;Decreased strength;Decreased high-level IADLs  Assessment:  Pt tolerated session with moderate deficits noted in bed mobility, transfers, ambulation, balance, and endurance this session, & is decline from previous level of independence with functional mobility,  & demonstrates poor safety awareness. Pt requires continued IP PT & D/C to [address] to maximize independence with functional mobility, balance, safety awareness & activity tolerance.   Prognosis: Good  Decision Making: Medium Complexity  Exam: ROM, MMT, functional mobility, activity tolerance, Balance, & [employer] AM-PAC 6 Clicks Basic Mobility   Clinical Presentation: evolving  PT Education: Functional Mobility Training;Transfer Training;Gait Training  Patient Education: Ed pt on functional mobility, safety awareness, prevention of sedentary complications  REQUIRES PT FOLLOW UP: Yes  Activity Tolerance  Activity Tolerance: Patient limited by pain; Patient limited by endurance       Patient Diagnosis(es): There were no encounter diagnoses. has a past medical history of Aortic valve disease, Arthritis, Chronic back pain, COPD (chronic obstructive pulmonary disease) ([address] Disease of blood and blood forming organ, Diverticulitis, GERD (gastroesophageal reflux disease), History of blood transfusion, History of gastritis, Lumbar disc disease, Movement disorder, Nerve disease, peripheral, Osteoporosis, Pneumonia, and Unspecified cerebral artery occlusion with cerebral infarction. has a past surgical history that includes Spinal fusion (2012); Varicose vein surgery; Lumbar spine surgery (x 2); Upper gastrointestinal endoscopy; Colonoscopy; Hysterectomy; Foot surgery (Left, 04/18/2017); and arthroplasty (Left, 4/18/2017). Restrictions  Restrictions/Precautions  Restrictions/Precautions: General Precautions, Fall Risk  Required Braces or Orthoses?: No  Position Activity Restriction  Other position/activity restrictions: up with assist, O2 @ 2L/min  Vision/Hearing  Vision: Impaired  Vision Exceptions: Wears glasses at all times  Hearing: Within functional limits     Subjective  General  Chart Reviewed: Yes  Patient assessed for rehabilitation services?: Yes  Additional Pertinent Hx: TIA, COPD and a known heart murmur, wears home oxygen.    Response To Previous Treatment: Not applicable  General Comment  Comments: RNtashi PT  Subjective  Subjective: Pt agreeable to PT          Orientation  Orientation  Overall Orientation Status: Within Functional Limits  Social/Functional History  Social/Functional History  Lives With: Daughter  Type of Home: Apartment  Home Layout: One level  Home Access: Level entry  Bathroom Shower/Tub: Tub/Shower unit  Bathroom Toilet: Standard  Bathroom Equipment: Grab bars in shower, Shower chair  Home Equipment: Andrews Anastacio, Rolling walker, Oxygen  Receives Help From: Family, Home health([facility] comes 5x/week for 5hrs per day)  ADL Assistance: Needs assistance(for bathing)  Homemaking Assistance: Needs assistance  Homemaking Responsibilities: No  Ambulation Assistance: Independent(uses R/walker)  Transfer Assistance: Independent  Active : No  Mode of Transportation: Cab  Occupation: Full time employment  Leisure & Hobbies: reading, watch TV  Cognition   Cognition  Overall Cognitive Status: Exceptions  Following Commands: Follows multistep commands with increased time  Attention Span: Appears intact  Memory: Decreased recall of recent events  Safety Judgement: Decreased awareness of need for assistance;Decreased awareness of need for safety  Problem Solving: Decreased awareness of errors;Good awareness of errors made;Assistance required to identify errors made;Assistance required to correct errors made;Assistance required to generate solutions;Assistance required to implement solutions  Insights: Decreased awareness of deficits  Initiation: Requires cues for some  Sequencing: Requires cues for some    Objective     Observation/Palpation  Posture: Poor  Observation: resting in bed, o2 via NC @2L/min    AROM RLE (degrees)  RLE AROM: WFL  AROM LLE (degrees)  LLE AROM : WFL  AROM RUE (degrees)  RUE AROM : WFL  AROM LUE (degrees)  LUE AROM : WFL  Strength RLE  Comment: 4/5  Strength LLE  Comment: 4/5  Strength RUE  Comment: 4-/5 shoulder  Strength LUE  Comment: 4-/5 shoulder  Tone RLE  RLE Tone: Normotonic  Tone LLE  LLE Tone: Normotonic  Motor Control  Gross Motor?: WFL  Sensation  Overall Sensation Status: Impaired(LLE numbness)  Bed mobility  Rolling to Left: Minimal assistance  Rolling to Right: Minimal assistance  Supine to Sit: Moderate assistance;2 Person assistance  Sit to Supine: Moderate assistance;2 Person assistance  Scooting:  Moderate assistance;2 Person assistance  Comment: cues + modA to reach to bedrail & use of upper body to assist, requires extended time to complete  Transfers  Sit to Stand: Moderate Assistance;2 Person Assistance  Stand to sit: Moderate Assistance;2 Person Assistance  Bed to Chair: Moderate assistance;2 Person Assistance  Stand Pivot Transfers: Moderate Assistance;2 Person Assistance  Lateral Transfers: Moderate Assistance;2 Person Assistance  Comment: needed Ed on corect hand placement, 2 assist needed for safety & for line management  Ambulation  Ambulation?: Yes  Ambulation 1  Surface: level tile  Device: Rolling Walker  Other Apparatus: O2  Assistance: Moderate assistance;2 Person assistance  Quality of Gait: short shuffle steps, has severe kyphotic spine, cues to stand more upright but is till very limited  Gait Deviations: Slow Nusrat; Increased [place]; Decreased step length;Decreased step height  Distance: 3ft  Comments: to [facility] for toileting, Mod Assistance to sit to toilet. Pt stood with 2 mod Assistance,stood 3 minutes x 2 for pericare & to pull up brief, then ambulated 4 ft with R/walker & sat to EOB with mod Assistance           Balance  Sitting - Static: Good  Sitting - Dynamic: Good  Standing - Static: Fair;+  Standing - Dynamic: Fair  Exercises  Comments: Ed pt on functional mobility, safety awareness, prevention of sedentary complications    All lines intact, call light within reach, and patient positioned comfortably at end of treatment. All patient needs addressed prior to ending therapy session.         Plan   Plan  Times per week: 1-2x/D,5-6D/week  Current Treatment Recommendations: Strengthening, Balance Training, Functional Mobility Training, Transfer Training, Gait Training, Home Exercise Program, Safety Education & Training, Patient/Caregiver Education & Training  Safety Devices  Type of devices: Gait belt, Bed alarm in place, Call light within reach, Left in bed, Patient at risk for falls    G-Code       OutComes Score AM-PAC Score     AM-PAC Inpatient Mobility Raw Score : 12  AM-PAC Inpatient T-Scale Score : 35.33  Mobility Inpatient CMS 0-100% Score: 68.66  Mobility Inpatient CMS G-Code Modifier : CL                Goals  Short term goals  Time Frame for Short term goals: 12 visits  Short term goal 1: Inc bed-mobility & transfers to independent to enable pt to safely get in/OOB   Short term goal 2: Inc gait to amb 50ft or > indep w/ RW to enable pt to return to previous level of independence  Short term goal 3: Inc strength to [facility] & standing balance to good with device to facilitate pt independence for performance of ADL's & functional mobility, & reduce fall risk  Short term goal 4: Pt able to tolerate 30-40 min of activity to include 15-20 reps of ex & functional mobility including 5 minutes of standing to facilitate activity tolerance to [facility]; Short term goal 5: Ed pt on home ex's, safety & energy principles & issue written home program       Therapy Time   Individual Concurrent Group Co-treatment   Time In 1000/1056         Time Out 1025/1129         Minutes 25+33=58              Additional 10 minutes for chart review    Co-treatment with OT warranted secondary to decreased safety and independence requiring 2 skilled therapy professionals to address individual discipline's goals. PT addressing pre gait trunk strengthening, weight shifting prior to transfers, transfer training and postural control in sitting.         [address], PT

## 2020-02-12 NOTE — PROGRESS NOTES
Pt admitted to 2020 vitals, assessment, and database complete. Waffle mattress inflated on bed, plan of care reviewed including bed alarm discussed.  Will cont to monitor

## 2020-02-12 NOTE — PROGRESS NOTES
Rick Harris was ordered Calcium Carbonate/Vit D - specific dosing not available. As per the [address], herbals and certain dietary supplements will be discontinued. The herbal or dietary supplement may be continued after discharge from the hospital.     If you feel the patient needs to continue their home herbal therapy during the inpatient stay, the patient may bring an unopened bottle for verification and administration pursuant to our home medication use policy. Please contact the pharmacy with any questions or concerns. Thank you.   Trini Sexton   2/11/2020  9:05 PM

## 2020-02-12 NOTE — CARE COORDINATION
Social work: Spoke with [facility], they are able to accept patient today. Patient to dc to [facility] via [email or web address] at 18:00. # for RN report: [phone]. [address] faxed to [phone]. Informed RN, pt, unit and SNF of dc time, agreeable to plan. PASSAR completed.

## 2020-02-13 LAB
-: ABNORMAL
AMORPHOUS: ABNORMAL
BACTERIA: ABNORMAL
BILIRUBIN URINE: NEGATIVE
CASTS UA: ABNORMAL /LPF
COLOR: YELLOW
COMMENT UA: ABNORMAL
CRYSTALS, UA: ABNORMAL /HPF
EPITHELIAL CELLS UA: ABNORMAL /HPF (ref 0–5)
GLUCOSE URINE: NEGATIVE
KETONES, URINE: NEGATIVE
LEUKOCYTE ESTERASE, URINE: ABNORMAL
MUCUS: ABNORMAL
NITRITE, URINE: NEGATIVE
OTHER OBSERVATIONS UA: ABNORMAL
PH UA: 7 (ref 5–8)
POTASSIUM SERPL-SCNC: 3.7 MMOL/L (ref 3.7–5.3)
PROTEIN UA: NEGATIVE
RBC UA: ABNORMAL /HPF (ref 0–2)
RENAL EPITHELIAL, UA: ABNORMAL /HPF
SPECIFIC GRAVITY UA: 1.02 (ref 1–1.03)
TRICHOMONAS: ABNORMAL
TURBIDITY: ABNORMAL
URINE HGB: NEGATIVE
UROBILINOGEN, URINE: NORMAL
WBC UA: ABNORMAL /HPF (ref 0–5)
YEAST: ABNORMAL

## 2020-02-13 PROCEDURE — 6370000000 HC RX 637 (ALT 250 FOR IP): Performed by: INTERNAL MEDICINE

## 2020-02-13 PROCEDURE — 2700000000 HC OXYGEN THERAPY PER DAY

## 2020-02-13 PROCEDURE — 97110 THERAPEUTIC EXERCISES: CPT

## 2020-02-13 PROCEDURE — 81001 URINALYSIS AUTO W/SCOPE: CPT

## 2020-02-13 PROCEDURE — 87086 URINE CULTURE/COLONY COUNT: CPT

## 2020-02-13 PROCEDURE — 36415 COLL VENOUS BLD VENIPUNCTURE: CPT

## 2020-02-13 PROCEDURE — 2580000003 HC RX 258: Performed by: INTERNAL MEDICINE

## 2020-02-13 PROCEDURE — G0378 HOSPITAL OBSERVATION PER HR: HCPCS

## 2020-02-13 PROCEDURE — 99225 PR SBSQ OBSERVATION CARE/DAY 25 MINUTES: CPT | Performed by: INTERNAL MEDICINE

## 2020-02-13 PROCEDURE — 6370000000 HC RX 637 (ALT 250 FOR IP): Performed by: NURSE PRACTITIONER

## 2020-02-13 PROCEDURE — 84132 ASSAY OF SERUM POTASSIUM: CPT

## 2020-02-13 PROCEDURE — 94640 AIRWAY INHALATION TREATMENT: CPT

## 2020-02-13 PROCEDURE — 6360000002 HC RX W HCPCS: Performed by: INTERNAL MEDICINE

## 2020-02-13 PROCEDURE — 97530 THERAPEUTIC ACTIVITIES: CPT

## 2020-02-13 RX ORDER — HYDROCODONE BITARTRATE AND ACETAMINOPHEN 5; 325 MG/1; MG/1
2 TABLET ORAL ONCE
Status: COMPLETED | OUTPATIENT
Start: 2020-02-13 | End: 2020-02-13

## 2020-02-13 RX ORDER — FENTANYL CITRATE 50 UG/ML
50 INJECTION, SOLUTION INTRAMUSCULAR; INTRAVENOUS ONCE
Status: DISCONTINUED | OUTPATIENT
Start: 2020-02-13 | End: 2020-02-22 | Stop reason: HOSPADM

## 2020-02-13 RX ADMIN — TROSPIUM CHLORIDE 20 MG: 20 TABLET, FILM COATED ORAL at 20:40

## 2020-02-13 RX ADMIN — GABAPENTIN 300 MG: 300 CAPSULE ORAL at 09:33

## 2020-02-13 RX ADMIN — DOCUSATE SODIUM 100 MG: 100 CAPSULE, LIQUID FILLED ORAL at 09:34

## 2020-02-13 RX ADMIN — PYRIDOXINE HCL TAB 50 MG 100 MG: 50 TAB at 09:33

## 2020-02-13 RX ADMIN — TIOTROPIUM BROMIDE INHALATION SPRAY 2 PUFF: 3.12 SPRAY, METERED RESPIRATORY (INHALATION) at 10:25

## 2020-02-13 RX ADMIN — CLOPIDOGREL BISULFATE 75 MG: 75 TABLET ORAL at 09:34

## 2020-02-13 RX ADMIN — GUAIFENESIN 600 MG: 600 TABLET, EXTENDED RELEASE ORAL at 20:40

## 2020-02-13 RX ADMIN — BUDESONIDE AND FORMOTEROL FUMARATE DIHYDRATE 2 PUFF: 160; 4.5 AEROSOL RESPIRATORY (INHALATION) at 20:12

## 2020-02-13 RX ADMIN — MULTIPLE VITAMINS W/ MINERALS TAB 1 TABLET: TAB at 09:33

## 2020-02-13 RX ADMIN — Medication 10 ML: at 14:48

## 2020-02-13 RX ADMIN — GABAPENTIN 300 MG: 300 CAPSULE ORAL at 20:40

## 2020-02-13 RX ADMIN — BUDESONIDE AND FORMOTEROL FUMARATE DIHYDRATE 2 PUFF: 160; 4.5 AEROSOL RESPIRATORY (INHALATION) at 10:25

## 2020-02-13 RX ADMIN — GUAIFENESIN 600 MG: 600 TABLET, EXTENDED RELEASE ORAL at 09:34

## 2020-02-13 RX ADMIN — OXYCODONE HYDROCHLORIDE AND ACETAMINOPHEN 500 MG: 500 TABLET ORAL at 09:33

## 2020-02-13 RX ADMIN — MELATONIN 2000 UNITS: at 09:33

## 2020-02-13 RX ADMIN — LEVALBUTEROL 1.25 MG: 1.25 SOLUTION, CONCENTRATE RESPIRATORY (INHALATION) at 10:25

## 2020-02-13 RX ADMIN — Medication 10 ML: at 20:40

## 2020-02-13 RX ADMIN — DOCUSATE SODIUM 100 MG: 100 CAPSULE, LIQUID FILLED ORAL at 20:40

## 2020-02-13 RX ADMIN — LEVALBUTEROL 1.25 MG: 1.25 SOLUTION, CONCENTRATE RESPIRATORY (INHALATION) at 23:13

## 2020-02-13 RX ADMIN — LEVALBUTEROL 1.25 MG: 1.25 SOLUTION, CONCENTRATE RESPIRATORY (INHALATION) at 15:03

## 2020-02-13 RX ADMIN — HYDROCODONE BITARTRATE AND ACETAMINOPHEN 2 TABLET: 5; 325 TABLET ORAL at 20:40

## 2020-02-13 ASSESSMENT — PAIN DESCRIPTION - PAIN TYPE
TYPE: CHRONIC PAIN
TYPE: CHRONIC PAIN

## 2020-02-13 ASSESSMENT — PAIN SCALES - GENERAL
PAINLEVEL_OUTOF10: 9
PAINLEVEL_OUTOF10: 3
PAINLEVEL_OUTOF10: 3

## 2020-02-13 ASSESSMENT — ENCOUNTER SYMPTOMS
COUGH: 1
ABDOMINAL PAIN: 0
NAUSEA: 0
SHORTNESS OF BREATH: 0
BACK PAIN: 1
VOMITING: 0

## 2020-02-13 ASSESSMENT — PAIN DESCRIPTION - DESCRIPTORS: DESCRIPTORS: ACHING;SHARP

## 2020-02-13 ASSESSMENT — PAIN DESCRIPTION - LOCATION
LOCATION: WRIST
LOCATION: BACK

## 2020-02-13 ASSESSMENT — PAIN DESCRIPTION - FREQUENCY: FREQUENCY: CONTINUOUS

## 2020-02-13 ASSESSMENT — PAIN - FUNCTIONAL ASSESSMENT: PAIN_FUNCTIONAL_ASSESSMENT: PREVENTS OR INTERFERES SOME ACTIVE ACTIVITIES AND ADLS

## 2020-02-13 ASSESSMENT — PAIN DESCRIPTION - ONSET: ONSET: ON-GOING

## 2020-02-13 ASSESSMENT — PAIN DESCRIPTION - PROGRESSION: CLINICAL_PROGRESSION: NOT CHANGED

## 2020-02-13 ASSESSMENT — PAIN DESCRIPTION - ORIENTATION: ORIENTATION: RIGHT

## 2020-02-13 NOTE — PROGRESS NOTES
[facility]    Progress Note    2/13/2020    11:12 AM    Name:   Jill Philippe  MRN:     [id number]     Acct:      [de-identified]   Room:   2020/2020-01  IP Day:  0  Admit Date:  2/11/2020  3:42 PM    PCP:   Sonal Tucker MD  Code Status:  Full Code    Subjective:     C/C:   Chief Complaint   Patient presents with    Other     daughter wants her to go to respite due to being combative     Interval History Status:   Improved  Eating   Less anxious   Feels stronger     Data Base Updates:  Potassium 3.7     Brief History:     As documented in the medical record: \"Afsaneh Sun is a [de-identified] y.o. -American female who reports to the hospital with complaint of altered mental status. The patient was reportedly brought to the hospital by EMS after becoming combative at home with her daughter, who is her caregiver. ER notes indicate that the patient's daughter stated she was confused. The patient is currently alert and oriented x3, and upon further questioning she endorses having a verbal altercation with her daughter. She states this happens periodically. The patient currently lives with her daughter secondary to her own becoming infested with roaches. She denies specific complaint. She is appropriate at this time. She has past medical history that includes previous TIA, COPD and a known heart murmur. She wears home oxygen. \"     The patient was admitted  Her electrolyte abnormalities were addressed  Blood pressure was monitored and controlled  Respiratory Therapy and Bronchodilators prn   PT/OT was requested    CT brain 10/2019:  No acute intracranial abnormality.       Chronic small vessel ischemic changes       Echocardiogram 10/2019:   Summary  Left ventricle is normal in size. Mild left ventricular hypertrophy. Global left ventricular systolic function is normal with an estimated  ejection fraction of 60 % .   No obvious wall motion budesonide-formoterol  2 puff Inhalation BID    vitamin B-6  100 mg Oral Daily    vitamin C  500 mg Oral Daily    therapeutic multivitamin-minerals  1 tablet Oral Daily    Vitamin D  2,000 Units Oral Daily    guaiFENesin  600 mg Oral BID    tiotropium  2 puff Inhalation Daily    trospium  20 mg Oral Nightly    gabapentin  300 mg Oral BID    sodium chloride flush  10 mL Intravenous 2 times per day    enoxaparin  40 mg Subcutaneous Daily     Continuous Infusions:   PRN Meds: albuterol sulfate HFA, levalbuterol, sodium chloride, sodium chloride flush, potassium chloride **OR** potassium alternative oral replacement **OR** potassium chloride, magnesium sulfate, magnesium hydroxide, acetaminophen, ondansetron **OR** ondansetron    Data:     Past Medical History:   has a past medical history of Aortic valve disease, Arthritis, Chronic back pain, COPD (chronic obstructive pulmonary disease) ([facility] [address] Disease of blood and blood forming organ, Diverticulitis, GERD (gastroesophageal reflux disease), History of blood transfusion, History of gastritis, Lumbar disc disease, Movement disorder, Nerve disease, peripheral, Osteoporosis, Pneumonia, and Unspecified cerebral artery occlusion with cerebral infarction. Social History:   reports that she quit smoking about 25 years ago. She has never used smokeless tobacco. She reports that she does not drink alcohol or use drugs. Family History:   Family History   Problem Relation Age of Onset    Heart Disease Mother     Heart Disease Father        Vitals:  /66   Pulse 61   Temp 97.3 °F (36.3 °C) (Oral)   Resp 18   Ht 5' 7\" (1.702 m)   Wt 145 lb 12.8 oz (66.1 kg)   SpO2 96%   BMI 22.84 kg/m²   Temp (24hrs), Av.8 °F (36.6 °C), Min:97.3 °F (36.3 °C), Max:98.2 °F (36.8 °C)    No results for input(s): POCGLU in the last 72 hours. I/O (24Hr):     Intake/Output Summary (Last 24 hours) at 2020 1112  Last data filed at 2020 1037  Gross per 24 hour Intake 240 ml   Output --   Net 240 ml         Review of Systems:     Review of Systems   Constitutional: Positive for activity change (Diminished) and appetite change (Decreased). Respiratory: Positive for cough (Occasional white to yellow sputum). Negative for shortness of breath. Cardiovascular: Negative for chest pain and palpitations. Gastrointestinal: Negative for abdominal pain, nausea and vomiting. Genitourinary: Negative for flank pain and hematuria. Musculoskeletal: Positive for arthralgias, back pain (chronic ), gait problem (gait still unsteady ) and myalgias. The patient has chronic arthralgias and myalgias  No new musculoskeletal complaints    Psychiatric/Behavioral: Positive for dysphoric mood (Feeling somewhat depressed about her current health status and living arrangements). Negative for suicidal ideas. The patient is nervous/anxious (still considering options for discharge). Reports that she is living with her daughter  She will apparently need placement       Physical Examination:        Physical Exam  Vitals signs reviewed. Constitutional:       General: She is not in acute distress. Appearance: She is not diaphoretic. HENT:      Head: Normocephalic. Nose: Nose normal.   Eyes:      General: No scleral icterus. Conjunctiva/sclera: Conjunctivae normal.   Neck:      Musculoskeletal: Neck supple. Trachea: No tracheal deviation. Cardiovascular:      Rate and Rhythm: Normal rate and regular rhythm. Heart sounds: Murmur (Systolic) present. Pulmonary:      Effort: Pulmonary effort is normal. No respiratory distress. Breath sounds: Normal breath sounds. No wheezing or rales. Chest:      Chest wall: No tenderness. Abdominal:      General: Bowel sounds are normal. There is no distension. Palpations: Abdomen is soft. Tenderness: There is no abdominal tenderness. Musculoskeletal:         General: No tenderness.    Skin: General: Skin is warm and dry. Neurological:      Mental Status: She is alert and oriented to person, place, and time. Comments: Doing fairly well with historical data           Labs:  Hematology:  Recent Labs     02/11/20 1755 02/12/20  0546   WBC 5.3 5.3   RBC 5.10 4.35   HGB 14.9 12.9   HCT 46.7 39.2   MCV 91.6 90.1   MCH 29.2 29.7   MCHC 31.9 32.9   RDW 12.9 13.0    154   MPV 10.3 10.5   INR  --  1.1     Chemistry:  Recent Labs     02/11/20 1755 02/12/20  0546 02/13/20  0820    140  --    K 4.5 3.6* 3.7    108*  --    CO2 24 26  --    GLUCOSE 92 82  --    BUN 12 13  --    CREATININE 0.74 0.71  --    ANIONGAP 10 6*  --    LABGLOM >60 >60  --    GFRAA >60 >60  --    CALCIUM 9.9 9.0  --    No results for input(s): PROT, LABALBU, LABA1C, X2FNJIW, W3HMIBS, FT4, TSH, AST, ALT, LDH, GGT, ALKPHOS, LABGGT, BILITOT, BILIDIR, AMMONIA, AMYLASE, LIPASE, LACTATE, CHOL, HDL, LDLCHOLESTEROL, CHOLHDLRATIO, TRIG, VLDL, GVD86XF, PHENYTOIN, PHENYF, URICACID, POCGLU in the last 72 hours. Radiology:    No results found.     Assessment:        Primary Problem  Altered mental status, unspecified    Active Hospital Problems    Diagnosis Date Noted    Small vessel disease ([facility] [address]):  Cerebrovascular  [I73.9] 02/12/2020    Left ventricular diastolic dysfunction [E09.0] 02/12/2020    Moderate mitral regurgitation [I34.0] 02/12/2020    Tricuspid regurgitation moderate [I07.1] 02/12/2020    Age-related physical debility [R54]     Chronic respiratory failure with hypoxia (HCC) [J96.11] 10/08/2019    Supplemental oxygen dependent [Z99.81] 05/01/2018    Essential hypertension [I10]     Altered mental status, unspecified [R41.82]     Mild aortic insufficiency [I35.1] 11/17/2015    Hypokalemia [E87.6] 06/25/2014    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] 06/25/2014    Chronic bilateral low back pain with bilateral sciatica [M54.42, M54.41, G89.29] 06/25/2014    COPD (chronic obstructive pulmonary disease) ([facility] [J44.9]        Plan:        PT/OT recommending placement:    Monitor and control blood pressure  Correct electrolytes  Respiratory Therapy and Bronchodilators prn  Oxygen supplementation  Social service consult  Psych consultation offered  SSRI suggested: Declined   Pain management: Follow-up with pain clinic  DC planning  Med Rec Done  SYLVIA done  After further discussion with her family the patient is again considering placement? Will discharge when arrangements complete and ok with other services.   Follow-up with PCP in one week, Kerwin Reece MD  Notify PCP of discharge     IP CONSULT TO INTERNAL MEDICINE  IP CONSULT TO [address] [place] [address]  2/13/2020  11:12 AM

## 2020-02-13 NOTE — CARE COORDINATION
Social Work-Second message left for daughter explaining that patient is ready for discharge.  Rachel Cruz

## 2020-02-13 NOTE — PROGRESS NOTES
Jett Pool returned the phone call, said her brother Radha Lyles was going to try and talk with his mother about discharge, this nurse c conveyed to Jett Lanza that there will have to be made a decision by tomorrow and she agreed they would be in touch.

## 2020-02-13 NOTE — PROGRESS NOTES
arthroplasty (Left, 4/18/2017). Restrictions  Restrictions/Precautions  Restrictions/Precautions: Fall Risk  Required Braces or Orthoses?: No  Position Activity Restriction  Other position/activity restrictions: up with assist, O2 @ 2L/min  Subjective   General  Chart Reviewed: Yes  Additional Pertinent Hx: TIA, COPD and a known heart murmur, wears home oxygen. Subjective  Subjective: Pt agreeable to PT  Pain Screening  Patient Currently in Pain: Yes  Pain Assessment  Pain Assessment: 0-10  Pain Level: 3  Pain Type: Chronic pain  Pain Location: Back  Non-Pharmaceutical Pain Intervention(s): Ambulation/Increased Activity  Vital Signs  Patient Currently in Pain: Yes       Orientation  Orientation  Overall Orientation Status: Within Functional Limits  Cognition   Cognition  Overall Cognitive Status: Exceptions  Following Commands: Follows multistep commands with increased time  Attention Span: Appears intact  Memory: Decreased recall of recent events  Safety Judgement: Decreased awareness of need for assistance;Decreased awareness of need for safety  Problem Solving: Decreased awareness of errors;Good awareness of errors made;Assistance required to identify errors made;Assistance required to correct errors made;Assistance required to generate solutions;Assistance required to implement solutions  Insights: Decreased awareness of deficits  Initiation: Requires cues for some  Sequencing: Requires cues for some  Objective   Bed mobility  Rolling to Left: Minimal assistance  Supine to Sit: Minimal assistance  Scooting: Minimal assistance  Comment: Pt able to assist herself with bed rail as well as Oli for upper body  Transfers  Sit to Stand:  Moderate Assistance  Stand to sit: Moderate Assistance  Bed to Chair: Moderate assistance  Comment: cues for hand placement and fully back up to chair prior to sitting  Ambulation  Ambulation?: Yes  Ambulation 1  Surface: level tile  Device: Rolling Walker  Other Apparatus: O2  Assistance: Moderate assistance  Quality of Gait: short shuffle steps, has severe kyphotic spine, cues to stand more upright but is till very limited  Gait Deviations: Slow Nusrat;Decreased step length  Distance: 2 ft to chair  Comments: unsafe for further ambulation  Stairs/Curb  Stairs?: No     Balance  Posture: Poor  Sitting - Static: Good  Sitting - Dynamic: Good  Standing - Static: Fair;+  Standing - Dynamic: Fair       Supine heel slides, ankle pumps x 10 reps    Seated LE exercise program: [employer], heel/toe raises, and marches. Reps: 10       AM-PAC Score  AM-PAC Inpatient Mobility Raw Score : 12 (02/13/20 1152)  AM-PAC Inpatient T-Scale Score : 35.33 (02/13/20 1152)  Mobility Inpatient CMS 0-100% Score: 68.66 (02/13/20 1152)  Mobility Inpatient CMS G-Code Modifier : CL (02/13/20 1152)          Goals  Short term goals  Time Frame for Short term goals: 12 visits  Short term goal 1: Inc bed-mobility & transfers to independent to enable pt to safely get in/OOB   Short term goal 2: Inc gait to amb 50ft or > indep w/ RW to enable pt to return to previous level of independence  Short term goal 3: Inc strength to [facility] & standing balance to good with device to facilitate pt independence for performance of ADL's & functional mobility, & reduce fall risk  Short term goal 4: Pt able to tolerate 30-40 min of activity to include 15-20 reps of ex & functional mobility including 5 minutes of standing to facilitate activity tolerance to [facility];   Short term goal 5: Ed pt on home ex's, safety & energy principles & issue written home program    Plan    Plan  Times per week: 1-2x/D,5-6D/week  Current Treatment Recommendations: Strengthening, Balance Training, Functional Mobility Training, Transfer Training, Gait Training, Home Exercise Program, Safety Education & Training, Patient/Caregiver Education & Training  Safety Devices  Type of devices: Gait belt, Call light within reach, Patient at risk for falls, Left in chair, Chair alarm in place  Restraints  Initially in place: No     Therapy Time   Individual Concurrent Group Co-treatment   Time In 0930         Time Out 1000         Minutes [address]

## 2020-02-13 NOTE — PROGRESS NOTES
Occupational Therapy  Facility/Department: [facility]  Daily Treatment Note  NAME: Berenice Freeman  : 1940  MRN: [id number]    Date of Service: 2020    Discharge Recommendations:  Subacute/Skilled Nursing Facility       Assessment   Performance deficits / Impairments: Decreased functional mobility ; Decreased ADL status; Decreased strength;Decreased safe awareness;Decreased balance;Decreased endurance;Decreased cognition;Decreased posture  Prognosis: Good  OT Education: OT Role;Plan of Care;Home Exercise Program;Precautions; ADL Adaptive Strategies;Transfer Training;Equipment; Family Education; Energy Conservation  REQUIRES OT FOLLOW UP: Yes  Activity Tolerance  Activity Tolerance: Patient Tolerated treatment well  Safety Devices  Safety Devices in place: Yes  Type of devices: Call light within reach;Nurse notified;Gait belt;Patient at risk for falls; Left in bed;Bed alarm in place         Patient Diagnosis(es): There were no encounter diagnoses. has a past medical history of Aortic valve disease, Arthritis, Chronic back pain, COPD (chronic obstructive pulmonary disease) ([address] Disease of blood and blood forming organ, Diverticulitis, GERD (gastroesophageal reflux disease), History of blood transfusion, History of gastritis, Lumbar disc disease, Movement disorder, Nerve disease, peripheral, Osteoporosis, Pneumonia, and Unspecified cerebral artery occlusion with cerebral infarction. has a past surgical history that includes Spinal fusion (2012); Varicose vein surgery; Lumbar spine surgery (x 2); Upper gastrointestinal endoscopy; Colonoscopy; Hysterectomy; Foot surgery (Left, 2017); and arthroplasty (Left, 2017).     Restrictions  Restrictions/Precautions  Restrictions/Precautions: Fall Risk  Required Braces or Orthoses?: No  Position Activity Restriction  Other position/activity restrictions: up with assist, O2 @ 2L/min  Subjective   General  Chart Reviewed: Yes  Patient assessed for rehabilitation services?: Yes  Response to previous treatment: Patient with no complaints from previous session  Family / Caregiver Present: No      Orientation  Orientation  Overall Orientation Status: Within Functional Limits  Objective    ADL  Additional Comments: declined opportunity for ADL's this date        Balance  Sitting Balance: Unable to assess(comment)  Standing Balance: Unable to assess(comment)  Functional Mobility  Functional Mobility Comments: Patient declined  Bed mobility  Supine to Sit: Contact guard assistance  Sit to Supine: Contact guard assistance  Scooting: Contact guard assistance  Comment: Patient required max encouragment for bed mobility and increased time to complete with max cues d/t distraction  Transfers  Transfer Comments: patient declined                        Cognition  Overall Cognitive Status: Exceptions  Following Commands:  Follows multistep commands with increased time  Attention Span: Appears intact  Memory: Decreased recall of recent events  Safety Judgement: Decreased awareness of need for assistance;Decreased awareness of need for safety  Problem Solving: Decreased awareness of errors;Good awareness of errors made;Assistance required to identify errors made;Assistance required to correct errors made;Assistance required to generate solutions;Assistance required to implement solutions  Insights: Decreased awareness of deficits  Initiation: Requires cues for some  Sequencing: Requires cues for some                    Type of ROM/Therapeutic Exercise  Type of ROM/Therapeutic Exercise: Free weights  Comment: Patient participated in BUE therapuetic exercises this date seated EOB 10x with no weight and use of bed rail for stability in all joint motions                    Plan   Plan  Times per week: 4-5x/week, 1-2x/day  Current Treatment Recommendations: Strengthening, Balance Training, Functional Mobility Training, Endurance Training, Equipment Evaluation, Education, & procurement, Patient/Caregiver Education & Training, Self-Care / ADL, Safety Education & Training, Positioning    AM-PAC Score        AM-PAC Inpatient Daily Activity Raw Score: 15 (02/13/20 1454)  AM-PAC Inpatient ADL T-Scale Score : 34.69 (02/13/20 1454)  ADL Inpatient CMS 0-100% Score: 56.46 (02/13/20 1454)  ADL Inpatient CMS G-Code Modifier : CK (02/13/20 1454)    Goals  Short term goals  Time Frame for Short term goals: by discharge, pt will  Short term goal 1: demo min A with ADL transfers with good safety, approp DME/AD  Short term goal 2: demo min A with functional mob short distance in room for ADL completion with min cues for good safety/pacing  Short term goal 3: demo min A with toileiting routine, inc. clothing management  Short term goal 4: demo min A UB ADLs and mod A LB ADLs with min cues for safety and with approp DME  Short term goal 5: demo SBA with HEP for B UEs to inc. strength for transfers, ADLs  Patient Goals   Patient goals : to go home       Therapy Time   Individual Concurrent Group Co-treatment   Time In 1417         Time Out 1446         Minutes 29             Patient declined to complete ADL's and OOB activity, patient only agreeable to complete exercises seated EOB. Upon writer exit, call light within reach, pt retired to bed. All lines intact and patient positioned comfortably. All patient needs addressed prior to ending therapy session. Chart reviewed prior to treatment and patient is agreeable for therapy. RN reports patient is medically stable for therapy treatment this date.   SERA Levi/NAT

## 2020-02-13 NOTE — PLAN OF CARE
Problem: Falls - Risk of:  Goal: Will remain free from falls  Description  Will remain free from falls  2/13/2020 1439 by Alfonso Hill RN  Outcome: Ongoing  Note:   Room free of clutter  Hourly rounding   Non-skid socks worn  Side rails up x2  Bed low and locked  Call light in reach  Instructed to call out before getting out of bed  Anticipatory needs met  Bed alarm on  Falling star at the door and on wristband       Problem: Pain:  Goal: Control of chronic pain  Description  Control of chronic pain  2/13/2020 1439 by Alfonso Hill RN  Outcome: Ongoing  Note:   Pain level assessed and rated on a 0-10 scale  Assess characteristics of pain  PRN pain medication given per pt request  Non-pharmacological interventions implemented  Report ineffective pain management to physician  Update pt and family of any changes  Pt instructed to call out with new onset of pain  Continue to monitor       Problem: Infection:  Goal: Will remain free from infection  Description  Will remain free from infection  Outcome: Ongoing     Problem: Safety:  Goal: Free from accidental physical injury  Description  Free from accidental physical injury  Outcome: Ongoing  Note:   Proper pt identification  Hourly rounding performed  Anticipatory needs met  Non-skid socks worn  Proper transferring technique  2/4 side rails up  Personal necessities within reach  Bed low and locked  Call light in reach  Proper lighting  Room free of clutter  Continue to monitor         Problem: Daily Care:  Goal: Daily care needs are met  Description  Daily care needs are met  2/13/2020 1439 by Alfonso Hill RN  Outcome: Ongoing  Note:   Assess patient self-care activities  Encourage patient to perform ADL's as tolerated  Include family when possible       Problem: Discharge Planning:  Goal: Patients continuum of care needs are met  Description  Patients continuum of care needs are met  2/13/2020 1439 by Alfonso Hill

## 2020-02-13 NOTE — CARE COORDINATION
Social Jadon Marques will complete an onsite in the morning to determine if they can accept patient. Jonathan Dickson

## 2020-02-14 PROCEDURE — G0378 HOSPITAL OBSERVATION PER HR: HCPCS

## 2020-02-14 PROCEDURE — 96372 THER/PROPH/DIAG INJ SC/IM: CPT

## 2020-02-14 PROCEDURE — 2700000000 HC OXYGEN THERAPY PER DAY

## 2020-02-14 PROCEDURE — 97530 THERAPEUTIC ACTIVITIES: CPT

## 2020-02-14 PROCEDURE — 97116 GAIT TRAINING THERAPY: CPT

## 2020-02-14 PROCEDURE — 94640 AIRWAY INHALATION TREATMENT: CPT

## 2020-02-14 PROCEDURE — 97110 THERAPEUTIC EXERCISES: CPT

## 2020-02-14 PROCEDURE — 6360000002 HC RX W HCPCS: Performed by: INTERNAL MEDICINE

## 2020-02-14 PROCEDURE — 99225 PR SBSQ OBSERVATION CARE/DAY 25 MINUTES: CPT | Performed by: INTERNAL MEDICINE

## 2020-02-14 PROCEDURE — 6370000000 HC RX 637 (ALT 250 FOR IP): Performed by: INTERNAL MEDICINE

## 2020-02-14 RX ORDER — POLYVINYL ALCOHOL 14 MG/ML
1 SOLUTION/ DROPS OPHTHALMIC PRN
Status: DISCONTINUED | OUTPATIENT
Start: 2020-02-14 | End: 2020-02-22 | Stop reason: HOSPADM

## 2020-02-14 RX ORDER — OXYCODONE HYDROCHLORIDE AND ACETAMINOPHEN 5; 325 MG/1; MG/1
1 TABLET ORAL EVERY 6 HOURS PRN
Status: DISCONTINUED | OUTPATIENT
Start: 2020-02-14 | End: 2020-02-20

## 2020-02-14 RX ADMIN — GUAIFENESIN 600 MG: 600 TABLET, EXTENDED RELEASE ORAL at 09:27

## 2020-02-14 RX ADMIN — ENOXAPARIN SODIUM 40 MG: 40 INJECTION SUBCUTANEOUS at 09:27

## 2020-02-14 RX ADMIN — BUDESONIDE AND FORMOTEROL FUMARATE DIHYDRATE 2 PUFF: 160; 4.5 AEROSOL RESPIRATORY (INHALATION) at 20:00

## 2020-02-14 RX ADMIN — DOCUSATE SODIUM 100 MG: 100 CAPSULE, LIQUID FILLED ORAL at 20:10

## 2020-02-14 RX ADMIN — DOCUSATE SODIUM 100 MG: 100 CAPSULE, LIQUID FILLED ORAL at 09:26

## 2020-02-14 RX ADMIN — TROSPIUM CHLORIDE 20 MG: 20 TABLET, FILM COATED ORAL at 20:10

## 2020-02-14 RX ADMIN — GUAIFENESIN 600 MG: 600 TABLET, EXTENDED RELEASE ORAL at 20:10

## 2020-02-14 RX ADMIN — CLOPIDOGREL BISULFATE 75 MG: 75 TABLET ORAL at 09:26

## 2020-02-14 RX ADMIN — GABAPENTIN 300 MG: 300 CAPSULE ORAL at 20:10

## 2020-02-14 RX ADMIN — LEVALBUTEROL 1.25 MG: 1.25 SOLUTION, CONCENTRATE RESPIRATORY (INHALATION) at 19:59

## 2020-02-14 RX ADMIN — GABAPENTIN 300 MG: 300 CAPSULE ORAL at 09:26

## 2020-02-14 ASSESSMENT — PAIN SCALES - GENERAL: PAINLEVEL_OUTOF10: 7

## 2020-02-14 ASSESSMENT — PAIN DESCRIPTION - PAIN TYPE: TYPE: CHRONIC PAIN

## 2020-02-14 ASSESSMENT — PAIN DESCRIPTION - FREQUENCY: FREQUENCY: INTERMITTENT

## 2020-02-14 ASSESSMENT — ENCOUNTER SYMPTOMS
COUGH: 1
SHORTNESS OF BREATH: 0
BACK PAIN: 1
VOMITING: 0
NAUSEA: 0
ABDOMINAL PAIN: 0

## 2020-02-14 ASSESSMENT — PAIN DESCRIPTION - ONSET: ONSET: ON-GOING

## 2020-02-14 ASSESSMENT — PAIN - FUNCTIONAL ASSESSMENT: PAIN_FUNCTIONAL_ASSESSMENT: ACTIVITIES ARE NOT PREVENTED

## 2020-02-14 ASSESSMENT — PAIN DESCRIPTION - LOCATION: LOCATION: BACK

## 2020-02-14 ASSESSMENT — PAIN DESCRIPTION - DESCRIPTORS: DESCRIPTORS: ACHING;DISCOMFORT

## 2020-02-14 NOTE — PROGRESS NOTES
[facility]    Progress Note    2/14/2020    5:19 PM    Name:   Hugo Clinton  MRN:     [id number]     Acct:      [de-identified]   Room:   2020/2020-01  IP Day:  0  Admit Date:  2/11/2020  3:42 PM    PCP:   Jacky Sinclair MD  Code Status:  Full Code    Subjective:     C/C:   Chief Complaint   Patient presents with   Liyah Kim Other     daughter wants her to go to respite due to being combative     Interval History Status:   Ambulating with walker  Requesting Percocet for chronic back and arthritic pain  Strong urine odor reported    Data Base Updates:  Nitrite, Urine NEGATIVE Leukocyte Esterase, Urine SMALLAbnormal   Bacteria, UA MANYAbnormal   WBC, UA 5 TO 10   Turbidity UA SLIGHTLY CLOUDYAbnormal       Brief History:     As documented in the medical record: \"Afsaneh Gibbs is a [de-identified] y.o. -American female who reports to the hospital with complaint of altered mental status. The patient was reportedly brought to the hospital by EMS after becoming combative at home with her daughter, who is her caregiver. ER notes indicate that the patient's daughter stated she was confused. The patient is currently alert and oriented x3, and upon further questioning she endorses having a verbal altercation with her daughter. She states this happens periodically. The patient currently lives with her daughter secondary to her own becoming infested with roaches. She denies specific complaint. She is appropriate at this time. She has past medical history that includes previous TIA, COPD and a known heart murmur. She wears home oxygen. \"     The patient was admitted  Her electrolyte abnormalities were addressed  Blood pressure was monitored and controlled  Respiratory Therapy and Bronchodilators prn   PT/OT was requested    CT brain 10/2019:  No acute intracranial abnormality.       Chronic small vessel ischemic changes       Echocardiogram 10/2019: Tramadol Nausea And Vomiting and Nausea Only       Current Meds:   Scheduled Meds:    fentanNYL  50 mcg Intravenous Once    clopidogrel  75 mg Oral Daily    polyethylene glycol  17 g Oral BID    docusate sodium  100 mg Oral BID    budesonide-formoterol  2 puff Inhalation BID    vitamin B-6  100 mg Oral Daily    vitamin C  500 mg Oral Daily    therapeutic multivitamin-minerals  1 tablet Oral Daily    Vitamin D  2,000 Units Oral Daily    guaiFENesin  600 mg Oral BID    tiotropium  2 puff Inhalation Daily    trospium  20 mg Oral Nightly    gabapentin  300 mg Oral BID    sodium chloride flush  10 mL Intravenous 2 times per day    enoxaparin  40 mg Subcutaneous Daily     Continuous Infusions:   PRN Meds: albuterol sulfate HFA, levalbuterol, sodium chloride, sodium chloride flush, potassium chloride **OR** potassium alternative oral replacement **OR** potassium chloride, magnesium sulfate, magnesium hydroxide, acetaminophen, ondansetron **OR** ondansetron    Data:     Past Medical History:   has a past medical history of Aortic valve disease, Arthritis, Chronic back pain, COPD (chronic obstructive pulmonary disease) ([facility] [address] Disease of blood and blood forming organ, Diverticulitis, GERD (gastroesophageal reflux disease), History of blood transfusion, History of gastritis, Lumbar disc disease, Movement disorder, Nerve disease, peripheral, Osteoporosis, Pneumonia, and Unspecified cerebral artery occlusion with cerebral infarction. Social History:   reports that she quit smoking about 25 years ago. She has never used smokeless tobacco. She reports that she does not drink alcohol or use drugs.      Family History:   Family History   Problem Relation Age of Onset    Heart Disease Mother     Heart Disease Father        Vitals:  /73   Pulse 81   Temp 97.7 °F (36.5 °C) (Oral)   Resp 18   Ht 5' 7\" (1.702 m)   Wt 144 lb (65.3 kg)   SpO2 95%   BMI 22.55 kg/m²   Temp (24hrs), Av.7 °F (36.5 °C), Min:97.5 °F (36.4 °C), Max:97.7 °F (36.5 °C)    No results for input(s): POCGLU in the last 72 hours. I/O (24Hr): Intake/Output Summary (Last 24 hours) at 2/14/2020 1719  Last data filed at 2/14/2020 0306  Gross per 24 hour   Intake 640 ml   Output --   Net 640 ml         Review of Systems:     Review of Systems   Constitutional: Positive for activity change ( improved). Negative for appetite change. Respiratory: Positive for cough (Occasional white to yellow sputum). Negative for shortness of breath. Cardiovascular: Negative for chest pain and palpitations. Gastrointestinal: Negative for abdominal pain, nausea and vomiting. Genitourinary: Negative for dysuria, flank pain, hematuria and urgency. Musculoskeletal: Positive for arthralgias, back pain (chronic ), gait problem (gait still unsteady ) and myalgias. The patient has chronic arthralgias and myalgias  No new musculoskeletal complaints    Psychiatric/Behavioral: Positive for dysphoric mood (Feeling somewhat depressed about her current health status and living arrangements) and sleep disturbance (not sleeping well ). Negative for suicidal ideas. The patient is nervous/anxious (still considering options for discharge). Reports that she is living with her daughter  She will apparently need placement       Physical Examination:        Physical Exam  Vitals signs reviewed. Constitutional:       General: She is not in acute distress. Appearance: She is not diaphoretic. HENT:      Head: Normocephalic. Nose: Nose normal.   Eyes:      General: No scleral icterus. Conjunctiva/sclera: Conjunctivae normal.   Neck:      Musculoskeletal: Neck supple. Trachea: No tracheal deviation. Cardiovascular:      Rate and Rhythm: Normal rate and regular rhythm. Heart sounds: Murmur (Systolic) present. Pulmonary:      Effort: Pulmonary effort is normal. No respiratory distress. Breath sounds: Normal breath sounds.  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.2] 06/25/2014    Chronic bilateral low back pain with bilateral sciatica [M54.42, M54.41, G89.29] 06/25/2014    COPD (chronic obstructive pulmonary disease) ([facility] [J44.9]        Plan:        Awaiting urine culture: Rule out UTI  PT/OT recommending placement:    Monitor and control blood pressure  Correct electrolytes  Respiratory Therapy and Bronchodilators prn  Oxygen supplementation  Social service consult  Pain management: Follow-up with pain clinic  DC planning  Med Rec Done  SYLVIA done  After further discussion with her family the patient is again considering placement? Will discharge when arrangements complete and ok with other services.   Follow-up with PCP in one week, Nikky Tubbs MD  Notify PCP of discharge     IP CONSULT TO INTERNAL MEDICINE  IP CONSULT TO [address] [place] [address]  2/14/2020  5:19 PM

## 2020-02-14 NOTE — PLAN OF CARE
Problem: Falls - Risk of:  Goal: Will remain free from falls  3/14/6579 7917 by Eagle Singh RN  Outcome: Ongoing  2/14/2020 0520 by Clayton Stafford RN  Outcome: Ongoing  Goal: Absence of physical injury  0/69/1028 3190 by Eagle Singh RN  Outcome: Ongoing  2/14/2020 0520 by Clayton Stafford RN  Outcome: Ongoing     Problem: Pain:  Goal: Pain level will decrease  8/65/7411 7466 by Eagle Singh RN  Outcome: Ongoing  2/14/2020 0520 by Clayton Stafford RN  Outcome: Ongoing  Goal: Control of acute pain  4/40/4417 8156 by Eagle Singh RN  Outcome: Ongoing  2/14/2020 0520 by Clayton Stafford RN  Outcome: Ongoing  Goal: Control of chronic pain  8/22/8764 2563 by Eagle Singh RN  Outcome: Ongoing  2/14/2020 0520 by Clayton Stafford RN  Outcome: Ongoing  Goal: Patient's pain/discomfort is manageable  7/36/9174 8841 by Eagle Singh RN  Outcome: Ongoing  2/14/2020 0520 by Clayton Stafford RN  Outcome: Ongoing     Problem: Infection:  Goal: Will remain free from infection  7/21/9252 2792 by Eagle Singh RN  Outcome: Ongoing  2/14/2020 0520 by Clayton Stafford RN  Outcome: Ongoing     Problem: Safety:  Goal: Free from accidental physical injury  9/23/3422 7446 by Eagle Singh RN  Outcome: Ongoing  2/14/2020 0520 by Clayton Stafford RN  Outcome: Ongoing  Goal: Free from intentional harm  2/12/2302 0337 by Eagle Singh RN  Outcome: Ongoing  2/14/2020 0520 by Clayton Stafford RN  Outcome: Ongoing     Problem: Daily Care:  Goal: Daily care needs are met  0/09/0128 6353 by Eagle Singh RN  Outcome: Ongoing  2/14/2020 0520 by Clayton Stafford RN  Outcome: Ongoing     Problem: Skin Integrity:  Goal: Skin integrity will stabilize  5/28/9761 1763 by Eagle Singh RN  Outcome: Ongoing  2/14/2020 0520 by Clayton Stafford RN  Outcome: Ongoing     Problem: Discharge Planning:  Goal: Patients continuum of care needs are met  5/07/1353 9895 by Eagle Singh RN  Outcome: Ongoing  2/14/2020 0520 by Clayton Stafford RN  Outcome: Ongoing

## 2020-02-14 NOTE — PROGRESS NOTES
Physical Therapy  Facility/Department: [facility]  Daily Treatment Note  NAME: Howell Goltz  : 1940  MRN: [id number]    Date of Service: 2020    Discharge Recommendations:  Subacute/Skilled Nursing Facility        Assessment     Body structures, Functions, Activity limitations: Decreased functional mobility ; Decreased safe awareness;Decreased balance;Decreased endurance;Decreased strength;Decreased high-level IADLs  Assessment:  Pt tolerated session with less deficits noted in bed mobility, transfers, ambulation, balance, and endurance this session but requires EXTENDED time to complete all functional mobility & impaired safety awareness. Pt requires continued IP PT & D/C to [address] to maximize independence with functional mobility, balance, safety awareness & activity tolerance. PT Education: Functional Mobility Training;Transfer Training;Gait Training  Patient Education: functional mobility, safety awareness, prevention of sedentary complications, LE ex's  REQUIRES PT FOLLOW UP: Yes   PT Education: Functional Mobility Training;Transfer Training;Gait Training  Patient Education: functional mobility, safety awareness, prevention of sedentary complications, LE ex's     Patient Diagnosis(es): There were no encounter diagnoses. has a past medical history of Aortic valve disease, Arthritis, Chronic back pain, COPD (chronic obstructive pulmonary disease) ([facility] [address] Disease of blood and blood forming organ, Diverticulitis, GERD (gastroesophageal reflux disease), History of blood transfusion, History of gastritis, Lumbar disc disease, Movement disorder, Nerve disease, peripheral, Osteoporosis, Pneumonia, and Unspecified cerebral artery occlusion with cerebral infarction. has a past surgical history that includes Spinal fusion (); Varicose vein surgery; Lumbar spine surgery (x 2); Upper gastrointestinal endoscopy; Colonoscopy; Hysterectomy;  Foot surgery (Left, severe kyphotic spine, cues to stand more upright but still very limited  Distance: 15ft  Stairs/Curb  Stairs?: No     Balance  Posture: Poor  Sitting - Static: Good  Sitting - Dynamic: Good  Standing - Static: Fair;+  Standing - Dynamic: Fair  Exercises  Comments: LE ex's, 10 reps, safety with functional mobility         All lines intact, call light within reach, and patient positioned comfortably at end of treatment. All patient needs addressed prior to ending therapy session. G-Code     OutComes Score                                                     AM-PAC Score  AM-PAC Inpatient Mobility Raw Score : 15 (02/14/20 1023)  AM-PAC Inpatient T-Scale Score : 39.45 (02/14/20 1023)  Mobility Inpatient CMS 0-100% Score: 57.7 (02/14/20 1023)  Mobility Inpatient CMS G-Code Modifier : CK (02/14/20 1023)          Goals  Short term goals  Time Frame for Short term goals: 12 visits  Short term goal 1: Inc bed-mobility & transfers to independent to enable pt to safely get in/OOB   Short term goal 2: Inc gait to amb 50ft or > indep w/ RW to enable pt to return to previous level of independence  Short term goal 3: Inc strength to [facility] & standing balance to good with device to facilitate pt independence for performance of ADL's & functional mobility, & reduce fall risk  Short term goal 4: Pt able to tolerate 30-40 min of activity to include 15-20 reps of ex & functional mobility including 5 minutes of standing to facilitate activity tolerance to [facility];   Short term goal 5: Ed pt on home ex's, safety & energy principles & issue written home program    Plan    Plan  Times per week: 1-2x/D,5-6D/week  Current Treatment Recommendations: Strengthening, Balance Training, Functional Mobility Training, Transfer Training, Gait Training, Home Exercise Program, Safety Education & Training, Patient/Caregiver Education & Training  Safety Devices  Type of devices: Gait belt, Call light within reach, Patient at risk for

## 2020-02-15 LAB
CULTURE: NORMAL
Lab: NORMAL
SPECIMEN DESCRIPTION: NORMAL

## 2020-02-15 PROCEDURE — G0378 HOSPITAL OBSERVATION PER HR: HCPCS

## 2020-02-15 PROCEDURE — 6370000000 HC RX 637 (ALT 250 FOR IP): Performed by: INTERNAL MEDICINE

## 2020-02-15 PROCEDURE — 6360000002 HC RX W HCPCS: Performed by: INTERNAL MEDICINE

## 2020-02-15 PROCEDURE — 94640 AIRWAY INHALATION TREATMENT: CPT

## 2020-02-15 PROCEDURE — 99225 PR SBSQ OBSERVATION CARE/DAY 25 MINUTES: CPT | Performed by: INTERNAL MEDICINE

## 2020-02-15 PROCEDURE — 97110 THERAPEUTIC EXERCISES: CPT

## 2020-02-15 PROCEDURE — 97116 GAIT TRAINING THERAPY: CPT

## 2020-02-15 PROCEDURE — 96372 THER/PROPH/DIAG INJ SC/IM: CPT

## 2020-02-15 PROCEDURE — 2700000000 HC OXYGEN THERAPY PER DAY

## 2020-02-15 PROCEDURE — 97530 THERAPEUTIC ACTIVITIES: CPT

## 2020-02-15 RX ADMIN — GABAPENTIN 300 MG: 300 CAPSULE ORAL at 08:38

## 2020-02-15 RX ADMIN — OXYCODONE AND ACETAMINOPHEN 1 TABLET: 5; 325 TABLET ORAL at 22:11

## 2020-02-15 RX ADMIN — GABAPENTIN 300 MG: 300 CAPSULE ORAL at 22:12

## 2020-02-15 RX ADMIN — POLYVINYL ALCOHOL 1 DROP: 14 SOLUTION/ DROPS OPHTHALMIC at 17:09

## 2020-02-15 RX ADMIN — DOCUSATE SODIUM 100 MG: 100 CAPSULE, LIQUID FILLED ORAL at 22:11

## 2020-02-15 RX ADMIN — DOCUSATE SODIUM 100 MG: 100 CAPSULE, LIQUID FILLED ORAL at 08:38

## 2020-02-15 RX ADMIN — GUAIFENESIN 600 MG: 600 TABLET, EXTENDED RELEASE ORAL at 22:11

## 2020-02-15 RX ADMIN — LEVALBUTEROL 1.25 MG: 1.25 SOLUTION, CONCENTRATE RESPIRATORY (INHALATION) at 15:20

## 2020-02-15 RX ADMIN — POLYETHYLENE GLYCOL (3350) 17 G: 17 POWDER, FOR SOLUTION ORAL at 08:39

## 2020-02-15 RX ADMIN — BUDESONIDE AND FORMOTEROL FUMARATE DIHYDRATE 2 PUFF: 160; 4.5 AEROSOL RESPIRATORY (INHALATION) at 19:52

## 2020-02-15 RX ADMIN — LEVALBUTEROL 1.25 MG: 1.25 SOLUTION, CONCENTRATE RESPIRATORY (INHALATION) at 19:52

## 2020-02-15 RX ADMIN — POLYVINYL ALCOHOL 1 DROP: 14 SOLUTION/ DROPS OPHTHALMIC at 22:12

## 2020-02-15 RX ADMIN — POLYVINYL ALCOHOL 1 DROP: 14 SOLUTION/ DROPS OPHTHALMIC at 08:57

## 2020-02-15 RX ADMIN — BUDESONIDE AND FORMOTEROL FUMARATE DIHYDRATE 2 PUFF: 160; 4.5 AEROSOL RESPIRATORY (INHALATION) at 09:47

## 2020-02-15 RX ADMIN — OXYCODONE HYDROCHLORIDE AND ACETAMINOPHEN 500 MG: 500 TABLET ORAL at 08:38

## 2020-02-15 RX ADMIN — MELATONIN 2000 UNITS: at 08:38

## 2020-02-15 RX ADMIN — GUAIFENESIN 600 MG: 600 TABLET, EXTENDED RELEASE ORAL at 08:38

## 2020-02-15 RX ADMIN — TROSPIUM CHLORIDE 20 MG: 20 TABLET, FILM COATED ORAL at 22:11

## 2020-02-15 RX ADMIN — LEVALBUTEROL 1.25 MG: 1.25 SOLUTION, CONCENTRATE RESPIRATORY (INHALATION) at 09:47

## 2020-02-15 RX ADMIN — ENOXAPARIN SODIUM 40 MG: 40 INJECTION SUBCUTANEOUS at 08:37

## 2020-02-15 RX ADMIN — CLOPIDOGREL BISULFATE 75 MG: 75 TABLET ORAL at 08:38

## 2020-02-15 RX ADMIN — OXYCODONE AND ACETAMINOPHEN 1 TABLET: 5; 325 TABLET ORAL at 08:38

## 2020-02-15 RX ADMIN — PYRIDOXINE HCL TAB 50 MG 100 MG: 50 TAB at 08:38

## 2020-02-15 RX ADMIN — TIOTROPIUM BROMIDE INHALATION SPRAY 2 PUFF: 3.12 SPRAY, METERED RESPIRATORY (INHALATION) at 09:49

## 2020-02-15 RX ADMIN — MULTIPLE VITAMINS W/ MINERALS TAB 1 TABLET: TAB at 08:38

## 2020-02-15 ASSESSMENT — PAIN SCALES - GENERAL
PAINLEVEL_OUTOF10: 8
PAINLEVEL_OUTOF10: 5
PAINLEVEL_OUTOF10: 6
PAINLEVEL_OUTOF10: 6
PAINLEVEL_OUTOF10: 3

## 2020-02-15 ASSESSMENT — PAIN DESCRIPTION - DESCRIPTORS
DESCRIPTORS: ACHING;DULL;SORE
DESCRIPTORS: ACHING

## 2020-02-15 ASSESSMENT — PAIN - FUNCTIONAL ASSESSMENT: PAIN_FUNCTIONAL_ASSESSMENT: PREVENTS OR INTERFERES SOME ACTIVE ACTIVITIES AND ADLS

## 2020-02-15 ASSESSMENT — ENCOUNTER SYMPTOMS
VOMITING: 0
BACK PAIN: 1
SHORTNESS OF BREATH: 0
ABDOMINAL PAIN: 0
NAUSEA: 0
COUGH: 1

## 2020-02-15 ASSESSMENT — PAIN DESCRIPTION - LOCATION
LOCATION: BACK;ARM;LEG
LOCATION: BACK

## 2020-02-15 ASSESSMENT — PAIN DESCRIPTION - ORIENTATION
ORIENTATION: MID;RIGHT;LEFT
ORIENTATION: MID;LOWER

## 2020-02-15 ASSESSMENT — PAIN DESCRIPTION - ONSET
ONSET: GRADUAL
ONSET: ON-GOING

## 2020-02-15 ASSESSMENT — PAIN DESCRIPTION - FREQUENCY
FREQUENCY: CONTINUOUS
FREQUENCY: INTERMITTENT

## 2020-02-15 ASSESSMENT — PAIN DESCRIPTION - PROGRESSION
CLINICAL_PROGRESSION: NOT CHANGED
CLINICAL_PROGRESSION: NOT CHANGED

## 2020-02-15 ASSESSMENT — PAIN DESCRIPTION - PAIN TYPE
TYPE: CHRONIC PAIN;ACUTE PAIN
TYPE: CHRONIC PAIN

## 2020-02-15 NOTE — CARE COORDINATION
Social Work- Spoke with patient's son. Explained that  has not been able to reach [facility] today to finalize dc plan and patient has been discharged since Wed. He will  try to reach daughter .  Ambrocio Nix

## 2020-02-15 NOTE — PROGRESS NOTES
No     Balance  Posture: Poor  Sitting - Static: Good  Sitting - Dynamic: Good  Standing - Static: Fair;+  Standing - Dynamic: Fair  Exercises  Comments: LE ex's, 10 reps, safety with functional mobility         All lines intact, call light within reach, and patient positioned comfortably at end of treatment. All patient needs addressed prior to ending therapy session. G-Code     OutComes Score                                                     AM-PAC Score  AM-PAC Inpatient Mobility Raw Score : 15 (02/15/20 1046)  AM-PAC Inpatient T-Scale Score : 39.45 (02/15/20 1046)  Mobility Inpatient CMS 0-100% Score: 57.7 (02/15/20 1046)  Mobility Inpatient CMS G-Code Modifier : CK (02/15/20 1046)          Goals  Short term goals  Time Frame for Short term goals: 12 visits  Short term goal 1: Inc bed-mobility & transfers to independent to enable pt to safely get in/OOB   Short term goal 2: Inc gait to amb 50ft or > indep w/ RW to enable pt to return to previous level of independence  Short term goal 3: Inc strength to [facility] & standing balance to good with device to facilitate pt independence for performance of ADL's & functional mobility, & reduce fall risk  Short term goal 4: Pt able to tolerate 30-40 min of activity to include 15-20 reps of ex & functional mobility including 5 minutes of standing to facilitate activity tolerance to [facility];   Short term goal 5: Ed pt on home ex's, safety & energy principles & issue written home program    Plan    Plan  Times per week: 1-2x/D,5-6D/week  Current Treatment Recommendations: Strengthening, Balance Training, Functional Mobility Training, Transfer Training, Gait Training, Home Exercise Program, Safety Education & Training, Patient/Caregiver Education & Training  Safety Devices  Type of devices: Gait belt, Call light within reach, Patient at risk for falls, Left in chair, Chair alarm in place, Bed alarm in place  Restraints  Initially in place: No     Therapy Time Individual Concurrent Group Co-treatment   Time In 0949         Time Out Kendall 68, PT

## 2020-02-15 NOTE — PLAN OF CARE
Problem: Falls - Risk of:  Goal: Will remain free from falls  Description  Will remain free from falls  2/15/2020 1104 by Carl Obrien RN  Outcome: Ongoing  Note:   Patient is a fall risk during this admission. Fall risk assessment was performed. Patient is absent of falls. Bed is in the lowest position. Wheels on the bed are locked. Call light and bed side table are within reach. Clutter is removed. Patient was educated to call out when needing assistance or wanting to get out of bed. Patient offered toileting assistance during rounding. Hourly rounds have been performed. Fall precautions in place, pt's gait is steady  2/15/2020 0034 by Loni Thorpe RN  Outcome: Ongoing  Note:   Siderails up x 2  Hourly rounding  Call light in reach  Instructed to call for assist before attempting out of bed. Remains free from falls and accidental injury at this time   Floor free from obstacles  Bed is locked and in lowest position  Adequate lighting provided  Bed alarm on, [place] star and Stay with Me signs posted     Goal: Absence of physical injury  Description  Absence of physical injury  2/15/2020 0034 by Loni Thorpe RN  Outcome: Ongoing     Problem: Pain:  Goal: Pain level will decrease  Description  Pain level will decrease  2/15/2020 1104 by Carl Obrien RN  Outcome: Ongoing  Note:   Pain level assessment complete. Patient educated on pain scale and control interventions  PRN pain medication given per patient request  Patient instructed to call out with new onset of pain or unrelieved pain , has denied complaints of pain this AM  2/15/2020 0034 by Loni Thorpe RN  Outcome: Ongoing  Goal: Control of acute pain  Description  Control of acute pain  2/15/2020 0034 by Loni Thorpe RN  Outcome: Ongoing  Note:   Pain level assessment complete.    Patient educated on pain scale and control interventions  PRN pain medication given per patient request  Patient instructed to call out with new onset of pain or unrelieved pain   Goal: Control of chronic pain  Description  Control of chronic pain  2/15/2020 0034 by Gricelda Marquez RN  Outcome: Ongoing  Goal: Patient's pain/discomfort is manageable  Description  Patient's pain/discomfort is manageable  2/15/2020 0034 by Gricelda Marquez RN  Outcome: Ongoing     Problem: Infection:  Goal: Will remain free from infection  Description  Will remain free from infection  2/15/2020 1104 by Emmanuel Kendrick RN  Outcome: Ongoing  Note:   X-rays showing pneumonia, being treated with po antibiotics and IV solumedrol, will be going home with antibiotics and po steroids  2/15/2020 0034 by Gricelda Marquez RN  Outcome: Ongoing     Problem: Safety:  Goal: Free from accidental physical injury  Description  Free from accidental physical injury  2/15/2020 1104 by Emmanuel Kendrick RN  Outcome: Ongoing  Note:   No injury, pt up independently, gait is steady  2/15/2020 0034 by Gricelda Marquez RN  Outcome: Ongoing  Goal: Free from intentional harm  Description  Free from intentional harm  2/15/2020 0034 by Gricelda Marquez RN  Outcome: Ongoing     Problem: Daily Care:  Goal: Daily care needs are met  Description  Daily care needs are met  2/15/2020 1104 by Emmanuel Kendrick RN  Outcome: Ongoing  2/15/2020 0034 by Gricelda Marquez RN  Outcome: Ongoing     Problem: Skin Integrity:  Goal: Skin integrity will stabilize  Description  Skin integrity will stabilize  2/15/2020 0034 by Gricelda Marquez RN  Outcome: Ongoing     Problem: Discharge Planning:  Goal: Patients continuum of care needs are met  Description  Patients continuum of care needs are met  2/15/2020 1104 by Emmanuel Kendrick RN  Outcome: Ongoing  2/15/2020 0034 by Gricelda Marquez RN  Outcome: Ongoing     Problem: Discharge Planning:  Goal: Discharged to appropriate level of care  Description  Discharged to appropriate level of care  Outcome: Ongoing  Note: Pt will be going home today, has aerosal machine at home     Problem:  Activity Intolerance:  Goal: Ability to tolerate increased activity will improve  Description  Ability to tolerate increased activity will improve  Outcome: Ongoing  Note:   Pt walking in room and halls with minimal dyspnea     Problem: Airway Clearance - Ineffective:  Goal: Ability to maintain a clear airway will improve  Description  Ability to maintain a clear airway will improve  Outcome: Ongoing  Note:   Coughs at interval and able to expectorate small amt white sputum     Problem: Breathing Pattern - Ineffective:  Goal: Ability to achieve and maintain a regular respiratory rate will improve  Description  Ability to achieve and maintain a regular respiratory rate will improve  Outcome: Ongoing  Note:   Resp rate within norms, tolerates activity with minimal dyspnea     Problem: Gas Exchange - Impaired:  Goal: Levels of oxygenation will improve  Description  Levels of oxygenation will improve  Outcome: Ongoing  Note:   Sats above 93% room air

## 2020-02-15 NOTE — PLAN OF CARE
call out with new onset of pain or unrelieved pain   Goal: Control of chronic pain  Description  Control of chronic pain  2/15/2020 0034 by Lino Rubalcava RN  Outcome: Ongoing  Goal: Patient's pain/discomfort is manageable  Description  Patient's pain/discomfort is manageable  2/15/2020 0034 by Lino Rubalcava RN  Outcome: Ongoing     Problem: Infection:  Goal: Will remain free from infection  Description  Will remain free from infection  2/15/2020 1104 by Silvano Coles RN  Outcome: Ongoing  Note:   X-rays showing pneumonia, being treated with po antibiotics and IV solumedrol, will be going home with antibiotics and po steroids  2/15/2020 0034 by Lino Rubalcava RN  Outcome: Ongoing     Problem: Safety:  Goal: Free from accidental physical injury  Description  Free from accidental physical injury  2/15/2020 1104 by Silvano Coles RN  Outcome: Ongoing  Note:   No injury, pt up independently, gait is steady  2/15/2020 0034 by Lino Rubalcava RN  Outcome: Ongoing  Goal: Free from intentional harm  Description  Free from intentional harm  2/15/2020 0034 by Lino Rubalcava RN  Outcome: Ongoing     Problem: Daily Care:  Goal: Daily care needs are met  Description  Daily care needs are met  2/15/2020 1104 by Silvano Coles RN  Outcome: Ongoing  2/15/2020 0034 by Lino Rubalcava RN  Outcome: Ongoing     Problem: Skin Integrity:  Goal: Skin integrity will stabilize  Description  Skin integrity will stabilize  2/15/2020 0034 by Lino Rubalcava RN  Outcome: Ongoing     Problem: Discharge Planning:  Goal: Patients continuum of care needs are met  Description  Patients continuum of care needs are met  2/15/2020 1104 by Silvano Coles RN  Outcome: Ongoing  2/15/2020 0034 by Lino Rubalcava RN  Outcome: Ongoing     Problem: Discharge Planning:  Goal: Discharged to appropriate level of care  Description  Discharged to appropriate level of care  Outcome: Ongoing  Note:

## 2020-02-15 NOTE — CARE COORDINATION
Social Work-Met with patient. She reviewed list of SNF and would like referral sent to [facility], [place], [address] [place] [address]. Sent referrals.  Rachel Cruz

## 2020-02-15 NOTE — CARE COORDINATION
Social Work-Met with patient. Explained that she is ready for dc and she has been ready sice Wed. Discussed that since  can not reach daughter that she should choose 5 SNF that she would like  to check bed availability. Oswaldo stated that her  from [facility] stated that insurance would cover her stay until Tues. Explained once again that she is discharged and plan needs to be made today. Discussed the option of returning home until she can find a SNF that would be acceptable.  Bre Gillespie

## 2020-02-15 NOTE — PROGRESS NOTES
[facility]    2/15/2020    11:21 AM    Name:   Marlowe Sever  MRN:     [id number]     Acct:      [de-identified]   Room:   2020/2020-01  IP Day:  0  Admit Date:  2/11/2020  3:42 PM    PCP:   Ean Olivarez MD  Code Status:  Full Code    Subjective:     C/C:   Chief Complaint   Patient presents with    Other     daughter wants her to go to respite due to being combative     Interval History Status:   Eating  Comfortable  Chronic pain issues better controlled  Hoping to go home    Data Base Updates:  Culture NO SIGNIFICANT GROWTH       Brief History:     As documented in the medical record: \"Afsaneh Diaz is a [de-identified] y.o. -American female who reports to the hospital with complaint of altered mental status. The patient was reportedly brought to the hospital by EMS after becoming combative at home with her daughter, who is her caregiver. ER notes indicate that the patient's daughter stated she was confused. The patient is currently alert and oriented x3, and upon further questioning she endorses having a verbal altercation with her daughter. She states this happens periodically. The patient currently lives with her daughter secondary to her own becoming infested with roaches. She denies specific complaint. She is appropriate at this time. She has past medical history that includes previous TIA, COPD and a known heart murmur. She wears home oxygen. \"     The patient was admitted  Her electrolyte abnormalities were addressed  Blood pressure was monitored and controlled  Respiratory Therapy and Bronchodilators prn   PT/OT was requested    CT brain 10/2019:  No acute intracranial abnormality.       Chronic small vessel ischemic changes       Echocardiogram 10/2019:   Summary  Left ventricle is normal in size. Mild left ventricular hypertrophy.   Global left ventricular systolic function is normal with an estimated  ejection fraction of 60 % . No obvious wall motion abnormality seen. Grade I (mild) left ventricular diastolic dysfunction. Left atrium is moderately dilated. Right atrium is moderately dilated . Aortic valve sclerosis without stenosis. Mild aortic insufficiency. Mitral annular calcification is seen with thickened mitral valve leaflets. Mild to moderate mitral regurgitation. Mild to Moderate tricuspid regurgitation. Moderate pulmonary hypertension with an estimated right ventricular systolic  pressure of 52 mmHg. No significant pericardial effusion is seen.  were consulted for placement    The patient responded well to therapy  DC planning was initiated  The patient was instructed to follow up with their PCP, Glee Hashimoto, MD in one week      Medications: Allergies: Allergies   Allergen Reactions    Anti-Inflammatory Enzyme [Nutritional Supplements]      Patient states allergy to anti inflammatories    Ceclor [Cefaclor]      Doesn't remember    Morphine Other (See Comments)     Urinary retention    Nsaids      Patient states allergy to anti-inflammatories    Penicillins     Propoxyphene     Skelaxin [Metaxalone]     Sulfa Antibiotics Hives    Tadalafil     Tetracycline     Tolmetin      Patient states allergy to anti-inflammatories  Patient states allergy to anti-inflammatories  Patient states allergy to anti-inflammatories    Alendronate Nausea And Vomiting and Nausea Only    Alendronate Sodium Nausea Only    Asa [Aspirin] Other (See Comments)     Hx diverticulitis.   Can't T  Take regular aspirin 325mg but can take baby aspirin 81mg    Erythromycin Nausea And Vomiting and Nausea Only    Erythromycin Base Nausea Only    Temazepam Nausea And Vomiting and Nausea Only    Tetracyclines & Related Nausea And Vomiting    Tramadol Nausea And Vomiting and Nausea Only       Current Meds:   Scheduled Meds:    fentanNYL  50 mcg Intravenous Once    clopidogrel There is no distension. Palpations: Abdomen is soft. Tenderness: There is no abdominal tenderness. Musculoskeletal:         General: No tenderness. Skin:     General: Skin is warm and dry. Neurological:      Mental Status: She is alert and oriented to person, place, and time. Comments: Doing fairly well with historical data           Labs:  Hematology:  No results for input(s): WBC, RBC, HGB, HCT, MCV, MCH, MCHC, RDW, PLT, MPV, SEDRATE, CRP, INR, DDIMER, RJ9IELYO, LABABSO in the last 72 hours. Invalid input(s): PT  Chemistry:  Recent Labs     02/13/20  0820   K 3.7   No results for input(s): PROT, LABALBU, LABA1C, J4NZXJJ, M8ISMPI, FT4, TSH, AST, ALT, LDH, GGT, ALKPHOS, LABGGT, BILITOT, BILIDIR, AMMONIA, AMYLASE, LIPASE, LACTATE, CHOL, HDL, LDLCHOLESTEROL, CHOLHDLRATIO, TRIG, VLDL, [id number], PHENYTOIN, PHENYF, URICACID, POCGLU in the last 72 hours. Radiology:    No results found.     Assessment:        Primary Problem  Altered mental status, unspecified    Active Hospital Problems    Diagnosis Date Noted    Small vessel disease ([facility]):  Cerebrovascular  [I73.9] 02/12/2020    Left ventricular diastolic dysfunction [X28.3] 02/12/2020    Moderate mitral regurgitation [I34.0] 02/12/2020    Tricuspid regurgitation moderate [I07.1] 02/12/2020    Age-related physical debility [R54]     Chronic respiratory failure with hypoxia (HCC) [J96.11] 10/08/2019    Supplemental oxygen dependent [Z99.81] 05/01/2018    Essential hypertension [I10]     Altered mental status, unspecified [R41.82]     Mild aortic insufficiency [I35.1] 11/17/2015    Hypokalemia [E87.6] 06/25/2014    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 Will discharge when arrangements complete and ok with other services.   Follow-up with PCP in one week, Glee Hashimoto, MD  Notify PCP of discharge     Still awaiting placement (remains in observation status)  Patient appears clinically stable, no current acute care needs  Suggesting discharge and further evaluation on outpatient basis  Having difficulty with family getting on the \"same page\"  (This seems to be a pattern that has been encountered on previous admissions)  Daughter Sascha Madden and son locally, son Harris Freeman in [place], son Sandra Whitfield in [place]  I called [facility] [employer] and left a message  Concerned about the patient's \"home \"situation  Adult protective consult placed    IP CONSULT TO INTERNAL MEDICINE  IP CONSULT TO [address] [place] [address],   2/15/2020  11:21 AM

## 2020-02-16 PROCEDURE — G0378 HOSPITAL OBSERVATION PER HR: HCPCS

## 2020-02-16 PROCEDURE — 94640 AIRWAY INHALATION TREATMENT: CPT

## 2020-02-16 PROCEDURE — 96372 THER/PROPH/DIAG INJ SC/IM: CPT

## 2020-02-16 PROCEDURE — 6370000000 HC RX 637 (ALT 250 FOR IP): Performed by: INTERNAL MEDICINE

## 2020-02-16 PROCEDURE — 94760 N-INVAS EAR/PLS OXIMETRY 1: CPT

## 2020-02-16 PROCEDURE — 97530 THERAPEUTIC ACTIVITIES: CPT

## 2020-02-16 PROCEDURE — 99224 PR SBSQ OBSERVATION CARE/DAY 15 MINUTES: CPT | Performed by: INTERNAL MEDICINE

## 2020-02-16 PROCEDURE — 2700000000 HC OXYGEN THERAPY PER DAY

## 2020-02-16 PROCEDURE — 6360000002 HC RX W HCPCS: Performed by: INTERNAL MEDICINE

## 2020-02-16 PROCEDURE — 97110 THERAPEUTIC EXERCISES: CPT

## 2020-02-16 RX ADMIN — PYRIDOXINE HCL TAB 50 MG 100 MG: 50 TAB at 07:58

## 2020-02-16 RX ADMIN — CLOPIDOGREL BISULFATE 75 MG: 75 TABLET ORAL at 07:57

## 2020-02-16 RX ADMIN — OXYCODONE AND ACETAMINOPHEN 1 TABLET: 5; 325 TABLET ORAL at 23:37

## 2020-02-16 RX ADMIN — GUAIFENESIN 600 MG: 600 TABLET, EXTENDED RELEASE ORAL at 07:58

## 2020-02-16 RX ADMIN — OXYCODONE AND ACETAMINOPHEN 1 TABLET: 5; 325 TABLET ORAL at 17:09

## 2020-02-16 RX ADMIN — POLYVINYL ALCOHOL 1 DROP: 14 SOLUTION/ DROPS OPHTHALMIC at 21:12

## 2020-02-16 RX ADMIN — DOCUSATE SODIUM 100 MG: 100 CAPSULE, LIQUID FILLED ORAL at 07:58

## 2020-02-16 RX ADMIN — TROSPIUM CHLORIDE 20 MG: 20 TABLET, FILM COATED ORAL at 21:12

## 2020-02-16 RX ADMIN — OXYCODONE HYDROCHLORIDE AND ACETAMINOPHEN 500 MG: 500 TABLET ORAL at 07:58

## 2020-02-16 RX ADMIN — POLYVINYL ALCOHOL 1 DROP: 14 SOLUTION/ DROPS OPHTHALMIC at 07:57

## 2020-02-16 RX ADMIN — POLYVINYL ALCOHOL 1 DROP: 14 SOLUTION/ DROPS OPHTHALMIC at 04:39

## 2020-02-16 RX ADMIN — MELATONIN 2000 UNITS: at 07:58

## 2020-02-16 RX ADMIN — BUDESONIDE AND FORMOTEROL FUMARATE DIHYDRATE 2 PUFF: 160; 4.5 AEROSOL RESPIRATORY (INHALATION) at 20:22

## 2020-02-16 RX ADMIN — MULTIPLE VITAMINS W/ MINERALS TAB 1 TABLET: TAB at 07:58

## 2020-02-16 RX ADMIN — OXYCODONE AND ACETAMINOPHEN 1 TABLET: 5; 325 TABLET ORAL at 04:39

## 2020-02-16 RX ADMIN — GABAPENTIN 300 MG: 300 CAPSULE ORAL at 07:58

## 2020-02-16 RX ADMIN — TIOTROPIUM BROMIDE INHALATION SPRAY 2 PUFF: 3.12 SPRAY, METERED RESPIRATORY (INHALATION) at 10:58

## 2020-02-16 RX ADMIN — LEVALBUTEROL 1.25 MG: 1.25 SOLUTION, CONCENTRATE RESPIRATORY (INHALATION) at 20:21

## 2020-02-16 RX ADMIN — ENOXAPARIN SODIUM 40 MG: 40 INJECTION SUBCUTANEOUS at 07:57

## 2020-02-16 RX ADMIN — OXYCODONE AND ACETAMINOPHEN 1 TABLET: 5; 325 TABLET ORAL at 11:16

## 2020-02-16 RX ADMIN — GABAPENTIN 300 MG: 300 CAPSULE ORAL at 21:12

## 2020-02-16 RX ADMIN — BUDESONIDE AND FORMOTEROL FUMARATE DIHYDRATE 2 PUFF: 160; 4.5 AEROSOL RESPIRATORY (INHALATION) at 10:58

## 2020-02-16 RX ADMIN — POLYVINYL ALCOHOL 1 DROP: 14 SOLUTION/ DROPS OPHTHALMIC at 17:09

## 2020-02-16 RX ADMIN — GUAIFENESIN 600 MG: 600 TABLET, EXTENDED RELEASE ORAL at 21:12

## 2020-02-16 RX ADMIN — LEVALBUTEROL 1.25 MG: 1.25 SOLUTION, CONCENTRATE RESPIRATORY (INHALATION) at 15:21

## 2020-02-16 RX ADMIN — DOCUSATE SODIUM 100 MG: 100 CAPSULE, LIQUID FILLED ORAL at 21:12

## 2020-02-16 RX ADMIN — LEVALBUTEROL 1.25 MG: 1.25 SOLUTION, CONCENTRATE RESPIRATORY (INHALATION) at 10:55

## 2020-02-16 ASSESSMENT — PAIN SCALES - GENERAL
PAINLEVEL_OUTOF10: 8
PAINLEVEL_OUTOF10: 6
PAINLEVEL_OUTOF10: 8
PAINLEVEL_OUTOF10: 4
PAINLEVEL_OUTOF10: 4
PAINLEVEL_OUTOF10: 6

## 2020-02-16 ASSESSMENT — ENCOUNTER SYMPTOMS
BACK PAIN: 1
ABDOMINAL PAIN: 0
NAUSEA: 0
VOMITING: 0
SHORTNESS OF BREATH: 0
COUGH: 1

## 2020-02-16 NOTE — PROGRESS NOTES
normal with an estimated  ejection fraction of 60 % . No obvious wall motion abnormality seen. Grade I (mild) left ventricular diastolic dysfunction. Left atrium is moderately dilated. Right atrium is moderately dilated . Aortic valve sclerosis without stenosis. Mild aortic insufficiency. Mitral annular calcification is seen with thickened mitral valve leaflets. Mild to moderate mitral regurgitation. Mild to Moderate tricuspid regurgitation. Moderate pulmonary hypertension with an estimated right ventricular systolic  pressure of 52 mmHg. No significant pericardial effusion is seen.  were consulted for placement    The patient responded well to therapy  DC planning was initiated  The patient was instructed to follow up with their PCP, Mary Alice Oleary MD in one week      Medications: Allergies: Allergies   Allergen Reactions    Anti-Inflammatory Enzyme [Nutritional Supplements]      Patient states allergy to anti inflammatories    Ceclor [Cefaclor]      Doesn't remember    Morphine Other (See Comments)     Urinary retention    Nsaids      Patient states allergy to anti-inflammatories    Penicillins     Propoxyphene     Skelaxin [Metaxalone]     Sulfa Antibiotics Hives    Tadalafil     Tetracycline     Tolmetin      Patient states allergy to anti-inflammatories  Patient states allergy to anti-inflammatories  Patient states allergy to anti-inflammatories    Alendronate Nausea And Vomiting and Nausea Only    Alendronate Sodium Nausea Only    Asa [Aspirin] Other (See Comments)     Hx diverticulitis.   Can't T  Take regular aspirin 325mg but can take baby aspirin 81mg    Erythromycin Nausea And Vomiting and Nausea Only    Erythromycin Base Nausea Only    Temazepam Nausea And Vomiting and Nausea Only    Tetracyclines & Related Nausea And Vomiting    Tramadol Nausea And Vomiting and Nausea Only       Current Meds:   Scheduled Meds:    fentanNYL  50 mcg Intravenous Once    clopidogrel  75 mg Oral Daily    polyethylene glycol  17 g Oral BID    docusate sodium  100 mg Oral BID    budesonide-formoterol  2 puff Inhalation BID    vitamin B-6  100 mg Oral Daily    vitamin C  500 mg Oral Daily    therapeutic multivitamin-minerals  1 tablet Oral Daily    Vitamin D  2,000 Units Oral Daily    guaiFENesin  600 mg Oral BID    tiotropium  2 puff Inhalation Daily    trospium  20 mg Oral Nightly    gabapentin  300 mg Oral BID    sodium chloride flush  10 mL Intravenous 2 times per day    enoxaparin  40 mg Subcutaneous Daily     Continuous Infusions:   PRN Meds: oxyCODONE-acetaminophen, polyvinyl alcohol, albuterol sulfate HFA, levalbuterol, sodium chloride, potassium chloride **OR** potassium alternative oral replacement **OR** potassium chloride, magnesium sulfate, magnesium hydroxide, acetaminophen, ondansetron **OR** ondansetron    Data:     Past Medical History:   has a past medical history of Aortic valve disease, Arthritis, Chronic back pain, COPD (chronic obstructive pulmonary disease) ([facility] Disease of blood and blood forming organ, Diverticulitis, GERD (gastroesophageal reflux disease), History of blood transfusion, History of gastritis, Lumbar disc disease, Movement disorder, Nerve disease, peripheral, Osteoporosis, Pneumonia, and Unspecified cerebral artery occlusion with cerebral infarction. Social History:   reports that she quit smoking about 25 years ago. She has never used smokeless tobacco. She reports that she does not drink alcohol or use drugs. Family History:   Family History   Problem Relation Age of Onset    Heart Disease Mother     Heart Disease Father        Vitals:  BP (!) 96/51   Pulse 66   Temp 98.6 °F (37 °C) (Oral)   Resp 16   Ht 5' 7\" (1.702 m)   Wt 148 lb (67.1 kg)   SpO2 98%   BMI 23.18 kg/m²   Temp (24hrs), Av.6 °F (37 °C), Min:98.6 °F (37 °C), Max:98.6 °F (37 °C)    No results for input(s): POCGLU in the last 72 hours.     I/O (24Hr): Intake/Output Summary (Last 24 hours) at 2/16/2020 1703  Last data filed at 2/16/2020 1230  Gross per 24 hour   Intake 1210 ml   Output 425 ml   Net 785 ml         Review of Systems:     Review of Systems   Constitutional: Positive for activity change ( improved). Negative for appetite change. Respiratory: Positive for cough (Occasional white to yellow sputum). Negative for shortness of breath. Cardiovascular: Negative for chest pain and palpitations. Gastrointestinal: Negative for abdominal pain, nausea and vomiting. Genitourinary: Negative for dysuria, flank pain, hematuria and urgency. Musculoskeletal: Positive for arthralgias, back pain (chronic ), gait problem (gait still unsteady ) and myalgias. The patient has chronic arthralgias and myalgias  No new musculoskeletal complaints    Psychiatric/Behavioral: Positive for dysphoric mood (Feeling somewhat depressed about her current health status and living arrangements) and sleep disturbance (not sleeping well ). Negative for suicidal ideas. The patient is nervous/anxious (still considering options for discharge). Reports that she is living with her daughter  She will apparently need placement       Physical Examination:        Physical Exam  Vitals signs reviewed. Constitutional:       General: She is not in acute distress. Appearance: She is not diaphoretic. HENT:      Head: Normocephalic. Nose: Nose normal.   Eyes:      General: No scleral icterus. Conjunctiva/sclera: Conjunctivae normal.   Neck:      Musculoskeletal: Neck supple. Trachea: No tracheal deviation. Cardiovascular:      Rate and Rhythm: Normal rate and regular rhythm. Heart sounds: Murmur (Systolic) present. Pulmonary:      Effort: Pulmonary effort is normal. No respiratory distress. Breath sounds: Normal breath sounds. No wheezing or rales. Chest:      Chest wall: No tenderness.    Abdominal:      General: Bowel sounds are 06/25/2014    COPD (chronic obstructive pulmonary disease) ([facility]) [J44.9]        Plan:        PT/OT recommending placement:    Monitor and control blood pressure  Correct electrolytes  Respiratory Therapy and Bronchodilators prn  Oxygen supplementation  Social service consult  Pain management: Follow-up with pain clinic  DC planning  Med Rec Done  SYLVIA done  Will discharge when arrangements complete and ok with other services.   Follow-up with PCP in one week, Violeta Faith MD  Notify PCP of discharge  Have again called daughter Jad Figueroa and left message to call us        IP CONSULT TO INTERNAL MEDICINE  IP CONSULT TO [address] [place] [address]  2/16/2020  5:03 PM

## 2020-02-16 NOTE — PLAN OF CARE
injury  Description  Free from accidental physical injury  2/16/2020 0044 by Ivy Mei RN  Outcome: Ongoing  2/15/2020 1104 by Sherwin Hubbard RN  Outcome: Ongoing  Note:   No injury, pt up independently, gait is steady  Goal: Free from intentional harm  Description  Free from intentional harm  Outcome: Ongoing     Problem: Daily Care:  Goal: Daily care needs are met  Description  Daily care needs are met  2/16/2020 0044 by Ivy Mei RN  Outcome: Ongoing  2/15/2020 1104 by Sherwin Hubbard RN  Outcome: Ongoing     Problem: Skin Integrity:  Goal: Skin integrity will stabilize  Description  Skin integrity will stabilize  Outcome: Ongoing     Problem: Discharge Planning:  Goal: Patients continuum of care needs are met  Description  Patients continuum of care needs are met  2/16/2020 0044 by Ivy Mei RN  Outcome: Ongoing  2/15/2020 1104 by Sherwin Hubbard RN  Outcome: Ongoing     Problem: Discharge Planning:  Goal: Discharged to appropriate level of care  Description  Discharged to appropriate level of care  2/16/2020 0044 by Ivy Mei RN  Outcome: Ongoing  2/15/2020 1104 by Sherwin Hubbard RN  Outcome: Ongoing  Note:   Pt will be going home today, has aerosal machine at home  Goal: Participates in care planning  Description  Participates in care planning  Outcome: Ongoing     Problem:  Activity Intolerance:  Goal: Ability to tolerate increased activity will improve  Description  Ability to tolerate increased activity will improve  2/16/2020 0044 by Ivy Mei RN  Outcome: Ongoing  2/15/2020 1104 by Sherwin Hubbard RN  Outcome: Ongoing  Note:   Pt walking in room and halls with minimal dyspnea

## 2020-02-16 NOTE — PROGRESS NOTES
Nanette Quinonez,pt's daughter called in  Asking to talk with the Dr because he left a message on her phone, reminded pt that was yesterday he left the message but will give him the number and he can call her and at this time she said she would answer, reviewed the choices for ECF that the pt had made and she was in agreement with these

## 2020-02-16 NOTE — PLAN OF CARE
Problem: Falls - Risk of:  Goal: Will remain free from falls  Description  Will remain free from falls  2/16/2020 1411 by Emmanuel Kendrick RN  Outcome: Ongoing  Note:   Patient is a fall risk during this admission. Fall risk assessment was performed. Patient is absent of falls. Bed is in the lowest position. Wheels on the bed are locked. Call light and bed side table are within reach. Clutter is removed. Patient was educated to call out when needing assistance or wanting to get out of bed. Patient offered toileting assistance during rounding. Hourly rounds have been performed. Fall precautions in place, using bed and chair alarms, pt asks for help appropriatley  2/16/2020 0044 by Abdi Harmon RN  Outcome: Ongoing  Goal: Absence of physical injury  Description  Absence of physical injury  2/16/2020 0044 by Abdi Harmon RN  Outcome: Ongoing     Problem: Pain:  Goal: Pain level will decrease  Description  Pain level will decrease  2/16/2020 1411 by Emmanuel Kendrick RN  Outcome: Ongoing  Note:   Pain level assessment complete.    Patient educated on pain scale and control interventions  PRN pain medication given per patient request  Patient instructed to call out with new onset of pain or unrelieved pain , pt complains of pain in several different areas of body but mostly back and the rt wrist area today, medicated with Percocet with stated relief  2/16/2020 0044 by Abdi Harmon RN  Outcome: Ongoing  Goal: Control of acute pain  Description  Control of acute pain  2/16/2020 0044 by Abdi Harmon RN  Outcome: Ongoing  Goal: Control of chronic pain  Description  Control of chronic pain  2/16/2020 0044 by Abdi Harmon RN  Outcome: Ongoing  Goal: Patient's pain/discomfort is manageable  Description  Patient's pain/discomfort is manageable  2/16/2020 0044 by Abdi Harmon RN  Outcome: Ongoing     Problem: Infection:  Goal: Will remain free from infection  Description  Will remain free from infection  2/16/2020 0044 by Fozia Bashir RN  Outcome: Ongoing     Problem: Safety:  Goal: Free from accidental physical injury  Description  Free from accidental physical injury  2/16/2020 1411 by Rodney Obrien RN  Outcome: Ongoing  Note:   No injury  2/16/2020 0044 by Fozia Bashir RN  Outcome: Ongoing  Goal: Free from intentional harm  Description  Free from intentional harm  2/16/2020 0044 by Fozia Bashir RN  Outcome: Ongoing     Problem: Daily Care:  Goal: Daily care needs are met  Description  Daily care needs are met  2/16/2020 1411 by Rodney Obrien RN  Outcome: Ongoing  Note:   Pt is able to voice her needs clearly,   2/16/2020 0044 by Fozia Bashir RN  Outcome: Ongoing     Problem: Skin Integrity:  Goal: Skin integrity will stabilize  Description  Skin integrity will stabilize  2/16/2020 1411 by Rodney Obrien RN  Outcome: Ongoing  Note:   Skin assessment performed. Patient turns self PRN. Pressure ulcer prevention being practiced. Waffle mattress on the bed  2/16/2020 0044 by Fozia Bashir RN  Outcome: Ongoing     Problem: Discharge Planning:  Goal: Patients continuum of care needs are met  Description  Patients continuum of care needs are met  2/16/2020 1411 by Rodney Obrien RN  Outcome: Ongoing  Note:   Awaiting  response from SNF for placement  2/16/2020 0044 by Fozia Bashir RN  Outcome: Ongoing     Problem:  Activity Intolerance:  Goal: Ability to tolerate increased activity will improve  Description  Ability to tolerate increased activity will improve  2/16/2020 1411 by Rodney Obrien RN  Outcome: Ongoing  Note:   Cont to work with PT and OT on daily basis, have encouraged activity out of the bed but pt is reluctant to be up for any length of time  2/16/2020 0044 by Fozia Bashir RN  Outcome: Ongoing     Problem: Discharge Planning:  Goal: Discharged to appropriate level of care  Description  Discharged to appropriate level of care  2/16/2020 1411 by Rodney Obrien RN  Outcome: Completed  Note:   Pt will be going to rehab after

## 2020-02-16 NOTE — PROGRESS NOTES
Physical Therapy  Facility/Department: [facility]  Daily Treatment Note  NAME: Freda Lanier  : 1940  MRN: [id number]    Date of Service: 2020    Discharge Recommendations:  Subacute/Skilled Nursing Facility   PT Equipment Recommendations  Equipment Needed: No    Assessment    Pt demonstrates weakness in L hip, quads which contribute overall functional mobility and safety. Pt motivated and address exercises for 4 extremities. Pt fwd flexed torso of approx 70 degrees contributes to overall static and dynamic standing and ambulating stabiltiby able to correct in sitting and static standing against wall intermittently. Body structures, Functions, Activity limitations: Decreased functional mobility ; Decreased safe awareness;Decreased balance;Decreased endurance;Decreased strength;Decreased high-level IADLs  Assessment: torso, periscapular and L hip/quad weakness present and contributes to funcational weakness/mobility and safety concerns. Prognosis: Good  Exam: mobility, exercise x 4, periscapular and core strengthening addrssed in sitting, stanidng and supine. Clinical Presentation: evolving  PT Education: Functional Mobility Training;Transfer Training;Gait Training  Patient Education: scapular retraction, Static standing with back to wall and addresing upright torso posture, with walker in front of pt.   REQUIRES PT FOLLOW UP: Yes  Activity Tolerance  Activity Tolerance: Patient limited by fatigue;Patient limited by endurance; Patient limited by pain  Activity Tolerance:  activity limited by R forarm pain. Pt requires some rest breaks then able to continue activitiy. Motivated     Patient Diagnosis(es): There were no encounter diagnoses.      has a past medical history of Aortic valve disease, Arthritis, Chronic back pain, COPD (chronic obstructive pulmonary disease) ([address] Disease of blood and blood forming organ, Diverticulitis, GERD (gastroesophageal reflux disease), History of blood transfusion, History of gastritis, Lumbar disc disease, Movement disorder, Nerve disease, peripheral, Osteoporosis, Pneumonia, and Unspecified cerebral artery occlusion with cerebral infarction. has a past surgical history that includes Spinal fusion (2012); Varicose vein surgery; Lumbar spine surgery (x 2); Upper gastrointestinal endoscopy; Colonoscopy; Hysterectomy; Foot surgery (Left, 04/18/2017); and arthroplasty (Left, 4/18/2017). Restrictions  Restrictions/Precautions  Restrictions/Precautions: General Precautions, Fall Risk  Required Braces or Orthoses?: No  Position Activity Restriction  Other position/activity restrictions: up with assist, O2 @ 2L/min  Subjective    Resting in bed, report R forearm is painful and its bee that way for several days. Agreeable to addressing PT as able. States RN is aware of forearm pain. Orientation  Orientation  Overall Orientation Status: Within Normal Limits  Cognition      Objective   Bed mobility  Rolling to Left: Contact guard assistance  Supine to Sit: Contact guard assistance  Sit to Supine: Moderate assistance  Scooting: Minimal assistance  Comment: required assist for ezekiel LE only with sit to supine. Transfers  Sit to Stand: Minimal Assistance  Stand to sit: Contact guard assistance  Bed to Chair: Contact guard assistance  Stand Pivot Transfers: Contact guard assistance  Lateral Transfers: Contact guard assistance  Comment: cues for torso, fwd flexed appros 70 degrees at time. Ambulation  Ambulation?: Yes  Ambulation 1  Surface: level tile  Device: Rolling Walker  Other Apparatus: O2  Assistance: Contact guard assistance  Quality of Gait: short steps ezekiel, fwd flexed at waist due to weak core and thoracic back muscles   Gait Deviations: Slow Nusrat;Decreased step length  Distance: 20ftx 2  Comments: pt able to standing with back against wall and bring torso up to approx 25 degrees torso fleixon vx 70 degreees.     Exercises:   Supine: SAQ and hip abd x 15 reps AROM  Sitting: ankle pumps, hip flexion, LAQ x 15 reps AROM with all but AAROM with L Hip Flexon. Scapular retraction x 5 reps with static hold. Static standing with back to wall to promote upright torso, stood x 20 seconds with walker in front of pt. G-Code     OutComes Score                                                     AM-PAC Score  AM-PAC Inpatient Mobility Raw Score : 18 (02/16/20 1222)  AM-PAC Inpatient T-Scale Score : 43.63 (02/16/20 1222)  Mobility Inpatient CMS 0-100% Score: 46.58 (02/16/20 1222)  Mobility Inpatient CMS G-Code Modifier : CK (02/16/20 1222)          Goals  Short term goals  Time Frame for Short term goals: 12 visits  Short term goal 1: Inc bed-mobility & transfers to independent to enable pt to safely get in/OOB   Short term goal 2: Inc gait to amb 50ft or > indep w/ RW to enable pt to return to previous level of independence  Short term goal 3: Inc strength to [facility] & standing balance to good with device to facilitate pt independence for performance of ADL's & functional mobility, & reduce fall risk  Short term goal 4: Pt able to tolerate 30-40 min of activity to include 15-20 reps of ex & functional mobility including 5 minutes of standing to facilitate activity tolerance to [facility];   Short term goal 5: Ed pt on home ex's, safety & energy principles & issue written home program    Plan    Plan  Times per week: 1-2x/D,5-6D/week  Current Treatment Recommendations: Strengthening, Balance Training, Functional Mobility Training, Transfer Training, Gait Training, Home Exercise Program, Safety Education & Training, Patient/Caregiver Education & Training  Safety Devices  Type of devices: Gait belt, Call light within reach, Patient at risk for falls, Bed alarm in place, Left in bed  Restraints  Initially in place: No     Therapy Time   Individual Concurrent Group Co-treatment   Time In 0956         Time Out 1023         Minutes 27 [id number] [place] [address] [id number], PTA

## 2020-02-16 NOTE — PROGRESS NOTES
Pt taking pain meds today ivonne every 6 hours with what seems to be relief, pt able to reest with eyes closed for some time afterwards but now pt asking that percocets be increased to \"10s\" and be given every 4 hours, talked with Melina about this and he said pain med regime should stay as it is

## 2020-02-17 PROCEDURE — 97116 GAIT TRAINING THERAPY: CPT

## 2020-02-17 PROCEDURE — 96372 THER/PROPH/DIAG INJ SC/IM: CPT

## 2020-02-17 PROCEDURE — 99224 PR SBSQ OBSERVATION CARE/DAY 15 MINUTES: CPT | Performed by: INTERNAL MEDICINE

## 2020-02-17 PROCEDURE — 6370000000 HC RX 637 (ALT 250 FOR IP): Performed by: INTERNAL MEDICINE

## 2020-02-17 PROCEDURE — G0378 HOSPITAL OBSERVATION PER HR: HCPCS

## 2020-02-17 PROCEDURE — 2700000000 HC OXYGEN THERAPY PER DAY

## 2020-02-17 PROCEDURE — 6370000000 HC RX 637 (ALT 250 FOR IP): Performed by: NURSE PRACTITIONER

## 2020-02-17 PROCEDURE — 94640 AIRWAY INHALATION TREATMENT: CPT

## 2020-02-17 PROCEDURE — 6360000002 HC RX W HCPCS: Performed by: INTERNAL MEDICINE

## 2020-02-17 PROCEDURE — 97110 THERAPEUTIC EXERCISES: CPT

## 2020-02-17 PROCEDURE — 97535 SELF CARE MNGMENT TRAINING: CPT

## 2020-02-17 RX ORDER — LANOLIN ALCOHOL/MO/W.PET/CERES
3 CREAM (GRAM) TOPICAL NIGHTLY PRN
Status: DISCONTINUED | OUTPATIENT
Start: 2020-02-17 | End: 2020-02-22 | Stop reason: HOSPADM

## 2020-02-17 RX ADMIN — MELATONIN 2000 UNITS: at 09:07

## 2020-02-17 RX ADMIN — MELATONIN TAB 3 MG 3 MG: 3 TAB at 23:16

## 2020-02-17 RX ADMIN — CLOPIDOGREL BISULFATE 75 MG: 75 TABLET ORAL at 09:07

## 2020-02-17 RX ADMIN — MULTIPLE VITAMINS W/ MINERALS TAB 1 TABLET: TAB at 09:07

## 2020-02-17 RX ADMIN — GABAPENTIN 300 MG: 300 CAPSULE ORAL at 21:51

## 2020-02-17 RX ADMIN — GUAIFENESIN 600 MG: 600 TABLET, EXTENDED RELEASE ORAL at 09:07

## 2020-02-17 RX ADMIN — GABAPENTIN 300 MG: 300 CAPSULE ORAL at 09:06

## 2020-02-17 RX ADMIN — BUDESONIDE AND FORMOTEROL FUMARATE DIHYDRATE 2 PUFF: 160; 4.5 AEROSOL RESPIRATORY (INHALATION) at 10:15

## 2020-02-17 RX ADMIN — DOCUSATE SODIUM 100 MG: 100 CAPSULE, LIQUID FILLED ORAL at 21:51

## 2020-02-17 RX ADMIN — TIOTROPIUM BROMIDE INHALATION SPRAY 2 PUFF: 3.12 SPRAY, METERED RESPIRATORY (INHALATION) at 10:17

## 2020-02-17 RX ADMIN — OXYCODONE AND ACETAMINOPHEN 1 TABLET: 5; 325 TABLET ORAL at 09:07

## 2020-02-17 RX ADMIN — GUAIFENESIN 600 MG: 600 TABLET, EXTENDED RELEASE ORAL at 21:51

## 2020-02-17 RX ADMIN — LEVALBUTEROL 1.25 MG: 1.25 SOLUTION, CONCENTRATE RESPIRATORY (INHALATION) at 15:21

## 2020-02-17 RX ADMIN — LEVALBUTEROL 1.25 MG: 1.25 SOLUTION, CONCENTRATE RESPIRATORY (INHALATION) at 22:03

## 2020-02-17 RX ADMIN — PYRIDOXINE HCL TAB 50 MG 100 MG: 50 TAB at 09:06

## 2020-02-17 RX ADMIN — ENOXAPARIN SODIUM 40 MG: 40 INJECTION SUBCUTANEOUS at 09:06

## 2020-02-17 RX ADMIN — POLYVINYL ALCOHOL 1 DROP: 14 SOLUTION/ DROPS OPHTHALMIC at 09:07

## 2020-02-17 RX ADMIN — OXYCODONE HYDROCHLORIDE AND ACETAMINOPHEN 500 MG: 500 TABLET ORAL at 09:07

## 2020-02-17 RX ADMIN — DOCUSATE SODIUM 100 MG: 100 CAPSULE, LIQUID FILLED ORAL at 09:07

## 2020-02-17 RX ADMIN — OXYCODONE AND ACETAMINOPHEN 1 TABLET: 5; 325 TABLET ORAL at 21:51

## 2020-02-17 RX ADMIN — TROSPIUM CHLORIDE 20 MG: 20 TABLET, FILM COATED ORAL at 21:51

## 2020-02-17 RX ADMIN — LEVALBUTEROL 1.25 MG: 1.25 SOLUTION, CONCENTRATE RESPIRATORY (INHALATION) at 10:15

## 2020-02-17 RX ADMIN — BUDESONIDE AND FORMOTEROL FUMARATE DIHYDRATE 2 PUFF: 160; 4.5 AEROSOL RESPIRATORY (INHALATION) at 22:03

## 2020-02-17 ASSESSMENT — PAIN - FUNCTIONAL ASSESSMENT: PAIN_FUNCTIONAL_ASSESSMENT: PREVENTS OR INTERFERES SOME ACTIVE ACTIVITIES AND ADLS

## 2020-02-17 ASSESSMENT — PAIN DESCRIPTION - PAIN TYPE: TYPE: ACUTE PAIN

## 2020-02-17 ASSESSMENT — PAIN DESCRIPTION - DESCRIPTORS: DESCRIPTORS: ACHING

## 2020-02-17 ASSESSMENT — PAIN DESCRIPTION - LOCATION: LOCATION: WRIST

## 2020-02-17 ASSESSMENT — ENCOUNTER SYMPTOMS
BACK PAIN: 1
VOMITING: 0
COUGH: 1
SHORTNESS OF BREATH: 0
ABDOMINAL PAIN: 0
NAUSEA: 0

## 2020-02-17 ASSESSMENT — PAIN DESCRIPTION - ORIENTATION: ORIENTATION: RIGHT

## 2020-02-17 ASSESSMENT — PAIN SCALES - GENERAL
PAINLEVEL_OUTOF10: 4
PAINLEVEL_OUTOF10: 6
PAINLEVEL_OUTOF10: 6
PAINLEVEL_OUTOF10: 8

## 2020-02-17 ASSESSMENT — PAIN DESCRIPTION - PROGRESSION: CLINICAL_PROGRESSION: NOT CHANGED

## 2020-02-17 ASSESSMENT — PAIN DESCRIPTION - FREQUENCY: FREQUENCY: CONTINUOUS

## 2020-02-17 ASSESSMENT — PAIN DESCRIPTION - ONSET: ONSET: ON-GOING

## 2020-02-17 NOTE — PROGRESS NOTES
but redirects well                                         Plan   Plan  Times per week: 4-5x/week, 1-2x/day  Current Treatment Recommendations: Strengthening, Balance Training, Functional Mobility Training, Endurance Training, Equipment Evaluation, Education, & procurement, Patient/Caregiver Education & Training, Self-Care / ADL, Safety Education & Training, Positioning  AM-PAC Score        AM-PAC Inpatient Daily Activity Raw Score: 15 (02/17/20 1217)  AM-PAC Inpatient ADL T-Scale Score : 34.69 (02/17/20 1217)  ADL Inpatient CMS 0-100% Score: 56.46 (02/17/20 1217)  ADL Inpatient CMS G-Code Modifier : CK (02/17/20 1217)    Goals  Short term goals  Time Frame for Short term goals: by discharge, pt will  Short term goal 1: demo min A with ADL transfers with good safety, approp DME/AD  Short term goal 2: demo min A with functional mob short distance in room for ADL completion with min cues for good safety/pacing  Short term goal 3: demo min A with toileiting routine, inc. clothing management  Short term goal 4: demo min A UB ADLs and mod A LB ADLs with min cues for safety and with approp DME  Short term goal 5: demo SBA with HEP for B UEs to inc. strength for transfers, ADLs  Patient Goals   Patient goals : to go home       Therapy Time   Individual Concurrent Group Co-treatment   Time In 0940     0921   Time Out [phone]   Minutes 4     23        Co-treatment with PT warranted secondary to decreased safety and independence requiring 2 skilled therapy professionals to address individual discipline's goals. OT addressing preparation for ADL transfer, functional reaching, environmental safety/scanning, fall prevention, functional mobility for ADL transfers, ability to sequence and follow directions and functional UE strength. Upon writer exit, call light within reach, pt retired to chair. All lines intact and patient positioned comfortably. All patient needs addressed prior to ending therapy session.  Chart reviewed

## 2020-02-17 NOTE — PROGRESS NOTES
Physical Therapy  Facility/Department: [facility]  Daily Treatment Note  NAME: Deborah Porter  : 1940  MRN: [id number]    Date of Service: 2020    Discharge Recommendations:  Subacute/Skilled Nursing Facility   PT Equipment Recommendations  Equipment Needed: No    Assessment   Body structures, Functions, Activity limitations: Decreased functional mobility ; Decreased safe awareness;Decreased balance;Decreased endurance;Decreased strength;Decreased high-level IADLs  Assessment: Pt requires extended time for all mobility and at current level of function and impaired activity tolerance the pt would benefit from D/C to a SNF. Prognosis: Good  Decision Making: Medium Complexity  Exam: mobility, exercise x 4, periscapular and core strengthening addrssed in sitting, stanidng and supine. Clinical Presentation: evolving  REQUIRES PT FOLLOW UP: Yes  Activity Tolerance  Activity Tolerance: Patient limited by endurance; Patient limited by fatigue     Patient Diagnosis(es): There were no encounter diagnoses. has a past medical history of Aortic valve disease, Arthritis, Chronic back pain, COPD (chronic obstructive pulmonary disease) ([address] Disease of blood and blood forming organ, Diverticulitis, GERD (gastroesophageal reflux disease), History of blood transfusion, History of gastritis, Lumbar disc disease, Movement disorder, Nerve disease, peripheral, Osteoporosis, Pneumonia, and Unspecified cerebral artery occlusion with cerebral infarction. has a past surgical history that includes Spinal fusion (); Varicose vein surgery; Lumbar spine surgery (x 2); Upper gastrointestinal endoscopy; Colonoscopy; Hysterectomy; Foot surgery (Left, 2017); and arthroplasty (Left, 2017).     Restrictions  Restrictions/Precautions  Restrictions/Precautions: General Precautions, Fall Risk  Required Braces or Orthoses?: No  Position Activity Restriction  Other position/activity restrictions: up with assist, O2

## 2020-02-17 NOTE — PLAN OF CARE
Problem: Falls - Risk of:  Goal: Will remain free from falls  Description  Will remain free from falls  2/17/2020 0308 by Fozia Bashir RN  Outcome: Ongoing  2/16/2020 1411 by Rodney Obrien RN  Outcome: Ongoing  Note:   Patient is a fall risk during this admission. Fall risk assessment was performed. Patient is absent of falls. Bed is in the lowest position. Wheels on the bed are locked. Call light and bed side table are within reach. Clutter is removed. Patient was educated to call out when needing assistance or wanting to get out of bed. Patient offered toileting assistance during rounding. Hourly rounds have been performed. Fall precautions in place, using bed and chair alarms, pt asks for help appropriatley  Goal: Absence of physical injury  Description  Absence of physical injury  Outcome: Ongoing     Problem: Pain:  Goal: Pain level will decrease  Description  Pain level will decrease  2/17/2020 0308 by Fozia Bashir RN  Outcome: Ongoing  2/16/2020 1411 by Rodney Obrien RN  Outcome: Ongoing  Note:   Pain level assessment complete.    Patient educated on pain scale and control interventions  PRN pain medication given per patient request  Patient instructed to call out with new onset of pain or unrelieved pain , pt complains of pain in several different areas of body but mostly back and the rt wrist area today, medicated with Percocet with stated relief  Goal: Control of acute pain  Description  Control of acute pain  Outcome: Ongoing  Goal: Control of chronic pain  Description  Control of chronic pain  Outcome: Ongoing  Goal: Patient's pain/discomfort is manageable  Description  Patient's pain/discomfort is manageable  Outcome: Ongoing     Problem: Infection:  Goal: Will remain free from infection  Description  Will remain free from infection  Outcome: Ongoing     Problem: Safety:  Goal: Free from accidental physical injury  Description  Free from accidental physical injury  2/17/2020 0308 by Fozia Bashir RN  Outcome: Ongoing  2/16/2020 1411 by Savita Fox RN  Outcome: Ongoing  Note:   No injury  Goal: Free from intentional harm  Description  Free from intentional harm  Outcome: Ongoing     Problem: Daily Care:  Goal: Daily care needs are met  Description  Daily care needs are met  2/17/2020 0308 by Monika Caba RN  Outcome: Ongoing  2/16/2020 1411 by Savita Fox RN  Outcome: Ongoing  Note:   Pt is able to voice her needs clearly,      Problem: Skin Integrity:  Goal: Skin integrity will stabilize  Description  Skin integrity will stabilize  2/17/2020 0308 by Monika Caba RN  Outcome: Ongoing  2/16/2020 1411 by Savita Fox RN  Outcome: Ongoing  Note:   Skin assessment performed. Patient turns self PRN. Pressure ulcer prevention being practiced. Waffle mattress on the bed     Problem: Discharge Planning:  Goal: Patients continuum of care needs are met  Description  Patients continuum of care needs are met  2/17/2020 0308 by Monika Caba RN  Outcome: Ongoing  2/16/2020 1411 by Savita Fox RN  Outcome: Ongoing  Note:   Awaiting  response from SNF for placement     Problem:  Activity Intolerance:  Goal: Ability to tolerate increased activity will improve  Description  Ability to tolerate increased activity will improve  2/17/2020 0308 by Monika Caba RN  Outcome: Ongoing  2/16/2020 1411 by Savita Fox RN  Outcome: Ongoing  Note:   Cont to work with PT and OT on daily basis, have encouraged activity out of the bed but pt is reluctant to be up for any length of time

## 2020-02-17 NOTE — PROGRESS NOTES
[facility]    Progress Note    2/17/2020    4:03 PM    Name:   Inder Wagoner  MRN:     [id number]     Acct:      [de-identified]   Room:   2020/2020-01  IP Day:  0  Admit Date:  2/11/2020  3:42 PM    PCP:   Kerry Briseno MD  Code Status:  Full Code    Subjective:     C/C:   Chief Complaint   Patient presents with    Other     daughter wants her to go to respite due to being combative     Interval History Status:   Pain controlled  Doing okay with diet  Up to chair  Looks comfortable  Still waiting for placement    Data Base Updates:  Culture NO SIGNIFICANT GROWTH       Brief History:     As documented in the medical record: \"Afsaneh Trinidad is a [de-identified] y.o. -American female who reports to the hospital with complaint of altered mental status. The patient was reportedly brought to the hospital by EMS after becoming combative at home with her daughter, who is her caregiver. ER notes indicate that the patient's daughter stated she was confused. The patient is currently alert and oriented x3, and upon further questioning she endorses having a verbal altercation with her daughter. She states this happens periodically. The patient currently lives with her daughter secondary to her own becoming infested with roaches. She denies specific complaint. She is appropriate at this time. She has past medical history that includes previous TIA, COPD and a known heart murmur. She wears home oxygen. \"     The patient was admitted  Her electrolyte abnormalities were addressed  Blood pressure was monitored and controlled  Respiratory Therapy and Bronchodilators prn   PT/OT was requested    CT brain 10/2019:  No acute intracranial abnormality.       Chronic small vessel ischemic changes       Echocardiogram 10/2019:   Summary  Left ventricle is normal in size. Mild left ventricular hypertrophy.   Global left ventricular systolic function is normal with input(s): POCGLU in the last 72 hours. I/O (24Hr): Intake/Output Summary (Last 24 hours) at 2/17/2020 1603  Last data filed at 2/17/2020 0447  Gross per 24 hour   Intake 240 ml   Output 550 ml   Net -310 ml         Review of Systems:     Review of Systems   Constitutional: Positive for activity change ( improved). Negative for appetite change. Respiratory: Positive for cough (Scant white sputum). Negative for shortness of breath. Cardiovascular: Negative for chest pain and palpitations. Gastrointestinal: Negative for abdominal pain, nausea and vomiting. Genitourinary: Negative for dysuria, flank pain, hematuria and urgency. Musculoskeletal: Positive for arthralgias, back pain (chronic ), gait problem (gait still unsteady ) and myalgias. The patient has chronic arthralgias and myalgias  No new musculoskeletal complaints    Psychiatric/Behavioral: Positive for dysphoric mood (Feeling somewhat depressed about her current health status and living arrangements) and sleep disturbance (not sleeping well ). Negative for suicidal ideas. The patient is nervous/anxious (still considering options for discharge). Had been living with her daughter  They have had an altercation  She will not be able to return home with her daughter       Physical Examination:        Physical Exam  Vitals signs reviewed. Constitutional:       General: She is not in acute distress. Appearance: She is not diaphoretic. HENT:      Head: Normocephalic. Nose: Nose normal.   Eyes:      General: No scleral icterus. Conjunctiva/sclera: Conjunctivae normal.   Neck:      Musculoskeletal: Neck supple. Trachea: No tracheal deviation. Cardiovascular:      Rate and Rhythm: Normal rate and regular rhythm. Heart sounds: Murmur (Systolic) present. Pulmonary:      Effort: Pulmonary effort is normal. No respiratory distress. Breath sounds: Normal breath sounds. No wheezing or rales.    Chest: Centriacinar emphysema ([facility] [J43.2] 06/25/2014    Chronic bilateral low back pain with bilateral sciatica [M54.42, M54.41, G89.29] 06/25/2014    COPD (chronic obstructive pulmonary disease) ([facility] [J44.9]        Plan:        Discharge planning continues  PT/OT recommending placement:    Monitor and control blood pressure  Correct electrolytes  Respiratory Therapy and Bronchodilators prn  Oxygen supplementation  Social service consult  Pain management: Follow-up with pain clinic  Med Rec Done  SYLVIA done  Will discharge when arrangements complete and ok with other services. Follow-up with PCP in one week, Harvy Gilford, MD  Notify PCP of discharge  Status was discussed with daughter Filomena Mahmood today  She reports that although she was given a list of facilities available for her mom on Friday she has yet to begin any site visits \" she thought because of the weekend and holiday today nothing could happen until at least Tuesday!!!???\"  I explained that this is day 6 of observation status, that her mother has never been an inpatient  I have emphasized we need to get going on placement ASAP  It appears from prior admissions there has also been problems with family making timely arrangements. ..     IP CONSULT TO INTERNAL MEDICINE  IP CONSULT TO SOCIAL WORK    Edward Duffy DO  2/17/2020  4:03 PM

## 2020-02-17 NOTE — CARE COORDINATION
Social Work-Spoke with patient's daughter, Flower Asher, regarding dc plans. She has not visited any of the facilities that patient requested. Flower Asher stated that her daughter was in the ER on Sat and she could not visit and [place] because the facilities stated that no one was available on Sundays to tour her. Discussed that [facility] has bed available and [facility] VANCE HINOJOSA is reviewing the chart. Dr Arik Vega also spoke with patient's daughter. Phone call back to [facility] VANCE HINOJOSA and they have approved patient. Phone call to daughter to discuss that bed will be available at [facility]. Flower Asher stated that she felt that  was rude and she will only work with Dr Arik Vega and hung up the phone. Phone call back to daughter and provided her with manager's phone number and name.  Meche Munoz

## 2020-02-17 NOTE — CARE COORDINATION
Spoke with patient's daughter Oliva Niño. Discussed her process and progress on finding a facility for her mother to discharge to. Informed her that her mother has been discharged since 2/13 and that we need a decision to be made regarding this. She agreed to look at [facility] and [facility] which are both in plan for her insurance. Informed her that they both have bed availability today.

## 2020-02-18 PROCEDURE — 97535 SELF CARE MNGMENT TRAINING: CPT

## 2020-02-18 PROCEDURE — 97116 GAIT TRAINING THERAPY: CPT

## 2020-02-18 PROCEDURE — G0378 HOSPITAL OBSERVATION PER HR: HCPCS

## 2020-02-18 PROCEDURE — 6370000000 HC RX 637 (ALT 250 FOR IP): Performed by: NURSE PRACTITIONER

## 2020-02-18 PROCEDURE — 6360000002 HC RX W HCPCS: Performed by: INTERNAL MEDICINE

## 2020-02-18 PROCEDURE — 97110 THERAPEUTIC EXERCISES: CPT

## 2020-02-18 PROCEDURE — 97530 THERAPEUTIC ACTIVITIES: CPT

## 2020-02-18 PROCEDURE — 6370000000 HC RX 637 (ALT 250 FOR IP): Performed by: INTERNAL MEDICINE

## 2020-02-18 PROCEDURE — 94640 AIRWAY INHALATION TREATMENT: CPT

## 2020-02-18 PROCEDURE — 94760 N-INVAS EAR/PLS OXIMETRY 1: CPT

## 2020-02-18 PROCEDURE — 99225 PR SBSQ OBSERVATION CARE/DAY 25 MINUTES: CPT | Performed by: FAMILY MEDICINE

## 2020-02-18 PROCEDURE — 2700000000 HC OXYGEN THERAPY PER DAY

## 2020-02-18 PROCEDURE — 96372 THER/PROPH/DIAG INJ SC/IM: CPT

## 2020-02-18 RX ADMIN — GUAIFENESIN 600 MG: 600 TABLET, EXTENDED RELEASE ORAL at 22:00

## 2020-02-18 RX ADMIN — MELATONIN TAB 3 MG 3 MG: 3 TAB at 23:51

## 2020-02-18 RX ADMIN — TIOTROPIUM BROMIDE INHALATION SPRAY 2 PUFF: 3.12 SPRAY, METERED RESPIRATORY (INHALATION) at 09:19

## 2020-02-18 RX ADMIN — GABAPENTIN 300 MG: 300 CAPSULE ORAL at 10:35

## 2020-02-18 RX ADMIN — BUDESONIDE AND FORMOTEROL FUMARATE DIHYDRATE 2 PUFF: 160; 4.5 AEROSOL RESPIRATORY (INHALATION) at 22:25

## 2020-02-18 RX ADMIN — ENOXAPARIN SODIUM 40 MG: 40 INJECTION SUBCUTANEOUS at 10:35

## 2020-02-18 RX ADMIN — DOCUSATE SODIUM 100 MG: 100 CAPSULE, LIQUID FILLED ORAL at 10:36

## 2020-02-18 RX ADMIN — TROSPIUM CHLORIDE 20 MG: 20 TABLET, FILM COATED ORAL at 21:56

## 2020-02-18 RX ADMIN — POLYVINYL ALCOHOL 1 DROP: 14 SOLUTION/ DROPS OPHTHALMIC at 10:35

## 2020-02-18 RX ADMIN — DOCUSATE SODIUM 100 MG: 100 CAPSULE, LIQUID FILLED ORAL at 21:56

## 2020-02-18 RX ADMIN — GABAPENTIN 300 MG: 300 CAPSULE ORAL at 21:56

## 2020-02-18 RX ADMIN — GUAIFENESIN 600 MG: 600 TABLET, EXTENDED RELEASE ORAL at 10:35

## 2020-02-18 RX ADMIN — POLYVINYL ALCOHOL 1 DROP: 14 SOLUTION/ DROPS OPHTHALMIC at 23:44

## 2020-02-18 RX ADMIN — LEVALBUTEROL 1.25 MG: 1.25 SOLUTION, CONCENTRATE RESPIRATORY (INHALATION) at 09:16

## 2020-02-18 RX ADMIN — BUDESONIDE AND FORMOTEROL FUMARATE DIHYDRATE 2 PUFF: 160; 4.5 AEROSOL RESPIRATORY (INHALATION) at 09:16

## 2020-02-18 RX ADMIN — OXYCODONE AND ACETAMINOPHEN 1 TABLET: 5; 325 TABLET ORAL at 10:44

## 2020-02-18 RX ADMIN — LEVALBUTEROL 1.25 MG: 1.25 SOLUTION, CONCENTRATE RESPIRATORY (INHALATION) at 22:25

## 2020-02-18 RX ADMIN — CLOPIDOGREL BISULFATE 75 MG: 75 TABLET ORAL at 10:44

## 2020-02-18 ASSESSMENT — PAIN DESCRIPTION - DESCRIPTORS: DESCRIPTORS: ACHING

## 2020-02-18 ASSESSMENT — PAIN - FUNCTIONAL ASSESSMENT: PAIN_FUNCTIONAL_ASSESSMENT: PREVENTS OR INTERFERES SOME ACTIVE ACTIVITIES AND ADLS

## 2020-02-18 ASSESSMENT — PAIN DESCRIPTION - PAIN TYPE: TYPE: ACUTE PAIN

## 2020-02-18 ASSESSMENT — PAIN SCALES - GENERAL
PAINLEVEL_OUTOF10: 7
PAINLEVEL_OUTOF10: 8

## 2020-02-18 ASSESSMENT — PAIN DESCRIPTION - ONSET: ONSET: ON-GOING

## 2020-02-18 ASSESSMENT — PAIN DESCRIPTION - ORIENTATION: ORIENTATION: LOWER

## 2020-02-18 ASSESSMENT — PAIN DESCRIPTION - PROGRESSION: CLINICAL_PROGRESSION: NOT CHANGED

## 2020-02-18 ASSESSMENT — PAIN DESCRIPTION - FREQUENCY: FREQUENCY: CONTINUOUS

## 2020-02-18 ASSESSMENT — PAIN DESCRIPTION - LOCATION: LOCATION: BACK

## 2020-02-18 NOTE — PROGRESS NOTES
Occupational Therapy  Facility/Department: [facility] MED SURG  Daily Treatment Note  NAME: Micah Jara  : 1940  MRN: [id number]    Date of Service: 2020    Discharge Recommendations:  Subacute/Skilled Nursing Facility       Assessment   Performance deficits / Impairments: Decreased functional mobility ; Decreased ADL status; Decreased strength;Decreased safe awareness;Decreased balance;Decreased endurance;Decreased cognition;Decreased posture  Prognosis: Good  Patient Education: Writer provided max encouragement and importance of participating in therapy, pt still refused. REQUIRES OT FOLLOW UP: Yes  Activity Tolerance  Activity Tolerance: Refused all activity  Safety Devices  Safety Devices in place: Yes  Type of devices: Patient at risk for falls; Left in bed;Call light within reach         Patient Diagnosis(es): There were no encounter diagnoses. has a past medical history of Aortic valve disease, Arthritis, Chronic back pain, COPD (chronic obstructive pulmonary disease) ([facility] [address] Disease of blood and blood forming organ, Diverticulitis, GERD (gastroesophageal reflux disease), History of blood transfusion, History of gastritis, Lumbar disc disease, Movement disorder, Nerve disease, peripheral, Osteoporosis, Pneumonia, and Unspecified cerebral artery occlusion with cerebral infarction. has a past surgical history that includes Spinal fusion (); Varicose vein surgery; Lumbar spine surgery (x 2); Upper gastrointestinal endoscopy; Colonoscopy; Hysterectomy; Foot surgery (Left, 2017); and arthroplasty (Left, 2017).     Restrictions  Restrictions/Precautions  Restrictions/Precautions: General Precautions, Fall Risk  Required Braces or Orthoses?: No  Position Activity Restriction  Other position/activity restrictions: up with assist, O2 @ 2L/min  Subjective   General  Chart Reviewed: Yes  Patient assessed for rehabilitation services?: Yes  Response to previous treatment: Patient with no complaints from previous session  Family / Caregiver Present: No      Orientation  Orientation  Overall Orientation Status: Within Functional Limits  Objective    ADL  Additional Comments: Refused all care/tasks        Balance  Sitting Balance: Unable to assess(comment)(refused)  Standing Balance: Unable to assess(comment)(refused)  Standing Balance  Time: Refused  Functional Mobility  Functional Mobility Comments: Refused  Toilet Transfers  Toilet Transfers Comments: Declined toileting needs  Bed mobility  Comment: Refused  Transfers  Transfer Comments: Refused                       Cognition  Overall Cognitive Status: Exceptions  Arousal/Alertness: Delayed responses to stimuli  Insights: Decreased awareness of deficits  Initiation: Requires cues for some  Sequencing: Requires cues for some  Cognition Comment: Pt in bed with eyes closed, pt did not make eye contact with writer but did speak and refused to be uncovered or to get up for breakfast stating \"I'm not hungry! \"     Perception  Overall Perceptual Status: Impaired  Initiation: Cues to initiate tasks                                   Plan   Plan  Times per week: 4-5x/week, 1-2x/day  Current Treatment Recommendations: Strengthening, Balance Training, Functional Mobility Training, Endurance Training, Equipment Evaluation, Education, & procurement, Patient/Caregiver Education & Training, Self-Care / ADL, Safety Education & Training, Positioning                        AM-PAC Score             Goals  Short term goals  Time Frame for Short term goals: by discharge, pt will  Short term goal 1: demo min A with ADL transfers with good safety, approp DME/AD  Short term goal 2: demo min A with functional mob short distance in room for ADL completion with min cues for good safety/pacing  Short term goal 3: demo min A with toileiting routine, inc. clothing management  Short term goal 4: demo min A UB ADLs and mod A LB ADLs with min cues for safety and with approp DME  Short term goal 5: demo SBA with HEP for B UEs to inc. strength for transfers, ADLs  Patient Goals   Patient goals : to go home       Therapy Time   Individual Concurrent Group Co-treatment   Time In 0815         Time Out 0823         Minutes 8          Writer spent time in pt's room preparing bedside chair for pt to get up for breakfast and verbal encouragement to participate in therapy. Pt kept eyes closed and spoke very little and was difficult to hear. Pt did not have O2 in nose, writer placed and educated pt on the importance of keeping O2 on and in place, pt did not respond. Writer asked pt when she would like therapy but pt did no answer. Writer pulled covers off pt and pt became irritated stating \"No, I'm cold! \" Writer ensured pt she would make pt comfortable and warm in chair but pt continued to refuse.        GAYLA Ross

## 2020-02-18 NOTE — PROGRESS NOTES
Occupational Therapy  Facility/Department: [facility] MED SURG  Daily Treatment Note  NAME: Luna Gaspar  : 1940  MRN: [id number]    Date of Service: 2020    Discharge Recommendations:  [address]   Patient would benefit from SNF for continued occupational therapy to increase independence with  ADL of bathing, dressing, toileting and grooming. Writer recommending SNF placement for for activity tolerance and strength which will increase independence with ADL's coordinated with bed mobility and chair transfers. Continued skilled OT services to address decreased safety awareness with ADL and IADL tasks and for education and increased independence with DME and AE for fall prevention and ec/ws techniques prior to d/c home. Assessment   Performance deficits / Impairments: Decreased functional mobility ; Decreased ADL status; Decreased strength;Decreased safe awareness;Decreased balance;Decreased endurance;Decreased cognition;Decreased posture  Prognosis: Good  OT Education: Transfer Training;Home Exercise Program  Patient Education: Pt issued B UE HEP handout and was educated on safety during transfers, mobility and self care tasks. REQUIRES OT FOLLOW UP: Yes  Activity Tolerance  Activity Tolerance: Patient Tolerated treatment well  Activity Tolerance: fair-  Safety Devices  Safety Devices in place: Yes  Type of devices: Patient at risk for falls; Left in chair;Call light within reach; Chair alarm in place;Nurse notified;Gait belt         Patient Diagnosis(es): The encounter diagnosis was Declining functional status.       has a past medical history of Aortic valve disease, Arthritis, Chronic back pain, COPD (chronic obstructive pulmonary disease) ([facility] [address] Disease of blood and blood forming organ, Diverticulitis, GERD (gastroesophageal reflux disease), History of blood transfusion, History of gastritis, Lumbar disc disease, Movement disorder, Nerve disease, peripheral, Osteoporosis, Pneumonia, and Unspecified cerebral artery occlusion with cerebral infarction. has a past surgical history that includes Spinal fusion (2012); Varicose vein surgery; Lumbar spine surgery (x 2); Upper gastrointestinal endoscopy; Colonoscopy; Hysterectomy; Foot surgery (Left, 04/18/2017); and arthroplasty (Left, 4/18/2017). Restrictions  Restrictions/Precautions  Restrictions/Precautions: General Precautions, Fall Risk, Up as Tolerated  Required Braces or Orthoses?: No  Position Activity Restriction  Other position/activity restrictions: up with assist, O2 @ 2L/min  Subjective   General  Chart Reviewed: Yes  Patient assessed for rehabilitation services?: Yes  Response to previous treatment: Patient with no complaints from previous session  Family / Caregiver Present: No      Orientation  Orientation  Overall Orientation Status: Within Functional Limits  Objective    ADL  Grooming: Stand by assistance(standing at sink to wash hands)  Toileting: Contact guard assistance  Additional Comments: Pt moves very slow and is very talkative, req'd verbal redirection to task throughout session. Balance  Sitting Balance: Supervision  Standing Balance: Contact guard assistance  Standing Balance  Time: ~5 min  Activity: standing at sink  Functional Mobility  Functional - Mobility Device: Rolling Walker  Activity: To/from bathroom  Assist Level: Contact guard assistance  Functional Mobility Comments: Max verbal cues for posture and safety with walker, poor awareness of O2 tubing  Toilet Transfers  Toilet - Technique: Ambulating  Equipment Used: Raised toilet seat with rails  Toilet Transfer: Contact guard assistance  Toilet Transfers Comments: Max verbal cues for safety, posture and O2 mgmt  Bed mobility  Comment: Refused  Transfers  Stand Step Transfers: Contact guard assistance  Sit to stand: Contact guard assistance;Minimal assistance  Stand to sit: Contact guard assistance;Minimal assistance  Transfer Comments:  Max verbal cues for safety/technique and O2 mgmt                       Cognition  Overall Cognitive Status: Exceptions  Arousal/Alertness: Appropriate responses to stimuli  Following Commands: Follows one step commands with increased time; Follows one step commands with repetition  Attention Span: Attends with cues to redirect  Memory: Appears intact  Safety Judgement: Decreased awareness of need for assistance;Decreased awareness of need for safety  Problem Solving: Decreased awareness of errors;Good awareness of errors made;Assistance required to identify errors made;Assistance required to correct errors made;Assistance required to generate solutions;Assistance required to implement solutions  Insights: Decreased awareness of deficits  Initiation: Requires cues for some  Sequencing: Requires cues for some  Cognition Comment: Pt up in bedside chair upon arrival, more awake and alert this afternoon. Safety is poor.      Perception  Overall Perceptual Status: Impaired  Initiation: Cues to initiate tasks                                   Plan   Plan  Times per week: 4-5x/week, 1-2x/day  Current Treatment Recommendations: Strengthening, Balance Training, Functional Mobility Training, Endurance Training, Equipment Evaluation, Education, & procurement, Patient/Caregiver Education & Training, Self-Care / ADL, Safety Education & Training, Positioning                        AM-PAC Score        AM-PAC Inpatient Daily Activity Raw Score: 17 (02/18/20 1445)  AM-PAC Inpatient ADL T-Scale Score : 37.26 (02/18/20 1445)  ADL Inpatient CMS 0-100% Score: 50.11 (02/18/20 1445)  ADL Inpatient CMS G-Code Modifier : CK (02/18/20 1445)    Goals  Short term goals  Time Frame for Short term goals: by discharge, pt will  Short term goal 1: demo min A with ADL transfers with good safety, approp DME/AD  Short term goal 2: demo min A with functional mob short distance in room for ADL completion with min cues for good safety/pacing  Short term goal

## 2020-02-18 NOTE — PLAN OF CARE
Ongoing  2/18/2020 0145 by Becky Lopez RN  Outcome: Ongoing     Problem: Daily Care:  Goal: Daily care needs are met  8/94/3272 [id number] by Gael Guido RN  Outcome: Ongoing  2/18/2020 0145 by Becky Lopez RN  Outcome: Ongoing     Problem: Skin Integrity:  Goal: Skin integrity will stabilize  8/97/9113 2468 by Gael Guido RN  Outcome: Ongoing  2/18/2020 0145 by Becky Lopez RN  Outcome: Ongoing     Problem: Discharge Planning:  Goal: Patients continuum of care needs are met  9/07/4417 [id number] by Gael Guido RN  Outcome: Ongoing  2/18/2020 0145 by Becky Lopez RN  Outcome: Ongoing     Problem:  Activity Intolerance:  Goal: Ability to tolerate increased activity will improve  1/75/2303 1040 by Gael Guido RN  Outcome: Ongoing  2/18/2020 0145 by Becky Lopez RN  Outcome: Ongoing

## 2020-02-18 NOTE — PROGRESS NOTES
therapy  DC planning was initiated  Awaiting family choice on facility/precertification  The patient was instructed to follow up with their PCP, Glee Hashimoto, MD in one week      Review of Systems:     Constitutional:  negative for chills, fevers, sweats. Some fatigue   Respiratory:  negative for cough, dyspnea on exertion, shortness of breath, wheezing  Cardiovascular:  negative for chest pain, chest pressure/discomfort, lower extremity edema, palpitations  Gastrointestinal:  negative for abdominal pain, constipation, diarrhea, nausea, vomiting  Neurological:  negative for dizziness, headache    Medications: Allergies: Allergies   Allergen Reactions    Anti-Inflammatory Enzyme [Nutritional Supplements]      Patient states allergy to anti inflammatories    Ceclor [Cefaclor]      Doesn't remember    Morphine Other (See Comments)     Urinary retention    Nsaids      Patient states allergy to anti-inflammatories    Penicillins     Propoxyphene     Skelaxin [Metaxalone]     Sulfa Antibiotics Hives    Tadalafil     Tetracycline     Tolmetin      Patient states allergy to anti-inflammatories  Patient states allergy to anti-inflammatories  Patient states allergy to anti-inflammatories    Alendronate Nausea And Vomiting and Nausea Only    Alendronate Sodium Nausea Only    Asa [Aspirin] Other (See Comments)     Hx diverticulitis.   Can't T  Take regular aspirin 325mg but can take baby aspirin 81mg    Erythromycin Nausea And Vomiting and Nausea Only    Erythromycin Base Nausea Only    Temazepam Nausea And Vomiting and Nausea Only    Tetracyclines & Related Nausea And Vomiting    Tramadol Nausea And Vomiting and Nausea Only       Current Meds:   Scheduled Meds:    fentanNYL  50 mcg Intravenous Once    clopidogrel  75 mg Oral Daily    polyethylene glycol  17 g Oral BID    docusate sodium  100 mg Oral BID    budesonide-formoterol  2 puff Inhalation BID    vitamin B-6  100 mg Oral Daily    vitamin INR, DDIMER, [id number], LABABSO in the last 72 hours. Invalid input(s): PT  Chemistry:No results for input(s): NA, K, CL, CO2, GLUCOSE, BUN, CREATININE, MG, ANIONGAP, LABGLOM, GFRAA, CALCIUM, CAION, PHOS, PSA, PROBNP, TROPHS, CKTOTAL, CKMB, CKMBINDEX, MYOGLOBIN, DIGOXIN, LACTACIDWB in the last 72 hours. No results for input(s): PROT, LABALBU, LABA1C, J7KTXZR, P3KQMWL, FT4, TSH, AST, ALT, LDH, GGT, ALKPHOS, LABGGT, BILITOT, BILIDIR, AMMONIA, AMYLASE, LIPASE, LACTATE, CHOL, HDL, LDLCHOLESTEROL, CHOLHDLRATIO, TRIG, VLDL, MEF80DT, PHENYTOIN, PHENYF, URICACID, POCGLU in the last 72 hours. ABG:  Lab Results   Component Value Date    PHART 7.410 11/15/2014    CEY7MQE 42.0 11/15/2014    PO2ART 46.0 11/15/2014    QJW5YZD 26.1 11/15/2014    NBEA NOT REPORTED 11/15/2014    PBEA 1.8 11/15/2014    X9YWZVPE 78.6 11/15/2014    FIO2 ROOM AIR 11/15/2014     Lab Results   Component Value Date/Time    SPECIAL NOT REPORTED 02/13/2020 05:36 PM     Lab Results   Component Value Date/Time    CULTURE NO SIGNIFICANT GROWTH 02/13/2020 05:36 PM       Radiology:  No results found.     Physical Examination:        General appearance:  alert, cooperative and no distress, nasal canula  Mental Status:  oriented to person, place and time and normal affect  Lungs:  clear to auscultation bilaterally, normal effort  Heart:  regular rate and rhythm, no murmur  Abdomen:  soft, nontender, nondistended, normal bowel sounds, no masses, hepatomegaly, splenomegaly  Extremities:  no edema, redness, tenderness in the calves  Skin:  no gross lesions, rashes, induration    Assessment:        Hospital Problems           Last Modified POA    * (Principal) Altered mental status, unspecified 2/11/2020 Yes    COPD (chronic obstructive pulmonary disease) ([facility] [address] 2/11/2020 Yes    Hypokalemia 2/12/2020 Yes    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) 2/12/2020 Yes    Overview Addendum 3/12/2018  1:43 PM by Bert Coleman     Overview:   Overview: replace inactive diagnosis  replace inactive diagnosis    Overview:   Overview:   replace inactive diagnosis         Mild aortic insufficiency 2/12/2020 Yes    Essential hypertension 2/11/2020 Yes    Centriacinar emphysema ([address] 2/12/2020 Yes    Supplemental oxygen dependent 2/12/2020 Yes    Chronic respiratory failure with hypoxia ([address] 2/12/2020 Yes    Age-related physical debility 2/11/2020 Yes    Small vessel disease ([address]):  Cerebrovascular  2/12/2020 Yes    Left ventricular diastolic dysfunction 9/12/5747 Yes    Moderate mitral regurgitation 2/12/2020 Yes    Tricuspid regurgitation moderate 2/12/2020 Yes          Plan:         I reviewed hospital course including labs, images and notes    Continue current management. Continue dressing may come for pressure control. Continue correcting electrolyte as needed. Continue breathing treatment and oxygen supplementation as needed. PT/OT. Add Voltaren gel topically to right forearm. Placement. Stable for discharge when bed available.   Discussed with the nurse and the patient        Biju Leal MD  2/18/2020  3:04 PM

## 2020-02-18 NOTE — PROGRESS NOTES
Physical Therapy  Facility/Department: [facility] MED SURG  Daily Treatment Note  NAME: Shane Ortiz  : 1940  MRN: [id number]    Date of Service: 2020    Discharge Recommendations:  Subacute/Skilled Nursing Facility   PT Equipment Recommendations  Equipment Needed: No    Assessment   Body structures, Functions, Activity limitations: Decreased functional mobility ; Decreased safe awareness;Decreased balance;Decreased endurance;Decreased strength;Decreased high-level IADLs  Prognosis: Good  Decision Making: Medium Complexity  Clinical Presentation: evolving  REQUIRES PT FOLLOW UP: Yes  Activity Tolerance  Activity Tolerance: Patient limited by endurance; Patient limited by fatigue     Patient Diagnosis(es): There were no encounter diagnoses. has a past medical history of Aortic valve disease, Arthritis, Chronic back pain, COPD (chronic obstructive pulmonary disease) ([facility] [address] Disease of blood and blood forming organ, Diverticulitis, GERD (gastroesophageal reflux disease), History of blood transfusion, History of gastritis, Lumbar disc disease, Movement disorder, Nerve disease, peripheral, Osteoporosis, Pneumonia, and Unspecified cerebral artery occlusion with cerebral infarction. has a past surgical history that includes Spinal fusion (); Varicose vein surgery; Lumbar spine surgery (x 2); Upper gastrointestinal endoscopy; Colonoscopy; Hysterectomy; Foot surgery (Left, 2017); and arthroplasty (Left, 2017). Restrictions  Restrictions/Precautions  Restrictions/Precautions: General Precautions, Fall Risk  Required Braces or Orthoses?: No  Position Activity Restriction  Other position/activity restrictions: up with assist, O2 @ 2L/min  Subjective   General  Chart Reviewed: Yes  Additional Pertinent Hx: TIA, COPD and a known heart murmur, wears home oxygen.    Response To Previous Treatment: Not applicable  Subjective  Subjective: Pt agreeable to PT  General Comment  Comments: Magno MANCILLA Sic posture with little improvement from cues. AM-PAC Score  AM-PAC Inpatient Mobility Raw Score : 16 (02/18/20 1117)  AM-PAC Inpatient T-Scale Score : 40.78 (02/18/20 1117)  Mobility Inpatient CMS 0-100% Score: 54.16 (02/18/20 1117)  Mobility Inpatient CMS G-Code Modifier : CK (02/18/20 1117)          Goals  Short term goals  Time Frame for Short term goals: 12 visits  Short term goal 1: Inc bed-mobility & transfers to independent to enable pt to safely get in/OOB   Short term goal 2: Inc gait to amb 50ft or > indep w/ RW to enable pt to return to previous level of independence  Short term goal 3: Inc strength to [facility] & standing balance to good with device to facilitate pt independence for performance of ADL's & functional mobility, & reduce fall risk  Short term goal 4: Pt able to tolerate 30-40 min of activity to include 15-20 reps of ex & functional mobility including 5 minutes of standing to facilitate activity tolerance to [facility];   Short term goal 5: Ed pt on home ex's, safety & energy principles & issue written home program    Plan    Plan  Times per week: 1-2x/D,5-6D/week  Current Treatment Recommendations: Strengthening, Balance Training, Functional Mobility Training, Transfer Training, Gait Training, Home Exercise Program, Safety Education & Training, Patient/Caregiver Education & Training  Safety Devices  Type of devices: Nurse notified, Left in chair, Gait belt, Chair alarm in place, All fall risk precautions in place, Call light within reach  Restraints  Initially in place: No     Therapy Time   Individual Concurrent Group Co-treatment   Time In  955         Time Out  1038         Minutes  [address] Student Physical Therapy Assistant  Supervised by Sarina Childers PTA

## 2020-02-18 NOTE — PLAN OF CARE
Problem: Falls - Risk of:  Goal: Will remain free from falls  Description  Will remain free from falls  2/18/2020 0145 by Jl Ramos RN  Outcome: Ongoing  Note:   Siderails up x 2  Hourly rounding  Call light in reach  Instructed to call for assist before attempting out of bed. Remains free from falls and accidental injury at this time   Floor free from obstacles  Bed is locked and in lowest position  Adequate lighting provided  Bed alarm on, [place] star and Stay with Me signs posted  2/17/2020 1617 by Kristy Contreras RN  Outcome: Ongoing  Goal: Absence of physical injury  Description  Absence of physical injury  2/18/2020 0145 by Jl aRmos RN  Outcome: Ongoing  2/17/2020 1617 by Kristy Contreras RN  Outcome: Ongoing     Problem: Pain:  Goal: Pain level will decrease  Description  Pain level will decrease  Outcome: Ongoing  Note:   Pain level assessment complete.    Patient educated on pain scale and control interventions  PRN pain medication given per patient request-Percocet  Patient instructed to call out with new onset of pain or unrelieved pain    Goal: Control of acute pain  Description  Control of acute pain  Outcome: Ongoing  Goal: Control of chronic pain  Description  Control of chronic pain  Outcome: Ongoing  Goal: Patient's pain/discomfort is manageable  Description  Patient's pain/discomfort is manageable  Outcome: Ongoing     Problem: Infection:  Goal: Will remain free from infection  Description  Will remain free from infection  Outcome: Ongoing     Problem: Safety:  Goal: Free from accidental physical injury  Description  Free from accidental physical injury  Outcome: Ongoing  Goal: Free from intentional harm  Description  Free from intentional harm  Outcome: Ongoing     Problem: Daily Care:  Goal: Daily care needs are met  Description  Daily care needs are met  Outcome: Ongoing     Problem: Skin Integrity:  Goal: Skin integrity will stabilize  Description  Skin integrity will stabilize  Outcome: Ongoing     Problem: Discharge Planning:  Goal: Patients continuum of care needs are met  Description  Patients continuum of care needs are met  Outcome: Ongoing     Problem:  Activity Intolerance:  Goal: Ability to tolerate increased activity will improve  Description  Ability to tolerate increased activity will improve  2/18/2020 0145 by Abhijit Rojas RN  Outcome: Ongoing  2/17/2020 1617 by Kody Batista RN  Outcome: Ongoing

## 2020-02-19 PROCEDURE — 99225 PR SBSQ OBSERVATION CARE/DAY 25 MINUTES: CPT | Performed by: FAMILY MEDICINE

## 2020-02-19 PROCEDURE — 6360000002 HC RX W HCPCS: Performed by: INTERNAL MEDICINE

## 2020-02-19 PROCEDURE — 6370000000 HC RX 637 (ALT 250 FOR IP): Performed by: INTERNAL MEDICINE

## 2020-02-19 PROCEDURE — 6370000000 HC RX 637 (ALT 250 FOR IP): Performed by: NURSE PRACTITIONER

## 2020-02-19 PROCEDURE — 96372 THER/PROPH/DIAG INJ SC/IM: CPT

## 2020-02-19 PROCEDURE — 97530 THERAPEUTIC ACTIVITIES: CPT

## 2020-02-19 PROCEDURE — G0378 HOSPITAL OBSERVATION PER HR: HCPCS

## 2020-02-19 RX ORDER — OXYCODONE HYDROCHLORIDE AND ACETAMINOPHEN 5; 325 MG/1; MG/1
1 TABLET ORAL EVERY 8 HOURS PRN
Qty: 6 TABLET | Refills: 0 | Status: SHIPPED | OUTPATIENT
Start: 2020-02-19 | End: 2020-02-21

## 2020-02-19 RX ORDER — MAGNESIUM HYDROXIDE/ALUMINUM HYDROXICE/SIMETHICONE 120; 1200; 1200 MG/30ML; MG/30ML; MG/30ML
30 SUSPENSION ORAL EVERY 6 HOURS PRN
Status: DISCONTINUED | OUTPATIENT
Start: 2020-02-19 | End: 2020-02-22 | Stop reason: HOSPADM

## 2020-02-19 RX ORDER — LANOLIN ALCOHOL/MO/W.PET/CERES
3 CREAM (GRAM) TOPICAL NIGHTLY PRN
Qty: 30 TABLET | Refills: 0 | DISCHARGE
Start: 2020-02-19

## 2020-02-19 RX ADMIN — OXYCODONE HYDROCHLORIDE AND ACETAMINOPHEN 500 MG: 500 TABLET ORAL at 08:58

## 2020-02-19 RX ADMIN — ALUMINUM HYDROXIDE, MAGNESIUM HYDROXIDE, AND SIMETHICONE 30 ML: 200; 200; 20 SUSPENSION ORAL at 23:04

## 2020-02-19 RX ADMIN — POLYVINYL ALCOHOL 1 DROP: 14 SOLUTION/ DROPS OPHTHALMIC at 08:58

## 2020-02-19 RX ADMIN — MELATONIN 2000 UNITS: at 08:58

## 2020-02-19 RX ADMIN — GABAPENTIN 300 MG: 300 CAPSULE ORAL at 23:05

## 2020-02-19 RX ADMIN — POLYVINYL ALCOHOL 1 DROP: 14 SOLUTION/ DROPS OPHTHALMIC at 05:01

## 2020-02-19 RX ADMIN — TROSPIUM CHLORIDE 20 MG: 20 TABLET, FILM COATED ORAL at 23:04

## 2020-02-19 RX ADMIN — CLOPIDOGREL BISULFATE 75 MG: 75 TABLET ORAL at 08:58

## 2020-02-19 RX ADMIN — OXYCODONE AND ACETAMINOPHEN 1 TABLET: 5; 325 TABLET ORAL at 04:52

## 2020-02-19 RX ADMIN — MULTIPLE VITAMINS W/ MINERALS TAB 1 TABLET: TAB at 08:58

## 2020-02-19 RX ADMIN — ENOXAPARIN SODIUM 40 MG: 40 INJECTION SUBCUTANEOUS at 08:59

## 2020-02-19 RX ADMIN — MELATONIN TAB 3 MG 3 MG: 3 TAB at 23:05

## 2020-02-19 RX ADMIN — PYRIDOXINE HCL TAB 50 MG 100 MG: 50 TAB at 08:58

## 2020-02-19 RX ADMIN — GUAIFENESIN 600 MG: 600 TABLET, EXTENDED RELEASE ORAL at 08:58

## 2020-02-19 RX ADMIN — GUAIFENESIN 600 MG: 600 TABLET, EXTENDED RELEASE ORAL at 23:04

## 2020-02-19 RX ADMIN — DOCUSATE SODIUM 100 MG: 100 CAPSULE, LIQUID FILLED ORAL at 08:59

## 2020-02-19 RX ADMIN — GABAPENTIN 300 MG: 300 CAPSULE ORAL at 08:59

## 2020-02-19 RX ADMIN — OXYCODONE AND ACETAMINOPHEN 1 TABLET: 5; 325 TABLET ORAL at 20:08

## 2020-02-19 ASSESSMENT — PAIN SCALES - GENERAL
PAINLEVEL_OUTOF10: 6
PAINLEVEL_OUTOF10: 9

## 2020-02-19 NOTE — PROGRESS NOTES
Nutrition Assessment    Type and Reason for Visit: Reassess    Nutrition Recommendations: Continue general diet with supplements. Nutrition Assessment: Remains on general diet with Ensure Clear with meals. Intake 25-75% of meals, variable. Malnutrition Assessment:  · Malnutrition Status: At risk for malnutrition  · Context: Acute illness or injury  · Findings of the 6 clinical characteristics of malnutrition (Minimum of 2 out of 6 clinical characteristics is required to make the diagnosis of moderate or severe Protein Calorie Malnutrition based on AND/ASPEN Guidelines):  1. Energy Intake-Less than or equal to 75% of estimated energy requirement, Unable to assess    2. Weight Loss-No significant weight loss(not considered significant),    3. Fat Loss-Unable to assess  4. Muscle Loss-Unable to assess,    5. Fluid Accumulation-No significant fluid accumulation, Generalized  6.  Strength-Not measured    Nutrition Risk Level:  Moderate    Nutrient Needs:  · Estimated Daily Total Kcal: 25 - 28 kcal/kg = 1632 - 1828 kcal/day  · Estimated Daily Protein (g): 1.2 - 1.3 g/kg = 78 - 95 g protein/day  · Estimated Daily Total Fluid (ml/day): 1 ml/kcal    Nutrition Diagnosis:   · Problem: Inadequate oral intake  · Etiology: related to Insufficient energy/nutrient consumption     Signs and symptoms:  as evidenced by Intake 25-50%, Intake 50-75%    Objective Information:  · Wound Type: None  · Current Nutrition Therapies:  · Oral Diet Orders: General   · Oral Diet intake: 26-50%, 51-75%  · Oral Nutrition Supplement (ONS) Orders: Clear Liquid Oral Supplement  · Anthropometric Measures:  · Ht: 5' 7\" (170.2 cm)   · Current Body Wt: 147 lb (66.7 kg)  · Admission Body Wt: 144 lb (65.3 kg)  · Usual Body Wt: 153 lb (69.4 kg)(4/2019)  · % Weight Change:  ,  6% weight loss x 10 months using admit weight  · Ideal Body Wt: 135 lb (61.2 kg), % Ideal Body 107%  · BMI Classification: (underweight for age (> 72 YO, < 23 BMI))    Nutrition Interventions:   Continue current diet, Continue current ONS  Continued Inpatient Monitoring, Education Not Indicated    Nutrition Evaluation:   · Evaluation: Goals set   · Goals: Meet % estimated nutrition needs    · Monitoring: Meal Intake, Supplement Intake, Weight      Gilberto Guzmán RD, LD, [facility] [place] Office Phone: [phone]

## 2020-02-19 NOTE — PROGRESS NOTES
therapy  DC planning was initiated  Awaiting family choice on facility/precertification  The patient was instructed to follow up with their PCP, Antonette Chi MD in one week      Review of Systems:     Constitutional:  negative for chills, fevers, sweats. Some fatigue   Respiratory:  negative for cough, dyspnea on exertion, shortness of breath, wheezing  Cardiovascular:  negative for chest pain, chest pressure/discomfort, lower extremity edema, palpitations  Gastrointestinal:  negative for abdominal pain, constipation, diarrhea, nausea, vomiting  Neurological:  negative for dizziness, headache    Medications: Allergies: Allergies   Allergen Reactions    Anti-Inflammatory Enzyme [Nutritional Supplements]      Patient states allergy to anti inflammatories    Ceclor [Cefaclor]      Doesn't remember    Morphine Other (See Comments)     Urinary retention    Nsaids      Patient states allergy to anti-inflammatories    Penicillins     Propoxyphene     Skelaxin [Metaxalone]     Sulfa Antibiotics Hives    Tadalafil     Tetracycline     Tolmetin      Patient states allergy to anti-inflammatories  Patient states allergy to anti-inflammatories  Patient states allergy to anti-inflammatories    Alendronate Nausea And Vomiting and Nausea Only    Alendronate Sodium Nausea Only    Asa [Aspirin] Other (See Comments)     Hx diverticulitis.   Can't T  Take regular aspirin 325mg but can take baby aspirin 81mg    Erythromycin Nausea And Vomiting and Nausea Only    Erythromycin Base Nausea Only    Temazepam Nausea And Vomiting and Nausea Only    Tetracyclines & Related Nausea And Vomiting    Tramadol Nausea And Vomiting and Nausea Only       Current Meds:   Scheduled Meds:    fentanNYL  50 mcg Intravenous Once    clopidogrel  75 mg Oral Daily    polyethylene glycol  17 g Oral BID    docusate sodium  100 mg Oral BID    budesonide-formoterol  2 puff Inhalation BID    vitamin B-6  100 mg Oral Daily    vitamin C  500 mg Oral Daily    therapeutic multivitamin-minerals  1 tablet Oral Daily    Vitamin D  2,000 Units Oral Daily    guaiFENesin  600 mg Oral BID    tiotropium  2 puff Inhalation Daily    trospium  20 mg Oral Nightly    gabapentin  300 mg Oral BID    sodium chloride flush  10 mL Intravenous 2 times per day    enoxaparin  40 mg Subcutaneous Daily     Continuous Infusions:   PRN Meds: melatonin, oxyCODONE-acetaminophen, polyvinyl alcohol, albuterol sulfate HFA, levalbuterol, sodium chloride, potassium chloride **OR** potassium alternative oral replacement **OR** potassium chloride, magnesium sulfate, magnesium hydroxide, acetaminophen, ondansetron **OR** ondansetron    Data:     Past Medical History:   has a past medical history of Aortic valve disease, Arthritis, Chronic back pain, COPD (chronic obstructive pulmonary disease) ([address] Disease of blood and blood forming organ, Diverticulitis, GERD (gastroesophageal reflux disease), History of blood transfusion, History of gastritis, Lumbar disc disease, Movement disorder, Nerve disease, peripheral, Osteoporosis, Pneumonia, and Unspecified cerebral artery occlusion with cerebral infarction. Social History:   reports that she quit smoking about 25 years ago. She has never used smokeless tobacco. She reports that she does not drink alcohol or use drugs. Family History:   Family History   Problem Relation Age of Onset    Heart Disease Mother     Heart Disease Father        Vitals:  BP (!) 110/54   Pulse 67   Temp 98.2 °F (36.8 °C) (Oral)   Resp 14   Ht 5' 7\" (1.702 m)   Wt 147 lb (66.7 kg)   SpO2 96%   BMI 23.02 kg/m²   Temp (24hrs), Av.5 °F (36.9 °C), Min:98.2 °F (36.8 °C), Max:98.8 °F (37.1 °C)    No results for input(s): POCGLU in the last 72 hours. I/O (24Hr):   No intake or output data in the 24 hours ending 20 1427    Labs:  Hematology:No results for input(s): WBC, RBC, HGB, HCT, MCV, MCH, MCHC, RDW, PLT, MPV, SEDRATE, CRP, INR, replace inactive diagnosis  replace inactive diagnosis    Overview:   Overview:   replace inactive diagnosis         Mild aortic insufficiency 2/12/2020 Yes    Essential hypertension 2/11/2020 Yes    Centriacinar emphysema ([address] 2/12/2020 Yes    Supplemental oxygen dependent 2/12/2020 Yes    Chronic respiratory failure with hypoxia ([address] 2/12/2020 Yes    Age-related physical debility 2/11/2020 Yes    Small vessel disease ([address]):  Cerebrovascular  2/12/2020 Yes    Left ventricular diastolic dysfunction 8/12/0303 Yes    Moderate mitral regurgitation 2/12/2020 Yes    Tricuspid regurgitation moderate 2/12/2020 Yes          Plan:         I reviewed hospital course including labs, images and notes    Continue current management. Continue dressing may come for pressure control. Continue correcting electrolyte as needed. Continue breathing treatment and oxygen supplementation as needed. PT/OT. Add Voltaren gel topically to right forearm. Placement. Stable for discharge when bed available.   Discussed with the nurse and the patient   I checked OARS report myself, and current prescription from January 28 is for cassette 5-325 quantity 120 tabs for 30 days  There is no active prescription for Percocet 10   I updated the patient that I will give her a prescription for Percocet 5 mg only on discharge      Tate Bernard MD  2/19/2020  2:27 PM

## 2020-02-19 NOTE — PROGRESS NOTES
position/activity restrictions: up with assist, O2 @ 2L/min  Subjective   General  Chart Reviewed: Yes  Patient assessed for rehabilitation services?: Yes  Response to previous treatment: Patient with no complaints from previous session  Family / Caregiver Present: No      Orientation  Orientation  Overall Orientation Status: Within Functional Limits  Objective    ADL  Grooming: Stand by assistance  LE Dressing: Supervision(TO doff shoes)  Additional Comments: Pt moves very slow and is very talkative, req'd verbal redirection to task throughout session. Balance  Sitting Balance: Supervision  Standing Balance: Contact guard assistance  Standing Balance  Time: ~5 min during functional tasks  Functional Mobility  Functional - Mobility Device: Rolling Walker  Activity: Other  Assist Level: Contact guard assistance  Functional Mobility Comments: Patient completed functional mobility from chair>window>bed with RW and CGA for safety with verbal cues for posture, safety with RW, and line mgt. Patient c/o back pain and fatigue while completing functional mobility, but patient continued in conversation in static stance and required max verbal cues to complete func mob to bed to reduce pain and for rest break d/t fatigue  Bed mobility  Sit to Supine: Moderate assistance  Scooting: Minimal assistance  Comment: Mod A for BLE assist into bed, and max verbal instruction for scooting self up in bed to promote good postioning in bed. Transfers  Sit to stand: Contact guard assistance;Minimal assistance  Stand to sit: Contact guard assistance;Minimal assistance  Transfer Comments: Max verbal cues for safety/technique and O2 mgmt                       Cognition  Overall Cognitive Status: Exceptions  Arousal/Alertness: Appropriate responses to stimuli  Following Commands: Follows one step commands with increased time; Follows one step commands with repetition  Attention Span: Attends with cues to redirect  Memory: Appears intact  Safety Judgement: Decreased awareness of need for assistance;Decreased awareness of need for safety  Problem Solving: Decreased awareness of errors;Good awareness of errors made;Assistance required to identify errors made;Assistance required to correct errors made;Assistance required to generate solutions;Assistance required to implement solutions  Insights: Decreased awareness of deficits  Initiation: Requires cues for some  Sequencing: Requires cues for some                                         Plan   Plan  Times per week: 4-5x/week, 1-2x/day  Current Treatment Recommendations: Strengthening, Balance Training, Functional Mobility Training, Endurance Training, Equipment Evaluation, Education, & procurement, Patient/Caregiver Education & Training, Self-Care / ADL, Safety Education & Training, Positioning    AM-PAC Score        AM-PAC Inpatient Daily Activity Raw Score: 17 (02/19/20 1501)  AM-PAC Inpatient ADL T-Scale Score : 37.26 (02/19/20 1501)  ADL Inpatient CMS 0-100% Score: 50.11 (02/19/20 1501)  ADL Inpatient CMS G-Code Modifier : CK (02/19/20 1501)    Goals  Short term goals  Time Frame for Short term goals: by discharge, pt will  Short term goal 1: demo min A with ADL transfers with good safety, approp DME/AD  Short term goal 2: demo min A with functional mob short distance in room for ADL completion with min cues for good safety/pacing  Short term goal 3: demo min A with toileiting routine, inc. clothing management  Short term goal 4: demo min A UB ADLs and mod A LB ADLs with min cues for safety and with approp DME  Short term goal 5: demo SBA with HEP for B UEs to inc. strength for transfers, ADLs  Patient Goals   Patient goals : to go home       Therapy Time   Individual Concurrent Group Co-treatment   Time In 1400         Time Out 1427         Minutes 27              Upon writer exit, call light within reach, pt retired to bed. All lines intact and patient positioned comfortably.  All patient needs addressed prior to ending therapy session. Chart reviewed prior to treatment and patient is agreeable for therapy. RN reports patient is medically stable for therapy treatment this date.     SERA Hammonds/NAT

## 2020-02-19 NOTE — CARE COORDINATION
Social Work-Spoke with patient's daughter, Makayla Concepcion. Discussed that writer anticipates precert today and once precert is obtained, patient will be dc to [facility]. Makalya Concepcion is agreeable with transfer to [place].  Rick Wolf

## 2020-02-19 NOTE — PLAN OF CARE
Problem: Falls - Risk of:  Goal: Will remain free from falls  Description  Will remain free from falls  2/19/2020 0358 by Ginny Jimenez RN  Outcome: Ongoing  5/62/1843 8048 by Darius Wilson RN  Outcome: Ongoing  Goal: Absence of physical injury  Description  Absence of physical injury  2/19/2020 0358 by Ginny Jimenez RN  Outcome: Ongoing  7/47/2964 6002 by Darius Wilson RN  Outcome: Ongoing     Problem: Pain:  Goal: Pain level will decrease  Description  Pain level will decrease  2/19/2020 0358 by Ginny Jimenez RN  Outcome: Ongoing  6/57/0978 8056 by Darius Wilson RN  Outcome: Ongoing  Goal: Control of acute pain  Description  Control of acute pain  2/19/2020 0358 by Ginny Jimenez RN  Outcome: Ongoing  8/46/8492 9730 by Darius Wilson RN  Outcome: Ongoing  Goal: Control of chronic pain  Description  Control of chronic pain  2/19/2020 0358 by Ginny Jimenez RN  Outcome: Ongoing  6/35/8049 2737 by Darius Wilson RN  Outcome: Ongoing  Goal: Patient's pain/discomfort is manageable  Description  Patient's pain/discomfort is manageable  2/19/2020 0358 by Ginny Jimenez RN  Outcome: Ongoing  0/09/7642 5484 by Darius Wilson RN  Outcome: Ongoing     Problem: Infection:  Goal: Will remain free from infection  Description  Will remain free from infection  2/19/2020 0358 by Ginny Jimenez RN  Outcome: Ongoing  4/20/6115 1422 by Darius Wilson RN  Outcome: Ongoing     Problem: Safety:  Goal: Free from accidental physical injury  Description  Free from accidental physical injury  2/19/2020 0358 by Ginny Jimenez RN  Outcome: Ongoing  5/39/7981 8687 by Darius Wilson RN  Outcome: Ongoing  Goal: Free from intentional harm  Description  Free from intentional harm  2/19/2020 0358 by Ginny Jimenez RN  Outcome: Ongoing  3/13/1127 6238 by Darius Wilson RN  Outcome: Ongoing     Problem: Daily Care:  Goal: Daily care needs are met  Description  Daily care needs are met  2/19/2020 0358 by Ange Dewitt Hermes Zimmer RN  Outcome: Ongoing  6/21/9616 8474 by Ritika Mistry RN  Outcome: Ongoing     Problem: Skin Integrity:  Goal: Skin integrity will stabilize  Description  Skin integrity will stabilize  2/19/2020 0358 by Carlyle Lee RN  Outcome: Ongoing  1/92/0581 8100 by Ritika Mistry RN  Outcome: Ongoing     Problem: Discharge Planning:  Goal: Patients continuum of care needs are met  Description  Patients continuum of care needs are met  2/19/2020 0358 by Carlyle Lee RN  Outcome: Ongoing  1/43/9951 0728 by Ritika Mistry RN  Outcome: Ongoing     Problem:  Activity Intolerance:  Goal: Ability to tolerate increased activity will improve  Description  Ability to tolerate increased activity will improve  2/19/2020 0358 by Carlyle Lee RN  Outcome: Ongoing  2/86/8625 8307 by Ritika Mistry RN  Outcome: Ongoing

## 2020-02-19 NOTE — PLAN OF CARE
Problem: Falls - Risk of:  Goal: Will remain free from falls  7/36/3797 3031 by Jose Moraes RN  Outcome: Ongoing  2/19/2020 0358 by Bell Aviles RN  Outcome: Ongoing  Goal: Absence of physical injury  8/22/7240 2364 by Jose Moraes RN  Outcome: Ongoing  2/19/2020 0358 by Bell Aviles RN  Outcome: Ongoing     Problem: Pain:  Goal: Pain level will decrease  1/86/2983 1834 by Jose Moraes RN  Outcome: Ongoing  2/19/2020 0358 by Bell Aviles RN  Outcome: Ongoing  Goal: Control of acute pain  8/82/7386 4278 by Jose Moraes RN  Outcome: Ongoing  2/19/2020 0358 by Bell Aviles RN  Outcome: Ongoing  Goal: Control of chronic pain  6/37/5482 2438 by Jose Moraes RN  Outcome: Ongoing  2/19/2020 0358 by Bell Aviles RN  Outcome: Ongoing  Goal: Patient's pain/discomfort is manageable  3/66/7862 2212 by Jose Moraes RN  Outcome: Ongoing  2/19/2020 0358 by Bell Aviles RN  Outcome: Ongoing     Problem: Infection:  Goal: Will remain free from infection  5/03/4948 4811 by Jose Moraes RN  Outcome: Ongoing  2/19/2020 0358 by Bell Aviles RN  Outcome: Ongoing     Problem: Safety:  Goal: Free from accidental physical injury  9/47/3656 6392 by Jose Moraes RN  Outcome: Ongoing  2/19/2020 0358 by Bell Aviles RN  Outcome: Ongoing  Goal: Free from intentional harm  5/16/4752 1557 by Jose Moraes RN  Outcome: Ongoing  2/19/2020 0358 by Bell Aviles RN  Outcome: Ongoing     Problem: Daily Care:  Goal: Daily care needs are met  1/32/6851 3366 by Jose Moraes RN  Outcome: Ongoing  2/19/2020 0358 by Bell Aviles RN  Outcome: Ongoing     Problem: Skin Integrity:  Goal: Skin integrity will stabilize  2/17/0161 2835 by Jose Moraes RN  Outcome: Ongoing  2/19/2020 0358 by Bell Aviles RN  Outcome: Ongoing     Problem: Discharge Planning:  Goal: Patients continuum of care needs are met  7/18/3359 4345 by Jose Moraes RN  Outcome: Ongoing  2/19/2020 0358 by Leann Murillo

## 2020-02-19 NOTE — DISCHARGE SUMMARY
[facility]    Discharge Summary      Patient ID: Loly Lazar  :  1940   MRN: [id number]     ACCOUNT:  [de-identified]   Patient's PCP: Debora Otero MD  Admit Date: 2020   Discharge Date: 2020     Length of Stay: 0  Code Status:  Full Code  Admitting Physician: Tate Bernard MD  Discharge Physician: Tate Bernard MD     Active Discharge Diagnoses:     Hospital Problem Lists:  Principal Problem:    Altered mental status, unspecified  Active Problems:    COPD (chronic obstructive pulmonary disease) ([facility])    Hypokalemia    Chronic bilateral low back pain with bilateral sciatica    Mild aortic insufficiency    Essential hypertension    Centriacinar emphysema ([facility]    Supplemental oxygen dependent    Chronic respiratory failure with hypoxia ([facility]    Age-related physical debility    Small vessel disease [facility]):  Cerebrovascular     Left ventricular diastolic dysfunction    Moderate mitral regurgitation    Tricuspid regurgitation moderate    Diarrhea  Resolved Problems:    Mental status alteration      Admission Condition:  poor     Discharged Condition: fair    Hospital Stay:     Hospital Course:      Per my associate  As documented in the medical record: \"Afsaneh Kebede is a [de-identified] y.o. -American female who reports to the hospital with complaint of altered mental status.  The patient was reportedly brought to the hospital by EMS after becoming combative at home with her daughter, who is her caregiver. ER notes indicate that the patient's daughter stated she was confused.  The patient is currently alert and oriented x3, and upon further questioning she endorses having a verbal altercation with her daughter. Niya Ledbetter states this happens periodically.  The patient currently lives with her daughter secondary to her own becoming infested with roaches. Niya Ledbetter denies specific complaint. Amandaajune Colder is appropriate at this time. Niya Ledbetter has past medical history that includes previous TIA, COPD and a known heart murmur. She wears home oxygen. \"      The patient was admitted  Her electrolyte abnormalities were addressed  Blood pressure was monitored and controlled  Respiratory Therapy and Bronchodilators prn   PT/OT was requested  The patient responded well to therapy  DC planning was initiated    Had significant difficulties with placement  For days ( > 1 week) , significant resistance and delays from the family side especially her daughter as she  refused to take her back home despite denial of SNF and appeal, failed to send to long-term care and patient cannot go to shelter as she has a place to go meanwhile her daughter continuously refused to take her back  Discharge order has been in the system for more than 1 week patient was stable pretty much most of the time       On day of discharge we will needed security help and finally daughter brought in her clothes and  agreed to take her back home, patient discharge with  assisted of  in-house security to her daughter address not her friend as it was arranged initially     Significant therapeutic interventions:  As above       Significant Diagnostic Studies:     Radiology:  No results found.     Consultations:    Consults:     Final Specialist Recommendations/Findings:   IP CONSULT TO INTERNAL MEDICINE  IP CONSULT TO SOCIAL WORK      The patient was seen and examined on day of discharge    General appearance:  alert, cooperative and no distress, nasal canula  Mental Status:  oriented to person, place and time and normal affect  Lungs:  clear to auscultation bilaterally, normal effort  Heart:  regular rate and rhythm, no murmur  Abdomen:  soft, nontender, nondistended, normal bowel sounds, no masses, hepatomegaly, splenomegaly  Extremities:  no edema, redness, tenderness in the calves  Skin:  no gross lesions, rashes, induration    Discharge plan:     Disposition:     Physician Follow Up:     Marci Sigala MD  [address] [facility]  [address] [facility] [address]  [address]    Schedule an appointment as soon as possible for a visit in 1 week      [address]  [address] [facility] [id number] [address] [facility]       Requiring Further Evaluation/Follow Up POST HOSPITALIZATION/Incidental Findings:     Diet: cardiac diet    Activity: As tolerated    Instructions to Patient:     Discharge Medications:      Medication List      START taking these medications    melatonin 3 MG Tabs tablet  Take 1 tablet by mouth nightly as needed (for insomnia)     polyvinyl alcohol 1.4 % ophthalmic solution  Commonly known as:  LIQUIFILM TEARS  Place 1 drop into both eyes as needed for Dry Eyes        CONTINUE taking these medications    albuterol sulfate  (90 Base) MCG/ACT inhaler     budesonide-formoterol 160-4.5 MCG/ACT Aero  Commonly known as:  Symbicort  Inhale 2 puffs into the lungs 2 times daily.      Calcium 600 + D 600-200 MG-UNIT Tabs  Generic drug:  Calcium Carb-Cholecalciferol     clopidogrel 75 MG tablet  Commonly known as:  PLAVIX     docusate sodium 100 MG capsule  Commonly known as:  COLACE     gabapentin 600 MG tablet  Commonly known as:  NEURONTIN     Zoraida-Hydrolac 12 12 % cream  Generic drug:  ammonium lactate  APPLY TO AFFECTED AREA DAILY     guaiFENesin 600 MG extended release tablet  Commonly known as:  MUCINEX  Take 1 tablet by mouth 2 times daily     polyethylene glycol powder  Commonly known as:  GLYCOLAX     sodium chloride 0.65 % nasal spray  Commonly known as:  OCEAN, BABY AYR     Spiriva Respimat 2.5 MCG/ACT Aers inhaler  Generic drug:  tiotropium     therapeutic multivitamin-minerals tablet     trospium 20 MG tablet  Commonly known as:  SANCTURA  Take 1 tablet by mouth Daily with supper     vitamin B-6 100 MG tablet  Commonly known as:  PYRIDOXINE     vitamin C 500 MG tablet  Commonly known as:  ASCORBIC ACID     vitamin D 50 MCG ([address] UT) Caps capsule     Xopenex 1.25 MG/3ML nebulizer

## 2020-02-20 LAB
ANION GAP SERPL CALCULATED.3IONS-SCNC: 10 MMOL/L (ref 9–17)
BUN BLDV-MCNC: 18 MG/DL (ref 8–23)
BUN/CREAT BLD: 33 (ref 9–20)
CALCIUM SERPL-MCNC: 9 MG/DL (ref 8.6–10.4)
CHLORIDE BLD-SCNC: 105 MMOL/L (ref 98–107)
CO2: 26 MMOL/L (ref 20–31)
CREAT SERPL-MCNC: 0.55 MG/DL (ref 0.5–0.9)
GFR AFRICAN AMERICAN: >60 ML/MIN
GFR NON-AFRICAN AMERICAN: >60 ML/MIN
GFR SERPL CREATININE-BSD FRML MDRD: ABNORMAL ML/MIN/{1.73_M2}
GFR SERPL CREATININE-BSD FRML MDRD: ABNORMAL ML/MIN/{1.73_M2}
GLUCOSE BLD-MCNC: 89 MG/DL (ref 70–99)
POTASSIUM SERPL-SCNC: 3.7 MMOL/L (ref 3.7–5.3)
SODIUM BLD-SCNC: 141 MMOL/L (ref 135–144)

## 2020-02-20 PROCEDURE — 6360000002 HC RX W HCPCS: Performed by: INTERNAL MEDICINE

## 2020-02-20 PROCEDURE — G0378 HOSPITAL OBSERVATION PER HR: HCPCS

## 2020-02-20 PROCEDURE — 2700000000 HC OXYGEN THERAPY PER DAY

## 2020-02-20 PROCEDURE — 97535 SELF CARE MNGMENT TRAINING: CPT

## 2020-02-20 PROCEDURE — 97530 THERAPEUTIC ACTIVITIES: CPT

## 2020-02-20 PROCEDURE — 36415 COLL VENOUS BLD VENIPUNCTURE: CPT

## 2020-02-20 PROCEDURE — 6370000000 HC RX 637 (ALT 250 FOR IP): Performed by: NURSE PRACTITIONER

## 2020-02-20 PROCEDURE — 96372 THER/PROPH/DIAG INJ SC/IM: CPT

## 2020-02-20 PROCEDURE — 94640 AIRWAY INHALATION TREATMENT: CPT

## 2020-02-20 PROCEDURE — 6370000000 HC RX 637 (ALT 250 FOR IP): Performed by: INTERNAL MEDICINE

## 2020-02-20 PROCEDURE — 97116 GAIT TRAINING THERAPY: CPT

## 2020-02-20 PROCEDURE — 99225 PR SBSQ OBSERVATION CARE/DAY 25 MINUTES: CPT | Performed by: FAMILY MEDICINE

## 2020-02-20 PROCEDURE — 6370000000 HC RX 637 (ALT 250 FOR IP): Performed by: FAMILY MEDICINE

## 2020-02-20 PROCEDURE — 80048 BASIC METABOLIC PNL TOTAL CA: CPT

## 2020-02-20 RX ORDER — OXYCODONE HYDROCHLORIDE AND ACETAMINOPHEN 5; 325 MG/1; MG/1
1 TABLET ORAL EVERY 4 HOURS PRN
Status: DISCONTINUED | OUTPATIENT
Start: 2020-02-20 | End: 2020-02-22 | Stop reason: HOSPADM

## 2020-02-20 RX ORDER — BACITRACIN 500 [USP'U]/G
OINTMENT TOPICAL 4 TIMES DAILY PRN
Status: DISCONTINUED | OUTPATIENT
Start: 2020-02-20 | End: 2020-02-22 | Stop reason: HOSPADM

## 2020-02-20 RX ADMIN — GUAIFENESIN 600 MG: 600 TABLET, EXTENDED RELEASE ORAL at 21:29

## 2020-02-20 RX ADMIN — DOCUSATE SODIUM 100 MG: 100 CAPSULE, LIQUID FILLED ORAL at 21:32

## 2020-02-20 RX ADMIN — PYRIDOXINE HCL TAB 50 MG 100 MG: 50 TAB at 09:34

## 2020-02-20 RX ADMIN — BUDESONIDE AND FORMOTEROL FUMARATE DIHYDRATE 2 PUFF: 160; 4.5 AEROSOL RESPIRATORY (INHALATION) at 09:46

## 2020-02-20 RX ADMIN — MELATONIN TAB 3 MG 3 MG: 3 TAB at 22:59

## 2020-02-20 RX ADMIN — GABAPENTIN 300 MG: 300 CAPSULE ORAL at 09:34

## 2020-02-20 RX ADMIN — LEVALBUTEROL 1.25 MG: 1.25 SOLUTION, CONCENTRATE RESPIRATORY (INHALATION) at 09:30

## 2020-02-20 RX ADMIN — POLYVINYL ALCOHOL 1 DROP: 14 SOLUTION/ DROPS OPHTHALMIC at 21:30

## 2020-02-20 RX ADMIN — GABAPENTIN 300 MG: 300 CAPSULE ORAL at 21:29

## 2020-02-20 RX ADMIN — TIOTROPIUM BROMIDE INHALATION SPRAY 2 PUFF: 3.12 SPRAY, METERED RESPIRATORY (INHALATION) at 09:45

## 2020-02-20 RX ADMIN — OXYCODONE AND ACETAMINOPHEN 1 TABLET: 5; 325 TABLET ORAL at 21:29

## 2020-02-20 RX ADMIN — OXYCODONE AND ACETAMINOPHEN 1 TABLET: 5; 325 TABLET ORAL at 09:33

## 2020-02-20 RX ADMIN — GUAIFENESIN 600 MG: 600 TABLET, EXTENDED RELEASE ORAL at 09:34

## 2020-02-20 RX ADMIN — ENOXAPARIN SODIUM 40 MG: 40 INJECTION SUBCUTANEOUS at 09:35

## 2020-02-20 RX ADMIN — POLYVINYL ALCOHOL 1 DROP: 14 SOLUTION/ DROPS OPHTHALMIC at 00:10

## 2020-02-20 RX ADMIN — OXYCODONE AND ACETAMINOPHEN 1 TABLET: 5; 325 TABLET ORAL at 13:37

## 2020-02-20 RX ADMIN — POLYVINYL ALCOHOL 1 DROP: 14 SOLUTION/ DROPS OPHTHALMIC at 03:02

## 2020-02-20 RX ADMIN — OXYCODONE AND ACETAMINOPHEN 1 TABLET: 5; 325 TABLET ORAL at 03:02

## 2020-02-20 RX ADMIN — OXYCODONE HYDROCHLORIDE AND ACETAMINOPHEN 500 MG: 500 TABLET ORAL at 09:34

## 2020-02-20 RX ADMIN — TROSPIUM CHLORIDE 20 MG: 20 TABLET, FILM COATED ORAL at 21:29

## 2020-02-20 RX ADMIN — MULTIPLE VITAMINS W/ MINERALS TAB 1 TABLET: TAB at 09:34

## 2020-02-20 RX ADMIN — POLYVINYL ALCOHOL 1 DROP: 14 SOLUTION/ DROPS OPHTHALMIC at 07:31

## 2020-02-20 RX ADMIN — MELATONIN 2000 UNITS: at 09:33

## 2020-02-20 RX ADMIN — POLYVINYL ALCOHOL 1 DROP: 14 SOLUTION/ DROPS OPHTHALMIC at 13:35

## 2020-02-20 ASSESSMENT — PAIN SCALES - GENERAL
PAINLEVEL_OUTOF10: 8
PAINLEVEL_OUTOF10: 0
PAINLEVEL_OUTOF10: 9
PAINLEVEL_OUTOF10: 9
PAINLEVEL_OUTOF10: 8

## 2020-02-20 ASSESSMENT — PAIN - FUNCTIONAL ASSESSMENT: PAIN_FUNCTIONAL_ASSESSMENT: PREVENTS OR INTERFERES SOME ACTIVE ACTIVITIES AND ADLS

## 2020-02-20 ASSESSMENT — PAIN DESCRIPTION - FREQUENCY: FREQUENCY: CONTINUOUS

## 2020-02-20 ASSESSMENT — PAIN DESCRIPTION - LOCATION: LOCATION: BACK;HAND

## 2020-02-20 ASSESSMENT — PAIN DESCRIPTION - PAIN TYPE: TYPE: ACUTE PAIN

## 2020-02-20 ASSESSMENT — PAIN DESCRIPTION - ONSET: ONSET: ON-GOING

## 2020-02-20 ASSESSMENT — PAIN DESCRIPTION - DESCRIPTORS: DESCRIPTORS: ACHING

## 2020-02-20 NOTE — PLAN OF CARE
Problem: Falls - Risk of:  Goal: Will remain free from falls  Description  Will remain free from falls  2/20/2020 1424 by Anastasia Zabala RN  Outcome: Ongoing  Note:   Fall safety precautions remain in effect, alarms on  2/20/2020 0605 by Bell Aviles RN  Outcome: Ongoing     Problem: Pain:  Goal: Control of acute pain  Description  Control of acute pain  2/20/2020 1424 by Anastasia Zabala RN  Outcome: Ongoing  Note:   Pt reports moderate relief with current meds  2/20/2020 0605 by Bell Aviles RN  Outcome: Ongoing

## 2020-02-20 NOTE — PROGRESS NOTES
[facility]    Progress Note    2/20/2020    3:37 PM    Name:   Joelle Shipley  MRN:     [id number]     Acct:      [de-identified]   Room:   2020/2020-01  IP Day:  0  Admit Date:  2/11/2020  3:42 PM    PCP:   Willy Dior MD  Code Status:  Full Code    Subjective:     C/C:   Chief Complaint   Patient presents with   [facility] Other     daughter wants her to go to respite due to being combative     Interval History Status: not changed. Patient seen and examined at bedside, no acute events overnight. Continue to be stable, had some diarrhea yesterday , getting slightly better   Patient vitals, labs and all providers notes were reviewed,from overnight shift and morning updates were noted and discussed with the nurse      Brief History:     Per my associate  As documented in the medical record: \"Afsaneh Kebede is a [de-identified] y.o. -American female who reports to the hospital with complaint of altered mental status.  The patient was reportedly brought to the hospital by EMS after becoming combative at home with her daughter, who is her caregiver. ER notes indicate that the patient's daughter stated she was confused. The patient is currently alert and oriented x3, and upon further questioning she endorses having a verbal altercation with her daughter. Haritha Hinson states this happens periodically.  The patient currently lives with her daughter secondary to her own becoming infested with roaches. Haritha Hinson denies specific complaint. Haritha Hinson is appropriate at this time. Haritha Hinson has past medical history that includes previous TIA, COPD and a known heart murmur. She wears home oxygen. \"      The patient was admitted  Her electrolyte abnormalities were addressed  Blood pressure was monitored and controlled  Respiratory Therapy and Bronchodilators prn   PT/OT was requested  The patient responded well to therapy  DC planning was initiated  Awaiting family choice on facility/precertification  The patient was instructed to follow up with their PCP, Keily Souza MD in one week      Review of Systems:     Constitutional:  negative for chills, fevers, sweats. Some fatigue   Respiratory:  negative for cough, dyspnea on exertion, shortness of breath, wheezing  Cardiovascular:  negative for chest pain, chest pressure/discomfort, lower extremity edema, palpitations  Gastrointestinal:  negative for abdominal pain, constipation, diarrhea, nausea, vomiting  Neurological:  negative for dizziness, headache    Medications: Allergies: Allergies   Allergen Reactions    Anti-Inflammatory Enzyme [Nutritional Supplements]      Patient states allergy to anti inflammatories    Ceclor [Cefaclor]      Doesn't remember    Morphine Other (See Comments)     Urinary retention    Nsaids      Patient states allergy to anti-inflammatories    Penicillins     Propoxyphene     Skelaxin [Metaxalone]     Sulfa Antibiotics Hives    Tadalafil     Tetracycline     Tolmetin      Patient states allergy to anti-inflammatories  Patient states allergy to anti-inflammatories  Patient states allergy to anti-inflammatories    Alendronate Nausea And Vomiting and Nausea Only    Alendronate Sodium Nausea Only    Asa [Aspirin] Other (See Comments)     Hx diverticulitis.   Can't T  Take regular aspirin 325mg but can take baby aspirin 81mg    Erythromycin Nausea And Vomiting and Nausea Only    Erythromycin Base Nausea Only    Temazepam Nausea And Vomiting and Nausea Only    Tetracyclines & Related Nausea And Vomiting    Tramadol Nausea And Vomiting and Nausea Only       Current Meds:   Scheduled Meds:    fentanNYL  50 mcg Intravenous Once    clopidogrel  75 mg Oral Daily    polyethylene glycol  17 g Oral BID    docusate sodium  100 mg Oral BID    budesonide-formoterol  2 puff Inhalation BID    vitamin B-6  100 mg Oral Daily    vitamin C  500 mg Oral Daily    therapeutic multivitamin-minerals  1

## 2020-02-20 NOTE — CARE COORDINATION
Social work: Phone call received from [facility], states authorization denied for SNF at [facility], therefore request for skilled care at [place] will not be approved. JENNIFER spoke with Noy/neida wallace, confirmed authorization was denied(this information was never given to Ernesto 'ANN' ). Spoke with Audra Callahan ([phone]) to schedule peer to peer and SW was informed option for peer to peer   at 11:00AM; therefore SW will need to submit expedited appeal which takes at least 2 business days for response. JENNIFER will fax expedited appeal request once PT are entered for today. Per Zelda/PT patient seen today. Iesha/Juan Carlos Villalpando also stated they can admit patient under LTC benefit, she will receive PT/OT, have semi private bed/bath and does not have to stay long-term. Additionally, dtr informed SW this morning she wants to take patient to family  tomorrow; spoke with Gladys/supervisor, if dtr chooses to do this, patient cannot return to hospital.      Await call back from Karla/dtann to discuss options: 1. Dc to [facility] under [facility] 2. Take patient home with Sue Hernandez and she can go to  3.  Await outcome of expedited appeal.

## 2020-02-20 NOTE — CARE COORDINATION
Social work: Call back received from Karla/barak, feels patient needs skilled rehab, requesting to go forward with expedited appeal for [facility]. KARLY faxed appeal to [phone]- await outcome. Phone: [phone] Ref # [id number]  Iesha/Juan Carlos Villalpando updated. KARLY also explained to dtr patient cannot go to  and return to then return to the hospital; dtr voiced understanding. KARLY also received call from Damaris/CAROLINE([phone]) following up on call svitlana/karly had made; Effie Rae will come to the hospital tomorrow to meet with patient.

## 2020-02-20 NOTE — PROGRESS NOTES
4/18/2017). Restrictions  Restrictions/Precautions  Restrictions/Precautions: General Precautions, Fall Risk, Up as Tolerated  Required Braces or Orthoses?: No  Position Activity Restriction  Other position/activity restrictions: up with assist, O2 @ 2L/min  Subjective   General  Chart Reviewed: Yes  Additional Pertinent Hx: TIA, COPD and a known heart murmur, wears home oxygen. Response To Previous Treatment: no complaint  Subjective  Subjective: Pt side lying upon arrival getting ready to go to the bathroom. Once the  pt got the toilet she reported it was too low and that she would need the bedside commode place over the seat so she would be able to stand up from it. Time was spent explaining why she should attempt without the commode, pt still refused. General Comment: Pt was easily distracted throughout the treatment, and required redirection. Comments: RNSpencer PT          Orientation  Orientation  Overall Orientation Status: Within Normal Limits  Cognition      Objective   Bed mobility  Supine to Sit: Minimal assistance  Transfers  Sit to Stand: Contact guard assistance  Stand to sit: Contact guard assistance  Bed to Chair: Contact guard assistance  Stand Pivot Transfers: Contact guard assistance  Comment: Pt continues to present with a forward flexed posture in standing. Verbal cues for upright posture however pt states this is her normal baseline. VCs to back all the way up to the chair before strating to sit down and to use UE when sitting down to slow the descent. Ambulation  Ambulation?: Yes  More Ambulation?: No  Ambulation 1  Surface: level tile  Device: Rolling Walker  Other Apparatus: O2(2 L)  Assistance: Contact guard assistance  Quality of Gait: Pt frequently stopped walked do distractions  Gait Deviations: Slow Nusrat;Decreased step length;Decreased step height  Distance: 20ft x2  Comments: Multiple VCs for pt to keep the walker closer to her body and to walk with her head up.  Pt

## 2020-02-20 NOTE — PLAN OF CARE
Problem: Falls - Risk of:  Goal: Will remain free from falls  Description  Will remain free from falls  2/20/2020 0605 by Balbir Valle RN  Outcome: Ongoing  9/50/3152 7600 by Lawanda Fairbanks RN  Outcome: Ongoing  Goal: Absence of physical injury  Description  Absence of physical injury  2/20/2020 0605 by Balbir Valle RN  Outcome: Ongoing  7/26/5823 4842 by Lawanda Fairbanks RN  Outcome: Ongoing     Problem: Pain:  Goal: Pain level will decrease  Description  Pain level will decrease  2/20/2020 0605 by Balbir Valle RN  Outcome: Ongoing  9/24/6425 3929 by Lawanda Fairbanks RN  Outcome: Ongoing  Goal: Control of acute pain  Description  Control of acute pain  2/20/2020 0605 by Balbir Valle RN  Outcome: Ongoing  6/22/0542 5672 by Lawanda Fairbanks RN  Outcome: Ongoing  Goal: Control of chronic pain  Description  Control of chronic pain  2/20/2020 0605 by Balbir Valle RN  Outcome: Ongoing  7/50/9866 5202 by Lawanda Fairbanks RN  Outcome: Ongoing  Goal: Patient's pain/discomfort is manageable  Description  Patient's pain/discomfort is manageable  2/20/2020 0605 by Balbir Valle RN  Outcome: Ongoing  4/43/5408 6251 by Lawanda Fairbanks RN  Outcome: Ongoing     Problem: Infection:  Goal: Will remain free from infection  Description  Will remain free from infection  2/20/2020 0605 by Balbir Valle RN  Outcome: Ongoing  5/85/1903 3859 by Lawanda Fairbanks RN  Outcome: Ongoing     Problem: Safety:  Goal: Free from accidental physical injury  Description  Free from accidental physical injury  2/20/2020 0605 by Balbir Valle RN  Outcome: Ongoing  3/05/3926 2521 by Lawanda Fairbanks RN  Outcome: Ongoing  Goal: Free from intentional harm  Description  Free from intentional harm  2/20/2020 0605 by Balbir Valle RN  Outcome: Ongoing  5/14/1130 6443 by Lwaanda Fairbanks RN  Outcome: Ongoing     Problem: Daily Care:  Goal: Daily care needs are met  Description  Daily care needs are met  2/20/2020 0605 by Garry Lozano

## 2020-02-21 LAB
ABSOLUTE EOS #: 0.26 K/UL (ref 0–0.44)
ABSOLUTE IMMATURE GRANULOCYTE: 0.02 K/UL (ref 0–0.3)
ABSOLUTE LYMPH #: 2.65 K/UL (ref 1.1–3.7)
ABSOLUTE MONO #: 0.43 K/UL (ref 0.1–1.2)
BASOPHILS # BLD: 1 % (ref 0–2)
BASOPHILS ABSOLUTE: 0.03 K/UL (ref 0–0.2)
DIFFERENTIAL TYPE: ABNORMAL
EOSINOPHILS RELATIVE PERCENT: 5 % (ref 1–4)
HCT VFR BLD CALC: 39.4 % (ref 36.3–47.1)
HEMOGLOBIN: 12.8 G/DL (ref 11.9–15.1)
IMMATURE GRANULOCYTES: 0 %
LYMPHOCYTES # BLD: 51 % (ref 24–43)
MCH RBC QN AUTO: 29.5 PG (ref 25.2–33.5)
MCHC RBC AUTO-ENTMCNC: 32.5 G/DL (ref 28.4–34.8)
MCV RBC AUTO: 90.8 FL (ref 82.6–102.9)
MONOCYTES # BLD: 8 % (ref 3–12)
NRBC AUTOMATED: 0 PER 100 WBC
PDW BLD-RTO: 13.2 % (ref 11.8–14.4)
PLATELET # BLD: 164 K/UL (ref 138–453)
PLATELET ESTIMATE: ABNORMAL
PMV BLD AUTO: 10.3 FL (ref 8.1–13.5)
RBC # BLD: 4.34 M/UL (ref 3.95–5.11)
RBC # BLD: ABNORMAL 10*6/UL
SEG NEUTROPHILS: 35 % (ref 36–65)
SEGMENTED NEUTROPHILS ABSOLUTE COUNT: 1.82 K/UL (ref 1.5–8.1)
WBC # BLD: 5.2 K/UL (ref 3.5–11.3)
WBC # BLD: ABNORMAL 10*3/UL

## 2020-02-21 PROCEDURE — 85025 COMPLETE CBC W/AUTO DIFF WBC: CPT

## 2020-02-21 PROCEDURE — 6360000002 HC RX W HCPCS: Performed by: INTERNAL MEDICINE

## 2020-02-21 PROCEDURE — 6370000000 HC RX 637 (ALT 250 FOR IP): Performed by: FAMILY MEDICINE

## 2020-02-21 PROCEDURE — 36415 COLL VENOUS BLD VENIPUNCTURE: CPT

## 2020-02-21 PROCEDURE — 6370000000 HC RX 637 (ALT 250 FOR IP): Performed by: INTERNAL MEDICINE

## 2020-02-21 PROCEDURE — G0378 HOSPITAL OBSERVATION PER HR: HCPCS

## 2020-02-21 PROCEDURE — 99224 PR SBSQ OBSERVATION CARE/DAY 15 MINUTES: CPT | Performed by: FAMILY MEDICINE

## 2020-02-21 RX ORDER — POLYVINYL ALCOHOL 14 MG/ML
1 SOLUTION/ DROPS OPHTHALMIC PRN
Qty: 1 BOTTLE | Refills: 4 | DISCHARGE
Start: 2020-02-21 | End: 2020-03-22

## 2020-02-21 RX ADMIN — GABAPENTIN 300 MG: 300 CAPSULE ORAL at 10:18

## 2020-02-21 RX ADMIN — MULTIPLE VITAMINS W/ MINERALS TAB 1 TABLET: TAB at 10:18

## 2020-02-21 RX ADMIN — MELATONIN 2000 UNITS: at 10:18

## 2020-02-21 RX ADMIN — TROSPIUM CHLORIDE 20 MG: 20 TABLET, FILM COATED ORAL at 20:33

## 2020-02-21 RX ADMIN — GUAIFENESIN 600 MG: 600 TABLET, EXTENDED RELEASE ORAL at 20:33

## 2020-02-21 RX ADMIN — CLOPIDOGREL BISULFATE 75 MG: 75 TABLET ORAL at 10:18

## 2020-02-21 RX ADMIN — PYRIDOXINE HCL TAB 50 MG 100 MG: 50 TAB at 10:19

## 2020-02-21 RX ADMIN — GUAIFENESIN 600 MG: 600 TABLET, EXTENDED RELEASE ORAL at 10:18

## 2020-02-21 RX ADMIN — GABAPENTIN 300 MG: 300 CAPSULE ORAL at 20:33

## 2020-02-21 RX ADMIN — OXYCODONE AND ACETAMINOPHEN 1 TABLET: 5; 325 TABLET ORAL at 20:37

## 2020-02-21 RX ADMIN — OXYCODONE AND ACETAMINOPHEN 1 TABLET: 5; 325 TABLET ORAL at 10:51

## 2020-02-21 RX ADMIN — DOCUSATE SODIUM 100 MG: 100 CAPSULE, LIQUID FILLED ORAL at 20:33

## 2020-02-21 RX ADMIN — OXYCODONE HYDROCHLORIDE AND ACETAMINOPHEN 500 MG: 500 TABLET ORAL at 10:18

## 2020-02-21 ASSESSMENT — PAIN DESCRIPTION - ONSET
ONSET: ON-GOING
ONSET: ON-GOING

## 2020-02-21 ASSESSMENT — PAIN DESCRIPTION - ORIENTATION
ORIENTATION: RIGHT
ORIENTATION: RIGHT

## 2020-02-21 ASSESSMENT — PAIN DESCRIPTION - PROGRESSION
CLINICAL_PROGRESSION: NOT CHANGED
CLINICAL_PROGRESSION: NOT CHANGED

## 2020-02-21 ASSESSMENT — PAIN SCALES - GENERAL
PAINLEVEL_OUTOF10: 8
PAINLEVEL_OUTOF10: 4
PAINLEVEL_OUTOF10: 6
PAINLEVEL_OUTOF10: 6

## 2020-02-21 ASSESSMENT — PAIN DESCRIPTION - LOCATION
LOCATION: HAND
LOCATION: WRIST

## 2020-02-21 ASSESSMENT — PAIN DESCRIPTION - FREQUENCY
FREQUENCY: CONTINUOUS
FREQUENCY: CONTINUOUS

## 2020-02-21 ASSESSMENT — PAIN DESCRIPTION - DESCRIPTORS
DESCRIPTORS: ACHING
DESCRIPTORS: ACHING

## 2020-02-21 ASSESSMENT — PAIN DESCRIPTION - PAIN TYPE
TYPE: ACUTE PAIN
TYPE: ACUTE PAIN

## 2020-02-21 NOTE — CARE COORDINATION
Social work: Phone call to Karla/barak and informed her again insurance intends to DENY SNF expedited appear and therefore patient will need to be dc'd HOME today. Mehran Roberto states patient cannot come home given new diagnosis of dementia and 24 hr care has not been arranged yet. JENNIFER attempted multiple times inform this is not an acute reason to stay in the hospital.   Mehran Roberto would not accept this, Gladys/CHARO supervisor got on the phone and again attempted to explain this but Mehran Roberto became increasingly angry and hung up on [address] [facility]. Maryse Gee and Tia/JENNIFER met with patient at bedside and informed her insurance denied auth and she is ready for dc. Told her she needs to leave today, either to [place] long term care, with family member or to shelter. Patient stated a family member will be here by Holley Sidhu to take her to nephew's .   Maryse Patterson also attempted to contact sons, no answer. VM left.

## 2020-02-21 NOTE — PLAN OF CARE
Problem: Falls - Risk of:  Goal: Will remain free from falls  Description  Will remain free from falls  2/21/2020 0359 by Martha Jeff RN  Outcome: Ongoing  2/20/2020 1424 by Corbin Ye RN  Outcome: Ongoing  Note:   Fall safety precautions remain in effect, alarms on  Goal: Absence of physical injury  Description  Absence of physical injury  Outcome: Ongoing     Problem: Pain:  Goal: Pain level will decrease  Description  Pain level will decrease  Outcome: Ongoing  Goal: Control of acute pain  Description  Control of acute pain  2/21/2020 0359 by Martha Jeff RN  Outcome: Ongoing  2/20/2020 1424 by Corbin Ye RN  Outcome: Ongoing  Note:   Pt reports moderate relief with current meds  Goal: Control of chronic pain  Description  Control of chronic pain  Outcome: Ongoing  Goal: Patient's pain/discomfort is manageable  Description  Patient's pain/discomfort is manageable  Outcome: Ongoing     Problem: Infection:  Goal: Will remain free from infection  Description  Will remain free from infection  Outcome: Ongoing     Problem: Safety:  Goal: Free from accidental physical injury  Description  Free from accidental physical injury  Outcome: Ongoing  Goal: Free from intentional harm  Description  Free from intentional harm  Outcome: Ongoing     Problem: Daily Care:  Goal: Daily care needs are met  Description  Daily care needs are met  Outcome: Ongoing     Problem: Skin Integrity:  Goal: Skin integrity will stabilize  Description  Skin integrity will stabilize  Outcome: Ongoing     Problem: Discharge Planning:  Goal: Patients continuum of care needs are met  Description  Patients continuum of care needs are met  Outcome: Ongoing     Problem:  Activity Intolerance:  Goal: Ability to tolerate increased activity will improve  Description  Ability to tolerate increased activity will improve  Outcome: Ongoing

## 2020-02-21 NOTE — PROGRESS NOTES
Physical Therapy  Facility/Department: [facility] MED SURG  Re-EVAL Note  NAME: Lamberto Soto  : 1940  MRN: [id number]    Date of Service: 2020    Discharge Recommendations:  Home with OP PT. Assessment   Body structures, Functions, Activity limitations: Decreased functional mobility ; Decreased safe awareness;Decreased balance;Decreased endurance;Decreased strength;Decreased high-level IADLs    Assessment: Pt was initially 2 person assist for mobility and is now functioning with just contact guard assist.   She consistently demonstrated safety w/ mobility and was able to even stand unsupported for 10 minutes. Pt has had therapy during her acute care stay and her mobility has significantly improved. Pt appears back to baseline and would expect safe to return to prior living arrangements. As always with the aging population, would expect family involvement to ensure safety. Pt reports she has 8 children who all love her and would be willing to help her at any time. Pt also reports she is stubborn and knows she has to take her childrens help even though she doesn't want to do it. Pt would benefit from outpatient PT services at time of discharge. Prognosis: Good  Decision Making: Medium Complexity  PT Education: Functional Mobility Training;Transfer Training;Gait Training;Disease Specific Education;General Safety  REQUIRES PT FOLLOW UP: Yes  Activity Tolerance  Activity Tolerance: Patient limited by endurance; Patient limited by fatigue     Patient Diagnosis(es): The primary encounter diagnosis was Declining functional status. A diagnosis of Chronic bilateral low back pain with bilateral sciatica was also pertinent to this visit.      has a past medical history of Aortic valve disease, Arthritis, Chronic back pain, COPD (chronic obstructive pulmonary disease) ([address] Disease of blood and blood forming organ, Diverticulitis, GERD (gastroesophageal reflux disease), History of blood transfusion,

## 2020-02-21 NOTE — CARE COORDINATION
SW received call from [place] with [employer] regarding the appeal has been denied, the pt is at baseline and can receive care at a lower level than a SNF for example home with home care or outpatient therapy.

## 2020-02-21 NOTE — CARE COORDINATION
Social work; spoke with pt and she states she has called a friend of hers and she plans to stay with her at discharge. Asked pt where she lived and she stated, \"I don't want to tell you as you will tell those others\". Assurred her the address of her friend was needed for [employer] who she states she is active with and [employer] with whom she states she gets her 18. No one else would need it. But without a dr. Tejas Wilkinson she can't get home care if needed. She was planning just to take her portable 02. Suggested that does not last a long time and we needed to plan and move over the concentrator or other 02 supplies that may be needed at home for this pt. She also states she will only be staying with her a short time then move to her own apt. On [address]. She did not have that address either. Pt agreed writer welcome to return when her friend comes. Will need paulino for home care if needed.   Doreen Gunderson [facility]

## 2020-02-22 VITALS
TEMPERATURE: 97.9 F | DIASTOLIC BLOOD PRESSURE: 56 MMHG | SYSTOLIC BLOOD PRESSURE: 116 MMHG | WEIGHT: 147 LBS | OXYGEN SATURATION: 93 % | RESPIRATION RATE: 16 BRPM | BODY MASS INDEX: 23.07 KG/M2 | HEART RATE: 71 BPM | HEIGHT: 67 IN

## 2020-02-22 LAB
ABSOLUTE EOS #: 0.28 K/UL (ref 0–0.44)
ABSOLUTE IMMATURE GRANULOCYTE: 0.01 K/UL (ref 0–0.3)
ABSOLUTE LYMPH #: 2.79 K/UL (ref 1.1–3.7)
ABSOLUTE MONO #: 0.42 K/UL (ref 0.1–1.2)
BASOPHILS # BLD: 1 % (ref 0–2)
BASOPHILS ABSOLUTE: 0.03 K/UL (ref 0–0.2)
DIFFERENTIAL TYPE: ABNORMAL
EOSINOPHILS RELATIVE PERCENT: 6 % (ref 1–4)
HCT VFR BLD CALC: 38.1 % (ref 36.3–47.1)
HEMOGLOBIN: 12.6 G/DL (ref 11.9–15.1)
IMMATURE GRANULOCYTES: 0 %
LYMPHOCYTES # BLD: 55 % (ref 24–43)
MCH RBC QN AUTO: 29.7 PG (ref 25.2–33.5)
MCHC RBC AUTO-ENTMCNC: 33.1 G/DL (ref 28.4–34.8)
MCV RBC AUTO: 89.9 FL (ref 82.6–102.9)
MONOCYTES # BLD: 8 % (ref 3–12)
NRBC AUTOMATED: 0 PER 100 WBC
PDW BLD-RTO: 13.1 % (ref 11.8–14.4)
PLATELET # BLD: 169 K/UL (ref 138–453)
PLATELET ESTIMATE: ABNORMAL
PMV BLD AUTO: 10.3 FL (ref 8.1–13.5)
RBC # BLD: 4.24 M/UL (ref 3.95–5.11)
RBC # BLD: ABNORMAL 10*6/UL
SEG NEUTROPHILS: 30 % (ref 36–65)
SEGMENTED NEUTROPHILS ABSOLUTE COUNT: 1.54 K/UL (ref 1.5–8.1)
WBC # BLD: 5.1 K/UL (ref 3.5–11.3)
WBC # BLD: ABNORMAL 10*3/UL

## 2020-02-22 PROCEDURE — 6360000002 HC RX W HCPCS: Performed by: INTERNAL MEDICINE

## 2020-02-22 PROCEDURE — 36415 COLL VENOUS BLD VENIPUNCTURE: CPT

## 2020-02-22 PROCEDURE — 6370000000 HC RX 637 (ALT 250 FOR IP): Performed by: INTERNAL MEDICINE

## 2020-02-22 PROCEDURE — 85025 COMPLETE CBC W/AUTO DIFF WBC: CPT

## 2020-02-22 PROCEDURE — 99217 PR OBSERVATION CARE DISCHARGE MANAGEMENT: CPT | Performed by: FAMILY MEDICINE

## 2020-02-22 PROCEDURE — G0378 HOSPITAL OBSERVATION PER HR: HCPCS

## 2020-02-22 PROCEDURE — 6370000000 HC RX 637 (ALT 250 FOR IP): Performed by: FAMILY MEDICINE

## 2020-02-22 PROCEDURE — 96372 THER/PROPH/DIAG INJ SC/IM: CPT

## 2020-02-22 RX ADMIN — DOCUSATE SODIUM 100 MG: 100 CAPSULE, LIQUID FILLED ORAL at 09:03

## 2020-02-22 RX ADMIN — OXYCODONE AND ACETAMINOPHEN 1 TABLET: 5; 325 TABLET ORAL at 14:47

## 2020-02-22 RX ADMIN — OXYCODONE HYDROCHLORIDE AND ACETAMINOPHEN 500 MG: 500 TABLET ORAL at 09:03

## 2020-02-22 RX ADMIN — ENOXAPARIN SODIUM 40 MG: 40 INJECTION SUBCUTANEOUS at 09:02

## 2020-02-22 RX ADMIN — POLYVINYL ALCOHOL 1 DROP: 14 SOLUTION/ DROPS OPHTHALMIC at 14:48

## 2020-02-22 RX ADMIN — OXYCODONE AND ACETAMINOPHEN 1 TABLET: 5; 325 TABLET ORAL at 09:03

## 2020-02-22 RX ADMIN — CLOPIDOGREL BISULFATE 75 MG: 75 TABLET ORAL at 09:02

## 2020-02-22 RX ADMIN — GABAPENTIN 300 MG: 300 CAPSULE ORAL at 09:03

## 2020-02-22 RX ADMIN — GUAIFENESIN 600 MG: 600 TABLET, EXTENDED RELEASE ORAL at 09:03

## 2020-02-22 RX ADMIN — OXYCODONE AND ACETAMINOPHEN 1 TABLET: 5; 325 TABLET ORAL at 01:38

## 2020-02-22 ASSESSMENT — PAIN SCALES - GENERAL
PAINLEVEL_OUTOF10: 9
PAINLEVEL_OUTOF10: 8
PAINLEVEL_OUTOF10: 6
PAINLEVEL_OUTOF10: 10
PAINLEVEL_OUTOF10: 0

## 2020-02-22 ASSESSMENT — PAIN DESCRIPTION - PROGRESSION
CLINICAL_PROGRESSION: NOT CHANGED
CLINICAL_PROGRESSION: NOT CHANGED

## 2020-02-22 ASSESSMENT — PAIN DESCRIPTION - ONSET
ONSET: ON-GOING
ONSET: ON-GOING

## 2020-02-22 ASSESSMENT — PAIN DESCRIPTION - DESCRIPTORS
DESCRIPTORS: ACHING
DESCRIPTORS: ACHING

## 2020-02-22 ASSESSMENT — PAIN DESCRIPTION - PAIN TYPE
TYPE: ACUTE PAIN

## 2020-02-22 ASSESSMENT — PAIN DESCRIPTION - ORIENTATION
ORIENTATION: RIGHT

## 2020-02-22 ASSESSMENT — PAIN DESCRIPTION - FREQUENCY
FREQUENCY: CONTINUOUS
FREQUENCY: CONTINUOUS

## 2020-02-22 ASSESSMENT — PAIN DESCRIPTION - LOCATION
LOCATION: WRIST

## 2020-02-22 NOTE — PROGRESS NOTES
[facility]    Progress Note    2/22/2020    8:42 AM    Name:   Marlowe Sever  MRN:     [id number]     Acct:      [de-identified]   Room:   2020/2020-01  IP Day:  0  Admit Date:  2/11/2020  3:42 PM    PCP:   Ean Olivarez MD  Code Status:  Full Code    Subjective:     C/C:   Chief Complaint   Patient presents with   [facility] Other     daughter wants her to go to respite due to being combative     Interval History Status: not changed. Patient seen and examined at bedside, no acute events overnight. Continue to be stable, had some diarrhea yesterday , getting slightly better   Patient vitals, labs and all providers notes were reviewed,from overnight shift and morning updates were noted and discussed with the nurse      Brief History:     Per my associate  As documented in the medical record: \"Afsaneh Kebede is a [de-identified] y.o. -American female who reports to the hospital with complaint of altered mental status.  The patient was reportedly brought to the hospital by EMS after becoming combative at home with her daughter, who is her caregiver. ER notes indicate that the patient's daughter stated she was confused. The patient is currently alert and oriented x3, and upon further questioning she endorses having a verbal altercation with her daughter. Curly Kaitlin states this happens periodically.  The patient currently lives with her daughter secondary to her own becoming infested with roaches. Curly Madrigal denies specific complaint. Curly Madrigal is appropriate at this time. Curly Madrigal has past medical history that includes previous TIA, COPD and a known heart murmur. She wears home oxygen. \"      The patient was admitted  Her electrolyte abnormalities were addressed  Blood pressure was monitored and controlled  Respiratory Therapy and Bronchodilators prn   PT/OT was requested  The patient responded well to therapy  DC planning was initiated  Awaiting family choice on facility/precertification  The patient was instructed to follow up with their PCP, Nikky Tubbs MD in one week      Review of Systems:     Constitutional:  negative for chills, fevers, sweats. Some fatigue   Respiratory:  negative for cough, dyspnea on exertion, shortness of breath, wheezing  Cardiovascular:  negative for chest pain, chest pressure/discomfort, lower extremity edema, palpitations  Gastrointestinal:  negative for abdominal pain, constipation, diarrhea, nausea, vomiting  Neurological:  negative for dizziness, headache    Medications: Allergies: Allergies   Allergen Reactions    Anti-Inflammatory Enzyme [Nutritional Supplements]      Patient states allergy to anti inflammatories    Ceclor [Cefaclor]      Doesn't remember    Morphine Other (See Comments)     Urinary retention    Nsaids      Patient states allergy to anti-inflammatories    Penicillins     Propoxyphene     Skelaxin [Metaxalone]     Sulfa Antibiotics Hives    Tadalafil     Tetracycline     Tolmetin      Patient states allergy to anti-inflammatories  Patient states allergy to anti-inflammatories  Patient states allergy to anti-inflammatories    Alendronate Nausea And Vomiting and Nausea Only    Alendronate Sodium Nausea Only    Asa [Aspirin] Other (See Comments)     Hx diverticulitis.   Can't T  Take regular aspirin 325mg but can take baby aspirin 81mg    Erythromycin Nausea And Vomiting and Nausea Only    Erythromycin Base Nausea Only    Temazepam Nausea And Vomiting and Nausea Only    Tetracyclines & Related Nausea And Vomiting    Tramadol Nausea And Vomiting and Nausea Only       Current Meds:   Scheduled Meds:    fentanNYL  50 mcg Intravenous Once    clopidogrel  75 mg Oral Daily    polyethylene glycol  17 g Oral BID    docusate sodium  100 mg Oral BID    budesonide-formoterol  2 puff Inhalation BID    vitamin B-6  100 mg Oral Daily    vitamin C  500 mg Oral Daily    therapeutic multivitamin-minerals  1 Labs:  Hematology:  Recent Labs     02/21/20  0521 02/22/20  0625   WBC 5.2 5.1   RBC 4.34 4.24   HGB 12.8 12.6   HCT 39.4 38.1   MCV 90.8 89.9   MCH 29.5 29.7   MCHC 32.5 33.1   RDW 13.2 13.1    169   MPV 10.3 10.3     Chemistry:  Recent Labs     02/20/20  0845      K 3.7      CO2 26   GLUCOSE 89   BUN 18   CREATININE 0.55   ANIONGAP 10   LABGLOM >60   GFRAA >60   CALCIUM 9.0   No results for input(s): PROT, LABALBU, LABA1C, E7YUEBE, G4GBFJJ, FT4, TSH, AST, ALT, LDH, GGT, ALKPHOS, LABGGT, BILITOT, BILIDIR, AMMONIA, AMYLASE, LIPASE, LACTATE, CHOL, HDL, LDLCHOLESTEROL, CHOLHDLRATIO, TRIG, VLDL, TOW88PS, PHENYTOIN, PHENYF, URICACID, POCGLU in the last 72 hours. ABG:  Lab Results   Component Value Date    PHART 7.410 11/15/2014    EGG2SOZ 42.0 11/15/2014    PO2ART 46.0 11/15/2014    PZY9XJD 26.1 11/15/2014    NBEA NOT REPORTED 11/15/2014    PBEA 1.8 11/15/2014    X3UFTOPI 78.6 11/15/2014    FIO2 ROOM AIR 11/15/2014     Lab Results   Component Value Date/Time    SPECIAL NOT REPORTED 02/13/2020 05:36 PM     Lab Results   Component Value Date/Time    CULTURE NO SIGNIFICANT GROWTH 02/13/2020 05:36 PM       Radiology:  No results found.     Physical Examination:        General appearance:  alert, cooperative and no distress, nasal canula  Mental Status:  oriented to person, place and time and normal affect  Lungs:  clear to auscultation bilaterally, normal effort  Heart:  regular rate and rhythm, no murmur  Abdomen:  soft, nontender, nondistended, normal bowel sounds, no masses, hepatomegaly, splenomegaly  Extremities:  no edema, redness, tenderness in the calves  Skin:  no gross lesions, rashes, induration    Assessment:        Hospital Problems           Last Modified POA    * (Principal) Altered mental status, unspecified 2/11/2020 Yes    COPD (chronic obstructive pulmonary disease) ([facility] [address] 2/11/2020 Yes    Hypokalemia 2/12/2020 Yes    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) 2/12/2020 Yes    Overview Addendum 3/12/2018  1:43 PM by Katiana Swanson     Overview:   Overview:   replace inactive diagnosis  replace inactive diagnosis    Overview:   Overview:   replace inactive diagnosis         Mild aortic insufficiency 2/12/2020 Yes    Essential hypertension 2/11/2020 Yes    Centriacinar emphysema ([address] 2/12/2020 Yes    Supplemental oxygen dependent 2/12/2020 Yes    Chronic respiratory failure with hypoxia ([address] 2/12/2020 Yes    Age-related physical debility 2/11/2020 Yes    Small vessel disease ([address]):  Cerebrovascular  2/12/2020 Yes    Left ventricular diastolic dysfunction 3/38/2044 Yes    Moderate mitral regurgitation 2/12/2020 Yes    Tricuspid regurgitation moderate 2/12/2020 Yes    Diarrhea 2/20/2020 Yes          Plan:         I reviewed hospital course including labs, images and notes    Continue current management. Continue dressing may come for pressure control. Continue correcting electrolyte as needed. Continue breathing treatment and oxygen supplementation as needed. PT/OT. Percocet for pain  Added stool work up for diarrhea  Topical zinc oxide   Placement. Stable for discharge when bed available.   Discussed with the nurse and the patient      Pio Nelson MD  2/22/2020  8:42 AM

## 2020-02-22 NOTE — PLAN OF CARE
Problem: Falls - Risk of:  Goal: Will remain free from falls  Description  Will remain free from falls  Outcome: Ongoing  Goal: Absence of physical injury  Description  Absence of physical injury  Outcome: Ongoing     Problem: Pain:  Goal: Pain level will decrease  Description  Pain level will decrease  Outcome: Ongoing  Goal: Control of acute pain  Description  Control of acute pain  Outcome: Ongoing  Goal: Control of chronic pain  Description  Control of chronic pain  Outcome: Ongoing  Goal: Patient's pain/discomfort is manageable  Description  Patient's pain/discomfort is manageable  Outcome: Ongoing     Problem: Infection:  Goal: Will remain free from infection  Description  Will remain free from infection  Outcome: Ongoing     Problem: Safety:  Goal: Free from accidental physical injury  Description  Free from accidental physical injury  Outcome: Ongoing  Goal: Free from intentional harm  Description  Free from intentional harm  Outcome: Ongoing     Problem: Daily Care:  Goal: Daily care needs are met  Description  Daily care needs are met  Outcome: Ongoing     Problem: Skin Integrity:  Goal: Skin integrity will stabilize  Description  Skin integrity will stabilize  Outcome: Ongoing     Problem: Discharge Planning:  Goal: Patients continuum of care needs are met  Description  Patients continuum of care needs are met  Outcome: Ongoing     Problem:  Activity Intolerance:  Goal: Ability to tolerate increased activity will improve  Description  Ability to tolerate increased activity will improve  Outcome: Ongoing

## 2020-02-22 NOTE — PROGRESS NOTES
CLINICAL PHARMACY NOTE: MEDS TO [address] [place] Patient?: No  Total # of Prescriptions Filled: 1   The following medications were delivered to the patient:  · Percocet   Total # of Interventions Completed: 0  Time Spent (min): 30    Additional Documentation:    Picked up at pharmacy due to no m2b deliveries on saturdays

## 2020-02-22 NOTE — PLAN OF CARE
Fall precautions in place Remains alert and oriented Bed alarm in use Up with walker and standby assist Using call light appropriately  Getting percocet for right hand pain and effective  Afebrile Lungs clear Denies pain with urination No skin breakdown   Problem:  Activity Intolerance:  Goal: Ability to tolerate increased activity will improve  Description  Ability to tolerate increased activity will improve  2/22/2020 1110 by Etta Clayton RN  Outcome: Ongoing  2/22/2020 0335 by Felicia Bernardo RN  Outcome: Ongoing     Problem: Skin Integrity:  Goal: Skin integrity will stabilize  Description  Skin integrity will stabilize  2/22/2020 1110 by Etta Clayton RN  Outcome: Ongoing  2/22/2020 0335 by Felicia Bernardo RN  Outcome: Ongoing     Problem: Infection:  Goal: Will remain free from infection  Description  Will remain free from infection  2/22/2020 1110 by Etta Clayton RN  Outcome: Ongoing  2/22/2020 0335 by Felicia Bernardo RN  Outcome: Ongoing     Problem: Pain:  Goal: Control of acute pain  Description  Control of acute pain  2/22/2020 1110 by Etta Clayton RN  Outcome: Ongoing  2/22/2020 0335 by Felicia Bernardo RN  Outcome: Ongoing     Problem: Falls - Risk of:  Goal: Will remain free from falls  Description  Will remain free from falls  2/22/2020 1110 by Etta Clayton RN  Outcome: Ongoing  2/22/2020 0335 by Felicia Bernardo RN  Outcome: Ongoing

## 2020-02-22 NOTE — PROGRESS NOTES
During head to toe assessment patient informed RN that her friend was supposed to pick her up today so she could be discharged, but she recently found out this friend was having car trouble this evening and would not be able to pick her up until possibly tomorrow.

## 2020-02-23 NOTE — CARE COORDINATION
Pt discharged yesterday 2-22-20  between 5-5:30pm by [place] [id number] ambulance and decided to go to [facility] in [facility] at the last minute. [facility] informed. Cristiane Cai from [facility] notified to pick op oxygen tank that was delivered since pt went by ambulance.

## 2020-03-13 ENCOUNTER — TELEPHONE (OUTPATIENT)
Dept: FAMILY MEDICINE CLINIC | Age: 80
End: 2020-03-13

## 2020-03-13 NOTE — TELEPHONE ENCOUNTER
TRIED TO CALL PT AND MAIL BOX IS FULL ON PHONE TO LET HER KNOW SHE DIDN'T SHOW FOR APT ON Thursday 03/12/2020.  LETTER SENT

## 2020-11-03 PROBLEM — R55 SYNCOPE AND COLLAPSE: Status: RESOLVED | Noted: 2017-11-16 | Resolved: 2020-11-03

## (undated) DEVICE — ZIMMER® STERILE DISPOSABLE TOURNIQUET CUFF WITH PROTECTIVE SLEEVE AND PLC, DUAL PORT, SINGLE BLADDER, 18 IN. (46 CM)

## (undated) DEVICE — DUP USE 394429 BLADE SAW SAG OSCIL MED NAR 5.5MM

## (undated) DEVICE — PADDING CAST W4INXL4YD SYN NAT PROTOUCH

## (undated) DEVICE — Z DISCONTINUED BY MEDLINE USE 2271199 TRAY PREP WET 4% CHG SCRB SOL

## (undated) DEVICE — INTENDED FOR TISSUE SEPARATION, AND OTHER PROCEDURES THAT REQUIRE A SHARP SURGICAL BLADE TO PUNCTURE OR CUT.: Brand: BARD-PARKER ® CARBON RIB-BACK BLADES

## (undated) DEVICE — GLOVE SURG SZ 75 L12IN FNGR THK87MIL WHT LTX FREE

## (undated) DEVICE — GLOVE SURG SZ 8 L12IN FNGR THK87MIL WHT LTX FREE

## (undated) DEVICE — ELECTRODE PT RET AD L9FT HI MOIST COND ADH HYDRGEL CORDED

## (undated) DEVICE — Device

## (undated) DEVICE — MASTISOL ADHESIVE LIQ 2/3ML

## (undated) DEVICE — SUTURE MCRYL + SZ 5 0 L18IN ABSRB UD PC 3 L16MM 3 8 CIR MCP844G

## (undated) DEVICE — STANDARD HYPODERMIC NEEDLE,POLYPROPYLENE HUB: Brand: MONOJECT